# Patient Record
Sex: MALE | Race: WHITE | NOT HISPANIC OR LATINO | URBAN - METROPOLITAN AREA
[De-identification: names, ages, dates, MRNs, and addresses within clinical notes are randomized per-mention and may not be internally consistent; named-entity substitution may affect disease eponyms.]

---

## 2022-04-11 ENCOUNTER — INPATIENT (INPATIENT)
Facility: HOSPITAL | Age: 87
LOS: 38 days | Discharge: HOME HEALTH | DRG: 885 | End: 2022-05-20
Attending: PSYCHIATRY & NEUROLOGY | Admitting: PSYCHIATRY & NEUROLOGY
Payer: MEDICARE

## 2022-04-11 VITALS
SYSTOLIC BLOOD PRESSURE: 106 MMHG | TEMPERATURE: 98 F | OXYGEN SATURATION: 96 % | DIASTOLIC BLOOD PRESSURE: 55 MMHG | RESPIRATION RATE: 17 BRPM | HEIGHT: 67 IN | WEIGHT: 149.91 LBS | HEART RATE: 77 BPM

## 2022-04-11 DIAGNOSIS — F33.2 MAJOR DEPRESSIVE DISORDER, RECURRENT SEVERE WITHOUT PSYCHOTIC FEATURES: ICD-10-CM

## 2022-04-11 LAB
ALBUMIN SERPL ELPH-MCNC: 3.7 G/DL — SIGNIFICANT CHANGE UP (ref 3.3–5)
ALP SERPL-CCNC: 58 U/L — SIGNIFICANT CHANGE UP (ref 40–120)
ALT FLD-CCNC: 18 U/L — SIGNIFICANT CHANGE UP (ref 10–45)
AMPHET UR-MCNC: NEGATIVE — SIGNIFICANT CHANGE UP
ANION GAP SERPL CALC-SCNC: 10 MMOL/L — SIGNIFICANT CHANGE UP (ref 5–17)
APAP SERPL-MCNC: <5 UG/ML — LOW (ref 10–30)
APPEARANCE UR: CLEAR — SIGNIFICANT CHANGE UP
AST SERPL-CCNC: 18 U/L — SIGNIFICANT CHANGE UP (ref 10–40)
BARBITURATES UR SCN-MCNC: NEGATIVE — SIGNIFICANT CHANGE UP
BASOPHILS # BLD AUTO: 0.04 K/UL — SIGNIFICANT CHANGE UP (ref 0–0.2)
BASOPHILS NFR BLD AUTO: 0.5 % — SIGNIFICANT CHANGE UP (ref 0–2)
BENZODIAZ UR-MCNC: NEGATIVE — SIGNIFICANT CHANGE UP
BILIRUB SERPL-MCNC: 0.3 MG/DL — SIGNIFICANT CHANGE UP (ref 0.2–1.2)
BILIRUB UR-MCNC: NEGATIVE — SIGNIFICANT CHANGE UP
BUN SERPL-MCNC: 29 MG/DL — HIGH (ref 7–23)
CALCIUM SERPL-MCNC: 9.1 MG/DL — SIGNIFICANT CHANGE UP (ref 8.4–10.5)
CHLORIDE SERPL-SCNC: 102 MMOL/L — SIGNIFICANT CHANGE UP (ref 96–108)
CO2 SERPL-SCNC: 25 MMOL/L — SIGNIFICANT CHANGE UP (ref 22–31)
COCAINE METAB.OTHER UR-MCNC: NEGATIVE — SIGNIFICANT CHANGE UP
COLOR SPEC: YELLOW — SIGNIFICANT CHANGE UP
CREAT SERPL-MCNC: 1.43 MG/DL — HIGH (ref 0.5–1.3)
DIFF PNL FLD: NEGATIVE — SIGNIFICANT CHANGE UP
EGFR: 45 ML/MIN/1.73M2 — LOW
EOSINOPHIL # BLD AUTO: 0 K/UL — SIGNIFICANT CHANGE UP (ref 0–0.5)
EOSINOPHIL NFR BLD AUTO: 0 % — SIGNIFICANT CHANGE UP (ref 0–6)
ETHANOL SERPL-MCNC: <10 MG/DL — SIGNIFICANT CHANGE UP (ref 0–10)
GLUCOSE SERPL-MCNC: 109 MG/DL — HIGH (ref 70–99)
GLUCOSE UR QL: NEGATIVE — SIGNIFICANT CHANGE UP
HCT VFR BLD CALC: 30.2 % — LOW (ref 39–50)
HGB BLD-MCNC: 9.9 G/DL — LOW (ref 13–17)
IMM GRANULOCYTES NFR BLD AUTO: 0.2 % — SIGNIFICANT CHANGE UP (ref 0–1.5)
KETONES UR-MCNC: NEGATIVE — SIGNIFICANT CHANGE UP
LEUKOCYTE ESTERASE UR-ACNC: NEGATIVE — SIGNIFICANT CHANGE UP
LYMPHOCYTES # BLD AUTO: 1.01 K/UL — SIGNIFICANT CHANGE UP (ref 1–3.3)
LYMPHOCYTES # BLD AUTO: 12.4 % — LOW (ref 13–44)
MCHC RBC-ENTMCNC: 32.8 GM/DL — SIGNIFICANT CHANGE UP (ref 32–36)
MCHC RBC-ENTMCNC: 33.7 PG — SIGNIFICANT CHANGE UP (ref 27–34)
MCV RBC AUTO: 102.7 FL — HIGH (ref 80–100)
METHADONE UR-MCNC: NEGATIVE — SIGNIFICANT CHANGE UP
MONOCYTES # BLD AUTO: 0.68 K/UL — SIGNIFICANT CHANGE UP (ref 0–0.9)
MONOCYTES NFR BLD AUTO: 8.3 % — SIGNIFICANT CHANGE UP (ref 2–14)
NEUTROPHILS # BLD AUTO: 6.4 K/UL — SIGNIFICANT CHANGE UP (ref 1.8–7.4)
NEUTROPHILS NFR BLD AUTO: 78.6 % — HIGH (ref 43–77)
NITRITE UR-MCNC: NEGATIVE — SIGNIFICANT CHANGE UP
NRBC # BLD: 0 /100 WBCS — SIGNIFICANT CHANGE UP (ref 0–0)
OPIATES UR-MCNC: NEGATIVE — SIGNIFICANT CHANGE UP
PCP SPEC-MCNC: SIGNIFICANT CHANGE UP
PCP UR-MCNC: NEGATIVE — SIGNIFICANT CHANGE UP
PH UR: 6 — SIGNIFICANT CHANGE UP (ref 5–8)
PLATELET # BLD AUTO: 213 K/UL — SIGNIFICANT CHANGE UP (ref 150–400)
POTASSIUM SERPL-MCNC: 3.9 MMOL/L — SIGNIFICANT CHANGE UP (ref 3.5–5.3)
POTASSIUM SERPL-SCNC: 3.9 MMOL/L — SIGNIFICANT CHANGE UP (ref 3.5–5.3)
PROT SERPL-MCNC: 6.7 G/DL — SIGNIFICANT CHANGE UP (ref 6–8.3)
PROT UR-MCNC: NEGATIVE MG/DL — SIGNIFICANT CHANGE UP
RBC # BLD: 2.94 M/UL — LOW (ref 4.2–5.8)
RBC # FLD: 14.9 % — HIGH (ref 10.3–14.5)
SALICYLATES SERPL-MCNC: <0.3 MG/DL — LOW (ref 2.8–20)
SARS-COV-2 RNA SPEC QL NAA+PROBE: NEGATIVE — SIGNIFICANT CHANGE UP
SODIUM SERPL-SCNC: 137 MMOL/L — SIGNIFICANT CHANGE UP (ref 135–145)
SP GR SPEC: 1.02 — SIGNIFICANT CHANGE UP (ref 1–1.03)
THC UR QL: NEGATIVE — SIGNIFICANT CHANGE UP
UROBILINOGEN FLD QL: 0.2 E.U./DL — SIGNIFICANT CHANGE UP
WBC # BLD: 8.15 K/UL — SIGNIFICANT CHANGE UP (ref 3.8–10.5)
WBC # FLD AUTO: 8.15 K/UL — SIGNIFICANT CHANGE UP (ref 3.8–10.5)

## 2022-04-11 PROCEDURE — 99285 EMERGENCY DEPT VISIT HI MDM: CPT

## 2022-04-11 NOTE — ED ADULT NURSE NOTE - OBJECTIVE STATEMENT
Patient presents to ED c/o being depressed. Patient states that his daughters urged him to come in but states "I don't think I need to be here." Patient denies SI/HI or audio/visual hallucinations. Patient resting in bed in no acute distress at this time. PMH Depressed and Hard of hearing with hearing aid in right ear. NKA status confirmed.

## 2022-04-11 NOTE — ED BEHAVIORAL HEALTH ASSESSMENT NOTE - DETAILS
spoke to Dr Grant regarding the need to complete 9.27 form handoff to PRICILA Campa according to daughter, patient has never had any suicidal ideation/intent/plan

## 2022-04-11 NOTE — ED BEHAVIORAL HEALTH ASSESSMENT NOTE - OTHER
refused to answer retired, domiciled with daughter continues to think that he is in a bad financial predicament

## 2022-04-11 NOTE — ED BEHAVIORAL HEALTH ASSESSMENT NOTE - SUMMARY
Patient is a 89yo retired  M, domiciled with daughter Brianda (), w/ PMH of hearing loss using hearing aid, colitis, HLD, HTN, on Xarelto 15mg daily (PCP Dr Barrett  #1 #5), PPH of depression, multiple psych hospitalization requiring ECT, last 4 years ago at [facility], currently on sertraline 50mg bid, olanzapine 5mg qd, sees Dr Josh Simmons () for outpatient, presents today accompanied by daughters due to worsening depression and obsessive thought. Patient is sent on recommendation by psychiatrist as patient's depression is worsening, not responding to medications.    Patient seen in quiet room with daughter Valery/Brianda present. Upon evaluation, patient meets criteria for MDD (depressed mood, anhedonia, impaired concentration, anergia, weight loss, hypersomnia, hopelessness) with no acute SI. Outpatient attempts to stabilize patient has not been successful. Daughter had tried to work with outpatient psychiatrist Dr Simmons to manage patient's symptoms as outpatient but he has not responded to medications, only to ECT like he was 3-4 years ago (according to daughter, 4-5 sessions of ECT would show improvements). Daughter had tried to seek outpatient ECT however the waitlist is 4-5 weeks. Daughters and psychiatrist are concerned of patient's current state of depression. Patient is completely independent with ADLs and daughter assist him with some iADLs.    Due to current active depressive symptoms, nonresponsive to outpatient treatment, and collateral from psychiatrist and family, patient meets involuntary admission criteria. 2PC will be signed by ED attending, patient to be admitted to 8U for stabilization and possible ECT as inpatient.    Plan:  - Admit to 8U  - Does not need 1:1 in 8U. Continue 1:1 in ED pending transfer  - Continue medication as follows:  Myrbetriq 50mg daily  Xarelto 15mg daily with meals  Sertraline 50mg twice daily  Olanzapine 5mg daily  Losartan 50mg daily  Furosemide 20mg twice daily  Atorvastatin 20mg daily  Mesalamine 1.2mg 2 tablets twice daily  Vit D3 2000 units  Magnesium 500mg  -Add on TSH and rule out medical causes of depression

## 2022-04-11 NOTE — ED BEHAVIORAL HEALTH ASSESSMENT NOTE - RISK ASSESSMENT
High Acute Suicide Risk High risk for self harm due to acute dysphoria, hopelessness, not responding to outpatient treatment    Protective factor is supportive family, has established psychiatric care, has no self harm history, no access to guns.

## 2022-04-11 NOTE — ED ADULT NURSE NOTE - CHIEF COMPLAINT QUOTE
Patient arrives ambulatory with daughter for eval of "deep depression."  Patient reports, "a whole raft of problems, personal and financial."  Denies SI/HI/AH/VH.  Hx ECT treatment at [employer] ~4 years ago, patient's doctor spoke with Dr. Pittman "they are expecting him."  Patient is hard of hearing, hearing aid right ear.

## 2022-04-11 NOTE — ED BEHAVIORAL HEALTH ASSESSMENT NOTE - DESCRIPTION
Stable. Patient did bloodwork, did not require meds. Patient is ambulatory able to walk without assistance to toilet/quiet room. see above patient refused to elaborate. Currently lives with daughter Brianda

## 2022-04-11 NOTE — ED BEHAVIORAL HEALTH ASSESSMENT NOTE - OTHER PAST PSYCHIATRIC HISTORY (INCLUDE DETAILS REGARDING ONSET, COURSE OF ILLNESS, INPATIENT/OUTPATIENT TREATMENT)
Patient has history of depression, multiple hospitalizations, last ECT in 4 years ago at [facility]. Refused to provide further information regarding his psychiatric illness

## 2022-04-11 NOTE — ED BEHAVIORAL HEALTH ASSESSMENT NOTE - HPI (INCLUDE ILLNESS QUALITY, SEVERITY, DURATION, TIMING, CONTEXT, MODIFYING FACTORS, ASSOCIATED SIGNS AND SYMPTOMS)
Patient is a 89yo retired  M, domiciled with daughter Brianda (), w/ PMH of hearing loss using hearing aid, colitis, HLD, HTN, on Xarelto 15mg daily (PCP Dr Barrett  #1 #5), PPH of depression, multiple psych hospitalization requiring ECT, last 4 years ago at [facility], currently on sertraline 50mg bid, olanzapine 5mg qd, sees Dr Josh Simmons () for outpatient, presents today accompanied by daughters due to worsening depression and obsessive thought. Patient is sent on recommendation by psychiatrist as patient's depression is worsening, not responding to medications.    Patient seen in quiet room with daughter Valery/Brianda present. Patient is somewhat partially uncooperative during interview, appears miserable, defeated, and annoyed. He kept repeating that people are not going to care for him as he could not hear them. Patient says that he has been feeling more depressed, has no olivia in doing his activities, sleeping more than usual (16hr/day), no energy, poor concentration, and 15 lbs weight loss. He has no active suicidal ideation but feels miserable that he is in the hospital and said to his daughter 'you might as well shoot me', denies having access to firearm or have weapons at home. He denies manic symptoms, denies nightmares. Thinks his memory is good. He could not identify triggers to his depressive symptom although noted improvements with ECT. He does not want to be admitted, and feel very concerned about his financial situation.    Daughters Valery and Brianda provided collateral. Brianda noted that after patient had colitis around a few weeks ago, patient's depression has worsened. When patient becomes more depressed, he became fixated with the family's finances and had concerns that there would be no food or that they would be kicked out from the apartment. Daughter had to put lock in her bedroom as patient would come to her bed to say that they are running out of money. Patient has become less active, refusing to exercise like he used to regularly. Daughter had tried to work with outpatient psychiatrist Dr Simmons to manage patient's symptoms as outpatient but he has not responded to medications, only to ECT like he was 3-4 years ago (according to daughter, 4-5 sessions of ECT would show improvements). Daughter had tried to seek outpatient ECT however the waitlist is 4-5 weeks. Daughters and psychiatrist are concerned of patient's current state of depression. Patient is completely independent with ADLs and daughter assist him with some iADLs.

## 2022-04-11 NOTE — ED ADULT TRIAGE NOTE - CHIEF COMPLAINT QUOTE
Patient arrives ambulatory with daughter for eval of "deep depression."  Patient reports, "a whole raft of problems, personal and financial."  Denies SI/HI/AH/VH.  Hx ECT treatment at [facility] ~4 years ago, patient's doctor spoke with Dr. Pittman "they are expecting him."  Patient is hard of hearing, hearing aid right ear.

## 2022-04-11 NOTE — ED PROVIDER NOTE - CLINICAL SUMMARY MEDICAL DECISION MAKING FREE TEXT BOX
here w/ worsening depression despite outpatient tx  wll plan for admission to 8 uris for further treatment

## 2022-04-11 NOTE — ED PROVIDER NOTE - OBJECTIVE STATEMENT
89M retired  M, domiciled with daughter Brianda (), w/ PMH of hearing loss using hearing aid, colitis, HLD, HTN, on Xarelto 15mg daily (PCP Dr Barrett  #1 #5), PPH of depression, multiple psych hospitalization requiring ECT, last 4 years ago at [facility], currently on sertraline 50mg bid, olanzapine 5mg qd, sees Dr Josh Simmons () for outpatient, presents today accompanied by daughters due to worsening depression and obsessive thought. Patient is sent on recommendation by psychiatrist as patient's depression is worsening, not responding to medications.

## 2022-04-11 NOTE — ED BEHAVIORAL HEALTH ASSESSMENT NOTE - CURRENT MEDICATION
Myrbetriq 50mg daily  Xarelto 15mg daily with meals  Sertraline 50mg twice daily  Olanzapine 5mg daily  Losartan 50mg daily  Furosemide 20mg twice daily  Atorvastatin 20mg daily  Mesalamine 1.2mg 2 tablets twice daily  Vit D3 2000 units  Magnesium 500mg

## 2022-04-12 PROCEDURE — 99233 SBSQ HOSP IP/OBS HIGH 50: CPT

## 2022-04-12 RX ORDER — FUROSEMIDE 40 MG
20 TABLET ORAL
Refills: 0 | Status: DISCONTINUED | OUTPATIENT
Start: 2022-04-12 | End: 2022-04-18

## 2022-04-12 RX ORDER — LOSARTAN POTASSIUM 100 MG/1
50 TABLET, FILM COATED ORAL DAILY
Refills: 0 | Status: DISCONTINUED | OUTPATIENT
Start: 2022-04-12 | End: 2022-04-18

## 2022-04-12 RX ORDER — MAGNESIUM OXIDE 400 MG ORAL TABLET 241.3 MG
400 TABLET ORAL DAILY
Refills: 0 | Status: DISCONTINUED | OUTPATIENT
Start: 2022-04-12 | End: 2022-05-12

## 2022-04-12 RX ORDER — ATORVASTATIN CALCIUM 80 MG/1
20 TABLET, FILM COATED ORAL DAILY
Refills: 0 | Status: DISCONTINUED | OUTPATIENT
Start: 2022-04-12 | End: 2022-05-12

## 2022-04-12 RX ORDER — RIVAROXABAN 15 MG-20MG
15 KIT ORAL DAILY
Refills: 0 | Status: DISCONTINUED | OUTPATIENT
Start: 2022-04-12 | End: 2022-04-19

## 2022-04-12 RX ORDER — CHOLECALCIFEROL (VITAMIN D3) 125 MCG
2000 CAPSULE ORAL DAILY
Refills: 0 | Status: DISCONTINUED | OUTPATIENT
Start: 2022-04-12 | End: 2022-05-12

## 2022-04-12 RX ORDER — SERTRALINE 25 MG/1
50 TABLET, FILM COATED ORAL
Refills: 0 | Status: DISCONTINUED | OUTPATIENT
Start: 2022-04-12 | End: 2022-04-14

## 2022-04-12 RX ORDER — METHYLPREDNISOLONE 4 MG
500 TABLET ORAL DAILY
Refills: 0 | Status: DISCONTINUED | OUTPATIENT
Start: 2022-04-12 | End: 2022-04-12

## 2022-04-12 RX ORDER — OLANZAPINE 15 MG/1
5 TABLET, FILM COATED ORAL DAILY
Refills: 0 | Status: DISCONTINUED | OUTPATIENT
Start: 2022-04-12 | End: 2022-04-14

## 2022-04-12 RX ADMIN — OLANZAPINE 5 MILLIGRAM(S): 15 TABLET, FILM COATED ORAL at 11:12

## 2022-04-12 RX ADMIN — Medication 20 MILLIGRAM(S): at 19:16

## 2022-04-12 RX ADMIN — SERTRALINE 50 MILLIGRAM(S): 25 TABLET, FILM COATED ORAL at 11:12

## 2022-04-12 RX ADMIN — LOSARTAN POTASSIUM 50 MILLIGRAM(S): 100 TABLET, FILM COATED ORAL at 11:12

## 2022-04-12 RX ADMIN — Medication 20 MILLIGRAM(S): at 11:19

## 2022-04-12 RX ADMIN — MAGNESIUM OXIDE 400 MG ORAL TABLET 400 MILLIGRAM(S): 241.3 TABLET ORAL at 11:11

## 2022-04-12 NOTE — BH PATIENT PROFILE - FALL HARM RISK - HARM RISK INTERVENTIONS
Communicate Risk of Fall with Harm to all staff/Monitor for mental status changes/Monitor gait and stability/Move patient closer to nurses' station/Reinforce activity limits and safety measures with patient and family/Tailored Fall Risk Interventions/Visual Cue: Yellow wristband and red socks/Bed in lowest position, wheels locked, appropriate side rails in place/Non-slip footwear when patient is out of bed/Physically safe environment - no spills, clutter or unnecessary equipment/Purposeful Proactive Rounding

## 2022-04-12 NOTE — BH INPATIENT PSYCHIATRY ASSESSMENT NOTE - NSBHASSESSSUMMFT_PSY_ALL_CORE
Mr. Arriaga is a 89-year-old retired man, domiciled with daughter Brainda (), w/ PMH of hearing loss using hearing aid, colitis, HLD, HTN, on Xarelto 15mg daily (PCP Dr Barrett  #1 #5), PPH of depression, multiple psych hospitalization requiring ECT, last 4 years ago at [facility], currently on sertraline 50mg bid, olanzapine 5mg qd, sees Dr Josh Simmons () for outpatient, presenting due to worsening depression and obsessive thought.    Due to current active depressive symptoms, nonresponsive to outpatient treatment, and collateral from psychiatrist and family, patient meets involuntary admission criteria. He has been admitted to 8U for stabilization and possible ECT as inpatient. Despite not endorsing suicidality, the patient has experienced weight loss 2/2 severe depression, so ECT may be a good choice for him. Unfortunately, he is currently undecided regarding this treatment and has not yet given permission. Will continue to reassess and will consult medicine for clearance in the meantime.  MoCA score 20/30. Unclear how much of this is pseudodementia vs neurocognitive impairment vs hearing loss.      Plan:  - Does not need 1:1 in 8U  - Continue medication as follows:     Xarelto 15mg daily with meals     Sertraline 50mg twice daily     Olanzapine 5mg daily     Losartan 50mg daily     Furosemide 20mg twice daily     Atorvastatin 20mg daily     Vit D3 2000 units     Magnesium 500mg  - Need clarification on "Mesalamine 1.2mg 2 tablets twice daily"; this is not a normal dose, as most are in the 500-1000mg range  - Need daughter to bring in Myrbetriq 50mg daily, as it is not on formulary at [facility]  - Consider starting Wellbutrin or other activating antidepressant  - F/u labs:     TSH     RPR     B12     Folate     Lipid profile    4/12: Pt reluctant to engage today; does not want to be on [facility]. Equivocating regarding ECT. Clearly depressed. MoCA 20/30. Will continue to monitor and to discuss ECT.

## 2022-04-12 NOTE — BH INPATIENT PSYCHIATRY ASSESSMENT NOTE - MODIFICATIONS
Observe ; medical evaluation; consider for ECT but patient seems to reject it at this time.  Consider alternatives such as augmentation with low dose Welbutrin or low dose Abilify  etc.

## 2022-04-12 NOTE — CHART NOTE - NSCHARTNOTEFT_GEN_A_CORE
[facility]– [place]  PHYSICAL EXAM: Agree/Declined    VITALS: T(C): 36.6 (04-12-22 @ 01:50), Max: 36.7 (04-11-22 @ 20:46)  HR: 68 (04-12-22 @ 01:50) (67 - 77)  BP: 142/52 (04-12-22 @ 01:50) (106/55 - 157/68)  RR: 18 (04-12-22 @ 01:50) (17 - 18)  SpO2: 98% (04-12-22 @ 01:50) (96% - 99%)      GENERAL: NAD, comfortable, ambulating  HEAD:  Atraumatic, Normocephalic  EYES: EOMI, PERRLA, conjunctiva and sclera clear  ENT: Moist mucous membranes  NECK: Supple, No JVD  CHEST/LUNG: Clear to auscultation bilaterally; No rales, rhonchi, wheezing, or rubs. Unlabored respirations  HEART: Regular rate and rhythm; No murmurs, rubs, or gallops  ABDOMEN: BSx4; Soft, nontender, nondistended  EXTREMITIES:  No clubbing, cyanosis, or edema  NERVOUS SYSTEM:  A&Ox3, no focal deficits   SKIN: No rashes or lesions [facility]  PHYSICAL EXAM: Agree    VITALS: T(C): 36.6 (04-12-22 @ 01:50), Max: 36.7 (04-11-22 @ 20:46)  HR: 68 (04-12-22 @ 01:50) (67 - 77)  BP: 142/52 (04-12-22 @ 01:50) (106/55 - 157/68)  RR: 18 (04-12-22 @ 01:50) (17 - 18)  SpO2: 98% (04-12-22 @ 01:50) (96% - 99%)      GENERAL: NAD, comfortable, ambulating; quite thin  HEAD:  Atraumatic, Normocephalic  EYES: EOMI, PERRLA, conjunctiva and sclera clear  ENT: Moist mucous membranes  NECK: Supple, No JVD  CHEST/LUNG: Clear to auscultation bilaterally; No rales, rhonchi, wheezing, or rubs. Unlabored respirations  HEART: Regular rate and rhythm; No murmurs, rubs, or gallops  ABDOMEN: BSx4; Soft, nontender, nondistended  EXTREMITIES:  No clubbing, cyanosis, or edema  NERVOUS SYSTEM:  A&Ox3, no focal deficits; severe bilateral hearing loss  SKIN: No rashes or lesions

## 2022-04-12 NOTE — BH SOCIAL WORK INITIAL PSYCHOSOCIAL EVALUATION - OTHER PAST PSYCHIATRIC HISTORY (INCLUDE DETAILS REGARDING ONSET, COURSE OF ILLNESS, INPATIENT/OUTPATIENT TREATMENT)
Patient has history of depression, multiple hospitalizations, last ECT in 4 years ago at [facility]. Refused to provide further information regarding his psychiatric illness

## 2022-04-12 NOTE — BH INPATIENT PSYCHIATRY ASSESSMENT NOTE - HPI (INCLUDE ILLNESS QUALITY, SEVERITY, DURATION, TIMING, CONTEXT, MODIFYING FACTORS, ASSOCIATED SIGNS AND SYMPTOMS)
Mr. Arriaga is a 89-year-old retired man, domiciled with daughter Brianda (), w/ PMH of hearing loss using hearing aid, colitis, HLD, HTN, on Xarelto 15mg daily (PCP Dr Barrett  #1 #5), PPH of depression, multiple psych hospitalization requiring ECT, last 4 years ago at [facility], currently on sertraline 50mg bid, olanzapine 5mg qd, sees Dr Josh Simmons () for outpatient, presenting due to worsening depression and obsessive thought.    Per CL service (Dr. Knox):  "Patient is sent on recommendation by psychiatrist as patient's depression is worsening, not responding to medications.  Patient is somewhat partially uncooperative... appears miserable, defeated, and annoyed. He kept repeating that people are not going to care for him as he could not hear them. Patient says that he has been feeling more depressed, has no olivia in doing his activities, sleeping more than usual (16hr/day), no energy, poor concentration, and 15 lbs weight loss. He has no active suicidal ideation but feels miserable that he is in the hospital and said to his daughter 'you might as well shoot me', denies having access to firearm or have weapons at home. He denies manic symptoms, denies nightmares. Thinks his memory is good. He could not identify triggers to his depressive symptom although noted improvements with ECT. He does not want to be admitted, and feel very concerned about his financial situation.    Daughters Valery and Brianda provided collateral. Brianda noted that after patient had colitis around a few weeks ago, patient's depression has worsened. When patient becomes more depressed, he became fixated with the family's finances and had concerns that there would be no food or that they would be kicked out from the apartment. Daughter had to put lock in her bedroom as patient would come to her bed to say that they are running out of money. Patient has become less active, refusing to exercise like he used to regularly. Daughter had tried to work with outpatient psychiatrist Dr Simmons to manage patient's symptoms as outpatient but he has not responded to medications, only to ECT like he was 3-4 years ago (according to daughter, 4-5 sessions of ECT would show improvements). Daughter had tried to seek outpatient ECT however the waitlist is 4-5 weeks. Daughters and psychiatrist are concerned of patient's current state of depression. Patient is completely independent with ADLs and daughter assist him with some iADLs."    Pt was admitted to [facility] on 2PC; transferred safely and without incident.    On initial interview on the unit, Mr. Arriaga presented much as described by Dr. Knox: slightly irritated, annoyed at being here, low affect, very hard of hearing. Writer had to repeat questions several times at a very loud volume, but Mr. Arriaga corroborated the story above; he admitted to having felt depressed for "a while" and that his outpatient medications were not particularly helpful. He was not able to fully elaborate on the details of his illness but did admit to "lots" of sleep, poor energy, low mood, poor concentration and lack of interest in activities. He asked repeatedly for discharge and equivocated regarding ECT, neither granting nor rescinding permission. He acknowledged that it had been helpful 4-5 years ago (calling it "ECP") but could not describe other successful (or even partially so) past treatments.  Mr. 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 He lives with his daughter (Brianda) but hates feeling like he relies on her (even though he is independent with ADLs per reports). He denied drug or alcohol use, denied SI, and denied symptoms of psychosis. He was unable to explain worries about running out of money (see above).      Note: Mr. Arriaga scored a 20/30 on today's MoCA exam. However, he is extremely hard of hearing. Also worth noting that he used "larger" F-words during exam, such as "forethought" or "flourish."    Call placed to kb Lamar ([phone]) at approximately 3 pm; left message.

## 2022-04-12 NOTE — BH INPATIENT PSYCHIATRY ASSESSMENT NOTE - NSBHMETABOLIC_PSY_ALL_CORE_FT
BMI: BMI (kg/m2): 22.7 (04-12-22 @ 09:00)  HbA1c:   Glucose:   BP: 161/100 (04-12-22 @ 09:00) (106/55 - 161/100)  Lipid Panel:  BMI: BMI (kg/m2): 22.7 (04-12-22 @ 09:00)  HbA1c:   Glucose:   BP: 92/50 (04-12-22 @ 16:40) (92/50 - 161/100)  Lipid Panel:

## 2022-04-12 NOTE — BH INPATIENT PSYCHIATRY ASSESSMENT NOTE - OTHER PAST PSYCHIATRIC HISTORY (INCLUDE DETAILS REGARDING ONSET, COURSE OF ILLNESS, INPATIENT/OUTPATIENT TREATMENT)
Patient has history of depression, multiple hospitalizations, last ECT in 4 years ago at [facility]. Did not provide further information regarding his psychiatric illness.

## 2022-04-12 NOTE — BH INPATIENT PSYCHIATRY ASSESSMENT NOTE - CASE SUMMARY
89-year-old retired man, domiciled with daughter Brianda (), w/ PMH of hearing loss using hearing aid, colitis, HLD, HTN, on Xarelto 15mg daily (PCP Dr Barrett  #1 #5), PPH of depression, multiple psych hospitalization requiring ECT, last 4 years ago at [facility], currently on sertraline 50mg bid, olanzapine 5mg qd, sees Dr Josh Simmons () for outpatient, presenting due to worsening depression and obsessive thought.

## 2022-04-12 NOTE — ED BEHAVIORAL HEALTH NOTE - BEHAVIORAL HEALTH NOTE
Received handoff from day team Dr. Knox regarding patient. Pending C legals to be completed and orders to be placed. Handoff was already provided to [facility].    Patient was interviewed and part B completed at [facility]. Orders have been entered with the exception of "Myrbetriq 50mg daily" and "Mesalamine 1.2mg 2 tablets twice daily." Not on formulary. Discussed with [facility] and they recommended that patient bring bottles to determine brand and/or replacement medication can be determined in am.

## 2022-04-12 NOTE — BH INPATIENT PSYCHIATRY ASSESSMENT NOTE - NSBHCHARTREVIEWVS_PSY_A_CORE FT
Vital Signs Last 24 Hrs  T(C): 36.4 (04-12-22 @ 09:00), Max: 36.7 (04-11-22 @ 20:46)  T(F): 97.6 (04-12-22 @ 09:00), Max: 98 (04-11-22 @ 20:46)  HR: 79 (04-12-22 @ 09:00) (67 - 79)  BP: 161/100 (04-12-22 @ 09:00) (106/55 - 161/100)  BP(mean): --  RR: 16 (04-12-22 @ 09:00) (16 - 18)  SpO2: 97% (04-12-22 @ 09:00) (96% - 99%)     Vital Signs Last 24 Hrs  T(C): 36.9 (04-12-22 @ 16:40), Max: 36.9 (04-12-22 @ 16:40)  T(F): 98.4 (04-12-22 @ 16:40), Max: 98.4 (04-12-22 @ 16:40)  HR: 80 (04-12-22 @ 16:40) (67 - 80)  BP: 92/50 (04-12-22 @ 16:40) (92/50 - 161/100)  BP(mean): --  RR: 18 (04-12-22 @ 16:40) (16 - 18)  SpO2: 97% (04-12-22 @ 16:40) (97% - 99%)

## 2022-04-12 NOTE — BH INPATIENT PSYCHIATRY ASSESSMENT NOTE - CURRENT MEDICATION
MEDICATIONS  (STANDING):  atorvastatin 20 milliGRAM(s) Oral daily  cholecalciferol 2000 Unit(s) Oral daily  furosemide    Tablet 20 milliGRAM(s) Oral two times a day  losartan 50 milliGRAM(s) Oral daily  magnesium oxide 400 milliGRAM(s) Oral daily  OLANZapine 5 milliGRAM(s) Oral daily  rivaroxaban 15 milliGRAM(s) Oral daily  sertraline 50 milliGRAM(s) Oral two times a day    MEDICATIONS  (PRN):

## 2022-04-12 NOTE — DIETITIAN NUTRITION RISK NOTIFICATION - TREATMENT: THE FOLLOWING DIET HAS BEEN RECOMMENDED
1. Continue regular diet  >>Add low fiber restriction d/t colitis-pending MD verification  2. Monitor PO intake, honor food preferences as available  3. Recommend MVI to promote overall health  4. Align nutrition w/GOC at all times  >>Family reports Mexalaamine 1123mg (2caps/BID) & Butexonide 3mg (3 caps/ QD)  >>Family requested ECT treatment w/ Dr. Garcia  5. RD continue to follow per moderate nutrition risk

## 2022-04-13 LAB
A1C WITH ESTIMATED AVERAGE GLUCOSE RESULT: 5.7 % — HIGH (ref 4–5.6)
CHOLEST SERPL-MCNC: 120 MG/DL — SIGNIFICANT CHANGE UP
ESTIMATED AVERAGE GLUCOSE: 117 MG/DL — HIGH (ref 68–114)
HDLC SERPL-MCNC: 60 MG/DL — SIGNIFICANT CHANGE UP
LIPID PNL WITH DIRECT LDL SERPL: 50 MG/DL — SIGNIFICANT CHANGE UP
NON HDL CHOLESTEROL: 60 MG/DL — SIGNIFICANT CHANGE UP
TRIGL SERPL-MCNC: 50 MG/DL — SIGNIFICANT CHANGE UP

## 2022-04-13 PROCEDURE — 99233 SBSQ HOSP IP/OBS HIGH 50: CPT

## 2022-04-13 RX ORDER — MESALAMINE 400 MG
2.4 TABLET, DELAYED RELEASE (ENTERIC COATED) ORAL
Refills: 0 | Status: DISCONTINUED | OUTPATIENT
Start: 2022-04-13 | End: 2022-05-12

## 2022-04-13 RX ORDER — MIRABEGRON 50 MG/1
50 TABLET, EXTENDED RELEASE ORAL DAILY
Refills: 0 | Status: DISCONTINUED | OUTPATIENT
Start: 2022-04-13 | End: 2022-05-09

## 2022-04-13 RX ADMIN — MAGNESIUM OXIDE 400 MG ORAL TABLET 400 MILLIGRAM(S): 241.3 TABLET ORAL at 10:03

## 2022-04-13 RX ADMIN — SERTRALINE 50 MILLIGRAM(S): 25 TABLET, FILM COATED ORAL at 09:54

## 2022-04-13 RX ADMIN — LOSARTAN POTASSIUM 50 MILLIGRAM(S): 100 TABLET, FILM COATED ORAL at 09:54

## 2022-04-13 RX ADMIN — Medication 2000 UNIT(S): at 10:03

## 2022-04-13 RX ADMIN — Medication 20 MILLIGRAM(S): at 09:55

## 2022-04-13 RX ADMIN — RIVAROXABAN 15 MILLIGRAM(S): KIT at 10:03

## 2022-04-13 RX ADMIN — OLANZAPINE 5 MILLIGRAM(S): 15 TABLET, FILM COATED ORAL at 10:04

## 2022-04-13 NOTE — BH INPATIENT PSYCHIATRY PROGRESS NOTE - CASE SUMMARY
89-year-old retired man, domiciled with daughter Brianda (), w/ PMH of hearing loss using hearing aid, colitis, HLD, HTN, on Xarelto 15mg daily (PCP Dr Barrett  #1 #5), PPH of depression, multiple psych hospitalization requiring ECT, last 4 years ago at [facility], currently on sertraline 50mg bid, olanzapine 5mg qd, sees Dr Josh Simmons () for outpatient, presenting due to worsening depression and obsessive thought.      Due to current active depressive symptoms, nonresponsive to outpatient treatment, and collateral from psychiatrist and family, patient meets involuntary admission criteria. He has been admitted to 8U for stabilization and possible ECT as inpatient. Despite not endorsing suicidality, the patient has experienced weight loss 2/2 severe depression, so ECT may be a good choice for him. Unfortunately, he is currently undecided regarding this treatment and has not yet given permission. Will continue to reassess and will consult medicine for clearance in the meantime.  MoCA score 20/30. Unclear how much of this is pseudodementia vs neurocognitive impairment vs hearing loss.    ;;04/13: Sleep  appetite ok; no pain issues. sad depressed but ambivalent about ECT and refusing some labs; no SI or HI.  no AVH.  speech loud clear; difficulty hearing.  Some concern about evidence for macrocytic anemia;  need B12 and folate and med consult.

## 2022-04-13 NOTE — BH INPATIENT PSYCHIATRY PROGRESS NOTE - NSBHCHARTREVIEWVS_PSY_A_CORE FT
Vital Signs Last 24 Hrs  T(C): 36.9 (04-12-22 @ 16:40), Max: 36.9 (04-12-22 @ 16:40)  T(F): 98.4 (04-12-22 @ 16:40), Max: 98.4 (04-12-22 @ 16:40)  HR: 80 (04-12-22 @ 16:40) (80 - 80)  BP: 92/50 (04-12-22 @ 16:40) (92/50 - 92/50)  BP(mean): --  RR: 18 (04-12-22 @ 16:40) (18 - 18)  SpO2: 97% (04-12-22 @ 16:40) (97% - 97%)     Vital Signs Last 24 Hrs  T(C): 36.4 (04-13-22 @ 09:00), Max: 36.4 (04-13-22 @ 09:00)  T(F): 97.5 (04-13-22 @ 09:00), Max: 97.5 (04-13-22 @ 09:00)  HR: 78 (04-13-22 @ 09:00) (78 - 78)  BP: 169/77 (04-13-22 @ 09:00) (169/77 - 169/77)  BP(mean): --  RR: 18 (04-13-22 @ 09:00) (18 - 18)  SpO2: 97% (04-13-22 @ 09:00) (97% - 97%)

## 2022-04-13 NOTE — BH INPATIENT PSYCHIATRY PROGRESS NOTE - NSBHASSESSSUMMFT_PSY_ALL_CORE
Mr. Arriaga is a 89-year-old retired man, domiciled with daughter Brianda (), w/ PMH of hearing loss using hearing aid, colitis, HLD, HTN, on Xarelto 15mg daily (PCP Dr Barrett  #1 #5), PPH of depression, multiple psych hospitalization requiring ECT, last 4 years ago at [facility], currently on sertraline 50mg bid, olanzapine 5mg qd, sees Dr Josh Simmons () for outpatient, presenting due to worsening depression and obsessive thought.    Due to current active depressive symptoms, nonresponsive to outpatient treatment, and collateral from psychiatrist and family, patient meets involuntary admission criteria. He has been admitted to 8U for stabilization and possible ECT as inpatient. Despite not endorsing suicidality, the patient has experienced weight loss 2/2 severe depression, so ECT may be a good choice for him. Unfortunately, he is currently undecided regarding this treatment and has not yet given permission. Will continue to reassess and will consult medicine for clearance in the meantime.    MoCA score 20/30. Unclear how much of this is pseudodementia vs neurocognitive impairment vs hearing loss.    Labs resulted today; however patient refused many labs.     Plan:  -communicate with o/p psych  -discuss patient's decision (consents or does not consent) for ECT vs conservative management and outpatient referral to geriatric psychiatrist  -get medicine to clear for ECT, pending decision on treatment modality    - Does not need 1:1 in 8U  - Continue medication as follows:     Xarelto 15mg daily with meals     Sertraline 50mg twice daily; consider increase to 75 mg or 100 mg twice daily     Olanzapine 5mg daily     Losartan 50mg daily     Furosemide 20mg twice daily     Atorvastatin 20mg daily     Vit D3 2000 units     Magnesium 500mg  - Follow up on medications that daughters said they would bring in: clarification on "Mesalamine 1.2mg 2 tablets twice daily"; this is not a normal dose, as most are in the 500-1000mg range; and Myrbetriq 50mg daily, as it is not on formulary at [facility]  - Consider starting Wellbutrin or other activating antidepressant  - Labs:  -CBC shows mild macrocytic anemia () However, MCV >110 is generally what is considered the cutoff for [id number] anemia  -UDS - negative     TSH; RPR; B9/B12 - patient refused, for next collection     UA- within normal limits     Lipid profile - within normal limits  HgbA1c - 5.7%, c/w prediabetes  EKG - pending    4/12: Pt reluctant to engage today; does not want to be on 8Uris. Equivocating regarding ECT. Clearly depressed. MoCA 20/30. Will continue to monitor and to discuss ECT.    4/13:       Mr. Arriaga is a 89-year-old retired man, domiciled with daughter Brianda (), w/ PMH of hearing loss using hearing aid, colitis, HLD, HTN, on Xarelto 15mg daily (PCP Dr Barrett  #1 #5), PPH of depression, multiple psych hospitalization requiring ECT, last 4 years ago at [facility], currently on sertraline 50mg bid, olanzapine 5mg qd, sees Dr Josh Simmons () for outpatient, presenting due to worsening depression and obsessive thought.    Due to current active depressive symptoms, nonresponsive to outpatient treatment, and collateral from psychiatrist and family, patient meets involuntary admission criteria. He has been admitted to 8U for stabilization and possible ECT as inpatient. Despite not endorsing suicidality, the patient has experienced weight loss 2/2 severe depression, so ECT may be a good choice for him. Unfortunately, he is currently undecided regarding this treatment and has not yet given permission. The patient himself states that he "does not remember" whether the treatment was effective in the past and he is "not optimistic" about the potential efficacy of the treatment in the future. Will continue to reassess and will consult medicine for clearance in the meantime.  MoCA score 20/30. Unclear how much of this is pseudodementia vs neurocognitive impairment vs hearing loss.    Some labs resulted today; however patient refused many labs.    Plan:  - F/u with o/p psych  - Discuss patient's decision (consents or does not consent) for ECT vs conservative management and outpatient referral to geriatric psychiatrist  - Medicine consult    - Does not need 1:1 in 8U  - Continue medication as follows:     Xarelto 15mg daily with meals     Sertraline 50mg twice daily; consider increase to 75 mg or 100 mg twice daily     Olanzapine 5mg daily     Losartan 50mg daily     Furosemide 20mg twice daily     Atorvastatin 20mg daily     Vit D3 2000 units     Magnesium 500mg  - Follow up on medications that daughters said they would bring in: clarification on "Mesalamine 1.2mg 2 tablets twice daily"; this is not a normal dose, as most are in the 500-1000mg range; and Myrbetriq 50mg daily, as it is not on formulary at [facility]  - Consider starting Wellbutrin or other activating antidepressant  - Labs:  - CBC shows mild macrocytic anemia ()  - UDS - negative     TSH; RPR; B9/B12 - patient refused, for next collection     UA - within normal limits     Lipid profile - within normal limits  - HgbA1c - 5.7%  - EKG - pending    4/12: Pt reluctant to engage today; does not want to be on 8Uris. Equivocating regarding ECT. Clearly depressed. MoCA 20/30. Will continue to monitor and to discuss ECT.    4/13: Pt remains ambivalent about treatment. Not asking for discharge today. Refused some labs, will try to get these again. Alert and oriented. No SI. Will follow up anemia.

## 2022-04-13 NOTE — BH INPATIENT PSYCHIATRY PROGRESS NOTE - NSBHMETABOLIC_PSY_ALL_CORE_FT
BMI: BMI (kg/m2): 22.7 (04-12-22 @ 09:00)  HbA1c: A1C with Estimated Average Glucose Result: 5.7 % (04-13-22 @ 07:45)    Glucose:   BP: 92/50 (04-12-22 @ 16:40) (92/50 - 161/100)  Lipid Panel: Date/Time: 04-13-22 @ 07:45  Cholesterol, Serum: 120  Direct LDL: --  HDL Cholesterol, Serum: 60  Total Cholesterol/HDL Ration Measurement: --  Triglycerides, Serum: 50   BMI: BMI (kg/m2): 22.7 (04-12-22 @ 09:00)  HbA1c: A1C with Estimated Average Glucose Result: 5.7 % (04-13-22 @ 07:45)    Glucose:   BP: 169/77 (04-13-22 @ 09:00) (92/50 - 169/77)  Lipid Panel: Date/Time: 04-13-22 @ 07:45  Cholesterol, Serum: 120  Direct LDL: --  HDL Cholesterol, Serum: 60  Total Cholesterol/HDL Ration Measurement: --  Triglycerides, Serum: 50

## 2022-04-13 NOTE — BH INPATIENT PSYCHIATRY PROGRESS NOTE - CASE SUMMARY
89-year-old retired man, domiciled with daughter Brianda (), w/ PMH of hearing loss using hearing aid, colitis, HLD, HTN, on Xarelto 15mg daily (PCP Dr Barrett  #1 #5), PPH of depression, multiple psych hospitalization requiring ECT, last 4 years ago at [facility], currently on sertraline 50mg bid, olanzapine 5mg qd, sees Dr Josh Simmons () for outpatient, presenting due to worsening depression and obsessive thought.      Due to current active depressive symptoms, nonresponsive to outpatient treatment, and collateral from psychiatrist and family, patient meets involuntary admission criteria. He has been admitted to 8U for stabilization and possible ECT as inpatient. Despite not endorsing suicidality, the patient has experienced weight loss 2/2 severe depression, so ECT may be a good choice for him. Unfortunately, he is currently undecided regarding this treatment and has not yet given permission. Will continue to reassess and will consult medicine for clearance in the meantime.  MoCA score 20/30. Unclear how much of this is pseudodementia vs neurocognitive impairment vs hearing loss.    ;;04/13: Sleep  appetite ok; no pain issues. sad depressed but ambivalent about ECT and refusing some labs; no SI or HI.  no AVH.  speech loud clear; difficulty hearing.  Some concern about evidence for macrocytic anemia;  need B12 and folate and med consult.

## 2022-04-13 NOTE — BH INPATIENT PSYCHIATRY PROGRESS NOTE - NSBHASSESSSUMMFT_PSY_ALL_CORE
Mr. Arriaga is a 89-year-old retired man, domiciled with daughter Brianda (), w/ PMH of hearing loss using hearing aid, colitis, HLD, HTN, on Xarelto 15mg daily (PCP Dr Barrett  #1 #5), PPH of depression, multiple psych hospitalization requiring ECT, last 4 years ago at [facility], currently on sertraline 50mg bid, olanzapine 5mg qd, sees Dr Josh Simmons () for outpatient, presenting due to worsening depression and obsessive thought.    Due to current active depressive symptoms, nonresponsive to outpatient treatment, and collateral from psychiatrist and family, patient meets involuntary admission criteria. He has been admitted to 8U for stabilization and possible ECT as inpatient. Despite not endorsing suicidality, the patient has experienced weight loss 2/2 severe depression, so ECT may be a good choice for him. Unfortunately, he is currently undecided regarding this treatment and has not yet given permission. Will continue to reassess and will consult medicine for clearance in the meantime.  MoCA score 20/30. Unclear how much of this is pseudodementia vs neurocognitive impairment vs hearing loss.      Plan:  - Does not need 1:1 in 8U  - Continue medication as follows:     Xarelto 15mg daily with meals     Sertraline 50mg twice daily     Olanzapine 5mg daily     Losartan 50mg daily     Furosemide 20mg twice daily     Atorvastatin 20mg daily     Vit D3 2000 units     Magnesium 500mg  - Need clarification on "Mesalamine 1.2mg 2 tablets twice daily"; this is not a normal dose, as most are in the 500-1000mg range  - Need daughter to bring in Myrbetriq 50mg daily, as it is not on formulary at [facility]  - Consider starting Wellbutrin or other activating antidepressant  - F/u labs:     TSH     RPR     B12     Folate     Lipid profile    4/12: Pt reluctant to engage today; does not want to be on 8Uris. Equivocating regarding ECT. Clearly depressed. MoCA 20/30. Will continue to monitor and to discuss ECT.

## 2022-04-13 NOTE — BH INPATIENT PSYCHIATRY PROGRESS NOTE - PRN MEDS
84 yo male with generalized weakness, decreased PO and lethargy for one day.    Acute krishna:    IV abx  Hold per cutaneous krishna     Occipital hemorrhage:  R/o hemorrhagic mets  Neuro Sx eval noted  MRI brain pending      Rt Iliopsoas hematoma:    Sx f/up noted.  Hb stable        Paroxysmal A Fib:  Dced  heparin  Cardio f/up    IVETH/Hyponatremia:    BMP  Nephro f/up appreciated.    Left a message to pt's wife.   MEDICATIONS  (PRN):

## 2022-04-13 NOTE — BH INPATIENT PSYCHIATRY PROGRESS NOTE - NSBHMETABOLIC_PSY_ALL_CORE_FT
BMI: BMI (kg/m2): 22.7 (04-12-22 @ 09:00)  HbA1c: A1C with Estimated Average Glucose Result: 5.7 % (04-13-22 @ 07:45)    Glucose:   BP: 92/50 (04-12-22 @ 16:40) (92/50 - 161/100)  Lipid Panel: Date/Time: 04-13-22 @ 07:45  Cholesterol, Serum: 120  Direct LDL: --  HDL Cholesterol, Serum: 60  Total Cholesterol/HDL Ration Measurement: --  Triglycerides, Serum: 50

## 2022-04-13 NOTE — BH INPATIENT PSYCHIATRY PROGRESS NOTE - CURRENT MEDICATION
MEDICATIONS  (STANDING):  atorvastatin 20 milliGRAM(s) Oral daily  cholecalciferol 2000 Unit(s) Oral daily  furosemide    Tablet 20 milliGRAM(s) Oral two times a day  losartan 50 milliGRAM(s) Oral daily  magnesium oxide 400 milliGRAM(s) Oral daily  OLANZapine 5 milliGRAM(s) Oral daily  rivaroxaban 15 milliGRAM(s) Oral daily  sertraline 50 milliGRAM(s) Oral two times a day    MEDICATIONS  (PRN):   MEDICATIONS  (STANDING):  atorvastatin 20 milliGRAM(s) Oral daily  cholecalciferol 2000 Unit(s) Oral daily  furosemide    Tablet 20 milliGRAM(s) Oral two times a day  losartan 50 milliGRAM(s) Oral daily  magnesium oxide 400 milliGRAM(s) Oral daily  mesalamine DR (24-Hour) Tablet 2.4 Gram(s) Oral <User Schedule>  mirabegron ER 50 milliGRAM(s) Oral daily  OLANZapine 5 milliGRAM(s) Oral daily  rivaroxaban 15 milliGRAM(s) Oral daily  sertraline 50 milliGRAM(s) Oral two times a day    MEDICATIONS  (PRN):

## 2022-04-13 NOTE — BH INPATIENT PSYCHIATRY PROGRESS NOTE - OTHER
Reluctantly cooperative; guarded. Unwilling to undergo blood draws. Ambivalent about undergoing ECT. Reluctantly cooperative; guarded. Ambivalent about treatments.

## 2022-04-13 NOTE — BH INPATIENT PSYCHIATRY PROGRESS NOTE - NSBHFUPINTERVALHXFT_PSY_A_CORE
No acute events overnight. No SI/HI, auditory or visual hallucinations. The vital signs are within acceptable limits. The patient is left-handed per himself. 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     The following labs have resulted or been refused:  CBC shows mild macrocytic anemia.  UDS - negative     TSH; RPR; B9/B12 - patient refused, for next collection     UA- within normal limits     Lipid profile - within normal limits  HgbA1c - 5.7%, c/w prediabetes  EKG - pending    The patient's cognition demonstrated deficits in memory, but otherwise perceiving, recognizing, conceptualizing, learning, reasoning, problem solving, and language demonstrated no deficits. The patient's speech was clear; and there was no other evidence of movement impairment.     Today, the patient is still complaining of depression. 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 The patient expressed understanding, but requested more time to think about undergoing the procedure. Of note, his outpatient psychiatrist recommended admission with ECT as the indication for his admission.     Of note, today, we also spoke with his daughters (Lyndon) at 9:10 AM today about medication clarification and about this past treatment regimen. The patient's daughters said they would bring in the Mesalamine and Mybetriq and then give them to the Pharmacy team. Both his daughters and the patient stated that the patient has had ECT in the past. According to his daughters, his past ECT resulted in marked improvement in his mood. However, the patient himself states that he "does not remember" whether the treatment was effective in the past and he is "not optimistic" about the potential efficacy of the treatment in the future.     The patient is also refusing blood draws. When prompted to speak more about his refusal, the patient declined to elaborate and simply repeated that he "did not want" to give blood.     The patient is interested in returning home. Per his daughters' suggestions, we explained "Electroconvulsive therapy (ECT) in conjunction with pharmacotherapy is superior to pharmacotherapy alone for management of treatment-resistant depression" (Lita AP, David OM, Mary H, Terri WM. Electroconvulsive Therapy in Depression: Current Practice and Future Direction. [facility] [place] 2018 Sep;41(3):341-353. doi: 10.1016/j.psc.2018.04.001. Epub 2018 Fred 15. PMID: 35460681). Nevertheless, the patient reiterated that he "wasn't convinced" about whether ECT would be "worth it."    Otherwise, the patient presents with no behavioral issues. He is independent in his ADLs. He is neither labile, nor irritable.  No acute events overnight. No SI/HI, auditory or visual hallucinations. The vital signs are within acceptable limits. The patient is left-handed per himself. 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    The following labs have resulted or been refused:  CBC shows mild macrocytic anemia.  UDS - negative     TSH; RPR; B9/B12 - patient refused, for next collection     UA- within normal limits     Lipid profile - within normal limits  HgbA1c - 5.7%, c/w prediabetes  EKG - pending    Today, the patient is still complaining of depression. 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 The patient expressed understanding, but requested more time to think about undergoing the procedure.  The patient is also refusing blood draws. When prompted to speak more about his refusal, the patient declined to elaborate and simply repeated that he "did not want" to give blood.   The patient is interested in returning home. Per his daughters' suggestions, we explained "Electroconvulsive therapy (ECT) in conjunction with pharmacotherapy is superior to pharmacotherapy alone for management of treatment-resistant depression" (Lita AP, David OM, Mary H, Terri WM. Electroconvulsive Therapy in Depression: Current Practice and Future Direction. [facility] [place] 2018 Sep;41(3):341-353. doi: 10.1016/j.psc.2018.04.001. Epub 2018 Fred 15. PMID: 78110177). Nevertheless, the patient reiterated that he "wasn't convinced" about whether ECT would be "worth it."    Otherwise, the patient presents with no behavioral issues. He is independent in his ADLs. He is neither labile, nor irritable.  The patient demonstrated deficits in memory (did not recall details/efficacy of previous ECT), but otherwise perceiving, recognizing, conceptualizing, learning, reasoning, problem solving, and language demonstrated no deficits. The patient's speech was clear; and there was no other evidence of movement impairment.      Of note, today, we also spoke with his daughters (Lyndon; [phone]) at 9:10 AM today about medication clarification and about this past treatment regimen. The patient's daughters said they would bring in the Mesalamine and Mybetriq and then give them to nursing. Both his daughters and the patient stated that the patient has had ECT in the past. According to his daughters, his past ECT resulted in marked improvement in his mood.    Attempted to call outpatient psychiatrist, Dr. Chino ([phone]). Left message. No acute events overnight. No SI/HI, auditory or visual hallucinations. The vital signs are within acceptable limits. The patient is left-handed per himself. 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    The following labs have resulted or been refused:  CBC shows mild macrocytic anemia.  UDS - negative     TSH; RPR; B9/B12 - patient refused, for next collection     UA- within normal limits     Lipid profile - within normal limits  HgbA1c - 5.7%, c/w prediabetes  EKG - pending    Today, the patient is still complaining of depression. 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 The patient expressed understanding, but requested more time to think about undergoing the procedure.  The patient is also refusing blood draws. When prompted to speak more about his refusal, the patient declined to elaborate and simply repeated that he "did not want" to give blood.   The patient is interested in returning home. Per his daughters' suggestions, we explained "Electroconvulsive therapy (ECT) in conjunction with pharmacotherapy is superior to pharmacotherapy alone for management of treatment-resistant depression" (Lita AP, David OM, Mary H, Terri WM. Electroconvulsive Therapy in Depression: Current Practice and Future Direction. [facility] [place] 2018 Sep;41(3):341-353. doi: 10.1016/j.psc.2018.04.001. Epub 2018 Fred 15. PMID: [id number]). Nevertheless, the patient reiterated that he "wasn't convinced" about whether ECT would be "worth it."    Otherwise, the patient presents with no behavioral issues. He is independent in his ADLs. He is neither labile, nor irritable.  The patient demonstrated deficits in memory (did not recall details/efficacy of previous ECT), but otherwise perceiving, recognizing, conceptualizing, learning, reasoning, problem solving, and language demonstrated no deficits. The patient's speech was clear; and there was no other evidence of movement impairment.      Of note, today, we also spoke with his daughters (Lyndon; [phone]) at 9:10 AM today about medication clarification and about this past treatment regimen. The patient's daughters said they would bring in the Mesalamine and Mybetriq and then give them to nursing. Both his daughters and the patient stated that the patient has had ECT in the past. According to his daughters, his past ECT resulted in marked improvement in his mood.    Attempted to call outpatient psychiatrist, Dr. Chino ([phone]). Left message.

## 2022-04-14 PROCEDURE — 99232 SBSQ HOSP IP/OBS MODERATE 35: CPT

## 2022-04-14 RX ORDER — SERTRALINE 25 MG/1
100 TABLET, FILM COATED ORAL DAILY
Refills: 0 | Status: DISCONTINUED | OUTPATIENT
Start: 2022-04-15 | End: 2022-05-15

## 2022-04-14 RX ORDER — OLANZAPINE 15 MG/1
5 TABLET, FILM COATED ORAL AT BEDTIME
Refills: 0 | Status: DISCONTINUED | OUTPATIENT
Start: 2022-04-14 | End: 2022-04-20

## 2022-04-14 RX ADMIN — Medication 20 MILLIGRAM(S): at 11:08

## 2022-04-14 RX ADMIN — Medication 2000 UNIT(S): at 11:08

## 2022-04-14 RX ADMIN — LOSARTAN POTASSIUM 50 MILLIGRAM(S): 100 TABLET, FILM COATED ORAL at 11:09

## 2022-04-14 RX ADMIN — SERTRALINE 100 MILLIGRAM(S): 25 TABLET, FILM COATED ORAL at 11:12

## 2022-04-14 RX ADMIN — RIVAROXABAN 15 MILLIGRAM(S): KIT at 11:07

## 2022-04-14 RX ADMIN — ATORVASTATIN CALCIUM 20 MILLIGRAM(S): 80 TABLET, FILM COATED ORAL at 21:48

## 2022-04-14 RX ADMIN — MAGNESIUM OXIDE 400 MG ORAL TABLET 400 MILLIGRAM(S): 241.3 TABLET ORAL at 11:08

## 2022-04-14 RX ADMIN — OLANZAPINE 5 MILLIGRAM(S): 15 TABLET, FILM COATED ORAL at 21:48

## 2022-04-14 NOTE — BH INPATIENT PSYCHIATRY PROGRESS NOTE - CURRENT MEDICATION
MEDICATIONS  (STANDING):  atorvastatin 20 milliGRAM(s) Oral daily  cholecalciferol 2000 Unit(s) Oral daily  furosemide    Tablet 20 milliGRAM(s) Oral two times a day  losartan 50 milliGRAM(s) Oral daily  magnesium oxide 400 milliGRAM(s) Oral daily  mesalamine DR (24-Hour) Tablet 2.4 Gram(s) Oral <User Schedule>  mirabegron ER 50 milliGRAM(s) Oral daily  OLANZapine 5 milliGRAM(s) Oral at bedtime  rivaroxaban 15 milliGRAM(s) Oral daily    MEDICATIONS  (PRN):

## 2022-04-14 NOTE — BH INPATIENT PSYCHIATRY PROGRESS NOTE - CURRENT MEDICATION
MEDICATIONS  (STANDING):  atorvastatin 20 milliGRAM(s) Oral daily  cholecalciferol 2000 Unit(s) Oral daily  furosemide    Tablet 20 milliGRAM(s) Oral two times a day  losartan 50 milliGRAM(s) Oral daily  magnesium oxide 400 milliGRAM(s) Oral daily  mesalamine DR (24-Hour) Tablet 2.4 Gram(s) Oral <User Schedule>  mirabegron ER 50 milliGRAM(s) Oral daily  OLANZapine 5 milliGRAM(s) Oral daily  rivaroxaban 15 milliGRAM(s) Oral daily  sertraline 50 milliGRAM(s) Oral two times a day    MEDICATIONS  (PRN):

## 2022-04-14 NOTE — BH INPATIENT PSYCHIATRY PROGRESS NOTE - NSBHASSESSSUMMFT_PSY_ALL_CORE
Mr. Arriaga is a 89-year-old retired man, domiciled with daughter Brianda (), w/ PMH of hearing loss using hearing aid, colitis, HLD, HTN, on Xarelto 15mg daily (PCP Dr Barrett  #1 #5), PPH of depression, multiple psych hospitalization requiring ECT, last 4 years ago at [facility], currently on sertraline 50mg bid, olanzapine 5mg qd, sees Dr Josh Simmons () for outpatient, presenting due to worsening depression and obsessive thought.    Due to current active depressive symptoms, nonresponsive to outpatient treatment, and collateral from psychiatrist and family, patient meets involuntary admission criteria. He has been admitted to 8U for stabilization and possible ECT as inpatient. Despite not endorsing suicidality, the patient has experienced weight loss 2/2 severe depression, so ECT may be a good choice for him. Unfortunately, he is currently undecided regarding this treatment and has not yet given permission. The patient himself states that he "does not remember" whether the treatment was effective in the past and he is "not optimistic" about the potential efficacy of the treatment in the future. Will continue to reassess and will consult medicine for clearance in the meantime.  MoCA score 20/30. Unclear how much of this is pseudodementia vs neurocognitive impairment vs hearing loss.    Some labs resulted today; however patient refused many labs.    Plan:  - F/u with o/p psych  - Discuss patient's decision (consents or does not consent) for ECT vs conservative management and outpatient referral to geriatric psychiatrist  - Medicine consult    - Does not need 1:1 in 8U  - Continue medication as follows:     Xarelto 15mg daily with meals     Sertraline 50mg twice daily; consider increase to 75 mg or 100 mg twice daily     Olanzapine 5mg daily     Losartan 50mg daily     Furosemide 20mg twice daily     Atorvastatin 20mg daily     Vit D3 2000 units     Magnesium 500mg  - Follow up on medications that daughters said they would bring in: clarification on "Mesalamine 1.2mg 2 tablets twice daily"; this is not a normal dose, as most are in the 500-1000mg range; and Myrbetriq 50mg daily, as it is not on formulary at [facility]  - Consider starting Wellbutrin or other activating antidepressant  - Labs:  - CBC shows mild macrocytic anemia ()  - UDS - negative     TSH; RPR; B9/B12 - patient refused, for next collection     UA - within normal limits     Lipid profile - within normal limits  - HgbA1c - 5.7%  - EKG - pending    4/12: Pt reluctant to engage today; does not want to be on [facility]. Equivocating regarding ECT. Clearly depressed. MoCA 20/30. Will continue to monitor and to discuss ECT.    4/13: Pt remains ambivalent about treatment. Not asking for discharge today. Refused some labs, will try to get these again. Alert and oriented. No SI. Will follow up anemia.    ;;04/14: at this point patient is irritable and negativistic refusing ECT as well as some medications and lab work.  Will move Zyprexa to nighttime (sleep an issue?); and Zoloft 100mg to am; continue to attempt to discuss need for labs and possible ECT if medical evaluation can be completed.  Need to assess/address anemia.  Refusal may require consideration of TOO.

## 2022-04-14 NOTE — BH INPATIENT PSYCHIATRY PROGRESS NOTE - NSBHMETABOLIC_PSY_ALL_CORE_FT
BMI: BMI (kg/m2): 22.7 (04-12-22 @ 09:00)  HbA1c: A1C with Estimated Average Glucose Result: 5.7 % (04-13-22 @ 07:45)    Glucose:   BP: 169/77 (04-13-22 @ 09:00) (92/50 - 169/77)  Lipid Panel: Date/Time: 04-13-22 @ 07:45  Cholesterol, Serum: 120  Direct LDL: --  HDL Cholesterol, Serum: 60  Total Cholesterol/HDL Ration Measurement: --  Triglycerides, Serum: 50

## 2022-04-14 NOTE — BH INPATIENT PSYCHIATRY PROGRESS NOTE - NSTXDEPRESINTERMD_PSY_ALL_CORE
Zoloft and Zyprexa and consider ECT;  consider need to treat medical comorbidies that may be worsening mood issues.

## 2022-04-14 NOTE — BH INPATIENT PSYCHIATRY PROGRESS NOTE - NSBHCHARTREVIEWVS_PSY_A_CORE FT
Vital Signs Last 24 Hrs  T(C): 36.4 (04-13-22 @ 09:00), Max: 36.4 (04-13-22 @ 09:00)  T(F): 97.5 (04-13-22 @ 09:00), Max: 97.5 (04-13-22 @ 09:00)  HR: 78 (04-13-22 @ 09:00) (78 - 78)  BP: 169/77 (04-13-22 @ 09:00) (169/77 - 169/77)  BP(mean): --  RR: 18 (04-13-22 @ 09:00) (18 - 18)  SpO2: 97% (04-13-22 @ 09:00) (97% - 97%)

## 2022-04-14 NOTE — BH INPATIENT PSYCHIATRY PROGRESS NOTE - NSBHFUPINTERVALHXFT_PSY_A_CORE
patient accepting Zyprexa but has refused many or most of his medications and takes Zoloft sporadically; similarity has refused some of his blood work; especially important in view of his macrocytic anemia is folate and B12;  patient mostly should angry remarks when attempting to discuss labs, ECT, supplements, "My family!"   in bed angry and irritable.  When asked directly if he would consent to ECT he shouts "No!"

## 2022-04-15 PROCEDURE — 99223 1ST HOSP IP/OBS HIGH 75: CPT | Mod: GC

## 2022-04-15 PROCEDURE — 93010 ELECTROCARDIOGRAM REPORT: CPT

## 2022-04-15 RX ADMIN — MAGNESIUM OXIDE 400 MG ORAL TABLET 400 MILLIGRAM(S): 241.3 TABLET ORAL at 10:12

## 2022-04-15 RX ADMIN — SERTRALINE 100 MILLIGRAM(S): 25 TABLET, FILM COATED ORAL at 10:11

## 2022-04-15 RX ADMIN — MIRABEGRON 50 MILLIGRAM(S): 50 TABLET, EXTENDED RELEASE ORAL at 10:46

## 2022-04-15 RX ADMIN — Medication 20 MILLIGRAM(S): at 17:57

## 2022-04-15 RX ADMIN — Medication 2.4 GRAM(S): at 06:27

## 2022-04-15 RX ADMIN — Medication 20 MILLIGRAM(S): at 10:12

## 2022-04-15 RX ADMIN — Medication 2000 UNIT(S): at 10:46

## 2022-04-15 RX ADMIN — RIVAROXABAN 15 MILLIGRAM(S): KIT at 10:08

## 2022-04-15 RX ADMIN — Medication 2.4 GRAM(S): at 17:57

## 2022-04-15 RX ADMIN — LOSARTAN POTASSIUM 50 MILLIGRAM(S): 100 TABLET, FILM COATED ORAL at 10:11

## 2022-04-15 RX ADMIN — ATORVASTATIN CALCIUM 20 MILLIGRAM(S): 80 TABLET, FILM COATED ORAL at 22:04

## 2022-04-15 RX ADMIN — OLANZAPINE 5 MILLIGRAM(S): 15 TABLET, FILM COATED ORAL at 22:04

## 2022-04-15 NOTE — BH INPATIENT PSYCHIATRY PROGRESS NOTE - NSBHMETABOLIC_PSY_ALL_CORE_FT
BMI: BMI (kg/m2): 22.7 (04-12-22 @ 09:00)  HbA1c: A1C with Estimated Average Glucose Result: 5.7 % (04-13-22 @ 07:45)    Glucose:   BP: 168/65 (04-14-22 @ 09:15) (92/50 - 169/77)  Lipid Panel: Date/Time: 04-13-22 @ 07:45  Cholesterol, Serum: 120  Direct LDL: --  HDL Cholesterol, Serum: 60  Total Cholesterol/HDL Ration Measurement: --  Triglycerides, Serum: 50   BMI: BMI (kg/m2): 22.7 (04-12-22 @ 09:00)  HbA1c: A1C with Estimated Average Glucose Result: 5.7 % (04-13-22 @ 07:45)    Glucose:   BP: 194/71 (04-15-22 @ 09:10) (92/50 - 194/71)  Lipid Panel: Date/Time: 04-13-22 @ 07:45  Cholesterol, Serum: 120  Direct LDL: --  HDL Cholesterol, Serum: 60  Total Cholesterol/HDL Ration Measurement: --  Triglycerides, Serum: 50

## 2022-04-15 NOTE — BH INPATIENT PSYCHIATRY PROGRESS NOTE - CURRENT MEDICATION
MEDICATIONS  (STANDING):  atorvastatin 20 milliGRAM(s) Oral daily  cholecalciferol 2000 Unit(s) Oral daily  furosemide    Tablet 20 milliGRAM(s) Oral two times a day  losartan 50 milliGRAM(s) Oral daily  magnesium oxide 400 milliGRAM(s) Oral daily  mesalamine DR (24-Hour) Tablet 2.4 Gram(s) Oral <User Schedule>  mirabegron ER 50 milliGRAM(s) Oral daily  OLANZapine 5 milliGRAM(s) Oral at bedtime  rivaroxaban 15 milliGRAM(s) Oral daily  sertraline 100 milliGRAM(s) Oral daily    MEDICATIONS  (PRN):

## 2022-04-15 NOTE — BH INPATIENT PSYCHIATRY PROGRESS NOTE - OTHER
Reluctantly cooperative; guarded. Ambivalent about treatments. Swaying from side to side while standing up

## 2022-04-15 NOTE — CONSULT NOTE ADULT - ASSESSMENT
89M w PMH of hearing loss, colitis, HLD, HTN, Afib, depression, multiple psych hospitalization requiring ECT, last 4 years ago at [facility] per chart review. Admitted brought in by daughters due to worsening depression and obsessive thought. Medicine consulted for ECT clearance    #Pre Op Risk   - EKG with rate controlled Afib, LAFB  - Re HTN: maintain /90 or less prior to ECT  - Re: Afib: maintain anticoagulation with home xarelto   - Most likely low-intermediate risk for low risk procedure. Consider cards clearance     Pending d/w attending.  89M w PMH of hearing loss, colitis, HLD, HTN, Afib, depression, multiple psych hospitalization requiring ECT, last 4 years ago at [facility] per chart review. Admitted brought in by daughters due to worsening depression and obsessive thought. Medicine consulted for ECT clearance.     #Pre Op Risk   - EKG with rate controlled Afib, LAFB  - Re HTN: maintain /90   - Re: Afib: maintain anticoagulation with home xarelto   - Most likely low-intermediate risk for low risk procedure. Consider cards clearance     Pending d/w attending  89M w PMH of hearing loss, colitis, HLD, HTN, Afib, depression, multiple psych hospitalization requiring ECT, last 4 years ago at [facility] per chart review. Admitted brought in by daughters due to worsening depression and obsessive thought. Medicine consulted for ECT clearance.     #Pre Op Risk   - EKG with rate controlled Afib, LAFB  - Re HTN: maintain /90   - Re: Afib: maintain anticoagulation with home xarelto   - Low-intermediate risk for low risk procedure 89M w PMH of hearing loss, colitis, HLD, HTN, Afib, depression, multiple psych hospitalization requiring ECT, last 4 years ago at [facility] per chart review. Admitted brought in by daughters due to worsening depression and obsessive thought. Medicine consulted for ECT clearance.     #Pre Op Risk   - EKG with rate controlled Afib, LAFB  - Re HTN: maintain /90   - Re: Afib: maintain anticoagulation with home xarelto   - Low-intermediate risk for low risk procedure    Medicine will s/o, please reconsult with new questions

## 2022-04-15 NOTE — CONSULT NOTE ADULT - SUBJECTIVE AND OBJECTIVE BOX
Patient is a 89y old  Male who presents with a chief complaint of     SUBJECTIVE / INTERVAL HPI: 89M w PMH of hearing loss, colitis, HLD, HTN, Afib, depression, multiple psych hospitalization requiring ECT, last 4 years ago at [facility] per chart review. Admitted brought in by daughters due to worsening depression and obsessive thought. Medicine consulted for ECT clearance    Upon interview pt refusing ECT, reports having it done in past.     REVIEW OF SYSTEMS: see HPI    PAST MEDICAL HISTORY: Afib, HTN, HLD    PAST SURGICAL HISTORY: unable to provide     FAMILY HISTORY: unable to provide    SOCIAL HISTORY:  Tobacco use: denies  EtOH use: denies  Illicit drug use: denies     MEDICATIONS:  MEDICATIONS  (STANDING):  atorvastatin 20 milliGRAM(s) Oral daily  cholecalciferol 2000 Unit(s) Oral daily  furosemide    Tablet 20 milliGRAM(s) Oral two times a day  losartan 50 milliGRAM(s) Oral daily  magnesium oxide 400 milliGRAM(s) Oral daily  mesalamine DR (24-Hour) Tablet 2.4 Gram(s) Oral <User Schedule>  mirabegron ER 50 milliGRAM(s) Oral daily  OLANZapine 5 milliGRAM(s) Oral at bedtime  rivaroxaban 15 milliGRAM(s) Oral daily  sertraline 100 milliGRAM(s) Oral daily    MEDICATIONS  (PRN):      ALLERGIES:  Allergies    No Known Allergies    Intolerances        VITAL SIGNS:  Vital Signs Last 24 Hrs  T(C): 36.6 (15 Apr 2022 09:10), Max: 36.6 (15 Apr 2022 09:10)  T(F): 97.8 (15 Apr 2022 09:10), Max: 97.8 (15 Apr 2022 09:10)  HR: 80 (15 Apr 2022 09:10) (80 - 80)  BP: 194/71 (15 Apr 2022 09:10) (194/71 - 194/71)  BP(mean): --  RR: 18 (15 Apr 2022 09:10) (18 - 18)  SpO2: 97% (15 Apr 2022 09:10) (97% - 97%)      PHYSICAL EXAM:  Constitutional: WDWN resting comfortably in bed; NAD  Head: NC/AT  Eyes: PERRL, EOMI, anicteric sclera  ENT: no nasal discharge; uvula midline, no oropharyngeal erythema or exudates; MMM  Neck: supple; no JVD or thyromegaly  Respiratory: CTA B/L; no W/R/R, no retractions  Cardiac: +S1/S2; RRR; no M/R/G; PMI non-displaced  Gastrointestinal: abdomen soft, NT/ND; no rebound or guarding; +BSx4  Genitourinary: normal external genitalia  Back: spine midline, no bony tenderness or step-offs; no CVAT B/L  Extremities: WWP, no clubbing or cyanosis; no peripheral edema  Musculoskeletal: NROM x4; no joint swelling, tenderness or erythema  Vascular: 2+ radial, femoral, DP/PT pulses B/L  Dermatologic: skin warm, dry and intact; no rashes, wounds, or scars  Lymphatic: no submandibular or cervical LAD  Neurologic: AAOx3; CNII-XII grossly intact; no focal deficits  Psychiatric: affect and characteristics of appearance, verbalizations, behaviors are appropriate    LABS: noted from 4/11/22                CAPILLARY BLOOD GLUCOSE              RADIOLOGY & ADDITIONAL TESTS: Reviewed. Patient is a 89y old  Male who presents with a chief complaint of     SUBJECTIVE / INTERVAL HPI: 89M w PMH of hearing loss, colitis, HLD, HTN, Afib, depression, multiple psych hospitalization requiring ECT, last 4 years ago at [facility] per chart review. Admitted brought in by daughters due to worsening depression and obsessive thought. Medicine consulted for ECT clearance    Upon interview pt refusing ECT, reports having it done in past with his history of afib. Reports he feels depressed. Denies CP palpitations dizziness sob.     REVIEW OF SYSTEMS: see HPI    PAST MEDICAL HISTORY: Afib, HTN, HLD    PAST SURGICAL HISTORY: unable to provide     FAMILY HISTORY: unable to provide    SOCIAL HISTORY:  Tobacco use: denies  EtOH use: denies  Illicit drug use: denies     MEDICATIONS:  MEDICATIONS  (STANDING):  atorvastatin 20 milliGRAM(s) Oral daily  cholecalciferol 2000 Unit(s) Oral daily  furosemide    Tablet 20 milliGRAM(s) Oral two times a day  losartan 50 milliGRAM(s) Oral daily  magnesium oxide 400 milliGRAM(s) Oral daily  mesalamine DR (24-Hour) Tablet 2.4 Gram(s) Oral <User Schedule>  mirabegron ER 50 milliGRAM(s) Oral daily  OLANZapine 5 milliGRAM(s) Oral at bedtime  rivaroxaban 15 milliGRAM(s) Oral daily  sertraline 100 milliGRAM(s) Oral daily    MEDICATIONS  (PRN):      ALLERGIES:  Allergies    No Known Allergies    Intolerances        VITAL SIGNS:  Vital Signs Last 24 Hrs  T(C): 36.6 (15 Apr 2022 09:10), Max: 36.6 (15 Apr 2022 09:10)  T(F): 97.8 (15 Apr 2022 09:10), Max: 97.8 (15 Apr 2022 09:10)  HR: 80 (15 Apr 2022 09:10) (80 - 80)  BP: 194/71 (15 Apr 2022 09:10) (194/71 - 194/71)  BP(mean): --  RR: 18 (15 Apr 2022 09:10) (18 - 18)  SpO2: 97% (15 Apr 2022 09:10) (97% - 97%)      PHYSICAL EXAM:  Constitutional: NAD  Head: NC/AT  Eyes: PERRL, EOMI, anicteric sclera  ENT: no nasal discharge; uvula midline, no oropharyngeal erythema or exudates; MMM  Neck: supple; no JVD  Respiratory: CTA B/L; no W/R/R, no retractions  Cardiac: +S1/S2; irregular ; no M/R/G; PMI non-displaced  Gastrointestinal: abdomen soft, NT/ND; no rebound or guarding; +BSx4  Back: spine midline, no bony tenderness or step-offs; no CVAT B/L  Extremities: WWP, no clubbing or cyanosis; no peripheral edema   Musculoskeletal: NROM x4; no joint swelling, tenderness or erythema  Vascular: 2+ radial pulses B/L  Dermatologic: skin warm, dry and intact; no rashes, wounds, or scars  Lymphatic: no submandibular or cervical LAD  Neurologic: AAOx3; CNII-XII grossly intact; no focal deficits  Psychiatric: affect and characteristics of appearance, verbalizations, behaviors are appropriate    LABS: noted from 4/11/22    CAPILLARY BLOOD GLUCOSE    RADIOLOGY & ADDITIONAL TESTS: Reviewed.

## 2022-04-15 NOTE — BH INPATIENT PSYCHIATRY PROGRESS NOTE - NSBHCHARTREVIEWVS_PSY_A_CORE FT
Vital Signs Last 24 Hrs  T(C): --  T(F): --  HR: --  BP: --  BP(mean): --  RR: --  SpO2: --     Vital Signs Last 24 Hrs  T(C): 36.6 (04-15-22 @ 09:10), Max: 36.6 (04-15-22 @ 09:10)  T(F): 97.8 (04-15-22 @ 09:10), Max: 97.8 (04-15-22 @ 09:10)  HR: 80 (04-15-22 @ 09:10) (80 - 80)  BP: 194/71 (04-15-22 @ 09:10) (194/71 - 194/71)  BP(mean): --  RR: 18 (04-15-22 @ 09:10) (18 - 18)  SpO2: 97% (04-15-22 @ 09:10) (97% - 97%)

## 2022-04-15 NOTE — BH INPATIENT PSYCHIATRY PROGRESS NOTE - NSBHASSESSSUMMFT_PSY_ALL_CORE
Mr. Arriaga is a 89-year-old retired man, domiciled with daughter Brianda (), w/ PMH of hearing loss using hearing aid, colitis, HLD, HTN, on Xarelto 15mg daily (PCP Dr Barrett  #1 #5), PPH of depression, multiple psych hospitalization requiring ECT, last 4 years ago at [facility], currently on sertraline 50mg bid, olanzapine 5mg qd, sees Dr Josh Simmons () for outpatient, presenting due to worsening depression and obsessive thought.    Due to current active depressive symptoms, nonresponsive to outpatient treatment, and collateral from psychiatrist and family, patient meets involuntary admission criteria. He has been admitted to 8U for stabilization and possible ECT as inpatient. Despite not endorsing suicidality, the patient has experienced weight loss 2/2 severe depression, so ECT may be a good choice for him. Unfortunately, he is currently undecided regarding this treatment and has not yet given permission. The patient himself states that he "does not remember" whether the treatment was effective in the past and he is "not optimistic" about the potential efficacy of the treatment in the future. Will continue to reassess and will consult medicine for clearance in the meantime.  MoCA score 20/30. Unclear how much of this is pseudodementia vs neurocognitive impairment vs hearing loss.    Some labs resulted today; however patient refused many labs.    Plan:  - F/u with o/p psych  - Discuss patient's decision (consents or does not consent) for ECT vs conservative management and outpatient referral to geriatric psychiatrist  - Medicine consult    - Does not need 1:1 in 8U  - Continue medication as follows:     Xarelto 15mg daily with meals     Sertraline 50mg twice daily; consider increase to 75 mg or 100 mg twice daily     Olanzapine 5mg daily     Losartan 50mg daily     Furosemide 20mg twice daily     Atorvastatin 20mg daily     Vit D3 2000 units     Magnesium 500mg  - Follow up on medications that daughters said they would bring in: clarification on "Mesalamine 1.2mg 2 tablets twice daily"; this is not a normal dose, as most are in the 500-1000mg range; and Myrbetriq 50mg daily, as it is not on formulary at [facility]  - Consider starting Wellbutrin or other activating antidepressant  - Labs:  - CBC shows mild macrocytic anemia ()  - UDS - negative     TSH; RPR; B9/B12 - patient refused, for next collection     UA - within normal limits     Lipid profile - within normal limits  - HgbA1c - 5.7%  - EKG - pending    4/12: Pt reluctant to engage today; does not want to be on 8Uris. Equivocating regarding ECT. Clearly depressed. MoCA 20/30. Will continue to monitor and to discuss ECT.    4/13: Pt remains ambivalent about treatment. Not asking for discharge today. Refused some labs, will try to get these again. Alert and oriented. No SI. Will follow up anemia.    ;;04/14: at this point patient is irritable and negativistic refusing ECT as well as some medications and lab work.  Will move Zyprexa to nighttime (sleep an issue?); and Zoloft 100mg to am; continue to attempt to discuss need for labs and possible ECT if medical evaluation can be completed.  Need to assess/address anemia.  Refusal may require consideration of TOO.             Mr. Arriaga is a 89-year-old retired man, domiciled with daughter Brianda (), w/ PMH of hearing loss using hearing aid, colitis, HLD, HTN, on Xarelto 15mg daily (PCP Dr Barrett  #1 #5), PPH of depression, multiple psych hospitalization requiring ECT, last 4 years ago at [facility], currently on sertraline 50mg bid, olanzapine 5mg qd, sees Dr Josh Simmons () for outpatient, presenting due to worsening depression and obsessive thought.    Due to current active depressive symptoms, nonresponsive to outpatient treatment, and collateral from psychiatrist and family, patient meets involuntary admission criteria. He has been admitted to 8U for stabilization and possible ECT as inpatient. Despite not endorsing suicidality, the patient has experienced weight loss 2/2 severe depression, so ECT may be a good choice for him. Unfortunately, he is currently undecided regarding this treatment and has not yet given permission. The patient himself states that he "does not remember" whether the treatment was effective in the past and he is "not optimistic" about the potential efficacy of the treatment in the future. Will continue to reassess and will consult medicine for clearance in the meantime.    MoCA score 20/30. Unclear how much of this is pseudodementia vs neurocognitive impairment vs hearing loss.    Patient continues to refuse blood draws and ECT.    Plan:  - F/u with o/p psych  - Discuss patient's decision (consents or does not consent) for ECT vs conservative management and outpatient referral to geriatric psychiatrist  - Seen by medicine for ECT clearance today, 4/15: They determine pt is Low-intermediate risk for low risk procedure    - Does not need 1:1 in 8U  - Continue medication as follows:     Xarelto 15mg daily with meals     Sertraline 50mg twice daily; consider increase to 75 mg or 100 mg twice daily     Olanzapine 5mg daily     Losartan 50mg daily     Furosemide 20mg twice daily     Atorvastatin 20mg daily     Vit D3 2000 units     Magnesium 500mg    - Follow up on medications that daughters said they would bring in: clarification on "Mesalamine 1.2mg 2 tablets twice daily"; this is not a normal dose, as most are in the 500-1000mg range; and Myrbetriq 50mg daily, as it is not on formulary at [facility]    - Consider starting Wellbutrin or other activating antidepressant  - Labs:  - CBC shows mild macrocytic anemia ()  - UDS - negative     TSH; RPR; B9/B12 - patient refused, for next collection     UA - within normal limits     Lipid profile - within normal limits  - HgbA1c - 5.7%  - EKG - pending    4/12: Pt reluctant to engage today; does not want to be on 8Uris. Equivocating regarding ECT. Clearly depressed. MoCA 20/30. Will continue to monitor and to discuss ECT.    4/13: Pt remains ambivalent about treatment. Not asking for discharge today. Refused some labs, will try to get these again. Alert and oriented. No SI. Will follow up anemia.    04/14: at this point patient is irritable and negativistic refusing ECT as well as some medications and lab work.  Will move Zyprexa to nighttime (sleep an issue?); and Zoloft 100mg to am; continue to attempt to discuss need for labs and possible ECT if medical evaluation can be completed.  Need to assess/address anemia.  Refusal may require consideration of TOO.      04/15: Pt continues to refuse ECT and routine blood work. Pt was seen by medicine team for clearance for ECT, after which he was determined to be "Low-intermediate risk for low risk procedure. Pt's daughter, Valery, states she has medical proxy and will bring in form next week, pending subsequent assessment of capacity.          Mr. Arriaga is a 89-year-old retired man, domiciled with daughter Brianda (), w/ PMH of hearing loss using hearing aid, colitis, HLD, HTN, on Xarelto 15mg daily (PCP Dr Barrett  #1 #5), PPH of depression, multiple psych hospitalization requiring ECT, last 4 years ago at [facility], currently on sertraline 50mg bid, olanzapine 5mg qd, sees Dr Josh Simmons () for outpatient, presenting due to worsening depression and obsessive thought.    Due to current active depressive symptoms, nonresponsive to outpatient treatment, and collateral from psychiatrist and family, patient meets involuntary admission criteria. He has been admitted to 8U for stabilization and possible ECT as inpatient. Despite not endorsing suicidality, the patient has experienced weight loss 2/2 severe depression, so ECT may be a good choice for him. Unfortunately, he is currently undecided regarding this treatment and has not yet given permission. The patient himself states that he "does not remember" whether the treatment was effective in the past and he is "not optimistic" about the potential efficacy of the treatment in the future. Will continue to reassess and will consult medicine for clearance in the meantime.    MoCA score 20/30. Unclear how much of this is pseudodementia vs neurocognitive impairment vs hearing loss.    Patient continues to refuse blood draws and ECT.    Plan:  - F/u with o/p psych  - Discuss patient's decision (consents or does not consent) for ECT vs conservative management and outpatient referral to geriatric psychiatrist  - Seen by medicine for ECT clearance today, 4/15: They determine pt is Low-intermediate risk for low risk procedure    - Does not need 1:1 in 8U  - Continue medication as follows:     Xarelto 15mg daily with meals     Sertraline 50mg twice daily; consider increase to 75 mg or 100 mg twice daily     Olanzapine 5mg daily     Losartan 50mg daily     Furosemide 20mg twice daily     Atorvastatin 20mg daily     Vit D3 2000 units     Magnesium 500mg    - Follow up on medications that daughters said they would bring in: clarification on "Mesalamine 1.2mg 2 tablets twice daily"; this is not a normal dose, as most are in the 500-1000mg range; and Myrbetriq 50mg daily, as it is not on formulary at [facility]    - Consider starting Wellbutrin or other activating antidepressant  - Labs:  - CBC shows mild macrocytic anemia ()  - UDS - negative     TSH; RPR; B9/B12 - patient refused, for next collection     UA - within normal limits     Lipid profile - within normal limits  - HgbA1c - 5.7%    Medicine consult, 4/15:  #Pre Op Risk   - EKG with rate controlled Afib, LAFB  - Re HTN: maintain /90   - Re: Afib: maintain anticoagulation with home xarelto   - Low-intermediate risk for low risk procedure      4/12: Pt reluctant to engage today; does not want to be on 8Uris. Equivocating regarding ECT. Clearly depressed. MoCA 20/30. Will continue to monitor and to discuss ECT.    4/13: Pt remains ambivalent about treatment. Not asking for discharge today. Refused some labs, will try to get these again. Alert and oriented. No SI. Will follow up anemia.    4/14: at this point patient is irritable and negativistic refusing ECT as well as some medications and lab work.  Will move Zyprexa to nighttime (sleep an issue?); and Zoloft 100mg to am; continue to attempt to discuss need for labs and possible ECT if medical evaluation can be completed. Need to assess/address anemia.  Refusal may require consideration of TOO.    4/15: Pt continues to refuse ECT and routine blood work. Pt was seen by medicine team for clearance for ECT, after which he was determined to be "Low-intermediate risk for low risk procedure. Pt's daughter, Valery, states she is his medical proxy and will bring in form next week.

## 2022-04-15 NOTE — BH INPATIENT PSYCHIATRY PROGRESS NOTE - NSBHFUPINTERVALHXFT_PSY_A_CORE
patient accepting Zyprexa but has refused many or most of his medications and takes Zoloft sporadically; similarity has refused some of his blood work; especially important in view of his macrocytic anemia is folate and B12;  patient mostly should angry remarks when attempting to discuss labs, ECT, supplements, "My family!"   in bed angry and irritable.  When asked directly if he would consent to ECT he shouts "No!"   Patient was seen in his room today. Unprompted, as soon as he saw interviewer, stated, "I'm not going for that procedure," referrin to ECT for which he was admitted. He otherwise was cooperative with interview, but depressed, stating "he doesn't see the point" and in his ideal scenario he'd "be a goner." Did not respond when asked if he had any suicidal ideation.     He continues to refuse blood work as "there is no point." He was counseled on the possible etiology of his depressed mood and feelings of "there being no hope." We spoke with him extensively about his newfound macrocytic anemia and the possible neuropsychiatric effects of B12 deficiency, which is on the differential. Nevertheless, the patient did not respond when asked if he'd be willing to allow a blood draw for B12 levels and CBC.     Pt was not irritable today.    Capacity assessment was done. Pt expressed he understood what ECT was and understood the risk, benefits, alternative and steps, and was still declining the treatment as "It's too much to go through."      Pt stated he is otherwise eating and sleeping fine. When asked about his hobbies, pt stated he "used to enjoy sailing and owned many boats" a long time ago.     Pt was seen by medicine team for clearance for ECT. Their reccs were seen and appreciated.    - Low-intermediate risk for low risk procedure    Medicine will s/o, please reconsult with new questions    Pt's daughter states she has medical proxy potentially Patient was seen in his room today. Unprompted, as soon as he saw interviewer, stated, "I'm not going for that procedure," referring to ECT for which he was admitted. He otherwise was cooperative with interview, but depressed, stating "he doesn't see the point" and in his ideal scenario he'd "be a goner." Did not respond when asked if he had any suicidal ideation.    He continues to refuse blood work as "there is no point." He was counseled on the possible etiology of his depressed mood and feelings of "there being no hope." Also reminded that he has some anemia (though this is not major). Nevertheless, the patient did not respond when asked if he'd be willing to allow a blood draw for B12 levels and CBC.    Pt was not irritable today.    Capacity assessment was done. Pt expressed he understood what ECT was and understood the risk, benefits, alternative and steps, and was still declining the treatment as "It's too much to go through."      Pt stated he is otherwise eating and sleeping fine. When asked about his hobbies, pt stated he "used to enjoy sailing and owned many boats" a long time ago.     Pt was seen by medicine team for clearance for ECT. Their recs were seen and appreciated.

## 2022-04-15 NOTE — BH INPATIENT PSYCHIATRY PROGRESS NOTE - PRN MEDS
Jennifer Junior is a 74 year old female presenting for a B/P check.      Home cuff bp readin/87  Our bp reading today:  152/74       MEDICATIONS  (PRN):

## 2022-04-16 RX ADMIN — ATORVASTATIN CALCIUM 20 MILLIGRAM(S): 80 TABLET, FILM COATED ORAL at 21:45

## 2022-04-16 RX ADMIN — Medication 20 MILLIGRAM(S): at 17:20

## 2022-04-16 RX ADMIN — Medication 2.4 GRAM(S): at 07:28

## 2022-04-16 RX ADMIN — LOSARTAN POTASSIUM 50 MILLIGRAM(S): 100 TABLET, FILM COATED ORAL at 07:29

## 2022-04-16 RX ADMIN — Medication 20 MILLIGRAM(S): at 07:29

## 2022-04-16 RX ADMIN — OLANZAPINE 5 MILLIGRAM(S): 15 TABLET, FILM COATED ORAL at 21:45

## 2022-04-16 RX ADMIN — Medication 2.4 GRAM(S): at 17:20

## 2022-04-17 LAB — GLUCOSE BLDC GLUCOMTR-MCNC: 184 MG/DL — HIGH (ref 70–99)

## 2022-04-17 RX ADMIN — LOSARTAN POTASSIUM 50 MILLIGRAM(S): 100 TABLET, FILM COATED ORAL at 06:44

## 2022-04-17 RX ADMIN — Medication 20 MILLIGRAM(S): at 06:44

## 2022-04-17 RX ADMIN — RIVAROXABAN 15 MILLIGRAM(S): KIT at 10:16

## 2022-04-17 RX ADMIN — SERTRALINE 100 MILLIGRAM(S): 25 TABLET, FILM COATED ORAL at 10:17

## 2022-04-17 RX ADMIN — Medication 2.4 GRAM(S): at 06:44

## 2022-04-17 RX ADMIN — MAGNESIUM OXIDE 400 MG ORAL TABLET 400 MILLIGRAM(S): 241.3 TABLET ORAL at 10:17

## 2022-04-17 RX ADMIN — MIRABEGRON 50 MILLIGRAM(S): 50 TABLET, EXTENDED RELEASE ORAL at 10:16

## 2022-04-17 RX ADMIN — Medication 2000 UNIT(S): at 10:17

## 2022-04-18 PROCEDURE — 99231 SBSQ HOSP IP/OBS SF/LOW 25: CPT

## 2022-04-18 RX ORDER — FUROSEMIDE 40 MG
20 TABLET ORAL
Refills: 0 | Status: DISCONTINUED | OUTPATIENT
Start: 2022-04-18 | End: 2022-04-18

## 2022-04-18 RX ORDER — LOSARTAN POTASSIUM 100 MG/1
25 TABLET, FILM COATED ORAL DAILY
Refills: 0 | Status: DISCONTINUED | OUTPATIENT
Start: 2022-04-18 | End: 2022-04-27

## 2022-04-18 RX ADMIN — MAGNESIUM OXIDE 400 MG ORAL TABLET 400 MILLIGRAM(S): 241.3 TABLET ORAL at 11:20

## 2022-04-18 RX ADMIN — OLANZAPINE 5 MILLIGRAM(S): 15 TABLET, FILM COATED ORAL at 22:09

## 2022-04-18 RX ADMIN — Medication 20 MILLIGRAM(S): at 06:43

## 2022-04-18 RX ADMIN — Medication 2000 UNIT(S): at 11:20

## 2022-04-18 RX ADMIN — LOSARTAN POTASSIUM 50 MILLIGRAM(S): 100 TABLET, FILM COATED ORAL at 06:43

## 2022-04-18 RX ADMIN — RIVAROXABAN 15 MILLIGRAM(S): KIT at 11:21

## 2022-04-18 RX ADMIN — MIRABEGRON 50 MILLIGRAM(S): 50 TABLET, EXTENDED RELEASE ORAL at 11:46

## 2022-04-18 RX ADMIN — Medication 2.4 GRAM(S): at 06:43

## 2022-04-18 RX ADMIN — SERTRALINE 100 MILLIGRAM(S): 25 TABLET, FILM COATED ORAL at 11:20

## 2022-04-18 RX ADMIN — ATORVASTATIN CALCIUM 20 MILLIGRAM(S): 80 TABLET, FILM COATED ORAL at 22:09

## 2022-04-18 NOTE — BH INPATIENT PSYCHIATRY PROGRESS NOTE - NSBHCHARTREVIEWVS_PSY_A_CORE FT
Vital Signs Last 24 Hrs  T(C): 36.1 (04-18-22 @ 09:12), Max: 37 (04-17-22 @ 09:38)  T(F): 97 (04-18-22 @ 09:12), Max: 98.6 (04-17-22 @ 09:38)  HR: 148 (04-18-22 @ 09:12) (76 - 148)  BP: 100/62 (04-18-22 @ 09:12) (100/62 - 142/54)  BP(mean): --  RR: 18 (04-18-22 @ 09:12) (16 - 18)  SpO2: 95% (04-18-22 @ 09:12) (95% - 100%)     Vital Signs Last 24 Hrs  T(C): 36.1 (04-18-22 @ 09:12), Max: 36.4 (04-17-22 @ 16:11)  T(F): 97 (04-18-22 @ 09:12), Max: 97.5 (04-17-22 @ 16:11)  HR: 148 (04-18-22 @ 09:12) (78 - 148)  BP: 100/62 (04-18-22 @ 09:12) (100/62 - 124/43)  BP(mean): --  RR: 18 (04-18-22 @ 10:22) (18 - 18)  SpO2: 98% (04-18-22 @ 10:22) (95% - 99%)    Orthostatic VS  04-18-22 @ 10:22  Lying BP: 107/65 HR: 73  Sitting BP: 92/38 HR: 83  Standing BP: 116/66 HR: 86  Site: --  Mode: --

## 2022-04-18 NOTE — BH INPATIENT PSYCHIATRY PROGRESS NOTE - CASE SUMMARY
Pt calm, with PMR, speech latency, denies SI today after much encouragement to discuss the topic.  He has been refusing ECT which had been recommended by outpt team.  Intermittently he was not taking medications, no side effects reported, QTc 419 on 4/15.  Continue to encourage Rx for now, primary team considering ECT.

## 2022-04-18 NOTE — BH TREATMENT PLAN - NSTXDEPRESINTERMD_PSY_ALL_CORE
Zoloft and Zyprexa and consider ECT;  consider need to treat medical comorbidities that may be worsening mood issues (pt will still not allow blood draws).

## 2022-04-18 NOTE — BH INPATIENT PSYCHIATRY PROGRESS NOTE - NSBHFUPINTERVALHXFT_PSY_A_CORE
Patient was seen in his room today. Unprompted, as soon as he saw interviewer, stated, "I'm not going for that procedure," referring to ECT for which he was admitted. He otherwise was cooperative with interview, but depressed, stating "he doesn't see the point" and in his ideal scenario he'd "be a goner." Did not respond when asked if he had any suicidal ideation.    He continues to refuse blood work as "there is no point." He was counseled on the possible etiology of his depressed mood and feelings of "there being no hope." Also reminded that he has some anemia (though this is not major). Nevertheless, the patient did not respond when asked if he'd be willing to allow a blood draw for B12 levels and CBC.    Pt was not irritable today.    Capacity assessment was done. Pt expressed he understood what ECT was and understood the risk, benefits, alternative and steps, and was still declining the treatment as "It's too much to go through."      Pt stated he is otherwise eating and sleeping fine. When asked about his hobbies, pt stated he "used to enjoy sailing and owned many boats" a long time ago.     Pt was seen by medicine team for clearance for ECT. Their recs were seen and appreciated.   Of note, pt had /43 yesterday and evening furosemide was held. Today pt complained to nursing of some dizziness and was positive for orthostasis. Given salty crackers and encouraged to drink more water. Furosemide suspended and losartan cut in half for now.    Patient was seen in his room today. Unprompted, as soon as he saw interviewer, stated, "I'm not going for that procedure," referring to ECT for which he was admitted. He otherwise was cooperative with interview, but depressed, stating "he doesn't see the point" and in his ideal scenario he'd "be a goner." Did not respond when asked if he had any suicidal ideation.    He continues to refuse blood work as "there is no point." He was counseled on the possible etiology of his depressed mood and feelings of "there being no hope." Also reminded that he has some anemia (though this is not major). Nevertheless, the patient did not respond when asked if he'd be willing to allow a blood draw for B12 levels and CBC.  Pt was not irritable today.   Of note, pt had /43 yesterday and evening furosemide was held. Today pt complained to nursing of some dizziness and was positive for orthostasis. Given salty crackers and encouraged to drink more water. Furosemide suspended and losartan cut in half for now. Pt states he is eating 3 meals a day, "If you can call them that."    Patient was seen in his room today. Continues to endorse low mood and states "I feel hopeless about being here and about my prognosis." When asked whether he would consent to blood draws or ECT, pt will not respond—instead he will look away, stop talking and engage in repetitive rubbing of his quadriceps.    Pt repeatedly said, "Don't go" and "Oh jeez" when interviewer attempted to leave. When asked why he wanted me to say, pt responded, "I don't know what you can do for me" and "I don't know what to say." When again counseled on ECT, pt stopped talking again and looked away.   Of note, pt had /43 yesterday and evening furosemide was held. Today pt complained to nursing of some dizziness and was positive for orthostasis. Given salty crackers and encouraged to drink more water. Furosemide suspended and losartan cut in half for now. Pt states he is eating 3 meals a day, "If you can call them that."  (Later seen in hallway, denied feeling dizzy.)    Patient was seen in his room today. Still with long periods of staring and considerable PMR & latency. Continues to endorse low mood and states "I feel hopeless about being here and about my prognosis." When asked whether he would consent to blood draws or ECT, pt will not respond—instead he will look away, stop talking and engage in repetitive rubbing of his quadriceps.    Pt repeatedly said, "Don't go" and "Oh jeez" when interviewer attempted to leave. When asked why he wanted me to say, pt responded, "I don't know what you can do for me" and "I don't know what to say." When again counseled on ECT, pt stopped talking again and looked away.  Pt did not answer when directly asked about SI.

## 2022-04-18 NOTE — BH INPATIENT PSYCHIATRY PROGRESS NOTE - OTHER
Swaying from side to side while standing up Reluctantly cooperative; guarded. Ambivalent about treatments. Swaying from side to side while standing up; right thumb tremor; left hand intention tremor when drinking water Continues to think that he is in a bad financial predicament and/or that his future is hopeless no matter what treatments are attempted

## 2022-04-18 NOTE — BH INPATIENT PSYCHIATRY PROGRESS NOTE - CURRENT MEDICATION
MEDICATIONS  (STANDING):  atorvastatin 20 milliGRAM(s) Oral daily  cholecalciferol 2000 Unit(s) Oral daily  furosemide    Tablet 20 milliGRAM(s) Oral two times a day  losartan 50 milliGRAM(s) Oral daily  magnesium oxide 400 milliGRAM(s) Oral daily  mesalamine DR (24-Hour) Tablet 2.4 Gram(s) Oral <User Schedule>  mirabegron ER 50 milliGRAM(s) Oral daily  OLANZapine 5 milliGRAM(s) Oral at bedtime  rivaroxaban 15 milliGRAM(s) Oral daily  sertraline 100 milliGRAM(s) Oral daily    MEDICATIONS  (PRN):   MEDICATIONS  (STANDING):  atorvastatin 20 milliGRAM(s) Oral daily  cholecalciferol 2000 Unit(s) Oral daily  losartan 50 milliGRAM(s) Oral daily  magnesium oxide 400 milliGRAM(s) Oral daily  mesalamine DR (24-Hour) Tablet 2.4 Gram(s) Oral <User Schedule>  mirabegron ER 50 milliGRAM(s) Oral daily  OLANZapine 5 milliGRAM(s) Oral at bedtime  rivaroxaban 15 milliGRAM(s) Oral daily  sertraline 100 milliGRAM(s) Oral daily    MEDICATIONS  (PRN):   MEDICATIONS  (STANDING):  atorvastatin 20 milliGRAM(s) Oral daily  cholecalciferol 2000 Unit(s) Oral daily  losartan 25 milliGRAM(s) Oral daily  magnesium oxide 400 milliGRAM(s) Oral daily  mesalamine DR (24-Hour) Tablet 2.4 Gram(s) Oral <User Schedule>  mirabegron ER 50 milliGRAM(s) Oral daily  OLANZapine 5 milliGRAM(s) Oral at bedtime  rivaroxaban 15 milliGRAM(s) Oral daily  sertraline 100 milliGRAM(s) Oral daily    MEDICATIONS  (PRN):

## 2022-04-18 NOTE — BH INPATIENT PSYCHIATRY PROGRESS NOTE - NSBHMETABOLIC_PSY_ALL_CORE_FT
BMI: BMI (kg/m2): 22.7 (04-12-22 @ 09:00)  HbA1c: A1C with Estimated Average Glucose Result: 5.7 % (04-13-22 @ 07:45)    Glucose: POCT Blood Glucose.: 184 mg/dL (04-17-22 @ 07:03)    BP: 100/62 (04-18-22 @ 09:12) (100/62 - 162/60)  Lipid Panel: Date/Time: 04-13-22 @ 07:45  Cholesterol, Serum: 120  Direct LDL: --  HDL Cholesterol, Serum: 60  Total Cholesterol/HDL Ration Measurement: --  Triglycerides, Serum: 50

## 2022-04-18 NOTE — BH INPATIENT PSYCHIATRY PROGRESS NOTE - NSBHASSESSSUMMFT_PSY_ALL_CORE
Mr. Arriaga is a 89-year-old retired man, domiciled with daughter Brianda (), w/ PMH of hearing loss using hearing aid, colitis, HLD, HTN, on Xarelto 15mg daily (PCP Dr Barrett  #1 #5), PPH of depression, multiple psych hospitalization requiring ECT, last 4 years ago at [facility], currently on sertraline 50mg bid, olanzapine 5mg qd, sees Dr Josh Simmons () for outpatient, presenting due to worsening depression and obsessive thought.    Due to current active depressive symptoms, nonresponsive to outpatient treatment, and collateral from psychiatrist and family, patient meets involuntary admission criteria. He has been admitted to 8U for stabilization and possible ECT as inpatient. Despite not endorsing suicidality, the patient has experienced weight loss 2/2 severe depression, so ECT may be a good choice for him. Unfortunately, he is currently undecided regarding this treatment and has not yet given permission. The patient himself states that he "does not remember" whether the treatment was effective in the past and he is "not optimistic" about the potential efficacy of the treatment in the future. Will continue to reassess and will consult medicine for clearance in the meantime.    MoCA score 20/30. Unclear how much of this is pseudodementia vs neurocognitive impairment vs hearing loss.    Patient continues to refuse blood draws and ECT.    Plan:  - Dr. Pittman spoke with the patient's outpatient psychiatrist, who reiterated his belief that ECT was the best next step  - Discuss patient's decision (consents or does not consent) for ECT vs conservative management and outpatient referral to geriatric psychiatrist  - Seen by medicine for ECT clearance 4/15: They determine pt is Low-intermediate risk for low risk procedure    - Does not need 1:1 in 8U  - Continue medication as follows:     Xarelto 15mg daily with meals     Sertraline 50mg twice daily; consider increase to 75 mg or 100 mg twice daily     Olanzapine 5mg daily     Losartan 50mg daily     Furosemide 20mg twice daily     Atorvastatin 20mg daily     Vit D3 2000 units     Magnesium 500mg    - Follow up on medications that daughters said they would bring in: clarification on "Mesalamine 1.2mg 2 tablets twice daily"; this is not a normal dose, as most are in the 500-1000mg range; and Myrbetriq 50mg daily, as it is not on formulary at [facility]    - Consider starting Wellbutrin or other activating antidepressant  - Labs:  - CBC shows mild macrocytic anemia ()  - UDS - negative     TSH; RPR; B9/B12 - patient refused, for next collection     UA - within normal limits     Lipid profile - within normal limits  - HgbA1c - 5.7%    Medicine consult, 4/15:  #Pre Op Risk   - EKG with rate controlled Afib, LAFB  - Re HTN: maintain /90   - Re: Afib: maintain anticoagulation with home xarelto   - Low-intermediate risk for low risk procedure      4/12: Pt reluctant to engage today; does not want to be on 8Uris. Equivocating regarding ECT. Clearly depressed. MoCA 20/30. Will continue to monitor and to discuss ECT.    4/13: Pt remains ambivalent about treatment. Not asking for discharge today. Refused some labs, will try to get these again. Alert and oriented. No SI. Will follow up anemia.    4/14: at this point patient is irritable and negativistic refusing ECT as well as some medications and lab work.  Will move Zyprexa to nighttime (sleep an issue?); and Zoloft 100mg to am; continue to attempt to discuss need for labs and possible ECT if medical evaluation can be completed. Need to assess/address anemia.  Refusal may require consideration of TOO.    4/15: Pt continues to refuse ECT and routine blood work. Pt was seen by medicine team for clearance for ECT, after which he was determined to be "Low-intermediate risk for low risk procedure. Pt's daughter, Valery, states she is his medical proxy and will bring in form next week.         Mr. Arriaga is a 89-year-old retired man, domiciled with daughter Brianda (), w/ PMH of hearing loss using hearing aid, colitis, HLD, HTN, on Xarelto 15mg daily (PCP Dr Barrett  #1 #5), PPH of depression, multiple psych hospitalization requiring ECT, last 4 years ago at [facility], currently on sertraline 50mg bid, olanzapine 5mg qd, sees Dr Josh Simmons () for outpatient, presenting due to worsening depression and obsessive thought.    Due to current active depressive symptoms, nonresponsive to outpatient treatment, and collateral from psychiatrist and family, patient meets involuntary admission criteria. He has been admitted to 8U for stabilization and possible ECT as inpatient. Despite not endorsing suicidality, the patient has experienced weight loss 2/2 severe depression, so ECT may be a good choice for him. Unfortunately, he is currently undecided regarding this treatment and has not yet given permission. The patient himself states that he "does not remember" whether the treatment was effective in the past and he is "not optimistic" about the potential efficacy of the treatment in the future. Will continue to reassess and will consult medicine for clearance in the meantime.    MoCA score 20/30. Unclear how much of this is pseudodementia vs neurocognitive impairment vs hearing loss.    Patient continues to refuse blood draws and ECT.    Plan:  - Dr. Pittman spoke with the patient's outpatient psychiatrist, who reiterated his belief that ECT was the best next step  - C/w Discuss patient's decision (consents or does not consent) for ECT vs conservative management and outpatient referral to geriatric psychiatrist  - Seen by medicine for ECT clearance 4/15: They determine pt is Low-intermediate risk for low risk procedure  - Decrease     - Does not need 1:1 in 8U  - Continue medication as follows:     Xarelto 15mg daily with meals     Sertraline 50mg twice daily; consider increase to 75 mg or 100 mg twice daily     Olanzapine 5mg daily     - Losartan to 25 mg QD; 4/18/22, decrease Losartan to 25 mg (previously 50mg) daily as BPs have been low 4/18/22 and pt was orthostatics positive     - Discontinue Furosemide 4/18/ 22; preiously 20mg twice daily     Atorvastatin 20mg daily     Vit D3 2000 units     Magnesium 500mg    - Meds are Mesalamine 2.4 mg and Mirabegron 50mg po QD     - Consider starting Wellbutrin or other activating antidepressant  - Labs:  - CBC shows mild macrocytic anemia ()  - UDS - negative     TSH; RPR; B9/B12 - patient refused, for next collection     UA - within normal limits     Lipid profile - within normal limits  - HgbA1c - 5.7%    Medicine consult, 4/15:  #Pre Op Risk   - EKG with rate controlled Afib, LAFB  - Re HTN: maintain /90   - Re: Afib: maintain anticoagulation with home xarelto   - Low-intermediate risk for low risk procedure    -Repeat EKG 4/18/22 per medicine reccs was unremarkable except for LBBB      4/12: Pt reluctant to engage today; does not want to be on 8Uris. Equivocating regarding ECT. Clearly depressed. MoCA 20/30. Will continue to monitor and to discuss ECT.    4/13: Pt remains ambivalent about treatment. Not asking for discharge today. Refused some labs, will try to get these again. Alert and oriented. No SI. Will follow up anemia.    4/14: at this point patient is irritable and negativistic refusing ECT as well as some medications and lab work.  Will move Zyprexa to nighttime (sleep an issue?); and Zoloft 100mg to am; continue to attempt to discuss need for labs and possible ECT if medical evaluation can be completed. Need to assess/address anemia.  Refusal may require consideration of TOO.    4/15: Pt continues to refuse ECT and routine blood work. Pt was seen by medicine team for clearance for ECT, after which he was determined to be "Low-intermediate risk for low risk procedure. Pt's daughter, Valery, states she is his medical proxy and will bring in form next week.    4/18: Pt continues to be ambivalent about ECT.  Lasix was held as BPs were low. BPs continued to be low and pt was newly orthostatic positive, tf decrease Losartan to 25 mg (previously 50mg) daily as BPs have been low 4/18/22 and pt was orthostatics positive.       Mr. Arriaga is a 89-year-old retired man, domiciled with daughter Brianda (), w/ PMH of hearing loss using hearing aid, colitis, HLD, HTN, on Xarelto 15mg daily (PCP Dr Barrett  #1 #5), PPH of depression, multiple psych hospitalization requiring ECT, last 4 years ago at [facility], currently on sertraline 50mg bid, olanzapine 5mg qd, sees Dr Josh Simmons () for outpatient, presenting due to worsening depression and obsessive thought.    Due to current active depressive symptoms, nonresponsive to outpatient treatment, and collateral from psychiatrist and family, patient meets involuntary admission criteria. He has been admitted to 8U for stabilization and possible ECT as inpatient. Despite not endorsing suicidality, the patient has experienced weight loss 2/2 severe depression, so ECT may be a good choice for him. Unfortunately, he is currently undecided regarding this treatment and has not yet given permission. The patient himself states that he "does not remember" whether the treatment was effective in the past and he is "not optimistic" about the potential efficacy of the treatment in the future. Will continue to reassess and will consult medicine for clearance in the meantime.    MoCA score 20/30. Unclear how much of this is pseudodementia vs neurocognitive impairment vs hearing loss.    Patient continues to refuse blood draws and remains ambivalent about ECT.    Plan:  - Dr. Pittman spoke with the patient's outpatient psychiatrist, who reiterated his belief that ECT was the best next step  - C/w Discuss patient's decision (consents or does not consent) for ECT vs conservative management and outpatient referral to geriatric psychiatrist  - Seen by medicine for ECT clearance 4/15: They determine pt is Low-intermediate risk for low risk procedure  - Decrease     - Does not need 1:1 in 8U  - Continue medication as follows:     Xarelto 15mg daily with meals     Sertraline 50mg twice daily; consider increase to 75 mg or 100 mg twice daily     Olanzapine 5mg daily     - Losartan to 25 mg QD; 4/18/22, decrease Losartan to 25 mg (previously 50mg) daily as BPs have been low 4/18/22 and pt was orthostatics positive     - Discontinue Furosemide 4/18/ 22; preiously 20mg twice daily     Atorvastatin 20mg daily     Vit D3 2000 units     Magnesium 500mg    - Meds are Mesalamine 2.4 mg and Mirabegron 50mg po QD     - Consider starting Wellbutrin or other activating antidepressant  - Labs:  - CBC shows mild macrocytic anemia ()  - UDS - negative     TSH; RPR; B9/B12 - patient refused, for next collection     UA - within normal limits     Lipid profile - within normal limits  - HgbA1c - 5.7%    Medicine consult, 4/15:  #Pre Op Risk   - EKG with rate controlled Afib, LAFB  - Re HTN: maintain /90   - Re: Afib: maintain anticoagulation with home xarelto   - Low-intermediate risk for low risk procedure    -Repeat EKG 4/18/22 per medicine reccs was unremarkable except for LBBB      4/12: Pt reluctant to engage today; does not want to be on 8Uris. Equivocating regarding ECT. Clearly depressed. MoCA 20/30. Will continue to monitor and to discuss ECT.    4/13: Pt remains ambivalent about treatment. Not asking for discharge today. Refused some labs, will try to get these again. Alert and oriented. No SI. Will follow up anemia.    4/14: at this point patient is irritable and negativistic refusing ECT as well as some medications and lab work.  Will move Zyprexa to nighttime (sleep an issue?); and Zoloft 100mg to am; continue to attempt to discuss need for labs and possible ECT if medical evaluation can be completed. Need to assess/address anemia.  Refusal may require consideration of TOO.    4/15: Pt continues to refuse ECT and routine blood work. Pt was seen by medicine team for clearance for ECT, after which he was determined to be "Low-intermediate risk for low risk procedure. Pt's daughter, Valery, states she is his medical proxy and will bring in form next week.    4/18: Lasix was held as BPs were low. BPs continued to be low and pt was newly orthostatic positive, tf decrease Losartan to 25 mg (previously 50mg) daily CTM. Pt still severely depressed; outpatient psychiatrist and daughters endorse ECT as having been the only effective treatment in the past. Pt continues to be ambivalent about it. He will not answer regarding SI today but endorsed passive SI on previous interview. Still with significant PMR and latency.

## 2022-04-19 LAB
GLUCOSE BLDC GLUCOMTR-MCNC: 190 MG/DL — HIGH (ref 70–99)
SARS-COV-2 RNA SPEC QL NAA+PROBE: SIGNIFICANT CHANGE UP

## 2022-04-19 PROCEDURE — 99232 SBSQ HOSP IP/OBS MODERATE 35: CPT

## 2022-04-19 RX ORDER — RIVAROXABAN 15 MG-20MG
15 KIT ORAL DAILY
Refills: 0 | Status: DISCONTINUED | OUTPATIENT
Start: 2022-04-19 | End: 2022-04-26

## 2022-04-19 RX ADMIN — Medication 2.4 GRAM(S): at 06:52

## 2022-04-19 RX ADMIN — ATORVASTATIN CALCIUM 20 MILLIGRAM(S): 80 TABLET, FILM COATED ORAL at 21:59

## 2022-04-19 RX ADMIN — SERTRALINE 100 MILLIGRAM(S): 25 TABLET, FILM COATED ORAL at 10:49

## 2022-04-19 RX ADMIN — RIVAROXABAN 15 MILLIGRAM(S): KIT at 10:49

## 2022-04-19 RX ADMIN — OLANZAPINE 5 MILLIGRAM(S): 15 TABLET, FILM COATED ORAL at 21:59

## 2022-04-19 RX ADMIN — MIRABEGRON 50 MILLIGRAM(S): 50 TABLET, EXTENDED RELEASE ORAL at 10:52

## 2022-04-19 RX ADMIN — MAGNESIUM OXIDE 400 MG ORAL TABLET 400 MILLIGRAM(S): 241.3 TABLET ORAL at 10:52

## 2022-04-19 RX ADMIN — Medication 2000 UNIT(S): at 10:49

## 2022-04-19 RX ADMIN — LOSARTAN POTASSIUM 25 MILLIGRAM(S): 100 TABLET, FILM COATED ORAL at 10:49

## 2022-04-19 NOTE — BH INPATIENT PSYCHIATRY PROGRESS NOTE - NSBHFUPINTERVALHXFT_PSY_A_CORE
irritable and not wanting to speak to the writer; sitting at the desk in his room eating breakfast; stares angrily at writer; No behavioral issues.

## 2022-04-19 NOTE — BH INPATIENT PSYCHIATRY PROGRESS NOTE - CURRENT MEDICATION
MEDICATIONS  (STANDING):  atorvastatin 20 milliGRAM(s) Oral daily  cholecalciferol 2000 Unit(s) Oral daily  losartan 25 milliGRAM(s) Oral daily  magnesium oxide 400 milliGRAM(s) Oral daily  mesalamine DR (24-Hour) Tablet 2.4 Gram(s) Oral <User Schedule>  mirabegron ER 50 milliGRAM(s) Oral daily  OLANZapine 5 milliGRAM(s) Oral at bedtime  rivaroxaban 15 milliGRAM(s) Oral daily  sertraline 100 milliGRAM(s) Oral daily    MEDICATIONS  (PRN):

## 2022-04-19 NOTE — BH INPATIENT PSYCHIATRY PROGRESS NOTE - NSBHMSETHTCONTENT_PSY_A_CORE
Preoccupations/Ruminations/Hopelessness/Other Preoccupations/Ruminations/Hopelessness/Unable to assess

## 2022-04-19 NOTE — BH INPATIENT PSYCHIATRY PROGRESS NOTE - OTHER
Swaying from side to side while standing up; right thumb tremor; left hand intention tremor when drinking water Continues to think that he is in a bad financial predicament and/or that his future is hopeless no matter what treatments are attempted   Reluctantly cooperative; guarded. Ambivalent about treatments. vasilee to writer; however spoke to other staff

## 2022-04-19 NOTE — BH INPATIENT PSYCHIATRY PROGRESS NOTE - NSBHASSESSSUMMFT_PSY_ALL_CORE
Mr. Arriaga is a 89-year-old retired man, domiciled with daughter Brianda (), w/ PMH of hearing loss using hearing aid, colitis, HLD, HTN, on Xarelto 15mg daily (PCP Dr Barrett  #1 #5), PPH of depression, multiple psych hospitalization requiring ECT, last 4 years ago at [facility], currently on sertraline 50mg bid, olanzapine 5mg qd, sees Dr Josh Simmnos () for outpatient, presenting due to worsening depression and obsessive thought.    Due to current active depressive symptoms, nonresponsive to outpatient treatment, and collateral from psychiatrist and family, patient meets involuntary admission criteria. He has been admitted to 8U for stabilization and possible ECT as inpatient. Despite not endorsing suicidality, the patient has experienced weight loss 2/2 severe depression, so ECT may be a good choice for him. Unfortunately, he is currently undecided regarding this treatment and has not yet given permission. The patient himself states that he "does not remember" whether the treatment was effective in the past and he is "not optimistic" about the potential efficacy of the treatment in the future. Will continue to reassess and will consult medicine for clearance in the meantime.    MoCA score 20/30. Unclear how much of this is pseudodementia vs neurocognitive impairment vs hearing loss.    Patient continues to refuse blood draws and remains ambivalent about ECT.    Plan:  - Dr. Pittman spoke with the patient's outpatient psychiatrist, who reiterated his belief that ECT was the best next step  - C/w Discuss patient's decision (consents or does not consent) for ECT vs conservative management and outpatient referral to geriatric psychiatrist  - Seen by medicine for ECT clearance 4/15: They determine pt is Low-intermediate risk for low risk procedure  - Decrease     - Does not need 1:1 in 8U  - Continue medication as follows:     Xarelto 15mg daily with meals     Sertraline 50mg twice daily; consider increase to 75 mg or 100 mg twice daily     Olanzapine 5mg daily     - Losartan to 25 mg QD; 4/18/22, decrease Losartan to 25 mg (previously 50mg) daily as BPs have been low 4/18/22 and pt was orthostatics positive     - Discontinue Furosemide 4/18/ 22; preiously 20mg twice daily     Atorvastatin 20mg daily     Vit D3 2000 units     Magnesium 500mg    - Meds are Mesalamine 2.4 mg and Mirabegron 50mg po QD     - Consider starting Wellbutrin or other activating antidepressant  - Labs:  - CBC shows mild macrocytic anemia ()  - UDS - negative     TSH; RPR; B9/B12 - patient refused, for next collection     UA - within normal limits     Lipid profile - within normal limits  - HgbA1c - 5.7%    Medicine consult, 4/15:  #Pre Op Risk   - EKG with rate controlled Afib, LAFB  - Re HTN: maintain /90   - Re: Afib: maintain anticoagulation with home xarelto   - Low-intermediate risk for low risk procedure    -Repeat EKG 4/18/22 per medicine reccs was unremarkable except for LBBB      4/12: Pt reluctant to engage today; does not want to be on 8Uris. Equivocating regarding ECT. Clearly depressed. MoCA 20/30. Will continue to monitor and to discuss ECT.    4/13: Pt remains ambivalent about treatment. Not asking for discharge today. Refused some labs, will try to get these again. Alert and oriented. No SI. Will follow up anemia.    4/14: at this point patient is irritable and negativistic refusing ECT as well as some medications and lab work.  Will move Zyprexa to nighttime (sleep an issue?); and Zoloft 100mg to am; continue to attempt to discuss need for labs and possible ECT if medical evaluation can be completed. Need to assess/address anemia.  Refusal may require consideration of TOO.    4/15: Pt continues to refuse ECT and routine blood work. Pt was seen by medicine team for clearance for ECT, after which he was determined to be "Low-intermediate risk for low risk procedure. Pt's daughter, Valery, states she is his medical proxy and will bring in form next week.    4/18: Lasix was held as BPs were low. BPs continued to be low and pt was newly orthostatic positive, tf decrease Losartan to 25 mg (previously 50mg) daily CTM. Pt still severely depressed; outpatient psychiatrist and daughters endorse ECT as having been the only effective treatment in the past. Pt continues to be ambivalent about it. He will not answer regarding SI today but endorsed passive SI on previous interview. Still with significant PMR and latency.    ;;04/19: mute to writer but stares; observed interacting with staff; irritable speech clear and coherent; by observation appears generally Alert; oriented; cognition intact; speech clear; no tremor or evidence of movement impairment. No behavioral issues.

## 2022-04-19 NOTE — BH INPATIENT PSYCHIATRY PROGRESS NOTE - NSTXDEPRESINTERMD_PSY_ALL_CORE
Zoloft and Zyprexa and consider ECT;  consider need to treat medical comorbidities that may be worsening mood issues (pt will still not allow blood draws). refuses ECT as a consideration but appears to be accepting Zyprexa and Zoloft at this time

## 2022-04-19 NOTE — BH INPATIENT PSYCHIATRY PROGRESS NOTE - NSBHCHARTREVIEWVS_PSY_A_CORE FT
Vital Signs Last 24 Hrs  T(C): 37.2 (04-19-22 @ 06:00), Max: 37.2 (04-19-22 @ 06:00)  T(F): 99 (04-19-22 @ 06:00), Max: 99 (04-19-22 @ 06:00)  HR: 68 (04-19-22 @ 06:00) (68 - 148)  BP: 168/60 (04-19-22 @ 06:00) (97/50 - 168/60)  BP(mean): --  RR: 18 (04-19-22 @ 06:00) (18 - 18)  SpO2: 95% (04-19-22 @ 06:00) (95% - 98%)    Orthostatic VS  04-18-22 @ 10:22  Lying BP: 107/65 HR: 73  Sitting BP: 92/38 HR: 83  Standing BP: 116/66 HR: 86  Site: --  Mode: --   Vital Signs Last 24 Hrs  T(C): 36.9 (04-19-22 @ 10:46), Max: 37.2 (04-19-22 @ 06:00)  T(F): 98.5 (04-19-22 @ 10:46), Max: 99 (04-19-22 @ 06:00)  HR: 73 (04-19-22 @ 10:46) (68 - 82)  BP: 149/71 (04-19-22 @ 10:46) (97/50 - 168/60)  BP(mean): --  RR: 16 (04-19-22 @ 10:46) (16 - 18)  SpO2: 97% (04-19-22 @ 10:46) (95% - 97%)    Orthostatic VS  04-18-22 @ 10:22  Lying BP: 107/65 HR: 73  Sitting BP: 92/38 HR: 83  Standing BP: 116/66 HR: 86  Site: --  Mode: --

## 2022-04-19 NOTE — BH INPATIENT PSYCHIATRY PROGRESS NOTE - NSBHMETABOLIC_PSY_ALL_CORE_FT
BMI: BMI (kg/m2): 22.7 (04-12-22 @ 09:00)  HbA1c: A1C with Estimated Average Glucose Result: 5.7 % (04-13-22 @ 07:45)    Glucose: POCT Blood Glucose.: 190 mg/dL (04-19-22 @ 06:43)    BP: 168/60 (04-19-22 @ 06:00) (97/50 - 168/60)  Lipid Panel: Date/Time: 04-13-22 @ 07:45  Cholesterol, Serum: 120  Direct LDL: --  HDL Cholesterol, Serum: 60  Total Cholesterol/HDL Ration Measurement: --  Triglycerides, Serum: 50   BMI: BMI (kg/m2): 22.7 (04-12-22 @ 09:00)  HbA1c: A1C with Estimated Average Glucose Result: 5.7 % (04-13-22 @ 07:45)    Glucose: POCT Blood Glucose.: 190 mg/dL (04-19-22 @ 06:43)    BP: 149/71 (04-19-22 @ 10:46) (97/50 - 168/60)  Lipid Panel: Date/Time: 04-13-22 @ 07:45  Cholesterol, Serum: 120  Direct LDL: --  HDL Cholesterol, Serum: 60  Total Cholesterol/HDL Ration Measurement: --  Triglycerides, Serum: 50

## 2022-04-19 NOTE — BH INPATIENT PSYCHIATRY PROGRESS NOTE - NSBHMSESPABN_PSY_A_CORE
Soft volume/Decreased productivity/Increased latency Soft volume/Decreased productivity/Increased latency/Other

## 2022-04-20 PROCEDURE — 99233 SBSQ HOSP IP/OBS HIGH 50: CPT

## 2022-04-20 RX ORDER — OLANZAPINE 15 MG/1
5 TABLET, FILM COATED ORAL AT BEDTIME
Refills: 0 | Status: COMPLETED | OUTPATIENT
Start: 2022-04-20 | End: 2022-04-20

## 2022-04-20 RX ORDER — OLANZAPINE 15 MG/1
2.5 TABLET, FILM COATED ORAL ONCE
Refills: 0 | Status: COMPLETED | OUTPATIENT
Start: 2022-04-20 | End: 2022-04-20

## 2022-04-20 RX ORDER — OLANZAPINE 15 MG/1
7.5 TABLET, FILM COATED ORAL AT BEDTIME
Refills: 0 | Status: DISCONTINUED | OUTPATIENT
Start: 2022-04-21 | End: 2022-04-22

## 2022-04-20 RX ADMIN — MIRABEGRON 50 MILLIGRAM(S): 50 TABLET, EXTENDED RELEASE ORAL at 10:40

## 2022-04-20 RX ADMIN — Medication 2000 UNIT(S): at 10:40

## 2022-04-20 RX ADMIN — RIVAROXABAN 15 MILLIGRAM(S): KIT at 10:40

## 2022-04-20 RX ADMIN — ATORVASTATIN CALCIUM 20 MILLIGRAM(S): 80 TABLET, FILM COATED ORAL at 22:01

## 2022-04-20 RX ADMIN — OLANZAPINE 2.5 MILLIGRAM(S): 15 TABLET, FILM COATED ORAL at 13:22

## 2022-04-20 RX ADMIN — LOSARTAN POTASSIUM 25 MILLIGRAM(S): 100 TABLET, FILM COATED ORAL at 10:40

## 2022-04-20 RX ADMIN — MAGNESIUM OXIDE 400 MG ORAL TABLET 400 MILLIGRAM(S): 241.3 TABLET ORAL at 10:40

## 2022-04-20 RX ADMIN — OLANZAPINE 5 MILLIGRAM(S): 15 TABLET, FILM COATED ORAL at 22:01

## 2022-04-20 RX ADMIN — SERTRALINE 100 MILLIGRAM(S): 25 TABLET, FILM COATED ORAL at 10:40

## 2022-04-20 NOTE — BH INPATIENT PSYCHIATRY PROGRESS NOTE - MODIFICATIONS
monitor compliance with Zyprexa (switch to Zydis) may need to raise dose.  If continues to worsen will advocate for ECT and TOO if needed.  Monitor for vascular events.  Currently bp stable.  May consider repeating EKG.

## 2022-04-20 NOTE — BH INPATIENT PSYCHIATRY PROGRESS NOTE - CASE SUMMARY
Mr. Arriaga is a 89-year-old retired man, domiciled with daughter Brianda (), w/ PMH of hearing loss using hearing aid, colitis, HLD, HTN, on Xarelto 15mg daily (PCP Dr Barrett  #1 #5), PPH of depression, multiple psych hospitalization requiring ECT, last 4 years ago at [facility], currently on sertraline 50mg bid, olanzapine 5mg qd, sees Dr Josh Simmons () for outpatient, presenting due to worsening depression and obsessive thought.    Due to current active depressive symptoms, nonresponsive to outpatient treatment, and collateral from psychiatrist and family, patient meets involuntary admission criteria. He has been admitted to 8U for stabilization and possible ECT as inpatient. Despite not endorsing suicidality, the patient has experienced weight loss 2/2 severe depression, so ECT may be a good choice for him. Unfortunately, he is currently undecided regarding this treatment and has not yet given permission. The patient himself states that he "does not remember" whether the treatment was effective in the past and he is "not optimistic" about the potential efficacy of the treatment in the future. Will continue to reassess and will consult medicine for clearance in the meantime.    MoCA score 20/30. Unclear how much of this is pseudodementia vs neurocognitive impairment vs hearing loss.    Patient continues to refuse blood draws and remains ambivalent about ECT.    Plan:  - Dr. Pittman spoke with the patient's outpatient psychiatrist, who reiterated his belief that ECT was the best next step  - C/w Discuss patient's decision (consents or does not consent) for ECT vs conservative management and outpatient referral to geriatric psychiatrist  - Seen by medicine for ECT clearance 4/15: They determine pt is Low-intermediate risk for low risk procedure  - Decrease     - Does not need 1:1 in 8U  - Continue medication as follows:     Xarelto 15mg daily with meals     Sertraline 50mg twice daily; consider increase to 75 mg or 100 mg twice daily     Olanzapine 5mg daily     - Losartan to 25 mg QD; 4/18/22, decrease Losartan to 25 mg (previously 50mg) daily as BPs have been low 4/18/22 and pt was orthostatics positive     - Discontinue Furosemide 4/18/ 22; preiously 20mg twice daily     Atorvastatin 20mg daily     Vit D3 2000 units     Magnesium 500mg    - Meds are Mesalamine 2.4 mg and Mirabegron 50mg po QD     - Consider starting Wellbutrin or other activating antidepressant  - Labs:  - CBC shows mild macrocytic anemia ()  - UDS - negative     TSH; RPR; B9/B12 - patient refused, for next collection     UA - within normal limits     Lipid profile - within normal limits  - HgbA1c - 5.7%    Medicine consult, 4/15:  #Pre Op Risk   - EKG with rate controlled Afib, LAFB  - Re HTN: maintain /90   - Re: Afib: maintain anticoagulation with home xarelto   - Low-intermediate risk for low risk procedure    -Repeat EKG 4/18/22 per medicine reccs was unremarkable except for LBBB      4/12: Pt reluctant to engage today; does not want to be on [facility]. Equivocating regarding ECT. Clearly depressed. MoCA 20/30. Will continue to monitor and to discuss ECT.    4/13: Pt remains ambivalent about treatment. Not asking for discharge today. Refused some labs, will try to get these again. Alert and oriented. No SI. Will follow up anemia.    4/14: at this point patient is irritable and negativistic refusing ECT as well as some medications and lab work.  Will move Zyprexa to nighttime (sleep an issue?); and Zoloft 100mg to am; continue to attempt to discuss need for labs and possible ECT if medical evaluation can be completed. Need to assess/address anemia.  Refusal may require consideration of TOO.    4/15: Pt continues to refuse ECT and routine blood work. Pt was seen by medicine team for clearance for ECT, after which he was determined to be "Low-intermediate risk for low risk procedure. Pt's daughter, Valery, states she is his medical proxy and will bring in form next week.    4/18: Lasix was held as BPs were low. BPs continued to be low and pt was newly orthostatic positive, tf decrease Losartan to 25 mg (previously 50mg) daily CTM. Pt still severely depressed; outpatient psychiatrist and daughters endorse ECT as having been the only effective treatment in the past. Pt continues to be ambivalent about it. He will not answer regarding SI today but endorsed passive SI on previous interview. Still with significant PMR and latency.    ;;04/19: mute to writer but stares; observed interacting with staff; irritable speech clear and coherent; by observation appears generally Alert; oriented; cognition intact; speech clear; no tremor or evidence of movement impairment. No behavioral issues.      ;;04/20: worsening of depression and anxiety ; fearful of the writer leaving his room; sits with direction near the nursing station; internally preoccupied; unable to give details; however appears alert and aware of environment; given Zydis (Zyprexa) 2.5mg po stat for distress and severe anxiety.  Possibility of psychotic sxs; in view of worsening of sxs may need to consider ECT and if needed TOO.     Mr. Arriaga is a 89-year-old retired man, domiciled with daughter Brianda (), w/ PMH of hearing loss using hearing aid, colitis, HLD, HTN, on Xarelto 15mg daily (PCP Dr Barrett  #1 #5), PPH of depression, multiple psych hospitalization requiring ECT, last 4 years ago at [facility], currently on sertraline 50mg bid, olanzapine 5mg qd, sees Dr Josh Simmons () for outpatient, presenting due to worsening depression and obsessive thought.    Due to current active depressive symptoms, nonresponsive to outpatient treatment, and collateral from psychiatrist and family, patient meets involuntary admission criteria. He has been admitted to 8U for stabilization and possible ECT as inpatient. Despite not endorsing suicidality, the patient has experienced weight loss 2/2 severe depression, so ECT may be a good choice for him. Unfortunately, he is currently undecided regarding this treatment and has not yet given permission. The patient himself states that he "does not remember" whether the treatment was effective in the past and he is "not optimistic" about the potential efficacy of the treatment in the future. Will continue to reassess and will consult medicine for clearance in the meantime.    MoCA score 20/30. Unclear how much of this is pseudodementia vs neurocognitive impairment vs hearing loss.    Patient continues to refuse blood draws and remains ambivalent about ECT.    Plan:  - Dr. Pittman spoke with the patient's outpatient psychiatrist, who reiterated his belief that ECT was the best next step  - C/w Discuss patient's decision (consents or does not consent) for ECT vs conservative management and outpatient referral to geriatric psychiatrist  - Seen by medicine for ECT clearance 4/15: They determine pt is Low-intermediate risk for low risk procedure  - Decrease     - Does not need 1:1 in 8U  - Continue medication as follows:     Xarelto 15mg daily with meals     Sertraline 50mg twice daily; consider increase to 75 mg or 100 mg twice daily     Olanzapine 5mg daily     - Losartan to 25 mg QD; 4/18/22, decrease Losartan to 25 mg (previously 50mg) daily as BPs have been low 4/18/22 and pt was orthostatics positive     - Discontinue Furosemide 4/18/ 22; preiously 20mg twice daily     Atorvastatin 20mg daily     Vit D3 2000 units     Magnesium 500mg    - Meds are Mesalamine 2.4 mg and Mirabegron 50mg po QD     - Consider starting Wellbutrin or other activating antidepressant  - Labs:  - CBC shows mild macrocytic anemia ()  - UDS - negative     TSH; RPR; B9/B12 - patient refused, for next collection     UA - within normal limits     Lipid profile - within normal limits  - HgbA1c - 5.7%    Medicine consult, 4/15:  #Pre Op Risk   - EKG with rate controlled Afib, LAFB  - Re HTN: maintain /90   - Re: Afib: maintain anticoagulation with home xarelto   - Low-intermediate risk for low risk procedure    -Repeat EKG 4/18/22 per medicine reccs was unremarkable except for LBBB      4/12: Pt reluctant to engage today; does not want to be on [facility]. Equivocating regarding ECT. Clearly depressed. MoCA 20/30. Will continue to monitor and to discuss ECT.    4/13: Pt remains ambivalent about treatment. Not asking for discharge today. Refused some labs, will try to get these again. Alert and oriented. No SI. Will follow up anemia.    4/14: at this point patient is irritable and negativistic refusing ECT as well as some medications and lab work.  Will move Zyprexa to nighttime (sleep an issue?); and Zoloft 100mg to am; continue to attempt to discuss need for labs and possible ECT if medical evaluation can be completed. Need to assess/address anemia.  Refusal may require consideration of TOO.    4/15: Pt continues to refuse ECT and routine blood work. Pt was seen by medicine team for clearance for ECT, after which he was determined to be "Low-intermediate risk for low risk procedure. Pt's daughter, Valery, states she is his medical proxy and will bring in form next week.    4/18: Lasix was held as BPs were low. BPs continued to be low and pt was newly orthostatic positive, tf decrease Losartan to 25 mg (previously 50mg) daily CTM. Pt still severely depressed; outpatient psychiatrist and daughters endorse ECT as having been the only effective treatment in the past. Pt continues to be ambivalent about it. He will not answer regarding SI today but endorsed passive SI on previous interview. Still with significant PMR and latency.    ;;04/19: mute to writer but stares; observed interacting with staff; irritable speech clear and coherent; by observation appears generally Alert; oriented; cognition intact; speech clear; no tremor or evidence of movement impairment. No behavioral issues.      ;;04/20: worsening of depression and anxiety ; fearful of the writer leaving his room; sits with direction near the nursing station; internally preoccupied; unable to give details; however appears alert and aware of environment; given Zydis (Zyprexa) 2.5mg po stat for distress and severe anxiety.  Possibility of psychotic sxs; in view of worsening of sxs may need to consider ECT and if needed TOO.  Later in the day verbally consented to ECT in presence of RN and visiting daughter; will approach patient tomorrow for written consent; has been medically cleared for ECT.

## 2022-04-20 NOTE — BH INPATIENT PSYCHIATRY PROGRESS NOTE - NSBHMETABOLIC_PSY_ALL_CORE_FT
BMI: BMI (kg/m2): 22.7 (04-12-22 @ 09:00)  HbA1c: A1C with Estimated Average Glucose Result: 5.7 % (04-13-22 @ 07:45)    Glucose: POCT Blood Glucose.: 190 mg/dL (04-19-22 @ 06:43)    BP: 125/60 (04-19-22 @ 16:13) (97/50 - 168/60)  Lipid Panel: Date/Time: 04-13-22 @ 07:45  Cholesterol, Serum: 120  Direct LDL: --  HDL Cholesterol, Serum: 60  Total Cholesterol/HDL Ration Measurement: --  Triglycerides, Serum: 50   BMI: BMI (kg/m2): 22.7 (04-12-22 @ 09:00)  HbA1c: A1C with Estimated Average Glucose Result: 5.7 % (04-13-22 @ 07:45)    Glucose: POCT Blood Glucose.: 190 mg/dL (04-19-22 @ 06:43)    BP: 137/62 (04-20-22 @ 08:30) (97/50 - 168/60)  Lipid Panel: Date/Time: 04-13-22 @ 07:45  Cholesterol, Serum: 120  Direct LDL: --  HDL Cholesterol, Serum: 60  Total Cholesterol/HDL Ration Measurement: --  Triglycerides, Serum: 50   BMI: BMI (kg/m2): 22.7 (04-12-22 @ 09:00)  HbA1c: A1C with Estimated Average Glucose Result: 5.7 % (04-13-22 @ 07:45)    Glucose: POCT Blood Glucose.: 190 mg/dL (04-19-22 @ 06:43)    BP: 93/60 (04-20-22 @ 13:23) (93/60 - 168/60)  Lipid Panel: Date/Time: 04-13-22 @ 07:45  Cholesterol, Serum: 120  Direct LDL: --  HDL Cholesterol, Serum: 60  Total Cholesterol/HDL Ration Measurement: --  Triglycerides, Serum: 50   BMI: BMI (kg/m2): 22.7 (04-12-22 @ 09:00)  HbA1c: A1C with Estimated Average Glucose Result: 5.7 % (04-13-22 @ 07:45)    Glucose: POCT Blood Glucose.: 190 mg/dL (04-19-22 @ 06:43)    BP: 164/56 (04-20-22 @ 16:44) (93/60 - 168/60)  Lipid Panel: Date/Time: 04-13-22 @ 07:45  Cholesterol, Serum: 120  Direct LDL: --  HDL Cholesterol, Serum: 60  Total Cholesterol/HDL Ration Measurement: --  Triglycerides, Serum: 50

## 2022-04-20 NOTE — BH INPATIENT PSYCHIATRY PROGRESS NOTE - CURRENT MEDICATION
MEDICATIONS  (STANDING):  atorvastatin 20 milliGRAM(s) Oral daily  cholecalciferol 2000 Unit(s) Oral daily  losartan 25 milliGRAM(s) Oral daily  magnesium oxide 400 milliGRAM(s) Oral daily  mesalamine DR (24-Hour) Tablet 2.4 Gram(s) Oral <User Schedule>  mirabegron ER 50 milliGRAM(s) Oral daily  OLANZapine 5 milliGRAM(s) Oral at bedtime  rivaroxaban 15 milliGRAM(s) Oral daily  sertraline 100 milliGRAM(s) Oral daily    MEDICATIONS  (PRN):   MEDICATIONS  (STANDING):  atorvastatin 20 milliGRAM(s) Oral daily  cholecalciferol 2000 Unit(s) Oral daily  losartan 25 milliGRAM(s) Oral daily  magnesium oxide 400 milliGRAM(s) Oral daily  mesalamine DR (24-Hour) Tablet 2.4 Gram(s) Oral <User Schedule>  mirabegron ER 50 milliGRAM(s) Oral daily  OLANZapine 5 milliGRAM(s) Oral at bedtime  OLANZapine Disintegrating Tablet 2.5 milliGRAM(s) Oral once  rivaroxaban 15 milliGRAM(s) Oral daily  sertraline 100 milliGRAM(s) Oral daily    MEDICATIONS  (PRN):

## 2022-04-20 NOTE — BH INPATIENT PSYCHIATRY PROGRESS NOTE - OTHER
Swaying from side to side while standing up; right thumb tremor; left hand intention tremor when drinking water Reluctantly cooperative; guarded. Ambivalent about treatments. vasilee to writer; however spoke to other staff

## 2022-04-20 NOTE — BH INPATIENT PSYCHIATRY PROGRESS NOTE - NSBHASSESSSUMMFT_PSY_ALL_CORE
Mr. Arriaga is a 89-year-old retired man, domiciled with daughter Brianda (), w/ PMH of hearing loss using hearing aid, colitis, HLD, HTN, on Xarelto 15mg daily (PCP Dr Barrett  #1 #5), PPH of depression, multiple psych hospitalization requiring ECT, last 4 years ago at [facility], currently on sertraline 50mg bid, olanzapine 5mg qd, sees Dr Josh Simmons () for outpatient, presenting due to worsening depression and obsessive thought.    Due to current active depressive symptoms, nonresponsive to outpatient treatment, and collateral from psychiatrist and family, patient meets involuntary admission criteria. He has been admitted to 8U for stabilization and possible ECT as inpatient. Despite not endorsing suicidality, the patient has experienced weight loss 2/2 severe depression, so ECT may be a good choice for him. Unfortunately, he is currently undecided regarding this treatment and has not yet given permission. The patient himself states that he "does not remember" whether the treatment was effective in the past and he is "not optimistic" about the potential efficacy of the treatment in the future. Will continue to reassess and will consult medicine for clearance in the meantime.    MoCA score 20/30. Unclear how much of this is pseudodementia vs neurocognitive impairment vs hearing loss.    Patient continues to refuse blood draws and remains ambivalent about ECT.    Plan:  - Dr. Pittman spoke with the patient's outpatient psychiatrist, who reiterated his belief that ECT was the best next step  - C/w Discuss patient's decision (consents or does not consent) for ECT vs conservative management and outpatient referral to geriatric psychiatrist  - Seen by medicine for ECT clearance 4/15: They determine pt is Low-intermediate risk for low risk procedure  - Decrease     - Does not need 1:1 in 8U  - Continue medication as follows:     Xarelto 15mg daily with meals     Sertraline 50mg twice daily; consider increase to 75 mg or 100 mg twice daily     Olanzapine 5mg daily     - Losartan to 25 mg QD; 4/18/22, decrease Losartan to 25 mg (previously 50mg) daily as BPs have been low 4/18/22 and pt was orthostatics positive     - Discontinue Furosemide 4/18/ 22; preiously 20mg twice daily     Atorvastatin 20mg daily     Vit D3 2000 units     Magnesium 500mg    - Meds are Mesalamine 2.4 mg and Mirabegron 50mg po QD     - Consider starting Wellbutrin or other activating antidepressant  - Labs:  - CBC shows mild macrocytic anemia ()  - UDS - negative     TSH; RPR; B9/B12 - patient refused, for next collection     UA - within normal limits     Lipid profile - within normal limits  - HgbA1c - 5.7%    Medicine consult, 4/15:  #Pre Op Risk   - EKG with rate controlled Afib, LAFB  - Re HTN: maintain /90   - Re: Afib: maintain anticoagulation with home xarelto   - Low-intermediate risk for low risk procedure    -Repeat EKG 4/18/22 per medicine reccs was unremarkable except for LBBB      4/12: Pt reluctant to engage today; does not want to be on 8[facility]ris. Equivocating regarding ECT. Clearly depressed. MoCA 20/30. Will continue to monitor and to discuss ECT.    4/13: Pt remains ambivalent about treatment. Not asking for discharge today. Refused some labs, will try to get these again. Alert and oriented. No SI. Will follow up anemia.    4/14: at this point patient is irritable and negativistic refusing ECT as well as some medications and lab work.  Will move Zyprexa to nighttime (sleep an issue?); and Zoloft 100mg to am; continue to attempt to discuss need for labs and possible ECT if medical evaluation can be completed. Need to assess/address anemia.  Refusal may require consideration of TOO.    4/15: Pt continues to refuse ECT and routine blood work. Pt was seen by medicine team for clearance for ECT, after which he was determined to be "Low-intermediate risk for low risk procedure. Pt's daughter, Valery, states she is his medical proxy and will bring in form next week.    4/18: Lasix was held as BPs were low. BPs continued to be low and pt was newly orthostatic positive, tf decrease Losartan to 25 mg (previously 50mg) daily CTM. Pt still severely depressed; outpatient psychiatrist and daughters endorse ECT as having been the only effective treatment in the past. Pt continues to be ambivalent about it. He will not answer regarding SI today but endorsed passive SI on previous interview. Still with significant PMR and latency.    ;;04/19: mute to writer but stares; observed interacting with staff; irritable speech clear and coherent; by observation appears generally Alert; oriented; cognition intact; speech clear; no tremor or evidence of movement impairment. No behavioral issues.       Mr. Arriaga is a 89-year-old retired man, domiciled with daughter Brianda (), w/ PMH of hearing loss using hearing aid, colitis, HLD, HTN, on Xarelto 15mg daily (PCP Dr Barrett  #1 #5), PPH of depression, multiple psych hospitalization requiring ECT, last 4 years ago at [facility], currently on sertraline 50mg bid, olanzapine 5mg qd, sees Dr Josh Simmons () for outpatient, presenting due to worsening depression and obsessive thought.    Due to current active depressive symptoms, nonresponsive to outpatient treatment, and collateral from psychiatrist and family, patient meets involuntary admission criteria. He has been admitted to 8U for stabilization and possible ECT as inpatient. Despite not endorsing suicidality, the patient has experienced weight loss 2/2 severe depression, so ECT may be a good choice for him. Unfortunately, he is currently undecided regarding this treatment and has not yet given permission. The patient himself states that he "does not remember" whether the treatment was effective in the past and he is "not optimistic" about the potential efficacy of the treatment in the future. Cleared by medicine for ECT 4/15/22.    MoCA score 20/30. Unclear how much of this is pseudodementia vs neurocognitive impairment vs hearing loss.    Patient continues to refuse blood draws and remains ambivalent about ECT.    Problems    1) MDD r/o psychotic features  2) Auditory Hallucinations    #MDD  - Today, 4/20, pt endorses auditory hallucinations. Stated he was "hearing music coming from the room and following him around with words that he couldn't quite make out." Similar episode "last time I was suffering from this condition." AH resolved after 10 minutes.   Plan:  - Dr. 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 It is unclear whether he has tinnitus. His female sex is not necessarily a pro nor a con as female predomination "may be due to an overrepresentation of females in the literature." (MADHU Escoto et al (2015))    Per, VIRGEN Blair et al. (2009), "Psychotic Major Depression patients were more likely to be members of a racial/ethnic minority and to have lower educational attainment compared to those with nonpsychotic major depression. In addition, PMD patients were found to have greater current depression severity, suicidal ideation, and social and work impairment. These patients also were more likely to have histories of suicide attempts and psychiatric hospitalizations, to report an earlier age of illness onset, and to meet criteria for chronic depression. 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 Arriaga has stated that he is of [place] descent and his father was a member of an [place] minority group in [place] [facility]. Further supporting this diagnosis is his transient suicidal ideation evidenced by the statement, "It'd be better if I were a goner." The patient's chronic depression is another plus in this column. "Results indicated that PMD was present in a relatively small percentage of treatment-seeking outpatients but was associated with disproportionately high levels of severity and impairment" (ibid)  Plan: Consider AchE inhibitors as "[T]hey seem to be safer and to yield fewer side-effects than antipsychotics, antidepressants, and anticonvulsants . Second, they may even yield beneficial side-effects when administered to patients suffering from comorbid cognitive decline. Furthermore, a beneficial effect may be obtained relatively quickly…at a low dose (rivastigmine 1.5?mg is much lower than the 9?mg dose used for dementia), and with substantial effect (HOMERO Escoto et al 2015)     -MADHU Escoto, MADHU Crowder, JO ANN Napier, & MARYSOL Bonner (2015). Musical hallucinations treated with acetylcholinesterase inhibitors. Frontiers in psychiatry, 6, 46. https://doi.org/10.3389/fpsyt.2015.97985  -VIRGEN Blair., LAURA Hernandez, & ROSA Maher (2009). Prevalence and clinical characteristics of psychotic versus nonpsychotic major depression in a general psychiatric outpatient clinic. Depression and anxiety, 26(1), 54–64. https://doi.org/10.1002/da.47228    - Does not need 1:1 in 8U  - Continue medication as follows:     Xarelto 15mg daily with meals     Sertraline 50mg twice daily; consider increase to 75 mg or 100 mg twice daily     Olanzapine 5mg daily     - Losartan to 25 mg QD; 4/18/22, decrease Losartan to 25 mg (previously 50mg) daily as BPs have been low 4/18/22 and pt was orthostatics positive     - Discontinue Furosemide 4/18/ 22; preiously 20mg twice daily     Atorvastatin 20mg daily     Vit D3 2000 units     Magnesium 500mg    - Meds are Mesalamine 2.4 mg and Mirabegron 50mg po QD; pt refused Mesalamine    - Consider starting Wellbutrin or other activating antidepressant  - Labs:  - CBC shows mild macrocytic anemia ()  - UDS - negative     TSH; RPR; B9/B12 - patient refused, for next collection     UA - within normal limits     Lipid profile - within normal limits  - HgbA1c - 5.7%    Medicine consult, 4/15:  #Pre Op Risk   - EKG with rate controlled Afib, LAFB  - Re HTN: maintain /90   - Re: Afib: maintain anticoagulation with home xarelto   - Low-intermediate risk for low risk procedure    -Repeat EKG 4/18/22 per medicine reccs was unremarkable except for LBBB      4/12: Pt reluctant to engage today; does not want to be on 8[facility]. Equivocating regarding ECT. Clearly depressed. MoCA 20/30. Will continue to monitor and to discuss ECT.    4/13: Pt remains ambivalent about treatment. Not asking for discharge today. Refused some labs, will try to get these again. Alert and oriented. No SI. Will follow up anemia.    4/14: at this point patient is irritable and negativistic refusing ECT as well as some medications and lab work.  Will move Zyprexa to nighttime (sleep an issue?); and Zoloft 100mg to am; continue to attempt to discuss need for labs and possible ECT if medical evaluation can be completed. Need to assess/address anemia.  Refusal may require consideration of TOO.    4/15: Pt continues to refuse ECT and routine blood work. Pt was seen by medicine team for clearance for ECT, after which he was determined to be "Low-intermediate risk for low risk procedure. Pt's daughter, Valery, states she is his medical proxy and will bring in form next week.    4/18: Lasix was held as BPs were low. BPs continued to be low and pt was newly orthostatic positive, tf decrease Losartan to 25 mg (previously 50mg) daily CTM. Pt still severely depressed; outpatient psychiatrist and daughters endorse ECT as having been the only effective treatment in the past. Pt continues to be ambivalent about it. He will not answer regarding SI today but endorsed passive SI on previous interview. Still with significant PMR and latency.    ;;04/19: mute to writer but stares; observed interacting with staff; irritable speech clear and coherent; by observation appears generally Alert; oriented; cognition intact; speech clear; no tremor or evidence of movement impairment. No behavioral issues.      4/20: Continues to be despondent and ambivalent about treatments. Endorses auditory hallucinations. Stated he was "hearing music coming from the room and following him around with words that he couldn't quite make out." Similar episode "last time I was suffering from this condition." Pt states the music "does not bother him." AH resolved after 10 minutes. Otw no SI/HI/VH//delusions.     Mr. Arriaga is a 89-year-old retired man, domiciled with daughter Brianda (), w/ PMH of hearing loss using hearing aid, colitis, HLD, HTN, on Xarelto 15mg daily (PCP Dr Barrett  #1 #5), PPH of depression, multiple psych hospitalization requiring ECT, last 4 years ago at [facility], currently on sertraline 50mg bid, olanzapine 5mg qd, sees Dr Josh Simmons () for outpatient, presenting due to worsening depression and obsessive thought.    Due to current active depressive symptoms, nonresponsive to outpatient treatment, and collateral from psychiatrist and family, patient meets involuntary admission criteria. He has been admitted to 8U for stabilization and possible ECT as inpatient. Despite not endorsing suicidality, the patient has experienced weight loss 2/2 severe depression, so ECT may be a good choice for him. Unfortunately, he is currently undecided regarding this treatment and has not yet given permission. The patient himself states that he "does not remember" whether the treatment was effective in the past and he is "not optimistic" about the potential efficacy of the treatment in the future. Cleared by medicine for ECT 4/15/22.    MoCA score 20/30. Unclear how much of this is pseudodementia vs neurocognitive impairment vs hearing loss.    Patient continues to refuse blood draws and remains ambivalent about ECT.    Problems  1) MDD r/o psychotic features  2) Auditory Hallucinations    #MDD  - Today, 4/20, pt endorses auditory hallucinations. Stated he was "hearing music coming from the room and following him around with words that he couldn't quite make out." Similar episode "last time I was suffering from this condition." AH resolved after 10 minutes.   Plan:  - Dr. 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 It is unclear whether he has tinnitus. His female sex is not necessarily a pro nor a con as female predomination "may be due to an overrepresentation of females in the literature." (MADHU Escoto et al (2015))    Per, VIRGEN Blair et al. (2009), "Psychotic Major Depression patients were more likely to be members of a racial/ethnic minority and to have lower educational attainment compared to those with nonpsychotic major depression. In addition, PMD patients were found to have greater current depression severity, suicidal ideation, and social and work impairment. These patients also were more likely to have histories of suicide attempts and psychiatric hospitalizations, to report an earlier age of illness onset, and to meet criteria for chronic depression. 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 Arriaga has stated that he is of [place] descent and his father was a member of an [place] minority group in [place]. Further supporting this diagnosis is his transient suicidal ideation evidenced by the statement, "It'd be better if I were a goner." The patient's chronic depression is another plus in this column. "Results indicated that PMD was present in a relatively small percentage of treatment-seeking outpatients but was associated with disproportionately high levels of severity and impairment" (ibid)  Plan: Consider AchE inhibitors as "[T]hey seem to be safer and to yield fewer side-effects than antipsychotics, antidepressants, and anticonvulsants . Second, they may even yield beneficial side-effects when administered to patients suffering from comorbid cognitive decline. Furthermore, a beneficial effect may be obtained relatively quickly…at a low dose (rivastigmine 1.5?mg is much lower than the 9?mg dose used for dementia), and with substantial effect (HOMERO Escoto et al 2015)     -MADHU Escoto, MADHU Crowder, JO ANN Napier, & MAYRSOL Bonner (2015). Musical hallucinations treated with acetylcholinesterase inhibitors. Frontiers in psychiatry, 6, 46. https://doi.org/10.3389/fpsyt.2015.45861  -VIRGEN Blair., LAURA Hernandez, & ROSA Maher (2009). Prevalence and clinical characteristics of psychotic versus nonpsychotic major depression in a general psychiatric outpatient clinic. Depression and anxiety, 26(1), 54–64. https://doi.org/10.1002/da.77401    - Does not need 1:1 in 8U  - Continue medication as follows:     Xarelto 15mg daily with meals     Sertraline 50mg twice daily; consider increase to 75 mg or 100 mg twice daily     Olanzapine 5mg daily     - Losartan to 25 mg QD; 4/18/22, decrease Losartan to 25 mg (previously 50mg) daily as BPs have been low 4/18/22 and pt was orthostatics positive     - Discontinue Furosemide 4/18/ 22; preiously 20mg twice daily     Atorvastatin 20mg daily     Vit D3 2000 units     Magnesium 500mg    - Meds are Mesalamine 2.4 mg and Mirabegron 50mg po QD; pt refused Mesalamine    - Consider starting Wellbutrin or other activating antidepressant  - Labs:  - CBC shows mild macrocytic anemia ()  - UDS - negative     TSH; RPR; B9/B12 - patient refused, for next collection     UA - within normal limits     Lipid profile - within normal limits  - HgbA1c - 5.7%    Medicine consult, 4/15:  #Pre Op Risk   - EKG with rate controlled Afib, LAFB  - Re HTN: maintain /90   - Re: Afib: maintain anticoagulation with home xarelto   - Low-intermediate risk for low risk procedure    -Repeat EKG 4/18/22 per medicine reccs was unremarkable except for LBBB      4/12: Pt reluctant to engage today; does not want to be on 8[facility]. Equivocating regarding ECT. Clearly depressed. MoCA 20/30. Will continue to monitor and to discuss ECT.    4/13: Pt remains ambivalent about treatment. Not asking for discharge today. Refused some labs, will try to get these again. Alert and oriented. No SI. Will follow up anemia.    4/14: at this point patient is irritable and negativistic refusing ECT as well as some medications and lab work.  Will move Zyprexa to nighttime (sleep an issue?); and Zoloft 100mg to am; continue to attempt to discuss need for labs and possible ECT if medical evaluation can be completed. Need to assess/address anemia.  Refusal may require consideration of TOO.    4/15: Pt continues to refuse ECT and routine blood work. Pt was seen by medicine team for clearance for ECT, after which he was determined to be "Low-intermediate risk for low risk procedure. Pt's daughter, Valery, states she is his medical proxy and will bring in form next week.    4/18: Lasix was held as BPs were low. BPs continued to be low and pt was newly orthostatic positive, tf decrease Losartan to 25 mg (previously 50mg) daily CTM. Pt still severely depressed; outpatient psychiatrist and daughters endorse ECT as having been the only effective treatment in the past. Pt continues to be ambivalent about it. He will not answer regarding SI today but endorsed passive SI on previous interview. Still with significant PMR and latency.    ;;04/19: mute to writer but stares; observed interacting with staff; irritable speech clear and coherent; by observation appears generally Alert; oriented; cognition intact; speech clear; no tremor or evidence of movement impairment. No behavioral issues.      4/20: Continues to be despondent and ambivalent about treatments. Endorses auditory hallucinations. Stated he was "hearing music coming from the room and following him around with words that he couldn't quite make out." Similar episode "last time I was suffering from this condition." Pt states the music "does not bother him." AH resolved after 10 minutes. Otw no SI/HI/VH//delusions.     Mr. Arriaga is a 89-year-old retired man, domiciled with daughter Brianad (), w/ PMH of hearing loss using hearing aid, colitis, HLD, HTN, on Xarelto 15mg daily (PCP Dr Barrett  #1 #5), PPH of depression, multiple psych hospitalization requiring ECT, last 4 years ago at [facility], currently on sertraline 50mg bid, olanzapine 5mg qd, sees Dr Josh Simmons () for outpatient, presenting due to worsening depression and obsessive thought.    Due to current active depressive symptoms, nonresponsive to outpatient treatment, and collateral from psychiatrist and family, patient meets involuntary admission criteria. He has been admitted to 8U for stabilization and possible ECT as inpatient. Despite not endorsing suicidality, the patient has experienced weight loss 2/2 severe depression, so ECT may be a good choice for him. Unfortunately, he is currently undecided regarding this treatment and has not yet given permission. The patient himself states that he "does not remember" whether the treatment was effective in the past and he is "not optimistic" about the potential efficacy of the treatment in the future. Cleared by medicine for ECT 4/15/22.    MoCA score 20/30. Unclear how much of this is pseudodementia vs neurocognitive impairment vs hearing loss.    Patient continues to refuse blood draws and remains ambivalent about ECT.    Problems  1) MDD r/o psychotic features  2) Auditory Hallucinations    #MDD  - Today, 4/20, pt endorses auditory hallucinations. Stated he was "hearing music coming from the room and following him around with words that he couldn't quite make out." Similar episode "last time I was suffering from this condition." AH resolved after 10 minutes.   Plan:  - Dr. 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 It is unclear whether he has tinnitus. His female sex is not necessarily a pro nor a con as female predomination "may be due to an overrepresentation of females in the literature." (MADHU Escoto et al (2015))    Per, VIRGEN Blair et al. (2009), "Psychotic Major Depression patients were more likely to be members of a racial/ethnic minority and to have lower educational attainment compared to those with nonpsychotic major depression. In addition, PMD patients were found to have greater current depression severity, suicidal ideation, and social and work impairment. These patients also were more likely to have histories of suicide attempts and psychiatric hospitalizations, to report an earlier age of illness onset, and to meet criteria for chronic depression. 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 Arriaga has stated that he is of [place] descent and his father was a member of an [place]inese minority group in a [facility] of the [facility]. Further supporting this diagnosis is his transient suicidal ideation evidenced by the statement, "It'd be better if I were a goner." The patient's chronic depression is another plus in this column. "Results indicated that PMD was present in a relatively small percentage of treatment-seeking outpatients but was associated with disproportionately high levels of severity and impairment" (ibid)  Plan: Consider AchE inhibitors as "[T]hey seem to be safer and to yield fewer side-effects than antipsychotics, antidepressants, and anticonvulsants . Second, they may even yield beneficial side-effects when administered to patients suffering from comorbid cognitive decline. Furthermore, a beneficial effect may be obtained relatively quickly…at a low dose (rivastigmine 1.5?mg is much lower than the 9?mg dose used for dementia), and with substantial effect (HOMERO Escoto et al 2015)     -MADHU Escoto, MADHU Crowder, JO ANN Napier, & MARYSOL Bonner (2015). Musical hallucinations treated with acetylcholinesterase inhibitors. Frontiers in psychiatry, 6, 46. https://doi.org/10.3389/fpsyt.2015.77906  -VIRGEN Blair., LAURA Hernandez, & ROSA Maher (2009). Prevalence and clinical characteristics of psychotic versus nonpsychotic major depression in a general psychiatric outpatient clinic. Depression and anxiety, 26(1), 54–64. https://doi.org/10.1002/da.89004    #ADL  Barthel Index for Activities of Daily Living (ADL) from [employer]  on 4/20/2022    RESULT SUMMARY:  65 points  Minimally dependent    INPUTS:  Feeding —> 5 = Needs help  Bathing —> 0 = Unable  Grooming —> 5 = Independent  Dressing —> 10 = Independent  Bowel control —> 5 = Occasional accident  Bladder control —> 5 = Occasional accident  Toilet use —> 5 = Needs help  Transfers (bed to chair and back) —> 15 = Independent  Mobility on level surfaces —> 15 = Independent (but may use any aid, e.g. stick) >50 yards  Stairs —> 0 = Unable  -YOLANDA DAILEY, BARTHEL DW. FUNCTIONAL EVALUATION: THE BARTHEL INDEX. [place] State Med J. 1965 Feb;14:61-5. PMID: [id number].    - Does not need 1:1 in 8U  - Continue medication as follows:     Xarelto 15mg daily with meals     Sertraline 50mg twice daily; consider increase to 75 mg or 100 mg twice daily     Olanzapine 5mg daily     - Losartan to 25 mg QD; 4/18/22, decrease Losartan to 25 mg (previously 50mg) daily as BPs have been low 4/18/22 and pt was orthostatics positive     - Discontinue Furosemide 4/18/ 22; preiously 20mg twice daily     Atorvastatin 20mg daily     Vit D3 2000 units     Magnesium 500mg    - Meds are Mesalamine 2.4 mg and Mirabegron 50mg po QD; pt refused Mesalamine    - Consider starting Wellbutrin or other activating antidepressant  - Labs:  - CBC shows mild macrocytic anemia ()  - UDS - negative     TSH; RPR; B9/B12 - patient refused, for next collection     UA - within normal limits     Lipid profile - within normal limits  - HgbA1c - 5.7%    Medicine consult, 4/15:  #Pre Op Risk   - EKG with rate controlled Afib, LAFB  - Re HTN: maintain /90   - Re: Afib: maintain anticoagulation with home xarelto   - Low-intermediate risk for low risk procedure    -Repeat EKG 4/18/22 per medicine reccs was unremarkable except for LBBB      4/12: Pt reluctant to engage today; does not want to be on 8Uris. Equivocating regarding ECT. Clearly depressed. MoCA 20/30. Will continue to monitor and to discuss ECT.    4/13: Pt remains ambivalent about treatment. Not asking for discharge today. Refused some labs, will try to get these again. Alert and oriented. No SI. Will follow up anemia.    4/14: at this point patient is irritable and negativistic refusing ECT as well as some medications and lab work.  Will move Zyprexa to nighttime (sleep an issue?); and Zoloft 100mg to am; continue to attempt to discuss need for labs and possible ECT if medical evaluation can be completed. Need to assess/address anemia.  Refusal may require consideration of TOO.    4/15: Pt continues to refuse ECT and routine blood work. Pt was seen by medicine team for clearance for ECT, after which he was determined to be "Low-intermediate risk for low risk procedure. Pt's daughter, Valery, states she is his medical proxy and will bring in form next week.    4/18: Lasix was held as BPs were low. BPs continued to be low and pt was newly orthostatic positive, tf decrease Losartan to 25 mg (previously 50mg) daily CTM. Pt still severely depressed; outpatient psychiatrist and daughters endorse ECT as having been the only effective treatment in the past. Pt continues to be ambivalent about it. He will not answer regarding SI today but endorsed passive SI on previous interview. Still with significant PMR and latency.    ;;04/19: mute to writer but stares; observed interacting with staff; irritable speech clear and coherent; by observation appears generally Alert; oriented; cognition intact; speech clear; no tremor or evidence of movement impairment. No behavioral issues.      4/20: Continues to be despondent and ambivalent about treatments. Endorses auditory hallucinations. Stated he was "hearing music coming from the room and following him around with words that he couldn't quite make out." Similar episode "last time I was suffering from this condition." Pt states the music "does not bother him." AH resolved after 10 minutes. Otw no SI/HI/VH//delusions.     Mr. Arriaga is a 89-year-old retired man, domiciled with daughter Brianda (), w/ PMH of hearing loss using hearing aid, colitis, HLD, HTN, on Xarelto 15mg daily (PCP Dr Barrett  #1 #5), PPH of depression, multiple psych hospitalization requiring ECT, last 4 years ago at [facility], currently on sertraline 50mg bid, olanzapine 5mg qd, sees Dr Josh Simmons () for outpatient, presenting due to worsening depression and obsessive thought.    Due to current active depressive symptoms, nonresponsive to outpatient treatment, and collateral from psychiatrist and family, patient meets involuntary admission criteria. He has been admitted to 8U for stabilization and possible ECT as inpatient. Despite not endorsing suicidality, the patient has experienced weight loss 2/2 severe depression, so ECT may be a good choice for him. Unfortunately, he is currently undecided regarding this treatment and has not yet given permission. The patient himself states that he "does not remember" whether the treatment was effective in the past and he is "not optimistic" about the potential efficacy of the treatment in the future. Cleared by medicine for ECT 4/15/22.    MoCA score 20/30. Unclear how much of this is pseudodementia vs neurocognitive impairment vs hearing loss.    Patient continues to refuse blood draws and remains ambivalent about ECT. May pursue TOO due to poor ADLs. Pt may be showing signs of psychosis; endorses hearing music which may be consistent with auditory hallucinations, and has been following clinicians around without speaking.    MDD w/psychotic features  Plan:  - Continue sertraline 100mg daily  - Increase olanzapine to 7.5mg nightly to target ?psychosis  - Considering ECT but pt is ambivalent - Seen by medicine for ECT clearance 4/15: They determine pt is Low-intermediate risk for low risk procedure  - Continue medication as follows:     Xarelto 15mg daily with meals     Losartan to 25 mg QD; 4/18/22, decrease Losartan to 25 mg (previously 50mg) daily as BPs have been low 4/18/22 and pt was orthostatics positive     Discontinued Furosemide 4/18/ 22; preiously 20mg twice daily     Atorvastatin 20mg daily     Vit D3 2000 units     Magnesium 500mg     Mesalamine 2.4 mg     Mirabegron 50mg po QD (pt occasionally refusing)    Medicine consult, 4/15:  #Pre Op Risk   - EKG with rate controlled Afib, LAFB  - Re HTN: maintain /90   - Re: Afib: maintain anticoagulation with home xarelto   - Low-intermediate risk for low risk procedure      4/12: Pt reluctant to engage today; does not want to be on 8Uris. Equivocating regarding ECT. Clearly depressed. MoCA 20/30. Will continue to monitor and to discuss ECT.    4/13: Pt remains ambivalent about treatment. Not asking for discharge today. Refused some labs, will try to get these again. Alert and oriented. No SI. Will follow up anemia.    4/14: at this point patient is irritable and negativistic refusing ECT as well as some medications and lab work.  Will move Zyprexa to nighttime (sleep an issue?); and Zoloft 100mg to am; continue to attempt to discuss need for labs and possible ECT if medical evaluation can be completed. Need to assess/address anemia.  Refusal may require consideration of TOO.    4/15: Pt continues to refuse ECT and routine blood work. Pt was seen by medicine team for clearance for ECT, after which he was determined to be "Low-intermediate risk for low risk procedure. Pt's daughter, Valery, states she is his medical proxy and will bring in form next week.    4/18: Lasix was held as BPs were low. BPs continued to be low and pt was newly orthostatic positive, tf decrease Losartan to 25 mg (previously 50mg) daily CTM. Pt still severely depressed; outpatient psychiatrist and daughters endorse ECT as having been the only effective treatment in the past. Pt continues to be ambivalent about it. He will not answer regarding SI today but endorsed passive SI on previous interview. Still with significant PMR and latency.    4/19: mute to writer but stares; observed interacting with staff; irritable speech clear and coherent; by observation appears generally Alert; oriented; cognition intact; speech clear; no tremor or evidence of movement impairment. No behavioral issues.      4/20: Continues to be despondent and ambivalent about treatments. Endorses hearing music which may be consistent with auditory hallucinations. Stated he was "hearing music coming from the room and following him around with words that he couldn't quite make out." Similar episode "last time I was suffering from this condition." Pt states the music "does not bother him." This resolved after 10 minutes. Otw no SI/HI/VH//delusions. Walking halls as if in fugue.  ADLs:  Barthel Index for Activities of Daily Living (ADL) from [employer]  on 4/20/2022  RESULT SUMMARY:  65 points  Minimally dependent  INPUTS:  Feeding —> 5 = Needs help  Bathing —> 0 = Unable  Grooming —> 5 = Independent  Dressing —> 10 = Independent  Bowel control —> 5 = Occasional accident  Bladder control —> 5 = Occasional accident  Toilet use —> 5 = Needs help  Transfers (bed to chair and back) —> 15 = Independent  Mobility on level surfaces —> 15 = Independent (but may use any aid, e.g. stick) >50 yards  Stairs —> 0 = Unable            Per VIRGEN Blair et al. (2009), "Psychotic Major Depression patients were more likely to be members of a racial/ethnic minority and to have lower educational attainment compared to those with nonpsychotic major depression. In addition, PMD patients were found to have greater current depression severity, suicidal ideation, and social and work impairment. These patients also were more likely to have histories of suicide attempts and psychiatric hospitalizations, to report an earlier age of illness onset, and to meet criteria for chronic depression. 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 Arriaga has stated that he is of [place] descent and his father was a member of an [place] minority group in a [facility] region of the [facility]. Further supporting this diagnosis is his transient suicidal ideation evidenced by the statement, "It'd be better if I were a goner." The patient's chronic depression is another plus in this column. "Results indicated that PMD was present in a relatively small percentage of treatment-seeking outpatients but was associated with disproportionately high levels of severity and impairment" (ibid)  Plan: Consider AchE inhibitors as "[T]hey seem to be safer and to yield fewer side-effects than antipsychotics, antidepressants, and anticonvulsants . Second, they may even yield beneficial side-effects when administered to patients suffering from comorbid cognitive decline. Furthermore, a beneficial effect may be obtained relatively quickly…at a low dose (rivastigmine 1.5?mg is much lower than the 9?mg dose used for dementia), and with substantial effect (HOMERO Escoto et al 2015)     -MADHU Escoto., MADHU Crowder, JO ANN Napier, & MARYSOL Bonner (2015). Musical hallucinations treated with acetylcholinesterase inhibitors. Frontiers in psychiatry, 6, 46. https://doi.org/10.3389/fpsyt.2015.13608  -VIRGEN Blair., LAURA Hernandez, & ROSA Maher (2009). Prevalence and clinical characteristics of psychotic versus nonpsychotic major depression in a general psychiatric outpatient clinic. Depression and anxiety, 26(1), 54–64. https://doi.org/10.1002/da.42824

## 2022-04-20 NOTE — BH INPATIENT PSYCHIATRY PROGRESS NOTE - NSBHFUPINTERVALHXFT_PSY_A_CORE
irritable and not wanting to speak to the writer; sitting at the desk in his room eating breakfast; stares angrily at writer; No behavioral issues.   Pt was seen today in his room. VSS. No acute events overnight. He stood for the entirety of the interview. He was despondent but cooperative with interview.  During interview, pt endorsed "Hearing music with words that he can't quite make out." Pt stated he is the only one who can hear this music and that it is coming from inside the room and "following him around." He stated that he had a similar episode of "hearing music only he could hear a few years ago, the last time I suffering from this condition." Pt states the hallucinations "do not bother him ," and thus are not causing him any distress.     Otw no SI/HI/VH/delusions.    Pt will frequently look down and not respond when asked questions. When interviewer attempted to leave, pt responded "Don't go." When asked "Why not? What would you like me to do or hear if I stay?" After which, pt again looked down and remained silent for approximately 5 minutes as interview waited.   When asked "What is the ideal outcome for you?" Pt responds, "To get well because my mind is in such sad shape. I'm all over the place and I can't concentrate. Pt repeatedly pleaded with interviewer "Don't go." When asked why not? He again responded, "Oh Fermín" and then stopped talking.     We again discussed the possibility of ECT. Pt responds, "ECT seems like such a rigamarole with all the blood draws." When asked if he'd consent, pt did not respond. We discussed RAZ Haskins, & MADHU Sebastian's 2014 paper in laymen's terms with the patient. Specifically, we shared "The efficacy of ECT in major depression is well established. Data from comparative trials showed that the antidepressant effects of ECT are greater than any pharmacologic agent …. A large, multisite collaborative study showed that, among 217 patients, 86% completed an acute treatment course with three-times a week BL ECT, 79% showed sustained improvement and that ECT has a rapid effect [despite] the high remission rates compared with 25–35% remission rates with pharmacotherapy. For pharmacotherapy treatment-resistant major depression, 50% or more can respond to ECT ." He expressed understanding.  RAZ Haskins, & MADHU Sebastian (2014). Current electroconvulsive therapy practice and research in the geriatric population. Neuropsychiatry, 4(1), 33–54. https://doi.[email or web address]/10.2217/npy.14.3. Pt threw up his hands and responded, "Jenaro Kovacs."    Orthostatic hypotension has improved s/p d/c of anti-HTN medications. Pt was seen today in his room. VSS. No acute events overnight. He stood for the entirety of the interview. He was despondent but cooperative with interview.  During interview, pt endorsed "Hearing music with words that he can't quite make out." Pt stated he is the only one who can hear this music and that it is coming from inside the room and "following him around." He stated that he had a similar episode of "hearing music only he could hear a few years ago, the last time I suffering from this condition." Pt states the hallucinations "do not bother him ," and thus are not causing him any distress.     Otw no SI/HI/VH/delusions.    Pt will frequently look down and not respond when asked questions. When interviewer attempted to leave, pt responded "Don't go." When asked "Why not? What would you like me to do or hear if I stay?" After which, pt again looked down and remained silent for approximately 5 minutes as interview waited.   When asked "What is the ideal outcome for you?" Pt responds, "To get well because my mind is in such sad shape. I'm all over the place and I can't concentrate. Pt repeatedly pleaded with interviewer "Don't go." When asked why not? He again responded, "Oh Fermín" and then stopped talking.     We again discussed the possibility of ECT. Pt responds, "ECT seems like such a rigamarole with all the blood draws." When asked if he'd consent, pt did not respond. We discussed RAZ Haskins, & MADHU Sebastian's 2014 paper in laymen's terms with the patient. Specifically, we shared "The efficacy of ECT in major depression is well established. Data from comparative trials showed that the antidepressant effects of ECT are greater than any pharmacologic agent …. A large, multisite collaborative study showed that, among 217 patients, 86% completed an acute treatment course with three-times a week BL ECT, 79% showed sustained improvement and that ECT has a rapid effect [despite] the high remission rates compared with 25–35% remission rates with pharmacotherapy. For pharmacotherapy treatment-resistant major depression, 50% or more can respond to ECT ." He expressed understanding.  RAZ Haskins, & MADHU Sebastian (2014). Current electroconvulsive therapy practice and research in the geriatric population. Neuropsychiatry, 4(1), 33–54. https://doi.[email or web address]/10.2217/npy.14.3. Pt threw up his hands and responded, "Jenaro Kovacs."    Orthostatic hypotension has improved s/p d/c of anti-HTN medications.    Lastly, Barthel Index for Activities of Daily Living (ADL) from [employer]  on 4/20/2022    RESULT SUMMARY:  65 points  Minimally dependent      INPUTS:  Feeding —> 5 = Needs help  Bathing —> 0 = Unable  Grooming —> 5 = Independent  Dressing —> 10 = Independent  Bowel control —> 5 = Occasional accident  Bladder control —> 5 = Occasional accident  Toilet use —> 5 = Needs help  Transfers (bed to chair and back) —> 15 = Independent  Mobility on level surfaces —> 15 = Independent (but may use any aid, e.g. stick) >50 yards  Stairs —> 0 = Unable    Abdi Index of [place] in Activities of Daily Living was administered, with patient scoring 3/6.  Pt was seen today in his room. VSS. No acute events overnight. He stood for the entirety of the interview. He appeared despondent but was cooperative with interview.  During interview, pt endorsed "Hearing music with words that he can't quite make out." Pt stated he is the only one who can hear this music and that it is coming from inside the room and "following him around." He stated that he had a similar episode of "hearing music only he could hear a few years ago, the last time I suffering from this condition." Pt states that the music "does not bother him" and thus are not causing him any distress. He hears this music as if it is in the room with him and it follows him around.    Otw no SI/HI/VH/delusions.    Pt will frequently look down and not respond when asked questions. When interviewer attempted to leave, pt responded "Don't go." When asked "Why not? What would you like me to do or hear if I stay?" After which, pt again looked down and remained silent for approximately 5 minutes as interview waited.   When asked "What is the ideal outcome for you?" Pt responds, "To get well because my mind is in such sad shape. I'm all over the place and I can't concentrate." Pt repeatedly pleaded with interviewer "Don't go." When asked why not? He again responded, "Oh Fermín" and then stopped talking.    We again discussed the possibility of ECT. Pt responds, "ECT seems like such a rigamarole with all the blood draws." When asked if he'd consent, pt did not respond. We discussed Jozef & Jaz's 2014 paper in laypeople's terms with the patient. Specifically, we shared "The efficacy of ECT in major depression is well established. Data from comparative trials showed that the antidepressant effects of ECT are greater than any pharmacologic agent …. A large, multisite collaborative study showed that, among 217 patients, 86% completed an acute treatment course with three-times a week BL ECT, 79% showed sustained improvement and that ECT has a rapid effect [despite] the high remission rates compared with 25–35% remission rates with pharmacotherapy. For pharmacotherapy treatment-resistant major depression, 50% or more can respond to ECT ." He expressed understanding.    Pt later seen in halls, wandering slowly, barely speaking, asking for clinicians but then not engaging. Asked to sit, he could not decide whether or not to do so.      Barthel Index for Activities of Daily Living (ADL) from [employer] on 4/20/2022.  RESULT SUMMARY:  65 points  Minimally dependent      INPUTS:  Feeding —> 5 = Needs help  Bathing —> 0 = Unable  Grooming —> 5 = Independent  Dressing —> 10 = Independent  Bowel control —> 5 = Occasional accident  Bladder control —> 5 = Occasional accident  Toilet use —> 5 = Needs help  Transfers (bed to chair and back) —> 15 = Independent  Mobility on level surfaces —> 15 = Independent (but may use any aid, e.g. stick) >50 yards  Stairs —> 0 = Unable    Abdi Index of [place] in Activities of Daily Living was administered, with patient scoring 3/6.

## 2022-04-20 NOTE — BH INPATIENT PSYCHIATRY PROGRESS NOTE - NSCGIIMPROVESX_PSY_ALL_CORE
3 = Minimally improved - slightly better with little or no clinically meaningful reduction of symptoms.  Represents very little change in basic clinical status, level of care, or functional capacity. 5 = Minimally worse - slightly worse but may not be clinically meaningful; may represent very little change in basic clinical status or functional capacity

## 2022-04-20 NOTE — BH INPATIENT PSYCHIATRY PROGRESS NOTE - NSBHCHARTREVIEWVS_PSY_A_CORE FT
Vital Signs Last 24 Hrs  T(C): 37 (04-19-22 @ 16:13), Max: 37 (04-19-22 @ 16:13)  T(F): 98.6 (04-19-22 @ 16:13), Max: 98.6 (04-19-22 @ 16:13)  HR: 76 (04-19-22 @ 16:13) (73 - 76)  BP: 125/60 (04-19-22 @ 16:13) (125/60 - 149/71)  BP(mean): --  RR: 16 (04-19-22 @ 16:13) (16 - 16)  SpO2: 97% (04-19-22 @ 16:13) (97% - 97%)    Orthostatic VS  04-18-22 @ 10:22  Lying BP: 107/65 HR: 73  Sitting BP: 92/38 HR: 83  Standing BP: 116/66 HR: 86  Site: --  Mode: --   Vital Signs Last 24 Hrs  T(C): 37 (04-19-22 @ 16:13), Max: 37 (04-19-22 @ 16:13)  T(F): 98.6 (04-19-22 @ 16:13), Max: 98.6 (04-19-22 @ 16:13)  HR: 80 (04-20-22 @ 08:30) (76 - 80)  BP: 137/62 (04-20-22 @ 08:30) (125/60 - 137/62)  BP(mean): --  RR: 16 (04-20-22 @ 08:30) (16 - 16)  SpO2: 97% (04-19-22 @ 16:13) (97% - 97%)     Vital Signs Last 24 Hrs  T(C): 37 (04-19-22 @ 16:13), Max: 37 (04-19-22 @ 16:13)  T(F): 98.6 (04-19-22 @ 16:13), Max: 98.6 (04-19-22 @ 16:13)  HR: 74 (04-20-22 @ 13:23) (74 - 80)  BP: 93/60 (04-20-22 @ 13:23) (93/60 - 137/62)  BP(mean): --  RR: 18 (04-20-22 @ 13:23) (16 - 18)  SpO2: 98% (04-20-22 @ 13:23) (97% - 98%)     Vital Signs Last 24 Hrs  T(C): 36.6 (04-20-22 @ 16:44), Max: 36.6 (04-20-22 @ 16:44)  T(F): 97.8 (04-20-22 @ 16:44), Max: 97.8 (04-20-22 @ 16:44)  HR: 90 (04-20-22 @ 16:44) (74 - 90)  BP: 164/56 (04-20-22 @ 16:44) (93/60 - 164/56)  BP(mean): --  RR: 18 (04-20-22 @ 16:44) (16 - 18)  SpO2: 100% (04-20-22 @ 16:44) (98% - 100%)

## 2022-04-21 PROCEDURE — 99232 SBSQ HOSP IP/OBS MODERATE 35: CPT

## 2022-04-21 RX ORDER — OLANZAPINE 15 MG/1
2.5 TABLET, FILM COATED ORAL ONCE
Refills: 0 | Status: COMPLETED | OUTPATIENT
Start: 2022-04-21 | End: 2022-04-21

## 2022-04-21 RX ADMIN — Medication 2000 UNIT(S): at 11:12

## 2022-04-21 RX ADMIN — ATORVASTATIN CALCIUM 20 MILLIGRAM(S): 80 TABLET, FILM COATED ORAL at 22:02

## 2022-04-21 RX ADMIN — MAGNESIUM OXIDE 400 MG ORAL TABLET 400 MILLIGRAM(S): 241.3 TABLET ORAL at 11:11

## 2022-04-21 RX ADMIN — LOSARTAN POTASSIUM 25 MILLIGRAM(S): 100 TABLET, FILM COATED ORAL at 11:12

## 2022-04-21 RX ADMIN — MIRABEGRON 50 MILLIGRAM(S): 50 TABLET, EXTENDED RELEASE ORAL at 12:06

## 2022-04-21 RX ADMIN — OLANZAPINE 2.5 MILLIGRAM(S): 15 TABLET, FILM COATED ORAL at 12:01

## 2022-04-21 RX ADMIN — Medication 2.4 GRAM(S): at 06:57

## 2022-04-21 RX ADMIN — OLANZAPINE 7.5 MILLIGRAM(S): 15 TABLET, FILM COATED ORAL at 22:02

## 2022-04-21 RX ADMIN — Medication 2.4 GRAM(S): at 18:34

## 2022-04-21 RX ADMIN — RIVAROXABAN 15 MILLIGRAM(S): KIT at 11:11

## 2022-04-21 RX ADMIN — SERTRALINE 100 MILLIGRAM(S): 25 TABLET, FILM COATED ORAL at 11:11

## 2022-04-21 NOTE — BH INPATIENT PSYCHIATRY PROGRESS NOTE - NSBHMSESPABN_PSY_A_CORE
Soft volume/Decreased productivity/Increased latency/Other Loud volume/Decreased productivity/Increased latency/Other

## 2022-04-21 NOTE — BH INPATIENT PSYCHIATRY PROGRESS NOTE - NSBHASSESSSUMMFT_PSY_ALL_CORE
Mr. Arriaga is a 89-year-old retired man, domiciled with daughter Brianda (), w/ PMH of hearing loss using hearing aid, colitis, HLD, HTN, on Xarelto 15mg daily (PCP Dr Barrett  #1 #5), PPH of depression, multiple psych hospitalization requiring ECT, last 4 years ago at [facility], currently on sertraline 50mg bid, olanzapine 5mg qd, sees Dr Josh Simmons () for outpatient, presenting due to worsening depression and obsessive thought.    Due to current active depressive symptoms, nonresponsive to outpatient treatment, and collateral from psychiatrist and family, patient meets involuntary admission criteria. He has been admitted to 8U for stabilization and possible ECT as inpatient. Despite not endorsing suicidality, the patient has experienced weight loss 2/2 severe depression, so ECT may be a good choice for him. Unfortunately, he is currently undecided regarding this treatment and has not yet given permission. The patient himself states that he "does not remember" whether the treatment was effective in the past and he is "not optimistic" about the potential efficacy of the treatment in the future. Cleared by medicine for ECT 4/15/22.    MoCA score 20/30. Unclear how much of this is pseudodementia vs neurocognitive impairment vs hearing loss.    Patient continues to refuse blood draws and remains ambivalent about ECT. May pursue TOO due to poor ADLs. Pt may be showing signs of psychosis; endorses hearing music which may be consistent with auditory hallucinations, and has been following clinicians around without speaking.    MDD w/psychotic features  Plan:  - Continue sertraline 100mg daily  - Increase olanzapine to 7.5mg nightly to target ?psychosis  - Considering ECT but pt is ambivalent - Seen by medicine for ECT clearance 4/15: They determine pt is Low-intermediate risk for low risk procedure  - Continue medication as follows:     Xarelto 15mg daily with meals     Losartan to 25 mg QD; 4/18/22, decrease Losartan to 25 mg (previously 50mg) daily as BPs have been low 4/18/22 and pt was orthostatics positive     Discontinued Furosemide 4/18/ 22; preiously 20mg twice daily     Atorvastatin 20mg daily     Vit D3 2000 units     Magnesium 500mg     Mesalamine 2.4 mg     Mirabegron 50mg po QD (pt occasionally refusing)    Medicine consult, 4/15:  #Pre Op Risk   - EKG with rate controlled Afib, LAFB  - Re HTN: maintain /90   - Re: Afib: maintain anticoagulation with home xarelto   - Low-intermediate risk for low risk procedure      4/12: Pt reluctant to engage today; does not want to be on [facility]. Equivocating regarding ECT. Clearly depressed. MoCA 20/30. Will continue to monitor and to discuss ECT.    4/13: Pt remains ambivalent about treatment. Not asking for discharge today. Refused some labs, will try to get these again. Alert and oriented. No SI. Will follow up anemia.    4/14: at this point patient is irritable and negativistic refusing ECT as well as some medications and lab work.  Will move Zyprexa to nighttime (sleep an issue?); and Zoloft 100mg to am; continue to attempt to discuss need for labs and possible ECT if medical evaluation can be completed. Need to assess/address anemia.  Refusal may require consideration of TOO.    4/15: Pt continues to refuse ECT and routine blood work. Pt was seen by medicine team for clearance for ECT, after which he was determined to be "Low-intermediate risk for low risk procedure. Pt's daughter, Valery, states she is his medical proxy and will bring in form next week.    4/18: Lasix was held as BPs were low. BPs continued to be low and pt was newly orthostatic positive, tf decrease Losartan to 25 mg (previously 50mg) daily CTM. Pt still severely depressed; outpatient psychiatrist and daughters endorse ECT as having been the only effective treatment in the past. Pt continues to be ambivalent about it. He will not answer regarding SI today but endorsed passive SI on previous interview. Still with significant PMR and latency.    4/19: mute to writer but stares; observed interacting with staff; irritable speech clear and coherent; by observation appears generally Alert; oriented; cognition intact; speech clear; no tremor or evidence of movement impairment. No behavioral issues.      4/20: Continues to be despondent and ambivalent about treatments. Endorses hearing music which may be consistent with auditory hallucinations. Stated he was "hearing music coming from the room and following him around with words that he couldn't quite make out." Similar episode "last time I was suffering from this condition." Pt states the music "does not bother him." This resolved after 10 minutes. Otw no SI/HI/VH//delusions. Walking halls as if in fugue.  ADLs:  Barthel Index for Activities of Daily Living (ADL) from [employer]  on 4/20/2022  RESULT SUMMARY:  65 points  Minimally dependent  INPUTS:  Feeding —> 5 = Needs help  Bathing —> 0 = Unable  Grooming —> 5 = Independent  Dressing —> 10 = Independent  Bowel control —> 5 = Occasional accident  Bladder control —> 5 = Occasional accident  Toilet use —> 5 = Needs help  Transfers (bed to chair and back) —> 15 = Independent  Mobility on level surfaces —> 15 = Independent (but may use any aid, e.g. stick) >50 yards  Stairs —> 0 = Unable    4/21/22: Yesterday, pt consented to ECT in presence of RN and daughter. Today, again seems ambivalent. Pt appears more irritable today, shouting at interviewer. Did not ask interviewer to stay in room. Pt had "panic" episode yesterday and was given Zyprexa, which seemed to improve panic. Pt is compliant with all meds today.

## 2022-04-21 NOTE — BH INPATIENT PSYCHIATRY PROGRESS NOTE - NSBHCHARTREVIEWVS_PSY_A_CORE FT
Vital Signs Last 24 Hrs  T(C): 36.6 (04-20-22 @ 16:44), Max: 36.6 (04-20-22 @ 16:44)  T(F): 97.8 (04-20-22 @ 16:44), Max: 97.8 (04-20-22 @ 16:44)  HR: 90 (04-20-22 @ 16:44) (74 - 90)  BP: 164/56 (04-20-22 @ 16:44) (93/60 - 164/56)  BP(mean): --  RR: 18 (04-20-22 @ 16:44) (18 - 18)  SpO2: 100% (04-20-22 @ 16:44) (98% - 100%)     Vital Signs Last 24 Hrs  T(C): 36.6 (04-21-22 @ 09:40), Max: 36.6 (04-20-22 @ 16:44)  T(F): 97.8 (04-21-22 @ 09:40), Max: 97.8 (04-20-22 @ 16:44)  HR: 70 (04-21-22 @ 09:40) (70 - 90)  BP: 152/51 (04-21-22 @ 09:40) (93/60 - 164/56)  BP(mean): --  RR: 16 (04-21-22 @ 09:40) (16 - 18)  SpO2: 98% (04-21-22 @ 09:40) (98% - 100%)     Vital Signs Last 24 Hrs  T(C): 36.8 (04-21-22 @ 17:00), Max: 36.8 (04-21-22 @ 17:00)  T(F): 98.3 (04-21-22 @ 17:00), Max: 98.3 (04-21-22 @ 17:00)  HR: 79 (04-21-22 @ 17:00) (70 - 79)  BP: 178/63 (04-21-22 @ 17:00) (152/51 - 178/63)  BP(mean): --  RR: 18 (04-21-22 @ 17:00) (16 - 18)  SpO2: 98% (04-21-22 @ 17:00) (98% - 98%)

## 2022-04-21 NOTE — BH INPATIENT PSYCHIATRY PROGRESS NOTE - MODIFICATIONS
continue Zyprexa 2.5mg midday and 5mg at night and monitor anxiety and mood; patient does NOT endorse SI.

## 2022-04-21 NOTE — BH INPATIENT PSYCHIATRY PROGRESS NOTE - NSBHMETABOLIC_PSY_ALL_CORE_FT
BMI: BMI (kg/m2): 22.7 (04-12-22 @ 09:00)  HbA1c: A1C with Estimated Average Glucose Result: 5.7 % (04-13-22 @ 07:45)    Glucose: POCT Blood Glucose.: 190 mg/dL (04-19-22 @ 06:43)    BP: 164/56 (04-20-22 @ 16:44) (93/60 - 168/60)  Lipid Panel: Date/Time: 04-13-22 @ 07:45  Cholesterol, Serum: 120  Direct LDL: --  HDL Cholesterol, Serum: 60  Total Cholesterol/HDL Ration Measurement: --  Triglycerides, Serum: 50   BMI: BMI (kg/m2): 22.7 (04-12-22 @ 09:00)  HbA1c: A1C with Estimated Average Glucose Result: 5.7 % (04-13-22 @ 07:45)    Glucose: POCT Blood Glucose.: 190 mg/dL (04-19-22 @ 06:43)    BP: 152/51 (04-21-22 @ 09:40) (93/60 - 168/60)  Lipid Panel: Date/Time: 04-13-22 @ 07:45  Cholesterol, Serum: 120  Direct LDL: --  HDL Cholesterol, Serum: 60  Total Cholesterol/HDL Ration Measurement: --  Triglycerides, Serum: 50   BMI: BMI (kg/m2): 22.7 (04-12-22 @ 09:00)  HbA1c: A1C with Estimated Average Glucose Result: 5.7 % (04-13-22 @ 07:45)    Glucose: POCT Blood Glucose.: 190 mg/dL (04-19-22 @ 06:43)    BP: 178/63 (04-21-22 @ 17:00) (93/60 - 178/63)  Lipid Panel: Date/Time: 04-13-22 @ 07:45  Cholesterol, Serum: 120  Direct LDL: --  HDL Cholesterol, Serum: 60  Total Cholesterol/HDL Ration Measurement: --  Triglycerides, Serum: 50

## 2022-04-21 NOTE — BH INPATIENT PSYCHIATRY PROGRESS NOTE - CASE SUMMARY
89-year-old retired man, domiciled with daughter Brianda (), w/ PMH of hearing loss using hearing aid, colitis, HLD, HTN, on Xarelto 15mg daily (PCP Dr Barrett  #1 #5), PPH of depression, multiple psych hospitalization requiring ECT, last 4 years ago at [facility], currently on sertraline 50mg bid, olanzapine 5mg qd, sees Dr Josh Simmons () for outpatient, presenting due to worsening depression and obsessive thought.    Due to current active depressive symptoms, nonresponsive to outpatient treatment, and collateral from psychiatrist and family, patient meets involuntary admission criteria. He has been admitted to 8U for stabilization and possible ECT as inpatient. Despite not endorsing suicidality, the patient has experienced weight loss 2/2 severe depression, so ECT may be a good choice for him. Unfortunately, he is currently undecided regarding this treatment and has not yet given permission. The patient himself states that he "does not remember" whether the treatment was effective in the past and he is "not optimistic" about the potential efficacy of the treatment in the future. Will continue to reassess and will consult medicine for clearance in the meantime.    MoCA score 20/30. Unclear how much of this is pseudodementia vs neurocognitive impairment vs hearing loss.    Patient continues to refuse blood draws and remains ambivalent about ECT.    Plan:  - Dr. Pittman spoke with the patient's outpatient psychiatrist, who reiterated his belief that ECT was the best next step  - C/w Discuss patient's decision (consents or does not consent) for ECT vs conservative management and outpatient referral to geriatric psychiatrist  - Seen by medicine for ECT clearance 4/15: They determine pt is Low-intermediate risk for low risk procedure  - Decrease     - Does not need 1:1 in 8U  - Continue medication as follows:     Xarelto 15mg daily with meals     Sertraline 50mg twice daily; consider increase to 75 mg or 100 mg twice daily     Olanzapine 5mg daily     - Losartan to 25 mg QD; 4/18/22, decrease Losartan to 25 mg (previously 50mg) daily as BPs have been low 4/18/22 and pt was orthostatics positive     - Discontinue Furosemide 4/18/ 22; preiously 20mg twice daily     Atorvastatin 20mg daily     Vit D3 2000 units     Magnesium 500mg    - Meds are Mesalamine 2.4 mg and Mirabegron 50mg po QD     - Consider starting Wellbutrin or other activating antidepressant  - Labs:  - CBC shows mild macrocytic anemia ()  - UDS - negative     TSH; RPR; B9/B12 - patient refused, for next collection     UA - within normal limits     Lipid profile - within normal limits  - HgbA1c - 5.7%    Medicine consult, 4/15:  #Pre Op Risk   - EKG with rate controlled Afib, LAFB  - Re HTN: maintain /90   - Re: Afib: maintain anticoagulation with home xarelto   - Low-intermediate risk for low risk procedure    -Repeat EKG 4/18/22 per medicine reccs was unremarkable except for LBBB      4/12: Pt reluctant to engage today; does not want to be on [facility]. Equivocating regarding ECT. Clearly depressed. MoCA 20/30. Will continue to monitor and to discuss ECT.    4/13: Pt remains ambivalent about treatment. Not asking for discharge today. Refused some labs, will try to get these again. Alert and oriented. No SI. Will follow up anemia.    4/14: at this point patient is irritable and negativistic refusing ECT as well as some medications and lab work.  Will move Zyprexa to nighttime (sleep an issue?); and Zoloft 100mg to am; continue to attempt to discuss need for labs and possible ECT if medical evaluation can be completed. Need to assess/address anemia.  Refusal may require consideration of TOO.    4/15: Pt continues to refuse ECT and routine blood work. Pt was seen by medicine team for clearance for ECT, after which he was determined to be "Low-intermediate risk for low risk procedure. Pt's daughter, Valery, states she is his medical proxy and will bring in form next week.    4/18: Lasix was held as BPs were low. BPs continued to be low and pt was newly orthostatic positive, tf decrease Losartan to 25 mg (previously 50mg) daily CTM. Pt still severely depressed; outpatient psychiatrist and daughters endorse ECT as having been the only effective treatment in the past. Pt continues to be ambivalent about it. He will not answer regarding SI today but endorsed passive SI on previous interview. Still with significant PMR and latency.    ;;04/19: mute to writer but stares; observed interacting with staff; irritable speech clear and coherent; by observation appears generally Alert; oriented; cognition intact; speech clear; no tremor or evidence of movement impairment. No behavioral issues.      ;;04/20: worsening of depression and anxiety ; fearful of the writer leaving his room; sits with direction near the nursing station; internally preoccupied; unable to give details; however appears alert and aware of environment; given Zydis (Zyprexa) 2.5mg po stat for distress and severe anxiety.  Possibility of psychotic sxs; in view of worsening of sxs may need to consider ECT and if needed TOO.  Later in the day verbally consented to ECT in presence of RN and visiting daughter; will approach patient tomorrow for written consent; has been medically cleared for ECT.      ;;04/21: today when asked about ECT the patient was silent but not silent when other questions where asked such as "how are you feeling?";  anxious but not clinging to the writer or other staff; accepted 2.5mg at midday; will continue effort to consult re benefit/risk of ECT in this patient.

## 2022-04-21 NOTE — BH INPATIENT PSYCHIATRY PROGRESS NOTE - NSBHFUPINTERVALHXFT_PSY_A_CORE
Pt was seen today in his room. VSS. No acute events overnight. He stood for the entirety of the interview. His responses were terser than previous days. Pt appeared irritable. Shouted, "What do you think?" in response to "How are you feeling today?"     Yesterday, in the presence of an RN and his daughter, he consented to ECT. When asked whether he reaffirmed consent, he responded, "I don't know."  Today, pt is s/p zyprexa. He denies SI/HI/VH/ and AH that he had yesterday.     Pt did not ask interviewer to stay when interviewer stated, "Any other questions or concerns before I leave?" This is a change from the previous few days.      Yesterday, pt had "panic" episode yesterday and was given Zyprexa, which seemed to improve panic. Pt is compliant with all meds today.

## 2022-04-21 NOTE — BH INPATIENT PSYCHIATRY PROGRESS NOTE - CURRENT MEDICATION
MEDICATIONS  (STANDING):  atorvastatin 20 milliGRAM(s) Oral daily  cholecalciferol 2000 Unit(s) Oral daily  losartan 25 milliGRAM(s) Oral daily  magnesium oxide 400 milliGRAM(s) Oral daily  mesalamine DR (24-Hour) Tablet 2.4 Gram(s) Oral <User Schedule>  mirabegron ER 50 milliGRAM(s) Oral daily  OLANZapine 7.5 milliGRAM(s) Oral at bedtime  rivaroxaban 15 milliGRAM(s) Oral daily  sertraline 100 milliGRAM(s) Oral daily    MEDICATIONS  (PRN):

## 2022-04-21 NOTE — BH INPATIENT PSYCHIATRY PROGRESS NOTE - OTHER
Reluctantly cooperative; guarded. Ambivalent about treatments. Swaying from side to side while standing up; right thumb tremor; left hand intention tremor when drinking water

## 2022-04-22 RX ORDER — BENZTROPINE MESYLATE 1 MG
0.5 TABLET ORAL ONCE
Refills: 0 | Status: COMPLETED | OUTPATIENT
Start: 2022-04-22 | End: 2022-04-22

## 2022-04-22 RX ORDER — HYDROXYZINE HCL 10 MG
25 TABLET ORAL ONCE
Refills: 0 | Status: COMPLETED | OUTPATIENT
Start: 2022-04-22 | End: 2022-04-22

## 2022-04-22 RX ORDER — HYDROXYZINE HCL 10 MG
25 TABLET ORAL THREE TIMES A DAY
Refills: 0 | Status: DISCONTINUED | OUTPATIENT
Start: 2022-04-23 | End: 2022-04-25

## 2022-04-22 RX ORDER — OLANZAPINE 15 MG/1
2.5 TABLET, FILM COATED ORAL AT BEDTIME
Refills: 0 | Status: DISCONTINUED | OUTPATIENT
Start: 2022-04-22 | End: 2022-04-22

## 2022-04-22 RX ORDER — OLANZAPINE 15 MG/1
2.5 TABLET, FILM COATED ORAL DAILY
Refills: 0 | Status: DISCONTINUED | OUTPATIENT
Start: 2022-04-22 | End: 2022-04-22

## 2022-04-22 RX ORDER — QUETIAPINE FUMARATE 200 MG/1
25 TABLET, FILM COATED ORAL AT BEDTIME
Refills: 0 | Status: DISCONTINUED | OUTPATIENT
Start: 2022-04-22 | End: 2022-04-25

## 2022-04-22 RX ORDER — OLANZAPINE 15 MG/1
5 TABLET, FILM COATED ORAL AT BEDTIME
Refills: 0 | Status: DISCONTINUED | OUTPATIENT
Start: 2022-04-22 | End: 2022-04-22

## 2022-04-22 RX ADMIN — QUETIAPINE FUMARATE 25 MILLIGRAM(S): 200 TABLET, FILM COATED ORAL at 21:47

## 2022-04-22 RX ADMIN — Medication 25 MILLIGRAM(S): at 12:25

## 2022-04-22 RX ADMIN — Medication 2.4 GRAM(S): at 17:42

## 2022-04-22 RX ADMIN — LOSARTAN POTASSIUM 25 MILLIGRAM(S): 100 TABLET, FILM COATED ORAL at 12:38

## 2022-04-22 RX ADMIN — SERTRALINE 100 MILLIGRAM(S): 25 TABLET, FILM COATED ORAL at 12:25

## 2022-04-22 RX ADMIN — OLANZAPINE 2.5 MILLIGRAM(S): 15 TABLET, FILM COATED ORAL at 12:38

## 2022-04-22 RX ADMIN — RIVAROXABAN 15 MILLIGRAM(S): KIT at 12:24

## 2022-04-22 RX ADMIN — Medication 25 MILLIGRAM(S): at 16:57

## 2022-04-22 RX ADMIN — ATORVASTATIN CALCIUM 20 MILLIGRAM(S): 80 TABLET, FILM COATED ORAL at 21:47

## 2022-04-22 RX ADMIN — MAGNESIUM OXIDE 400 MG ORAL TABLET 400 MILLIGRAM(S): 241.3 TABLET ORAL at 12:25

## 2022-04-22 RX ADMIN — Medication 2000 UNIT(S): at 12:25

## 2022-04-22 NOTE — BH INPATIENT PSYCHIATRY PROGRESS NOTE - MODIFICATIONS
lower dose of Zyprexa to 2.5mg midday and at night in view of emergence of eps; give trial dose of Cogentin 0.5mg and monitor response; continue if needed.  Monitor mood and especially anxiety

## 2022-04-22 NOTE — BH INPATIENT PSYCHIATRY PROGRESS NOTE - CURRENT MEDICATION
MEDICATIONS  (STANDING):  atorvastatin 20 milliGRAM(s) Oral daily  cholecalciferol 2000 Unit(s) Oral daily  losartan 25 milliGRAM(s) Oral daily  magnesium oxide 400 milliGRAM(s) Oral daily  mesalamine DR (24-Hour) Tablet 2.4 Gram(s) Oral <User Schedule>  mirabegron ER 50 milliGRAM(s) Oral daily  OLANZapine 7.5 milliGRAM(s) Oral at bedtime  rivaroxaban 15 milliGRAM(s) Oral daily  sertraline 100 milliGRAM(s) Oral daily    MEDICATIONS  (PRN):   MEDICATIONS  (STANDING):  atorvastatin 20 milliGRAM(s) Oral daily  cholecalciferol 2000 Unit(s) Oral daily  losartan 25 milliGRAM(s) Oral daily  magnesium oxide 400 milliGRAM(s) Oral daily  mesalamine DR (24-Hour) Tablet 2.4 Gram(s) Oral <User Schedule>  mirabegron ER 50 milliGRAM(s) Oral daily  OLANZapine 2.5 milliGRAM(s) Oral daily  OLANZapine 5 milliGRAM(s) Oral at bedtime  rivaroxaban 15 milliGRAM(s) Oral daily  sertraline 100 milliGRAM(s) Oral daily    MEDICATIONS  (PRN):

## 2022-04-22 NOTE — BH INPATIENT PSYCHIATRY PROGRESS NOTE - NSBHMETABOLIC_PSY_ALL_CORE_FT
BMI: BMI (kg/m2): 22.7 (04-12-22 @ 09:00)  HbA1c: A1C with Estimated Average Glucose Result: 5.7 % (04-13-22 @ 07:45)    Glucose: POCT Blood Glucose.: 190 mg/dL (04-19-22 @ 06:43)    BP: 178/63 (04-21-22 @ 17:00) (93/60 - 178/63)  Lipid Panel: Date/Time: 04-13-22 @ 07:45  Cholesterol, Serum: 120  Direct LDL: --  HDL Cholesterol, Serum: 60  Total Cholesterol/HDL Ration Measurement: --  Triglycerides, Serum: 50

## 2022-04-22 NOTE — BH INPATIENT PSYCHIATRY PROGRESS NOTE - NSBHCHARTREVIEWVS_PSY_A_CORE FT
Vital Signs Last 24 Hrs  T(C): 36.8 (04-21-22 @ 17:00), Max: 36.8 (04-21-22 @ 17:00)  T(F): 98.3 (04-21-22 @ 17:00), Max: 98.3 (04-21-22 @ 17:00)  HR: 79 (04-21-22 @ 17:00) (70 - 79)  BP: 178/63 (04-21-22 @ 17:00) (152/51 - 178/63)  BP(mean): --  RR: 18 (04-21-22 @ 17:00) (16 - 18)  SpO2: 98% (04-21-22 @ 17:00) (98% - 98%)     Vital Signs Last 24 Hrs  T(C): 36.8 (04-21-22 @ 17:00), Max: 36.8 (04-21-22 @ 17:00)  T(F): 98.3 (04-21-22 @ 17:00), Max: 98.3 (04-21-22 @ 17:00)  HR: 79 (04-21-22 @ 17:00) (79 - 79)  BP: 178/63 (04-21-22 @ 17:00) (178/63 - 178/63)  BP(mean): --  RR: 18 (04-21-22 @ 17:00) (18 - 18)  SpO2: 98% (04-21-22 @ 17:00) (98% - 98%)

## 2022-04-22 NOTE — BH INPATIENT PSYCHIATRY PROGRESS NOTE - CASE SUMMARY
89-year-old retired man, domiciled with daughter Brianda (), w/ PMH of hearing loss using hearing aid, colitis, HLD, HTN, on Xarelto 15mg daily (PCP Dr Barrett  #1 #5), PPH of depression, multiple psych hospitalization requiring ECT, last 4 years ago at [facility], currently on sertraline 50mg bid, olanzapine 5mg qd, sees Dr Josh Simmons () for outpatient, presenting due to worsening depression and obsessive thought.    Due to current active depressive symptoms, nonresponsive to outpatient treatment, and collateral from psychiatrist and family, patient meets involuntary admission criteria. He has been admitted to 8U for stabilization and possible ECT as inpatient. Despite not endorsing suicidality, the patient has experienced weight loss 2/2 severe depression, so ECT may be a good choice for him. Unfortunately, he is currently undecided regarding this treatment and has not yet given permission. The patient himself states that he "does not remember" whether the treatment was effective in the past and he is "not optimistic" about the potential efficacy of the treatment in the future. Cleared by medicine for ECT 4/15/22.    MoCA score 20/30. Unclear how much of this is pseudodementia vs neurocognitive impairment vs hearing loss.    Patient continues to refuse blood draws and remains ambivalent about ECT. May pursue TOO due to poor ADLs. Pt may be showing signs of psychosis; endorses hearing music which may be consistent with auditory hallucinations, and has been following clinicians around without speaking.    MDD w/psychotic features  Plan:  - Continue sertraline 100mg daily  - Increase olanzapine to 7.5mg nightly to target ?psychosis  - Considering ECT but pt is ambivalent - Seen by medicine for ECT clearance 4/15: They determine pt is Low-intermediate risk for low risk procedure  - Continue medication as follows:     Xarelto 15mg daily with meals     Losartan to 25 mg QD; 4/18/22, decrease Losartan to 25 mg (previously 50mg) daily as BPs have been low 4/18/22 and pt was orthostatics positive     Discontinued Furosemide 4/18/ 22; preiously 20mg twice daily     Atorvastatin 20mg daily     Vit D3 2000 units     Magnesium 500mg     Mesalamine 2.4 mg     Mirabegron 50mg po QD (pt occasionally refusing)    Medicine consult, 4/15:  #Pre Op Risk   - EKG with rate controlled Afib, LAFB  - Re HTN: maintain /90   - Re: Afib: maintain anticoagulation with home xarelto   - Low-intermediate risk for low risk procedure      4/12: Pt reluctant to engage today; does not want to be on [facility]. Equivocating regarding ECT. Clearly depressed. MoCA 20/30. Will continue to monitor and to discuss ECT.    4/13: Pt remains ambivalent about treatment. Not asking for discharge today. Refused some labs, will try to get these again. Alert and oriented. No SI. Will follow up anemia.    4/14: at this point patient is irritable and negativistic refusing ECT as well as some medications and lab work.  Will move Zyprexa to nighttime (sleep an issue?); and Zoloft 100mg to am; continue to attempt to discuss need for labs and possible ECT if medical evaluation can be completed. Need to assess/address anemia.  Refusal may require consideration of TOO.    4/15: Pt continues to refuse ECT and routine blood work. Pt was seen by medicine team for clearance for ECT, after which he was determined to be "Low-intermediate risk for low risk procedure. Pt's daughter, Valery, states she is his medical proxy and will bring in form next week.    4/18: Lasix was held as BPs were low. BPs continued to be low and pt was newly orthostatic positive, tf decrease Losartan to 25 mg (previously 50mg) daily CTM. Pt still severely depressed; outpatient psychiatrist and daughters endorse ECT as having been the only effective treatment in the past. Pt continues to be ambivalent about it. He will not answer regarding SI today but endorsed passive SI on previous interview. Still with significant PMR and latency.    4/19: mute to writer but stares; observed interacting with staff; irritable speech clear and coherent; by observation appears generally Alert; oriented; cognition intact; speech clear; no tremor or evidence of movement impairment. No behavioral issues.      4/20: Continues to be despondent and ambivalent about treatments. Endorses hearing music which may be consistent with auditory hallucinations. Stated he was "hearing music coming from the room and following him around with words that he couldn't quite make out." Similar episode "last time I was suffering from this condition." Pt states the music "does not bother him." This resolved after 10 minutes. Otw no SI/HI/VH//delusions. Walking halls as if in fugue.  ADLs:  Barthel Index for Activities of Daily Living (ADL) from [employer]  on 4/20/2022  RESULT SUMMARY:  65 points  Minimally dependent  INPUTS:  Feeding —> 5 = Needs help  Bathing —> 0 = Unable  Grooming —> 5 = Independent  Dressing —> 10 = Independent  Bowel control —> 5 = Occasional accident  Bladder control —> 5 = Occasional accident  Toilet use —> 5 = Needs help  Transfers (bed to chair and back) —> 15 = Independent  Mobility on level surfaces —> 15 = Independent (but may use any aid, e.g. stick) >50 yards  Stairs —> 0 = Unable    4/21/22: Yesterday (4/20) , pt "consented  verbally " to ECT in presence of RN and daughter. Today, again seems ambivalent. Pt appears more irritable today, shouting at interviewer. Did not ask interviewer to stay in room. Pt had "panic" episode yesterday and was given Zyprexa, which seemed to improve panic. Pt is compliant with all meds today.  Patient did not wish to sign consent and had no response when asked about this issue; however less clingy allowing staff to leave the room without panic.   Reported that patient had panic after "consent" was allegedly given on 4/20 verbally.       ;;04/22: patient states he is anxious rather than sad; Not endorsing  suicidal or homicidal ideation intent or plans; no mention of auditory or visual hallucinations ; does not consent to ECT (at this time).  Appeared less distraught today than yesterday.  However still feels "terrible";  Sleep  appetite ok; no pain issues. Alert; oriented; cognition intact; speech clear; but has tremor at rest thought to be EPS possibly secondary to Zyprexa.

## 2022-04-22 NOTE — BH INPATIENT PSYCHIATRY PROGRESS NOTE - NSBHFUPINTERVALHXFT_PSY_A_CORE
Pt was seen today in his room. VS_____. No acute events overnight. He stood for the entirety of the interview. His responses were terser than previous days. Pt appeared irritable. Shouted, "What do you think?" in response to "How are you feeling today?"     Yesterday, in the presence of an RN and his daughter, he consented to ECT. When asked whether he reaffirmed consent, he responded, "I don't know."  Today, pt is s/p zyprexa. He denies SI/HI/VH/ and AH that he had yesterday.     Pt did not ask interviewer to stay when interviewer stated, "Any other questions or concerns before I leave?" This is a change from the previous few days.      Yesterday, pt had "panic" episode yesterday and was given Zyprexa, which seemed to improve panic. Pt is compliant with all meds today.     Pt was seen today in his room. VSS. Today, pt demonstrated new whole body resting tremor and lead pipe rigidity in the RUE s/p zyprexa over two days for "panic attacks." Pt states he did not sleep well and is now "frightened."     Pt is also complaining of "not moving his bowels well." He has been refusing his mesalamine, stating "It's supposed to be given with another medication. I can't remember what it is, but I won't take it without the other medication."     No SI/HI/AH/VH/ delusions.       When asked whether he reaffirmed consent, he responded, "I don't know."    Today, pt is s/p zyprexa. He denies SI/HI/VH/ and AH that he had yesterday.     Pt did not ask interviewer to stay when interviewer stated, "Any other questions or concerns before I leave?" This is a change from the previous few days.      Yesterday, pt had "panic" episode yesterday and was given Zyprexa, which seemed to improve panic. Pt is compliant with all meds today.     Pt was seen today in his room. VSS. Today, pt demonstrated new whole body resting tremor and lead pipe rigidity in the RUE s/p zyprexa over two days for "panic attacks." Pt states he did not sleep well and is now "frightened." Pt was offered Benztropine for extra-pyramidal symptoms, which he consented to after initially expressing apprehension, "What's that supposed to do?"    Pt is also complaining of "not moving his bowels well." He has been refusing his mesalamine, stating "It's supposed to be given with another medication. I can't remember what it is, but I won't take it without the other medication." When interviewer offered to speak with daughters about meds, pt responded, "They won't know." When interviewer subsequently offered to call outpatient PCP, pt looked away and remained silent.     No SI/HI/AH/VH/ delusions.       When asked whether pt would consent to ECT, he  did not respond. Pt did not ask interviewer to stay after interview ended.    Pt was seen today in his room. VSS. Today, pt demonstrated new whole body resting tremor and cogwheel rigidity in the RUE. Of note, pt is s/p Zyprexa over two days for "panic attacks." Pt at first refused Cogentin, but after some convincing agreed to take Atarax 25mg PO for both EPS and anxiety.  During interview, pt states he did not sleep well and is now "frightened."    Pt is also complaining of "not moving his bowels well." He has been refusing his mesalamine, stating "It's supposed to be given with another medication. I can't remember what it is, but I won't take it without the other medication." When interviewer offered to speak with daughters about meds, pt responded, "They won't know." When interviewer subsequently offered to call outpatient PCP, pt looked away and remained silent. Pt also did not respond to offer of Senna.    No SI/HI/AH/VH/ delusions.    When asked whether pt would consent to ECT, he  did not respond. Pt did not ask interviewer to stay after interview ended.

## 2022-04-22 NOTE — BH INPATIENT PSYCHIATRY PROGRESS NOTE - NSBHASSESSSUMMFT_PSY_ALL_CORE
WDL Mr. Arriaga is a 89-year-old retired man, domiciled with daughter Brianda (), w/ PMH of hearing loss using hearing aid, colitis, HLD, HTN, on Xarelto 15mg daily (PCP Dr Barrett  #1 #5), PPH of depression, multiple psych hospitalization requiring ECT, last 4 years ago at [facility], currently on sertraline 50mg bid, olanzapine 5mg qd, sees Dr Josh Simmons () for outpatient, presenting due to worsening depression and obsessive thought.    Due to current active depressive symptoms, nonresponsive to outpatient treatment, and collateral from psychiatrist and family, patient meets involuntary admission criteria. He has been admitted to 8U for stabilization and possible ECT as inpatient. Despite not endorsing suicidality, the patient has experienced weight loss 2/2 severe depression, so ECT may be a good choice for him. Unfortunately, he is currently undecided regarding this treatment and has not yet given permission. The patient himself states that he "does not remember" whether the treatment was effective in the past and he is "not optimistic" about the potential efficacy of the treatment in the future. Cleared by medicine for ECT 4/15/22.    MoCA score 20/30. Unclear how much of this is pseudodementia vs neurocognitive impairment vs hearing loss.    Patient continues to refuse blood draws and remains ambivalent about ECT. May pursue TOO due to poor ADLs. Pt may be showing signs of psychosis; endorses hearing music which may be consistent with auditory hallucinations, and has been following clinicians around without speaking.    MDD w/psychotic features  Plan:  - Continue sertraline 100mg daily  - Increase olanzapine to 7.5mg nightly to target ?psychosis  - Considering ECT but pt is ambivalent - Seen by medicine for ECT clearance 4/15: They determine pt is Low-intermediate risk for low risk procedure  - Continue medication as follows:     Xarelto 15mg daily with meals     Losartan to 25 mg QD; 4/18/22, decrease Losartan to 25 mg (previously 50mg) daily as BPs have been low 4/18/22 and pt was orthostatics positive     Discontinued Furosemide 4/18/ 22; preiously 20mg twice daily     Atorvastatin 20mg daily     Vit D3 2000 units     Magnesium 500mg     Mesalamine 2.4 mg     Mirabegron 50mg po QD (pt occasionally refusing)    Medicine consult, 4/15:  #Pre Op Risk   - EKG with rate controlled Afib, LAFB  - Re HTN: maintain /90   - Re: Afib: maintain anticoagulation with home xarelto   - Low-intermediate risk for low risk procedure      4/12: Pt reluctant to engage today; does not want to be on 8Uris. Equivocating regarding ECT. Clearly depressed. MoCA 20/30. Will continue to monitor and to discuss ECT.    4/13: Pt remains ambivalent about treatment. Not asking for discharge today. Refused some labs, will try to get these again. Alert and oriented. No SI. Will follow up anemia.    4/14: at this point patient is irritable and negativistic refusing ECT as well as some medications and lab work.  Will move Zyprexa to nighttime (sleep an issue?); and Zoloft 100mg to am; continue to attempt to discuss need for labs and possible ECT if medical evaluation can be completed. Need to assess/address anemia.  Refusal may require consideration of TOO.    4/15: Pt continues to refuse ECT and routine blood work. Pt was seen by medicine team for clearance for ECT, after which he was determined to be "Low-intermediate risk for low risk procedure. Pt's daughter, Valery, states she is his medical proxy and will bring in form next week.    4/18: Lasix was held as BPs were low. BPs continued to be low and pt was newly orthostatic positive, tf decrease Losartan to 25 mg (previously 50mg) daily CTM. Pt still severely depressed; outpatient psychiatrist and daughters endorse ECT as having been the only effective treatment in the past. Pt continues to be ambivalent about it. He will not answer regarding SI today but endorsed passive SI on previous interview. Still with significant PMR and latency.    4/19: mute to writer but stares; observed interacting with staff; irritable speech clear and coherent; by observation appears generally Alert; oriented; cognition intact; speech clear; no tremor or evidence of movement impairment. No behavioral issues.      4/20: Continues to be despondent and ambivalent about treatments. Endorses hearing music which may be consistent with auditory hallucinations. Stated he was "hearing music coming from the room and following him around with words that he couldn't quite make out." Similar episode "last time I was suffering from this condition." Pt states the music "does not bother him." This resolved after 10 minutes. Otw no SI/HI/VH//delusions. Walking halls as if in fugue.  ADLs:  Barthel Index for Activities of Daily Living (ADL) from [employer]  on 4/20/2022  RESULT SUMMARY:  65 points  Minimally dependent  INPUTS:  Feeding —> 5 = Needs help  Bathing —> 0 = Unable  Grooming —> 5 = Independent  Dressing —> 10 = Independent  Bowel control —> 5 = Occasional accident  Bladder control —> 5 = Occasional accident  Toilet use —> 5 = Needs help  Transfers (bed to chair and back) —> 15 = Independent  Mobility on level surfaces —> 15 = Independent (but may use any aid, e.g. stick) >50 yards  Stairs —> 0 = Unable    4/21/22: Yesterday, pt consented to ECT in presence of RN and daughter. Today, again seems ambivalent. Pt appears more irritable today, shouting at interviewer. Did not ask interviewer to stay in room. Pt had "panic" episode yesterday and was given Zyprexa, which seemed to improve panic. Pt is compliant with all meds today.    4/22/22:    Mr. Arriaga is a 89-year-old retired man, domiciled with daughter Brianda (), w/ PMH of hearing loss using hearing aid, colitis, HLD, HTN, on Xarelto 15mg daily (PCP Dr Barrett  #1 #5), PPH of depression, multiple psych hospitalization requiring ECT, last 4 years ago at [facility], currently on sertraline 50mg bid, olanzapine 5mg qd, sees Dr Josh Simmons () for outpatient, presenting due to worsening depression and obsessive thought.    Due to current active depressive symptoms, nonresponsive to outpatient treatment, and collateral from psychiatrist and family, patient meets involuntary admission criteria. He has been admitted to 8U for stabilization and possible ECT as inpatient. Despite not endorsing suicidality, the patient has experienced weight loss 2/2 severe depression, so ECT may be a good choice for him. Unfortunately, he is currently undecided regarding this treatment and has not yet given permission. The patient himself states that he "does not remember" whether the treatment was effective in the past and he is "not optimistic" about the potential efficacy of the treatment in the future. Cleared by medicine for ECT 4/15/22.    MoCA score 20/30. Unclear how much of this is pseudodementia vs neurocognitive impairment vs hearing loss.    Patient continues to refuse blood draws and remains ambivalent about ECT. May pursue TOO due to poor ADLs. Pt may be showing signs of psychosis; endorses hearing music which may be consistent with auditory hallucinations, and has been following clinicians around without speaking.    MDD w/psychotic features  -AH resolved yesterday and have not re-occurred today s/p Zyprexa 7.5 mg.     Plan:  - Continue sertraline 100mg daily  - Decrease zyprexa 2/2 EPS    - Considering ECT but pt is ambivalent - Seen by medicine for ECT clearance 4/15: They determine pt is Low-intermediate risk for low risk procedure  - Continue medication as follows:     Xarelto 15mg daily with meals     Losartan to 25 mg QD; 4/18/22, decrease Losartan to 25 mg (previously 50mg) daily as BPs have been low 4/18/22 and pt was orthostatics positive     Discontinued Furosemide 4/18/ 22; preiously 20mg twice daily     Atorvastatin 20mg daily     Vit D3 2000 units     Magnesium 500mg     Mesalamine 2.4 mg     Mirabegron 50mg po QD (pt occasionally refusing)    Medicine consult, 4/15:  #Pre Op Risk   - EKG with rate controlled Afib, LAFB  - Re HTN: maintain /90   - Re: Afib: maintain anticoagulation with home xarelto   - Low-intermediate risk for low risk procedure      4/12: Pt reluctant to engage today; does not want to be on 8[facility]ris. Equivocating regarding ECT. Clearly depressed. MoCA 20/30. Will continue to monitor and to discuss ECT.    4/13: Pt remains ambivalent about treatment. Not asking for discharge today. Refused some labs, will try to get these again. Alert and oriented. No SI. Will follow up anemia.    4/14: at this point patient is irritable and negativistic refusing ECT as well as some medications and lab work.  Will move Zyprexa to nighttime (sleep an issue?); and Zoloft 100mg to am; continue to attempt to discuss need for labs and possible ECT if medical evaluation can be completed. Need to assess/address anemia.  Refusal may require consideration of TOO.    4/15: Pt continues to refuse ECT and routine blood work. Pt was seen by medicine team for clearance for ECT, after which he was determined to be "Low-intermediate risk for low risk procedure. Pt's daughter, Valery, states she is his medical proxy and will bring in form next week.    4/18: Lasix was held as BPs were low. BPs continued to be low and pt was newly orthostatic positive, tf decrease Losartan to 25 mg (previously 50mg) daily CTM. Pt still severely depressed; outpatient psychiatrist and daughters endorse ECT as having been the only effective treatment in the past. Pt continues to be ambivalent about it. He will not answer regarding SI today but endorsed passive SI on previous interview. Still with significant PMR and latency.    4/19: mute to writer but stares; observed interacting with staff; irritable speech clear and coherent; by observation appears generally Alert; oriented; cognition intact; speech clear; no tremor or evidence of movement impairment. No behavioral issues.      4/20: Continues to be despondent and ambivalent about treatments. Endorses hearing music which may be consistent with auditory hallucinations. Stated he was "hearing music coming from the room and following him around with words that he couldn't quite make out." Similar episode "last time I was suffering from this condition." Pt states the music "does not bother him." This resolved after 10 minutes. Otw no SI/HI/VH//delusions. Walking halls as if in fugue.  ADLs:  Barthel Index for Activities of Daily Living (ADL) from [employer]  on 4/20/2022  RESULT SUMMARY:  65 points  Minimally dependent  INPUTS:  Feeding —> 5 = Needs help  Bathing —> 0 = Unable  Grooming —> 5 = Independent  Dressing —> 10 = Independent  Bowel control —> 5 = Occasional accident  Bladder control —> 5 = Occasional accident  Toilet use —> 5 = Needs help  Transfers (bed to chair and back) —> 15 = Independent  Mobility on level surfaces —> 15 = Independent (but may use any aid, e.g. stick) >50 yards  Stairs —> 0 = Unable    4/21/22: Yesterday, pt consented to ECT in presence of RN and daughter. Today, again seems ambivalent. Pt appears more irritable today, shouting at interviewer. Did not ask interviewer to stay in room. Pt had "panic" episode yesterday and was given Zyprexa, which seemed to improve panic. Pt is compliant with all meds today.    4/22/22: Pt was seen today in his room. VSS. Today, pt demonstrated was offered Benztropine for new onset extra-pyramidal symptoms s/p Zyprexa 7.5 mg. Zyprexa was also decreased. Pt is also complaining of constipation. Of note, he has been refusing his mesalamine, stating "It's supposed to be given with another medication. I can't remember what it is, but I won't take it without the other medication." Otw no SI/HI/AH/VH/ delusions. Still does not consent to ECT.  Mr. Arriaga is a 89-year-old retired man, domiciled with daughter Brianda (), w/ PMH of hearing loss using hearing aid, colitis, HLD, HTN, on Xarelto 15mg daily (PCP Dr Barrett  #1 #5), PPH of depression, multiple psych hospitalization requiring ECT, last 4 years ago at [facility], currently on sertraline 50mg bid, olanzapine 5mg qd, sees Dr Josh Simmons () for outpatient, presenting due to worsening depression and obsessive thought.    Due to current active depressive symptoms, nonresponsive to outpatient treatment, and collateral from psychiatrist and family, patient meets involuntary admission criteria. He has been admitted to 8U for stabilization and possible ECT as inpatient. Despite not endorsing suicidality, the patient has experienced weight loss 2/2 severe depression, so ECT may be a good choice for him. Unfortunately, he is currently undecided regarding this treatment and has not yet given permission. The patient himself states that he "does not remember" whether the treatment was effective in the past and he is "not optimistic" about the potential efficacy of the treatment in the future. Cleared by medicine for ECT 4/15/22.    MoCA score 20/30. Unclear how much of this is pseudodementia vs neurocognitive impairment vs hearing loss.    Patient continues to refuse blood draws and remains ambivalent about ECT. May pursue TOO due to poor ADLs. Pt may be showing signs of psychosis; endorses hearing music which may be consistent with auditory hallucinations, and has been following clinicians around without speaking.    MDD w/psychotic features  -AH resolved yesterday and have not re-occurred today s/p Zyprexa 7.5 mg.   - Pt developed new extra-pyramidal symptoms (resting tremor; lead pipe rigidity) today, s/p Zyprexa 7.5 mg    Plan:  - Continue sertraline 100mg daily    - Decrease zyprexa from 7.5 mg (to 5 mg and 2.5 mg) 2/2 EPS  -Benztropine 0.5 mg 1 time for EPS  -Atarax 25 mg 1 time for EPS    - Considering ECT but pt is ambivalent - Seen by medicine for ECT clearance 4/15: They determine pt is Low-intermediate risk for low risk procedure  - Continue medication as follows:     Xarelto 15mg daily with meals     Losartan to 25 mg QD; 4/18/22, decrease Losartan to 25 mg (previously 50mg) daily as BPs have been low 4/18/22 and pt was orthostatics positive     Discontinued Furosemide 4/18/ 22; preiously 20mg twice daily     Atorvastatin 20mg daily     Vit D3 2000 units     Magnesium 500mg     Mesalamine 2.4 mg     Mirabegron 50mg po QD (pt occasionally refusing)    Medicine consult, 4/15:  #Pre Op Risk   - EKG with rate controlled Afib, LAFB  - Re HTN: maintain /90   - Re: Afib: maintain anticoagulation with home xarelto   - Low-intermediate risk for low risk procedure      4/12: Pt reluctant to engage today; does not want to be on 8[facility]. Equivocating regarding ECT. Clearly depressed. MoCA 20/30. Will continue to monitor and to discuss ECT.    4/13: Pt remains ambivalent about treatment. Not asking for discharge today. Refused some labs, will try to get these again. Alert and oriented. No SI. Will follow up anemia.    4/14: at this point patient is irritable and negativistic refusing ECT as well as some medications and lab work.  Will move Zyprexa to nighttime (sleep an issue?); and Zoloft 100mg to am; continue to attempt to discuss need for labs and possible ECT if medical evaluation can be completed. Need to assess/address anemia.  Refusal may require consideration of TOO.    4/15: Pt continues to refuse ECT and routine blood work. Pt was seen by medicine team for clearance for ECT, after which he was determined to be "Low-intermediate risk for low risk procedure. Pt's daughter, Valery, states she is his medical proxy and will bring in form next week.    4/18: Lasix was held as BPs were low. BPs continued to be low and pt was newly orthostatic positive, tf decrease Losartan to 25 mg (previously 50mg) daily CTM. Pt still severely depressed; outpatient psychiatrist and daughters endorse ECT as having been the only effective treatment in the past. Pt continues to be ambivalent about it. He will not answer regarding SI today but endorsed passive SI on previous interview. Still with significant PMR and latency.    4/19: mute to writer but stares; observed interacting with staff; irritable speech clear and coherent; by observation appears generally Alert; oriented; cognition intact; speech clear; no tremor or evidence of movement impairment. No behavioral issues.      4/20: Continues to be despondent and ambivalent about treatments. Endorses hearing music which may be consistent with auditory hallucinations. Stated he was "hearing music coming from the room and following him around with words that he couldn't quite make out." Similar episode "last time I was suffering from this condition." Pt states the music "does not bother him." This resolved after 10 minutes. Otw no SI/HI/VH//delusions. Walking halls as if in fugue.  ADLs:  Barthel Index for Activities of Daily Living (ADL) from [email or web address]  on 4/20/2022  RESULT SUMMARY:  65 points  Minimally dependent  INPUTS:  Feeding —> 5 = Needs help  Bathing —> 0 = Unable  Grooming —> 5 = Independent  Dressing —> 10 = Independent  Bowel control —> 5 = Occasional accident  Bladder control —> 5 = Occasional accident  Toilet use —> 5 = Needs help  Transfers (bed to chair and back) —> 15 = Independent  Mobility on level surfaces —> 15 = Independent (but may use any aid, e.g. stick) >50 yards  Stairs —> 0 = Unable    4/21/22: Yesterday, pt consented to ECT in presence of RN and daughter. Today, again seems ambivalent. Pt appears more irritable today, shouting at interviewer. Did not ask interviewer to stay in room. Pt had "panic" episode yesterday and was given Zyprexa, which seemed to improve panic. Pt is compliant with all meds today.    4/22/22: Pt was seen today in his room. VSS. Today, pt demonstrated was offered Benztropine for new onset extra-pyramidal symptoms s/p Zyprexa 7.5 mg. Zyprexa was also decreased. Pt is also complaining of constipation. Of note, he has been refusing his mesalamine, stating "It's supposed to be given with another medication. I can't remember what it is, but I won't take it without the other medication." Otw no SI/HI/AH/VH/ delusions. Still does not consent to ECT.  Mr. Arriaga is a 89-year-old retired man, domiciled with daughter Brianda (), w/ PMH of hearing loss using hearing aid, colitis, HLD, HTN, on Xarelto 15mg daily (PCP Dr Barrett  #1 #5), PPH of depression, multiple psych hospitalization requiring ECT, last 4 years ago at [facility], currently on sertraline 50mg bid, olanzapine 5mg qd, sees Dr Josh Simmons () for outpatient, presenting due to worsening depression and obsessive thought.    Due to current active depressive symptoms, nonresponsive to outpatient treatment, and collateral from psychiatrist and family, patient meets involuntary admission criteria. He has been admitted to 8U for stabilization and possible ECT as inpatient. Despite not endorsing suicidality, the patient has experienced weight loss 2/2 severe depression, so ECT may be a good choice for him. Unfortunately, he is currently undecided regarding this treatment and has not yet given permission. The patient himself states that he "does not remember" whether the treatment was effective in the past and he is "not optimistic" about the potential efficacy of the treatment in the future. Cleared by medicine for ECT 4/15/22.    MoCA score 20/30. Unclear how much of this is pseudodementia vs neurocognitive impairment vs hearing loss.    Patient continues to refuse blood draws and remains ambivalent about ECT. May pursue TOO due to poor ADLs. Pt may be showing signs of psychosis; endorses hearing music which may be consistent with auditory hallucinations, and has been following clinicians around without speaking.    MDD w/psychotic features  -AH resolved yesterday and have not re-occurred today s/p Zyprexa 7.5 mg.   - Pt developed new extra-pyramidal symptoms (resting tremor; lead pipe rigidity) today, s/p Zyprexa 7.5 mg    Plan:  - Continue sertraline 100mg daily    - Decrease zyprexa from 7.5 mg (to 5 mg and 2.5 mg) 2/2 EPS  -Benztropine 0.5 mg 1 time for EPS  -Atarax 25 mg 1 time for EPS    - Considering ECT but pt is ambivalent - Seen by medicine for ECT clearance 4/15: They determine pt is Low-intermediate risk for low risk procedure  - Continue medication as follows:     Xarelto 15mg daily with meals     Losartan to 25 mg QD; 4/18/22, decrease Losartan to 25 mg (previously 50mg) daily as BPs have been low 4/18/22 and pt was orthostatics positive     Discontinued Furosemide 4/18/ 22; preiously 20mg twice daily     Atorvastatin 20mg daily     Vit D3 2000 units     Magnesium 500mg     Mesalamine 2.4 mg     Mirabegron 50mg po QD (pt occasionally refusing)    Medicine consult, 4/15:  #Pre Op Risk   - EKG with rate controlled Afib, LAFB  - Re HTN: maintain /90   - Re: Afib: maintain anticoagulation with home xarelto   - Low-intermediate risk for low risk procedure      4/12: Pt reluctant to engage today; does not want to be on 8[facility]ris. Equivocating regarding ECT. Clearly depressed. MoCA 20/30. Will continue to monitor and to discuss ECT.    4/13: Pt remains ambivalent about treatment. Not asking for discharge today. Refused some labs, will try to get these again. Alert and oriented. No SI. Will follow up anemia.    4/14: at this point patient is irritable and negativistic refusing ECT as well as some medications and lab work.  Will move Zyprexa to nighttime (sleep an issue?); and Zoloft 100mg to am; continue to attempt to discuss need for labs and possible ECT if medical evaluation can be completed. Need to assess/address anemia.  Refusal may require consideration of TOO.    4/15: Pt continues to refuse ECT and routine blood work. Pt was seen by medicine team for clearance for ECT, after which he was determined to be "Low-intermediate risk for low risk procedure. Pt's daughter, Valery, states she is his medical proxy and will bring in form next week.    4/18: Lasix was held as BPs were low. BPs continued to be low and pt was newly orthostatic positive, tf decrease Losartan to 25 mg (previously 50mg) daily CTM. Pt still severely depressed; outpatient psychiatrist and daughters endorse ECT as having been the only effective treatment in the past. Pt continues to be ambivalent about it. He will not answer regarding SI today but endorsed passive SI on previous interview. Still with significant PMR and latency.    4/19: mute to writer but stares; observed interacting with staff; irritable speech clear and coherent; by observation appears generally Alert; oriented; cognition intact; speech clear; no tremor or evidence of movement impairment. No behavioral issues.      4/20: Continues to be despondent and ambivalent about treatments. Endorses hearing music which may be consistent with auditory hallucinations. Stated he was "hearing music coming from the room and following him around with words that he couldn't quite make out." Similar episode "last time I was suffering from this condition." Pt states the music "does not bother him." This resolved after 10 minutes. Otw no SI/HI/VH//delusions. Walking halls as if in fugue.  ADLs:  Barthel Index for Activities of Daily Living (ADL) from [email or web address]  on 4/20/2022  RESULT SUMMARY:  65 points  Minimally dependent  INPUTS:  Feeding —> 5 = Needs help  Bathing —> 0 = Unable  Grooming —> 5 = Independent  Dressing —> 10 = Independent  Bowel control —> 5 = Occasional accident  Bladder control —> 5 = Occasional accident  Toilet use —> 5 = Needs help  Transfers (bed to chair and back) —> 15 = Independent  Mobility on level surfaces —> 15 = Independent (but may use any aid, e.g. stick) >50 yards  Stairs —> 0 = Unable    4/21: Yesterday, pt consented to ECT in presence of RN and daughter. Today, again seems ambivalent. Pt appears more irritable today, shouting at interviewer. Did not ask interviewer to stay in room. Pt had "panic" episode yesterday and was given Zyprexa, which seemed to improve panic. Pt is compliant with all meds today.    4/22: Pt was seen today in his room. VSS. Today, pt demonstrated was offered Benztropine for new onset extra-pyramidal symptoms s/p Zyprexa 7.5 mg. Zyprexa was also decreased. Pt is also complaining of constipation. Of note, he has been refusing his mesalamine, stating "It's supposed to be given with another medication. I can't remember what it is, but I won't take it without the other medication." Otw no SI/HI/AH/VH/ delusions. Still does not consent to ECT.

## 2022-04-23 RX ADMIN — QUETIAPINE FUMARATE 25 MILLIGRAM(S): 200 TABLET, FILM COATED ORAL at 22:49

## 2022-04-23 RX ADMIN — MAGNESIUM OXIDE 400 MG ORAL TABLET 400 MILLIGRAM(S): 241.3 TABLET ORAL at 10:51

## 2022-04-23 RX ADMIN — Medication 2.4 GRAM(S): at 17:21

## 2022-04-23 RX ADMIN — RIVAROXABAN 15 MILLIGRAM(S): KIT at 10:49

## 2022-04-23 RX ADMIN — ATORVASTATIN CALCIUM 20 MILLIGRAM(S): 80 TABLET, FILM COATED ORAL at 22:49

## 2022-04-23 RX ADMIN — MIRABEGRON 50 MILLIGRAM(S): 50 TABLET, EXTENDED RELEASE ORAL at 10:49

## 2022-04-23 RX ADMIN — SERTRALINE 100 MILLIGRAM(S): 25 TABLET, FILM COATED ORAL at 10:49

## 2022-04-23 RX ADMIN — LOSARTAN POTASSIUM 25 MILLIGRAM(S): 100 TABLET, FILM COATED ORAL at 10:49

## 2022-04-23 RX ADMIN — Medication 2000 UNIT(S): at 10:49

## 2022-04-23 NOTE — BH INPATIENT PSYCHIATRY PROGRESS NOTE - NSBHFUPINTERVALHXFT_PSY_A_CORE
No acute interval events or episodes of behavioral agitation. No PRN medication required in the interval. Pt without any evidence of EPS or dystonia today after transitioning of olanzapine to quetiapine. Pt is oriented to time and self, but not to location, struggling to remember the name of the hospital; endorses confusion. Pt still endorsing constipation when asked, but refusing to take medications to resolve this, stating that he takes other medication but is unable to specify which.      Pt was seen today in his room. VSS. Today, pt demonstrated new whole body resting tremor and cogwheel rigidity in the RUE. Of note, pt is s/p Zyprexa over two days for "panic attacks." Pt at first refused Cogentin, but after some convincing agreed to take Atarax 25mg PO for both EPS and anxiety.  During interview, pt states he did not sleep well and is now "frightened."    No SI/HI/AH/VH or delusions.

## 2022-04-23 NOTE — BH INPATIENT PSYCHIATRY PROGRESS NOTE - PRN MEDS
MEDICATIONS  (PRN):  hydrOXYzine hydrochloride 25 milliGRAM(s) Oral three times a day PRN EPS, tremors, cogwheel rigidity

## 2022-04-23 NOTE — BH INPATIENT PSYCHIATRY PROGRESS NOTE - NSBHASSESSSUMMFT_PSY_ALL_CORE
Mr. Arriaga is a 89-year-old retired man, domiciled with daughter Brianda (), w/ PMH of hearing loss using hearing aid, colitis, HLD, HTN, on Xarelto 15mg daily (PCP Dr Barrett  #1 #5), PPH of depression, multiple psych hospitalization requiring ECT, last 4 years ago at [facility], currently on sertraline 50mg bid, olanzapine 5mg qd, sees Dr Josh Simmons () for outpatient, presenting due to worsening depression and obsessive thought.    Due to current active depressive symptoms, nonresponsive to outpatient treatment, and collateral from psychiatrist and family, patient meets involuntary admission criteria. He has been admitted to 8U for stabilization and possible ECT as inpatient. Despite not endorsing suicidality, the patient has experienced weight loss 2/2 severe depression, so ECT may be a good choice for him. Unfortunately, he is currently undecided regarding this treatment and has not yet given permission. The patient himself states that he "does not remember" whether the treatment was effective in the past and he is "not optimistic" about the potential efficacy of the treatment in the future. Cleared by medicine for ECT 4/15/22.    MoCA score 20/30. Unclear how much of this is pseudodementia vs neurocognitive impairment vs hearing loss.    Patient continues to refuse blood draws and remains ambivalent about ECT. May pursue TOO due to poor ADLs. Pt endorsing AH of music, which has been documented to occur in those who have hearing loss; r/o musical hallucinosis vs auditory variant of Umer-Bonnet syndrome.     MDD w/psychotic features  - Continue quetiapine 25 mg PO qHS   - Avoid benzodiazepines, anticholinergics, antihistamines, out of concern for inducing delirium in this elderly gentleman.  - Continue sertraline 100mg daily  - Considering ECT but pt is ambivalent - Seen by medicine for ECT clearance 4/15: They determine pt is Low-intermediate risk for low risk procedure  - Continue medication as follows:     Xarelto 15mg daily with meals     Losartan to 25 mg QD; 4/18/22, decrease Losartan to 25 mg (previously 50mg) daily as BPs have been low 4/18/22 and pt was orthostatics positive     Discontinued Furosemide 4/18/ 22; preiously 20mg twice daily     Atorvastatin 20mg daily     Vit D3 2000 units     Magnesium 500mg     Mesalamine 2.4 mg     Mirabegron 50mg po QD (pt occasionally refusing)    Medicine consult, 4/15:  #Pre Op Risk   - EKG with rate controlled Afib, LAFB  - Re HTN: maintain /90   - Re: Afib: maintain anticoagulation with home xarelto   - Low-intermediate risk for low risk procedure      4/12: Pt reluctant to engage today; does not want to be on 8[facility]. Equivocating regarding ECT. Clearly depressed. MoCA 20/30. Will continue to monitor and to discuss ECT.    4/13: Pt remains ambivalent about treatment. Not asking for discharge today. Refused some labs, will try to get these again. Alert and oriented. No SI. Will follow up anemia.    4/14: at this point patient is irritable and negativistic refusing ECT as well as some medications and lab work.  Will move Zyprexa to nighttime (sleep an issue?); and Zoloft 100mg to am; continue to attempt to discuss need for labs and possible ECT if medical evaluation can be completed. Need to assess/address anemia.  Refusal may require consideration of TOO.    4/15: Pt continues to refuse ECT and routine blood work. Pt was seen by medicine team for clearance for ECT, after which he was determined to be "Low-intermediate risk for low risk procedure. Pt's daughter, Valery, states she is his medical proxy and will bring in form next week.    4/18: Lasix was held as BPs were low. BPs continued to be low and pt was newly orthostatic positive, tf decrease Losartan to 25 mg (previously 50mg) daily CTM. Pt still severely depressed; outpatient psychiatrist and daughters endorse ECT as having been the only effective treatment in the past. Pt continues to be ambivalent about it. He will not answer regarding SI today but endorsed passive SI on previous interview. Still with significant PMR and latency.    4/19: mute to writer but stares; observed interacting with staff; irritable speech clear and coherent; by observation appears generally Alert; oriented; cognition intact; speech clear; no tremor or evidence of movement impairment. No behavioral issues.      4/20: Continues to be despondent and ambivalent about treatments. Endorses hearing music which may be consistent with auditory hallucinations. Stated he was "hearing music coming from the room and following him around with words that he couldn't quite make out." Similar episode "last time I was suffering from this condition." Pt states the music "does not bother him." This resolved after 10 minutes. Otw no SI/HI/VH//delusions. Walking halls as if in fugue.  ADLs:  Barthel Index for Activities of Daily Living (ADL) from [email or web address]  on 4/20/2022  RESULT SUMMARY:  65 points  Minimally dependent  INPUTS:  Feeding —> 5 = Needs help  Bathing —> 0 = Unable  Grooming —> 5 = Independent  Dressing —> 10 = Independent  Bowel control —> 5 = Occasional accident  Bladder control —> 5 = Occasional accident  Toilet use —> 5 = Needs help  Transfers (bed to chair and back) —> 15 = Independent  Mobility on level surfaces —> 15 = Independent (but may use any aid, e.g. stick) >50 yards  Stairs —> 0 = Unable    4/21: Yesterday, pt consented to ECT in presence of RN and daughter. Today, again seems ambivalent. Pt appears more irritable today, shouting at interviewer. Did not ask interviewer to stay in room. Pt had "panic" episode yesterday and was given Zyprexa, which seemed to improve panic. Pt is compliant with all meds today.    4/22: Pt was seen today in his room. VSS. Today, pt demonstrated was offered Benztropine for new onset extra-pyramidal symptoms s/p Zyprexa 7.5 mg. Zyprexa was also decreased. Pt is also complaining of constipation. Of note, he has been refusing his mesalamine, stating "It's supposed to be given with another medication. I can't remember what it is, but I won't take it without the other medication." Otw no SI/HI/AH/VH/ delusions. Still does not consent to ECT.

## 2022-04-23 NOTE — BH INPATIENT PSYCHIATRY PROGRESS NOTE - NSBHMETABOLIC_PSY_ALL_CORE_FT
BMI: BMI (kg/m2): 22.7 (04-12-22 @ 09:00)  HbA1c: A1C with Estimated Average Glucose Result: 5.7 % (04-13-22 @ 07:45)    Glucose: POCT Blood Glucose.: 190 mg/dL (04-19-22 @ 06:43)    BP: 119/51 (04-23-22 @ 10:52) (119/51 - 188/67)  Lipid Panel: Date/Time: 04-13-22 @ 07:45  Cholesterol, Serum: 120  Direct LDL: --  HDL Cholesterol, Serum: 60  Total Cholesterol/HDL Ration Measurement: --  Triglycerides, Serum: 50

## 2022-04-23 NOTE — BH INPATIENT PSYCHIATRY PROGRESS NOTE - CURRENT MEDICATION
MEDICATIONS  (STANDING):  atorvastatin 20 milliGRAM(s) Oral daily  cholecalciferol 2000 Unit(s) Oral daily  losartan 25 milliGRAM(s) Oral daily  magnesium oxide 400 milliGRAM(s) Oral daily  mesalamine DR (24-Hour) Tablet 2.4 Gram(s) Oral <User Schedule>  mirabegron ER 50 milliGRAM(s) Oral daily  QUEtiapine 25 milliGRAM(s) Oral at bedtime  rivaroxaban 15 milliGRAM(s) Oral daily  sertraline 100 milliGRAM(s) Oral daily    MEDICATIONS  (PRN):  hydrOXYzine hydrochloride 25 milliGRAM(s) Oral three times a day PRN EPS, tremors, cogwheel rigidity

## 2022-04-23 NOTE — BH INPATIENT PSYCHIATRY PROGRESS NOTE - NSBHCHARTREVIEWVS_PSY_A_CORE FT
Vital Signs Last 24 Hrs  T(C): 36.7 (04-23-22 @ 10:52), Max: 36.7 (04-23-22 @ 10:52)  T(F): 98 (04-23-22 @ 10:52), Max: 98 (04-23-22 @ 10:52)  HR: 83 (04-23-22 @ 10:52) (72 - 83)  BP: 119/51 (04-23-22 @ 10:52) (119/51 - 188/67)  BP(mean): --  RR: 18 (04-23-22 @ 10:52) (18 - 18)  SpO2: 99% (04-23-22 @ 10:52) (99% - 99%)

## 2022-04-24 RX ORDER — BENZTROPINE MESYLATE 1 MG
0.5 TABLET ORAL AT BEDTIME
Refills: 0 | Status: DISCONTINUED | OUTPATIENT
Start: 2022-04-24 | End: 2022-04-25

## 2022-04-24 RX ADMIN — RIVAROXABAN 15 MILLIGRAM(S): KIT at 10:36

## 2022-04-24 RX ADMIN — QUETIAPINE FUMARATE 25 MILLIGRAM(S): 200 TABLET, FILM COATED ORAL at 22:18

## 2022-04-24 RX ADMIN — ATORVASTATIN CALCIUM 20 MILLIGRAM(S): 80 TABLET, FILM COATED ORAL at 22:17

## 2022-04-24 RX ADMIN — MIRABEGRON 50 MILLIGRAM(S): 50 TABLET, EXTENDED RELEASE ORAL at 10:35

## 2022-04-24 RX ADMIN — Medication 2000 UNIT(S): at 10:36

## 2022-04-24 RX ADMIN — LOSARTAN POTASSIUM 25 MILLIGRAM(S): 100 TABLET, FILM COATED ORAL at 10:36

## 2022-04-24 RX ADMIN — Medication 0.5 MILLIGRAM(S): at 22:18

## 2022-04-24 RX ADMIN — SERTRALINE 100 MILLIGRAM(S): 25 TABLET, FILM COATED ORAL at 11:06

## 2022-04-24 RX ADMIN — MAGNESIUM OXIDE 400 MG ORAL TABLET 400 MILLIGRAM(S): 241.3 TABLET ORAL at 10:35

## 2022-04-24 NOTE — BH INPATIENT PSYCHIATRY PROGRESS NOTE - NSBHMSEJUDGE_PSY_A_CORE
Is unwilling to entertain the thought that ECT might be effective--even in the abstract./Unable to assess

## 2022-04-24 NOTE — BH INPATIENT PSYCHIATRY PROGRESS NOTE - NSBHMETABOLIC_PSY_ALL_CORE_FT
BMI: BMI (kg/m2): 22.7 (04-12-22 @ 09:00)  HbA1c: A1C with Estimated Average Glucose Result: 5.7 % (04-13-22 @ 07:45)    Glucose: POCT Blood Glucose.: 190 mg/dL (04-19-22 @ 06:43)    BP: 142/66 (04-24-22 @ 09:02) (119/51 - 188/67)  Lipid Panel: Date/Time: 04-13-22 @ 07:45  Cholesterol, Serum: 120  Direct LDL: --  HDL Cholesterol, Serum: 60  Total Cholesterol/HDL Ration Measurement: --  Triglycerides, Serum: 50

## 2022-04-24 NOTE — BH INPATIENT PSYCHIATRY PROGRESS NOTE - NSBHASSESSSUMMFT_PSY_ALL_CORE
Mr. Arriaga is a 89-year-old retired man, domiciled with daughter Brianda (), w/ PMH of hearing loss using hearing aid, colitis, HLD, HTN, on Xarelto 15mg daily (PCP Dr Barrett  #1 #5), PPH of depression, multiple psych hospitalization requiring ECT, last 4 years ago at [facility], currently on sertraline 50mg bid, olanzapine 5mg qd, sees Dr Josh Simmons () for outpatient, presenting due to worsening depression and obsessive thought.    Due to current active depressive symptoms, nonresponsive to outpatient treatment, and collateral from psychiatrist and family, patient meets involuntary admission criteria. He has been admitted to 8U for stabilization and possible ECT as inpatient. Despite not endorsing suicidality, the patient has experienced weight loss 2/2 severe depression, so ECT may be a good choice for him. Unfortunately, he is currently undecided regarding this treatment and has not yet given permission. The patient himself states that he "does not remember" whether the treatment was effective in the past and he is "not optimistic" about the potential efficacy of the treatment in the future. Cleared by medicine for ECT 4/15/22.    MoCA score 20/30. Unclear how much of this is pseudodementia vs neurocognitive impairment vs hearing loss.    Patient continues to refuse blood draws and remains ambivalent about ECT. May pursue TOO due to poor ADLs. Pt endorsing AH of music, which has been documented to occur in those who have hearing loss; r/o musical hallucinosis vs auditory variant of Umer-Bonnet syndrome.     MDD w/psychotic features  - Continue quetiapine 25 mg PO qHS   - Avoid benzodiazepines, anticholinergics, antihistamines, out of concern for inducing delirium in this elderly gentleman.  - Continue sertraline 100mg daily  - Considering ECT but pt is ambivalent - Seen by medicine for ECT clearance 4/15: They determine pt is Low-intermediate risk for low risk procedure  - Continue medication as follows:     Xarelto 15mg daily with meals     Losartan to 25 mg QD; 4/18/22, decrease Losartan to 25 mg (previously 50mg) daily as BPs have been low 4/18/22 and pt was orthostatics positive     Discontinued Furosemide 4/18/ 22; preiously 20mg twice daily     Atorvastatin 20mg daily     Vit D3 2000 units     Magnesium 500mg     Mesalamine 2.4 mg     Mirabegron 50mg po QD (pt occasionally refusing)    Medicine consult, 4/15:  #Pre Op Risk   - EKG with rate controlled Afib, LAFB  - Re HTN: maintain /90   - Re: Afib: maintain anticoagulation with home xarelto   - Low-intermediate risk for low risk procedure      4/12: Pt reluctant to engage today; does not want to be on [facility]. Equivocating regarding ECT. Clearly depressed. MoCA 20/30. Will continue to monitor and to discuss ECT.    4/13: Pt remains ambivalent about treatment. Not asking for discharge today. Refused some labs, will try to get these again. Alert and oriented. No SI. Will follow up anemia.    4/14: at this point patient is irritable and negativistic refusing ECT as well as some medications and lab work.  Will move Zyprexa to nighttime (sleep an issue?); and Zoloft 100mg to am; continue to attempt to discuss need for labs and possible ECT if medical evaluation can be completed. Need to assess/address anemia.  Refusal may require consideration of TOO.    4/15: Pt continues to refuse ECT and routine blood work. Pt was seen by medicine team for clearance for ECT, after which he was determined to be "Low-intermediate risk for low risk procedure. Pt's daughter, Valery, states she is his medical proxy and will bring in form next week.    4/18: Lasix was held as BPs were low. BPs continued to be low and pt was newly orthostatic positive, tf decrease Losartan to 25 mg (previously 50mg) daily CTM. Pt still severely depressed; outpatient psychiatrist and daughters endorse ECT as having been the only effective treatment in the past. Pt continues to be ambivalent about it. He will not answer regarding SI today but endorsed passive SI on previous interview. Still with significant PMR and latency.    4/19: mute to writer but stares; observed interacting with staff; irritable speech clear and coherent; by observation appears generally Alert; oriented; cognition intact; speech clear; no tremor or evidence of movement impairment. No behavioral issues.      4/20: Continues to be despondent and ambivalent about treatments. Endorses hearing music which may be consistent with auditory hallucinations. Stated he was "hearing music coming from the room and following him around with words that he couldn't quite make out." Similar episode "last time I was suffering from this condition." Pt states the music "does not bother him." This resolved after 10 minutes. Otw no SI/HI/VH//delusions. Walking halls as if in fugue.  ADLs:  Barthel Index for Activities of Daily Living (ADL) from [employer]  on 4/20/2022  RESULT SUMMARY:  65 points  Minimally dependent  INPUTS:  Feeding —> 5 = Needs help  Bathing —> 0 = Unable  Grooming —> 5 = Independent  Dressing —> 10 = Independent  Bowel control —> 5 = Occasional accident  Bladder control —> 5 = Occasional accident  Toilet use —> 5 = Needs help  Transfers (bed to chair and back) —> 15 = Independent  Mobility on level surfaces —> 15 = Independent (but may use any aid, e.g. stick) >50 yards  Stairs —> 0 = Unable    4/21: Yesterday, pt consented to ECT in presence of RN and daughter. Today, again seems ambivalent. Pt appears more irritable today, shouting at interviewer. Did not ask interviewer to stay in room. Pt had "panic" episode yesterday and was given Zyprexa, which seemed to improve panic. Pt is compliant with all meds today.    4/22: Pt was seen today in his room. VSS. Today, pt demonstrated was offered Benztropine for new onset extra-pyramidal symptoms s/p Zyprexa 7.5 mg. Zyprexa was also decreased. Pt is also complaining of constipation. Of note, he has been refusing his mesalamine, stating "It's supposed to be given with another medication. I can't remember what it is, but I won't take it without the other medication." Otw no SI/HI/AH/VH/ delusions. Still does not consent to ECT.    4/24: Pt was seen today in his room. VSS. Patient not able to engage in a meaningful evaluation due to difficulty hearing without his hearing aid. Still with whole body tremor. Started on cognentin 0.5mg qhs for EPS

## 2022-04-24 NOTE — BH INPATIENT PSYCHIATRY PROGRESS NOTE - OTHER
Encounters:   10/31/18 152 lb 6.4 oz (69.1 kg)   09/24/18 150 lb 9.6 oz (68.3 kg)   06/19/18 148 lb 12.8 oz (67.5 kg)       Physical Exam:  Constitutional: no acute distress, well appearing, well nourished  Psychiatric: oriented to person, place and time, judgement, insight and normal, recent and remote memory and intact and mood, affect are normal  Skin: skin and subcutaneous tissue is normal without mass, normal turgor  Head and Face: examination of head and face revealed no abnormalities  Eyes: no lid or conjunctival swelling, no erythema or discharge, pupils are normal, equal, round, and reactive to light  Ears/Nose: external inspection of ears and nose revealed no abnormalities, hearing is grossly normal  Oropharynx/Mouth/Face: lips, tongue and gums are normal with no lesions, the voice quality was normal  Neck: neck is supple and symmetric, with midline trachea and no masses, thyroid is normal  Lymphatics: normal cervical lymph nodes, normal supraclavicular nodes  Pulmonary: no increased work of breathing or signs of respiratory distress, lungs are clear to auscultation  Cardiovascular: normal heart rate and rhythm, normal S1 and S2, no murmurs and pedal pulses and 2+ bilaterally, No edema  Abdomen: abdomen is soft, non-tender with no masses  Musculoskeletal: normal gait and station, exam of the digits and nails are normal  Neurological: normal coordination, normal general cortical function    Lab Review:  No results found for: TSH  No results found for: FREET4     ASSESSMENT/PLAN:  1. Acquired hypothyroidism  Worsening of TSH, unclear reason, TSH was 7, now 0.14. Decrease levothyroxine to 0.15 mg 3 days, 0.175 mg 4 days a week. - T4, Free; Future  - TSH without Reflex;  Future    Reviewed and/or ordered clinical lab results Yes  Reviewed and/or ordered radiology tests No   Reviewed and/or ordered other diagnostic tests No  Discussed test results with performing physician No  Independently reviewed image, not assessed today Reluctantly cooperative; guarded. Ambivalent about treatments. Unable to engage due to difficulty hearing.

## 2022-04-24 NOTE — BH INPATIENT PSYCHIATRY PROGRESS NOTE - NSBHCHARTREVIEWVS_PSY_A_CORE FT
Vital Signs Last 24 Hrs  T(C): 36.1 (04-24-22 @ 09:02), Max: 36.1 (04-24-22 @ 09:02)  T(F): 97 (04-24-22 @ 09:02), Max: 97 (04-24-22 @ 09:02)  HR: 79 (04-24-22 @ 09:02) (79 - 79)  BP: 142/66 (04-24-22 @ 09:02) (142/66 - 142/66)  BP(mean): --  RR: 16 (04-24-22 @ 09:02) (16 - 16)  SpO2: 99% (04-24-22 @ 09:02) (99% - 99%)

## 2022-04-24 NOTE — BH INPATIENT PSYCHIATRY PROGRESS NOTE - NSBHFUPINTERVALHXFT_PSY_A_CORE
No acute interval events or episodes of behavioral agitation. No PRN medication required in the interval. Patient is somewhat difficult to engage as he is not able to hear the writer well despite of the appropriate effort made to speak very loudly and slowly. The patient says that he cannot hear the writer despite of the abovementioned efforts as the batteries for his hearing aid have run down. He appears minimally shaky and unstable on his feet on evaluation, but denies dizziness and difficulty walking or standing at this time. He says he "doesn't feel well" but is not able to elaborate further on what this signifies. without overt signs of EPS or dystonia today after transitioning of olanzapine to quetiapine.   Pt was seen today in his room. VSS. Today, pt demonstrated new whole body resting tremor. Currently on cogentin, and Atarax 25mg PO for both EPS and anxiety.    No SI/HI/AH/VH or delusions.

## 2022-04-25 PROCEDURE — 99233 SBSQ HOSP IP/OBS HIGH 50: CPT

## 2022-04-25 RX ORDER — QUETIAPINE FUMARATE 200 MG/1
50 TABLET, FILM COATED ORAL AT BEDTIME
Refills: 0 | Status: DISCONTINUED | OUTPATIENT
Start: 2022-04-25 | End: 2022-04-29

## 2022-04-25 RX ADMIN — Medication 2000 UNIT(S): at 10:15

## 2022-04-25 RX ADMIN — MIRABEGRON 50 MILLIGRAM(S): 50 TABLET, EXTENDED RELEASE ORAL at 10:07

## 2022-04-25 RX ADMIN — QUETIAPINE FUMARATE 50 MILLIGRAM(S): 200 TABLET, FILM COATED ORAL at 21:29

## 2022-04-25 RX ADMIN — ATORVASTATIN CALCIUM 20 MILLIGRAM(S): 80 TABLET, FILM COATED ORAL at 21:28

## 2022-04-25 RX ADMIN — RIVAROXABAN 15 MILLIGRAM(S): KIT at 10:06

## 2022-04-25 RX ADMIN — SERTRALINE 100 MILLIGRAM(S): 25 TABLET, FILM COATED ORAL at 10:07

## 2022-04-25 NOTE — BH INPATIENT PSYCHIATRY PROGRESS NOTE - CASE SUMMARY
89-year-old retired man, domiciled with daughter Brianda (), w/ PMH of hearing loss using hearing aid, colitis, HLD, HTN, on Xarelto 15mg daily (PCP Dr Barrett  #1 #5), PPH of depression, multiple psych hospitalization requiring ECT, last 4 years ago at [facility], currently on sertraline 50mg bid, olanzapine 5mg qd, sees Dr Josh Simmons () for outpatient, presenting due to worsening depression and obsessive thought.    Due to current active depressive symptoms, nonresponsive to outpatient treatment, and collateral from psychiatrist and family, patient meets involuntary admission criteria. He has been admitted to 8U for stabilization and possible ECT as inpatient. Despite not endorsing suicidality, the patient has experienced weight loss 2/2 severe depression, so ECT may be a good choice for him. Unfortunately, he is currently undecided regarding this treatment and has not yet given permission. The patient himself states that he "does not remember" whether the treatment was effective in the past and he is "not optimistic" about the potential efficacy of the treatment in the future. Cleared by medicine for ECT 4/15/22.    MoCA score 20/30. Unclear how much of this is pseudodementia vs neurocognitive impairment vs hearing loss.    Patient continues to refuse blood draws and remains ambivalent about ECT. May pursue TOO due to poor ADLs. Pt may be showing signs of psychosis; endorses hearing music which may be consistent with auditory hallucinations, and has been following clinicians around without speaking.    MDD w/psychotic features  Plan:  - Continue sertraline 100mg daily  - Increase olanzapine to 7.5mg nightly to target ?psychosis  - Considering ECT but pt is ambivalent - Seen by medicine for ECT clearance 4/15: They determine pt is Low-intermediate risk for low risk procedure  - Continue medication as follows:     Xarelto 15mg daily with meals     Losartan to 25 mg QD; 4/18/22, decrease Losartan to 25 mg (previously 50mg) daily as BPs have been low 4/18/22 and pt was orthostatics positive     Discontinued Furosemide 4/18/ 22; preiously 20mg twice daily     Atorvastatin 20mg daily     Vit D3 2000 units     Magnesium 500mg     Mesalamine 2.4 mg     Mirabegron 50mg po QD (pt occasionally refusing)    Medicine consult, 4/15:  #Pre Op Risk   - EKG with rate controlled Afib, LAFB  - Re HTN: maintain /90   - Re: Afib: maintain anticoagulation with home xarelto   - Low-intermediate risk for low risk procedure      4/12: Pt reluctant to engage today; does not want to be on [facility]. Equivocating regarding ECT. Clearly depressed. MoCA 20/30. Will continue to monitor and to discuss ECT.    4/13: Pt remains ambivalent about treatment. Not asking for discharge today. Refused some labs, will try to get these again. Alert and oriented. No SI. Will follow up anemia.    4/14: at this point patient is irritable and negativistic refusing ECT as well as some medications and lab work.  Will move Zyprexa to nighttime (sleep an issue?); and Zoloft 100mg to am; continue to attempt to discuss need for labs and possible ECT if medical evaluation can be completed. Need to assess/address anemia.  Refusal may require consideration of TOO.    4/15: Pt continues to refuse ECT and routine blood work. Pt was seen by medicine team for clearance for ECT, after which he was determined to be "Low-intermediate risk for low risk procedure. Pt's daughter, Valery, states she is his medical proxy and will bring in form next week.    4/18: Lasix was held as BPs were low. BPs continued to be low and pt was newly orthostatic positive, tf decrease Losartan to 25 mg (previously 50mg) daily CTM. Pt still severely depressed; outpatient psychiatrist and daughters endorse ECT as having been the only effective treatment in the past. Pt continues to be ambivalent about it. He will not answer regarding SI today but endorsed passive SI on previous interview. Still with significant PMR and latency.    4/19: mute to writer but stares; observed interacting with staff; irritable speech clear and coherent; by observation appears generally Alert; oriented; cognition intact; speech clear; no tremor or evidence of movement impairment. No behavioral issues.      4/20: Continues to be despondent and ambivalent about treatments. Endorses hearing music which may be consistent with auditory hallucinations. Stated he was "hearing music coming from the room and following him around with words that he couldn't quite make out." Similar episode "last time I was suffering from this condition." Pt states the music "does not bother him." This resolved after 10 minutes. Otw no SI/HI/VH//delusions. Walking halls as if in fugue.  ADLs:  Barthel Index for Activities of Daily Living (ADL) from [employer]  on 4/20/2022  RESULT SUMMARY:  65 points  Minimally dependent  INPUTS:  Feeding —> 5 = Needs help  Bathing —> 0 = Unable  Grooming —> 5 = Independent  Dressing —> 10 = Independent  Bowel control —> 5 = Occasional accident  Bladder control —> 5 = Occasional accident  Toilet use —> 5 = Needs help  Transfers (bed to chair and back) —> 15 = Independent  Mobility on level surfaces —> 15 = Independent (but may use any aid, e.g. stick) >50 yards  Stairs —> 0 = Unable    4/21/22: Yesterday (4/20) , pt "consented  verbally " to ECT in presence of RN and daughter. Today, again seems ambivalent. Pt appears more irritable today, shouting at interviewer. Did not ask interviewer to stay in room. Pt had "panic" episode yesterday and was given Zyprexa, which seemed to improve panic. Pt is compliant with all meds today.  Patient did not wish to sign consent and had no response when asked about this issue; however less clingy allowing staff to leave the room without panic.   Reported that patient had panic after "consent" was allegedly given on 4/20 verbally.       ;;04/22: patient states he is anxious rather than sad; Not endorsing  suicidal or homicidal ideation intent or plans; no mention of auditory or visual hallucinations ; does not consent to ECT (at this time).  Appeared less distraught today than yesterday.  However still feels "terrible";  Sleep  appetite ok; no pain issues. Alert; oriented; cognition intact; speech clear; but has tremor at rest thought to be EPS possibly secondary to Zyprexa.    ;;04/25: patient focused on leaving; continues somewhat irritable and feels "terrible" but less so when approached this morning.  Sleep an issue.    tremor not prominent.  makes better eye contact; less thought blocking;  a little more spontaneous.

## 2022-04-25 NOTE — BH INPATIENT PSYCHIATRY PROGRESS NOTE - OTHER
Reluctantly cooperative; guarded. Ambivalent about treatments. Unable to engage due to difficulty hearing. not assessed today Claims interviewer told him "ECT is covered." However, interviewer never discussed this at any time with patient before interview today, and only at patient promptig. Reluctantly cooperative. Paranoid about financial incentive of staff. Concerned that ECT is "not covered by insurance." Reluctantly cooperative, oppositional at times. Claims interviewer told him "ECT is covered." However, interviewer never discussed this at any time with patient before interview today, and only at patient prompting. Paranoid about financial incentive of staff. Concerned that ECT is "not covered by insurance."

## 2022-04-25 NOTE — BH INPATIENT PSYCHIATRY PROGRESS NOTE - NSBHCHARTREVIEWVS_PSY_A_CORE FT
Vital Signs Last 24 Hrs  T(C): 36.1 (04-24-22 @ 09:02), Max: 36.1 (04-24-22 @ 09:02)  T(F): 97 (04-24-22 @ 09:02), Max: 97 (04-24-22 @ 09:02)  HR: 79 (04-24-22 @ 09:02) (79 - 79)  BP: 142/66 (04-24-22 @ 09:02) (142/66 - 142/66)  BP(mean): --  RR: 16 (04-24-22 @ 09:02) (16 - 16)  SpO2: 99% (04-24-22 @ 09:02) (99% - 99%)     Vital Signs Last 24 Hrs  T(C): --  T(F): --  HR: --  BP: --  BP(mean): --  RR: --  SpO2: --

## 2022-04-25 NOTE — BH INPATIENT PSYCHIATRY PROGRESS NOTE - NSBHMSETHTCONTENT_PSY_A_CORE
Preoccupations/Ruminations/Hopelessness/Unable to assess Delusions/Obsessions/Preoccupations/Ruminations/Hopelessness Delusions/Obsessions/Preoccupations/Ruminations/Hopelessness/Other

## 2022-04-25 NOTE — BH TREATMENT PLAN - NSTXDEPRESINTERMD_PSY_ALL_CORE
Ambivalent about ECT as a consideration but appears to be accepting Zyprexa and Zoloft at this time

## 2022-04-25 NOTE — BH INPATIENT PSYCHIATRY PROGRESS NOTE - MODIFICATIONS
patient tolerating and likely benefitting from switch to Seroquel 25mg po at night; would raise to 50mg at night but hold Cozaar until medicine consulted and bp monitored as Seroquel has potential to lower bp.

## 2022-04-25 NOTE — BH INPATIENT PSYCHIATRY PROGRESS NOTE - CURRENT MEDICATION
MEDICATIONS  (STANDING):  atorvastatin 20 milliGRAM(s) Oral daily  benztropine 0.5 milliGRAM(s) Oral at bedtime  cholecalciferol 2000 Unit(s) Oral daily  losartan 25 milliGRAM(s) Oral daily  magnesium oxide 400 milliGRAM(s) Oral daily  mesalamine DR (24-Hour) Tablet 2.4 Gram(s) Oral <User Schedule>  mirabegron ER 50 milliGRAM(s) Oral daily  QUEtiapine 25 milliGRAM(s) Oral at bedtime  rivaroxaban 15 milliGRAM(s) Oral daily  sertraline 100 milliGRAM(s) Oral daily    MEDICATIONS  (PRN):  hydrOXYzine hydrochloride 25 milliGRAM(s) Oral three times a day PRN EPS, tremors, cogwheel rigidity   MEDICATIONS  (STANDING):  atorvastatin 20 milliGRAM(s) Oral daily  benztropine 0.5 milliGRAM(s) Oral at bedtime  cholecalciferol 2000 Unit(s) Oral daily  losartan 25 milliGRAM(s) Oral daily  magnesium oxide 400 milliGRAM(s) Oral daily  mesalamine DR (24-Hour) Tablet 2.4 Gram(s) Oral <User Schedule>  mirabegron ER 50 milliGRAM(s) Oral daily  QUEtiapine 50 milliGRAM(s) Oral at bedtime  rivaroxaban 15 milliGRAM(s) Oral daily  sertraline 100 milliGRAM(s) Oral daily    MEDICATIONS  (PRN):  hydrOXYzine hydrochloride 25 milliGRAM(s) Oral three times a day PRN EPS, tremors, cogwheel rigidity   MEDICATIONS  (STANDING):  atorvastatin 20 milliGRAM(s) Oral daily  cholecalciferol 2000 Unit(s) Oral daily  losartan 25 milliGRAM(s) Oral daily  magnesium oxide 400 milliGRAM(s) Oral daily  mesalamine DR (24-Hour) Tablet 2.4 Gram(s) Oral <User Schedule>  mirabegron ER 50 milliGRAM(s) Oral daily  QUEtiapine 50 milliGRAM(s) Oral at bedtime  rivaroxaban 15 milliGRAM(s) Oral daily  sertraline 100 milliGRAM(s) Oral daily    MEDICATIONS  (PRN):

## 2022-04-25 NOTE — BH INPATIENT PSYCHIATRY PROGRESS NOTE - NSBHFUPINTERVALHXFT_PSY_A_CORE
No acute interval events or episodes of behavioral agitation. No PRN medication required in the interval. Patient is somewhat difficult to engage as he is not able to hear the writer well despite of the appropriate effort made to speak very loudly and slowly. The patient says that he cannot hear the writer despite of the abovementioned efforts as the batteries for his hearing aid have run down. He appears minimally shaky and unstable on his feet on evaluation, but denies dizziness and difficulty walking or standing at this time. He says he "doesn't feel well" but is not able to elaborate further on what this signifies. without overt signs of EPS or dystonia today after transitioning of olanzapine to quetiapine.   Pt was seen today in his room. VSS. Today, pt demonstrated new whole body resting tremor. Currently on cogentin, and Atarax 25mg PO for both EPS and anxiety.    No SI/HI/AH/VH or delusions.       Seen in [facility] today, [facility] NAD. A&Ox3. Describes mood as "Not bright." Had a bowel movement this morning but states frequency is decreased.    Upon seeing interviewer, patient, unprompted, greeted interviewer saying, "He told me the procedure would be covered." When asked, "Which procedure?" Patient replied, "The ECT." When asked, "So you've changed your mind? Now you do want to undergo the ECT?" The patient replied,"Yes, if it's covered." Pt then began accusing interviewer of misleading him about whether insurance would cover ECT, stating, "Well that's a flip answer. You don't give any one any credit for having any amount of intelligence." When interviewer explained that interviewer is a medical student with no financial relationship with the provider of the ECT and thus with no financial incentive to recommend ECT or any other therapy, patient remained silent and frowned.     Today, he had no overt signs of EPS, no observable resting tremor.     Of note, pt endorsed having heard the same, "Music from the last time I suffered from this condition." Pt states music resolved without any obvious intervention and denied hearing it at the time of interview. Pt also denied having slept. Nursing confirms that the patient slept in until 1 AM last night and then stayed awake from that time until time of interview.    Pt's BPs were within acceptable limits yesterday.    No SI/HI/AH/VH or delusions.    Pt was switched from Zyprexa to low dose seroquel. Plan today was to increase seroquel to 50 mg.     Currently on cogentin, and Atarax 25mg PO for both EPS and anxiety. Last received dose of Atarax was 4/22 at 16:00 and last dose of Benztropine was 0.5 mg at 4/24 at 2:12 PM. Seen in [facility] today, sitting. NAD. A&Ox3. Describes mood as "not bright." Had a bowel movement this morning but states frequency is decreased.  Pt was switched from Zyprexa to low dose Seroquel. Plan today was to increase Seroquel to 50 mg.  Pt's BPs were within acceptable limits yesterday. Currently on cogentin, and Atarax 25mg PO for both EPS and anxiety. Last received dose of Atarax was 4/22 at 16:00 and last dose of Benztropine was 0.5 mg at 4/24 at 2:12 PM.    Upon seeing interviewer, patient, unprompted, greeted interviewer saying, "He told me the procedure would be covered." When asked, "Which procedure?" Patient replied, "The ECT." When asked, "So you've changed your mind? Now you do want to undergo the ECT?" The patient replied, "Yes, if it's covered." Pt then began accusing interviewer of misleading him about whether insurance would cover ECT, stating, "Well that's a flip answer. You don't give any one any credit for having any amount of intelligence." When interviewer explained that interviewer has no financial relationship with the provider of the ECT and thus with no financial incentive to recommend ECT or any other therapy, patient remained silent and frowned.    Today, he had no overt signs of EPS, no observable resting tremor.    Of note, pt endorsed having heard the same "music from the last time I suffered from this condition." (Music is in his head.) Pt states music resolved without any obvious intervention and denied hearing it at the time of interview. Pt also denied having slept (nursing confirms that the patient slept in until 1 AM last night and then stayed awake from that time until time of interview).    No SI/HI/AH/VH or delusions.

## 2022-04-25 NOTE — BH INPATIENT PSYCHIATRY PROGRESS NOTE - NSBHASSESSSUMMFT_PSY_ALL_CORE
Mr. Arriaga is a 89-year-old retired man, domiciled with daughter Brianda (), w/ PMH of hearing loss using hearing aid, colitis, HLD, HTN, on Xarelto 15mg daily (PCP Dr Barrett  #1 #5), PPH of depression, multiple psych hospitalization requiring ECT, last 4 years ago at [facility], currently on sertraline 50mg bid, olanzapine 5mg qd, sees Dr Josh Simmons () for outpatient, presenting due to worsening depression and obsessive thought.    Due to current active depressive symptoms, nonresponsive to outpatient treatment, and collateral from psychiatrist and family, patient meets involuntary admission criteria. He has been admitted to 8U for stabilization and possible ECT as inpatient. Despite not endorsing suicidality, the patient has experienced weight loss 2/2 severe depression, so ECT may be a good choice for him. Unfortunately, he is currently undecided regarding this treatment and has not yet given permission. The patient himself states that he "does not remember" whether the treatment was effective in the past and he is "not optimistic" about the potential efficacy of the treatment in the future. Cleared by medicine for ECT 4/15/22.    MoCA score 20/30. Unclear how much of this is pseudodementia vs neurocognitive impairment vs hearing loss.    Patient continues to refuse blood draws and remains ambivalent about ECT. May pursue TOO due to poor ADLs. Pt endorsing AH of music, which has been documented to occur in those who have hearing loss; r/o musical hallucinosis vs auditory variant of Umer-Bonnet syndrome.     MDD w/psychotic features  - Continue quetiapine 25 mg PO qHS   - Avoid benzodiazepines, anticholinergics, antihistamines, out of concern for inducing delirium in this elderly gentleman.  - Continue sertraline 100mg daily  - Considering ECT but pt is ambivalent - Seen by medicine for ECT clearance 4/15: They determine pt is Low-intermediate risk for low risk procedure  - Continue medication as follows:     Xarelto 15mg daily with meals     Losartan to 25 mg QD; 4/18/22, decrease Losartan to 25 mg (previously 50mg) daily as BPs have been low 4/18/22 and pt was orthostatics positive     Discontinued Furosemide 4/18/ 22; preiously 20mg twice daily     Atorvastatin 20mg daily     Vit D3 2000 units     Magnesium 500mg     Mesalamine 2.4 mg     Mirabegron 50mg po QD (pt occasionally refusing)    Medicine consult, 4/15:  #Pre Op Risk   - EKG with rate controlled Afib, LAFB  - Re HTN: maintain /90   - Re: Afib: maintain anticoagulation with home xarelto   - Low-intermediate risk for low risk procedure      4/12: Pt reluctant to engage today; does not want to be on 8[facility]ris. Equivocating regarding ECT. Clearly depressed. MoCA 20/30. Will continue to monitor and to discuss ECT.    4/13: Pt remains ambivalent about treatment. Not asking for discharge today. Refused some labs, will try to get these again. Alert and oriented. No SI. Will follow up anemia.    4/14: at this point patient is irritable and negativistic refusing ECT as well as some medications and lab work.  Will move Zyprexa to nighttime (sleep an issue?); and Zoloft 100mg to am; continue to attempt to discuss need for labs and possible ECT if medical evaluation can be completed. Need to assess/address anemia.  Refusal may require consideration of TOO.    4/15: Pt continues to refuse ECT and routine blood work. Pt was seen by medicine team for clearance for ECT, after which he was determined to be "Low-intermediate risk for low risk procedure. Pt's daughter, Valery, states she is his medical proxy and will bring in form next week.    4/18: Lasix was held as BPs were low. BPs continued to be low and pt was newly orthostatic positive, tf decrease Losartan to 25 mg (previously 50mg) daily CTM. Pt still severely depressed; outpatient psychiatrist and daughters endorse ECT as having been the only effective treatment in the past. Pt continues to be ambivalent about it. He will not answer regarding SI today but endorsed passive SI on previous interview. Still with significant PMR and latency.    4/19: mute to writer but stares; observed interacting with staff; irritable speech clear and coherent; by observation appears generally Alert; oriented; cognition intact; speech clear; no tremor or evidence of movement impairment. No behavioral issues.      4/20: Continues to be despondent and ambivalent about treatments. Endorses hearing music which may be consistent with auditory hallucinations. Stated he was "hearing music coming from the room and following him around with words that he couldn't quite make out." Similar episode "last time I was suffering from this condition." Pt states the music "does not bother him." This resolved after 10 minutes. Otw no SI/HI/VH//delusions. Walking halls as if in fugue.  ADLs:  Barthel Index for Activities of Daily Living (ADL) from [employer]  on 4/20/2022  RESULT SUMMARY:  65 points  Minimally dependent  INPUTS:  Feeding —> 5 = Needs help  Bathing —> 0 = Unable  Grooming —> 5 = Independent  Dressing —> 10 = Independent  Bowel control —> 5 = Occasional accident  Bladder control —> 5 = Occasional accident  Toilet use —> 5 = Needs help  Transfers (bed to chair and back) —> 15 = Independent  Mobility on level surfaces —> 15 = Independent (but may use any aid, e.g. stick) >50 yards  Stairs —> 0 = Unable    4/21: Yesterday, pt consented to ECT in presence of RN and daughter. Today, again seems ambivalent. Pt appears more irritable today, shouting at interviewer. Did not ask interviewer to stay in room. Pt had "panic" episode yesterday and was given Zyprexa, which seemed to improve panic. Pt is compliant with all meds today.    4/22: Pt was seen today in his room. VSS. Today, pt demonstrated was offered Benztropine for new onset extra-pyramidal symptoms s/p Zyprexa 7.5 mg. Zyprexa was also decreased. Pt is also complaining of constipation. Of note, he has been refusing his mesalamine, stating "It's supposed to be given with another medication. I can't remember what it is, but I won't take it without the other medication." Otw no SI/HI/AH/VH/ delusions. Still does not consent to ECT.    4/24: Pt was seen today in his room. VSS. Patient not able to engage in a meaningful evaluation due to difficulty hearing without his hearing aid. Still with whole body tremor. Started on cognentin 0.5mg qhs for EPS       Mr. Arriaga is a 89-year-old retired man, domiciled with daughter Brianda (), w/ PMH of hearing loss using hearing aid, colitis, HLD, HTN, on Xarelto 15mg daily (PCP Dr Barrett  #1 #5), PPH of depression, multiple psych hospitalization requiring ECT, last 4 years ago at [facility], currently on sertraline 50mg bid, olanzapine 5mg qd, sees Dr Josh Simmons () for outpatient, presenting due to worsening depression and obsessive thought.    Due to current active depressive symptoms, nonresponsive to outpatient treatment, and collateral from psychiatrist and family, patient meets involuntary admission criteria. He has been admitted to 8U for stabilization and possible ECT as inpatient. Despite not endorsing suicidality, the patient has experienced weight loss 2/2 severe depression, so ECT may be a good choice for him. Unfortunately, he is currently undecided regarding this treatment and has not yet given permission. The patient himself states that he "does not remember" whether the treatment was effective in the past and he is "not optimistic" about the potential efficacy of the treatment in the future. Cleared by medicine for ECT 4/15/22.    MoCA score 20/30. Unclear how much of this is pseudodementia vs neurocognitive impairment vs hearing loss.    Patient today, unprompted consented to ECT. He is claiming he was misled about whether ECT was covered. Fixated on this topic. Does not respond nor express understanding when interviewer explains that interviewer is a medical student with no financial incentive to recommend any particular treatment.    Pt complained of constipation last week, but states he had a bm this morning. However, states that frequency is still decreased. Pt is still refusing mesalamine.    BPs have been labile 4/21 (178/63); 4/22 (188/62 @16:30 and 147/65 @18:28)-s/p emotionally charged discussion with daughter regarding ECT; 4/23 (119-123/46-51); 4/24 (142/66). His BP medications have been held as the BP lability is likely 2/2 emotional state and also autonomic modulating medications.    MDD w/psychotic features  - Increase quetiapine to 50 mg po qHS (from 25 mg PO qHS)   - Avoid benzodiazepines, anticholinergics, antihistamines, out of concern for inducing delirium in this elderly gentleman.  - Continue sertraline 100mg daily  - Considering ECT but pt is ambivalent - Seen by medicine for ECT clearance 4/15: They determine pt is Low-intermediate risk for low risk procedure  - Continue medication as follows:     Xarelto 15mg daily with meals     Losartan to 25 mg QD; 4/18/22, decrease Losartan to 25 mg (previously 50mg) daily as BPs have been low 4/18/22 and pt was orthostatics positive     Discontinued Furosemide 4/18/ 22; preiously 20mg twice daily     Atorvastatin 20mg daily     Vit D3 2000 units     Magnesium 500mg     Mesalamine 2.4 mg (pt occasionally refusing)     Mirabegron 50mg po QD (pt occasionally refusing)    Medicine consult, 4/15:  #Pre Op Risk   - EKG with rate controlled Afib, LAFB  - Re HTN: maintain /90   - Re: Afib: maintain anticoagulation with home xarelto   - Low-intermediate risk for low risk procedure    4/12: Pt reluctant to engage today; does not want to be on 8[facility]ris. Equivocating regarding ECT. Clearly depressed. MoCA 20/30. Will continue to monitor and to discuss ECT.    4/13: Pt remains ambivalent about treatment. Not asking for discharge today. Refused some labs, will try to get these again. Alert and oriented. No SI. Will follow up anemia.    4/14: at this point patient is irritable and negativistic refusing ECT as well as some medications and lab work.  Will move Zyprexa to nighttime (sleep an issue?); and Zoloft 100mg to am; continue to attempt to discuss need for labs and possible ECT if medical evaluation can be completed. Need to assess/address anemia.  Refusal may require consideration of TOO.    4/15: Pt continues to refuse ECT and routine blood work. Pt was seen by medicine team for clearance for ECT, after which he was determined to be "Low-intermediate risk for low risk procedure. Pt's daughter, Valery, states she is his medical proxy and will bring in form next week.    4/18: Lasix was held as BPs were low. BPs continued to be low and pt was newly orthostatic positive, tf decrease Losartan to 25 mg (previously 50mg) daily CTM. Pt still severely depressed; outpatient psychiatrist and daughters endorse ECT as having been the only effective treatment in the past. Pt continues to be ambivalent about it. He will not answer regarding SI today but endorsed passive SI on previous interview. Still with significant PMR and latency.    4/19: mute to writer but stares; observed interacting with staff; irritable speech clear and coherent; by observation appears generally Alert; oriented; cognition intact; speech clear; no tremor or evidence of movement impairment. No behavioral issues.      4/20: Continues to be despondent and ambivalent about treatments. Endorses hearing music which may be consistent with auditory hallucinations. Stated he was "hearing music coming from the room and following him around with words that he couldn't quite make out." Similar episode "last time I was suffering from this condition." Pt states the music "does not bother him." This resolved after 10 minutes. Otw no SI/HI/VH//delusions. Walking halls as if in fugue.  ADLs:  Barthel Index for Activities of Daily Living (ADL) from [employer]  on 4/20/2022  RESULT SUMMARY:  65 points  Minimally dependent  INPUTS:  Feeding —> 5 = Needs help  Bathing —> 0 = Unable  Grooming —> 5 = Independent  Dressing —> 10 = Independent  Bowel control —> 5 = Occasional accident  Bladder control —> 5 = Occasional accident  Toilet use —> 5 = Needs help  Transfers (bed to chair and back) —> 15 = Independent  Mobility on level surfaces —> 15 = Independent (but may use any aid, e.g. stick) >50 yards  Stairs —> 0 = Unable    4/21: Yesterday, pt consented to ECT in presence of RN and daughter. Today, again seems ambivalent. Pt appears more irritable today, shouting at interviewer. Did not ask interviewer to stay in room. Pt had "panic" episode yesterday and was given Zyprexa, which seemed to improve panic. Pt is compliant with all meds today.    4/22: Pt was seen today in his room. VSS. Today, pt demonstrated was offered Benztropine for new onset extra-pyramidal symptoms s/p Zyprexa 7.5 mg. Zyprexa was also decreased. Pt is also complaining of constipation. Of note, he has been refusing his mesalamine, stating "It's supposed to be given with another medication. I can't remember what it is, but I won't take it without the other medication." Otw no SI/HI/AH/VH/ delusions. Still does not consent to ECT.    4/24: Pt was seen today in his room. VSS. Patient not able to engage in a meaningful evaluation due to difficulty hearing without his hearing aid. Still with whole body tremor. Started on cognentin 0.5mg qhs for EPS    4/25: Pt seen in hallway today, seated. VSS. Pt EPS have improved. Plan is to increase seroquel to 50 mg today. Consider d/c Cogentin and Atarax if EPS improved due to possibility of delirium in th elderly. Had bm this morning but states frequency decreased. Otw no SI/HI/AH/VH/ delusions. Today consented to ECT twice, but expressed reservations about coverage.   Mr. Arriaga is a 89-year-old retired man, domiciled with daughter Brianda (), w/ PMH of hearing loss using hearing aid, colitis, HLD, HTN, on Xarelto 15mg daily (PCP Dr Barrett  #1 #5), PPH of depression, multiple psych hospitalization requiring ECT, last 4 years ago at [facility], currently on sertraline 50mg bid, olanzapine 5mg qd, sees Dr Josh Simmons () for outpatient, presenting due to worsening depression and obsessive thought.    Due to current active depressive symptoms, nonresponsive to outpatient treatment, and collateral from psychiatrist and family, patient meets involuntary admission criteria. He has been admitted to 8U for stabilization and possible ECT as inpatient. Despite not endorsing suicidality, the patient has experienced weight loss 2/2 severe depression, so ECT may be a good choice for him. Unfortunately, he is currently undecided regarding this treatment and has not yet given permission. The patient himself states that he "does not remember" whether the treatment was effective in the past and he is "not optimistic" about the potential efficacy of the treatment in the future. Cleared by medicine for ECT 4/15/22.    MoCA score 20/30. Unclear how much of this is pseudodementia vs neurocognitive impairment vs hearing loss.    Patient today, unprompted consented to ECT. He is claiming he was misled about whether ECT was covered. Fixated on this topic. Does not respond nor express understanding when interviewer explains that interviewer is a medical student with no financial incentive to recommend any particular treatment.    Pt complained of constipation last week, but states he had a bm this morning. However, states that frequency is still decreased. Pt is still refusing mesalamine.    BPs have been somewhat labile 4/21 (178/63); 4/22 (188/62 @16:30 and 147/65 @18:28)-s/p emotionally charged discussion with daughter regarding ECT; 4/23 (119-123/46-51); 4/24 (142/66). His BP medications have been held as the BP lability is likely 2/2 emotional state and ?also autonomic modulating medications.    MDD w/psychotic features  - Increase quetiapine to 50 mg po qHS (from 25 mg PO qHS)  - Avoid benzodiazepines, anticholinergics, antihistamines (except where needed for EPS) out of concern for inducing delirium in this elderly gentleman     -- DC Atarax, Cogentin  - Continue sertraline 100mg daily  - Considering ECT but pt remains ambivalent - Seen by medicine for ECT clearance 4/15: They determine pt is Low-intermediate risk for low risk procedure  - Continue medication as follows:     Xarelto 15mg daily with meals     Losartan to 25 mg QD; 4/18/22, decrease Losartan to 25 mg (previously 50mg) daily as BPs have been low 4/18/22 and pt was orthostatics positive     Discontinued Furosemide 4/18/ 22; previously 20mg twice daily     Atorvastatin 20mg daily     Vit D3 2000 units     Magnesium 500mg     Mesalamine 2.4 mg (pt occasionally refusing)     Mirabegron 50mg po QD (pt occasionally refusing)    Medicine consult, 4/15:  #Pre Op Risk  - EKG with rate controlled Afib, LAFB  - Re HTN: maintain /90  - Re: Afib: maintain anticoagulation with home Xarelto  - Low-intermediate risk for low risk procedure    4/12: Pt reluctant to engage today; does not want to be on 8[facility]. Equivocating regarding ECT. Clearly depressed. MoCA 20/30. Will continue to monitor and to discuss ECT.    4/13: Pt remains ambivalent about treatment. Not asking for discharge today. Refused some labs, will try to get these again. Alert and oriented. No SI. Will follow up anemia.    4/14: at this point patient is irritable and negativistic refusing ECT as well as some medications and lab work.  Will move Zyprexa to nighttime (sleep an issue?); and Zoloft 100mg to am; continue to attempt to discuss need for labs and possible ECT if medical evaluation can be completed. Need to assess/address anemia.  Refusal may require consideration of TOO.    4/15: Pt continues to refuse ECT and routine blood work. Pt was seen by medicine team for clearance for ECT, after which he was determined to be "Low-intermediate risk for low risk procedure. Pt's daughter, Valery, states she is his medical proxy and will bring in form next week.    4/18: Lasix was held as BPs were low. BPs continued to be low and pt was newly orthostatic positive, tf decrease Losartan to 25 mg (previously 50mg) daily CTM. Pt still severely depressed; outpatient psychiatrist and daughters endorse ECT as having been the only effective treatment in the past. Pt continues to be ambivalent about it. He will not answer regarding SI today but endorsed passive SI on previous interview. Still with significant PMR and latency.    4/19: mute to writer but stares; observed interacting with staff; irritable speech clear and coherent; by observation appears generally Alert; oriented; cognition intact; speech clear; no tremor or evidence of movement impairment. No behavioral issues.      4/20: Continues to be despondent and ambivalent about treatments. Endorses hearing music which may be consistent with auditory hallucinations. Stated he was "hearing music coming from the room and following him around with words that he couldn't quite make out." Similar episode "last time I was suffering from this condition." Pt states the music "does not bother him." This resolved after 10 minutes. Otw no SI/HI/VH//delusions. Walking halls as if in fugue.  ADLs:  Barthel Index for Activities of Daily Living (ADL) from [email or web address]  on 4/20/2022  RESULT SUMMARY:  65 points  Minimally dependent  INPUTS:  Feeding —> 5 = Needs help  Bathing —> 0 = Unable  Grooming —> 5 = Independent  Dressing —> 10 = Independent  Bowel control —> 5 = Occasional accident  Bladder control —> 5 = Occasional accident  Toilet use —> 5 = Needs help  Transfers (bed to chair and back) —> 15 = Independent  Mobility on level surfaces —> 15 = Independent (but may use any aid, e.g. stick) >50 yards  Stairs —> 0 = Unable    4/21: Yesterday, pt consented to ECT in presence of RN and daughter. Today, again seems ambivalent. Pt appears more irritable today, shouting at interviewer. Did not ask interviewer to stay in room. Pt had "panic" episode yesterday and was given Zyprexa, which seemed to improve panic. Pt is compliant with all meds today.    4/22: Pt was seen today in his room. Today, pt demonstrated was offered Benztropine for new onset extra-pyramidal symptoms s/p Zyprexa 7.5 mg. Zyprexa was also decreased. Pt is also complaining of constipation. Of note, he has been refusing his mesalamine, stating "It's supposed to be given with another medication. I can't remember what it is, but I won't take it without the other medication." Otw no SI/HI/AH/VH/ delusions. Still does not consent to ECT.    4/24: Pt was seen today in his room. VSS. Patient not able to engage in a meaningful evaluation due to difficulty hearing without his hearing aid. Still with whole body tremor. Started on cogentin 0.5mg qhs for EPS.    4/25: Pt seen in hallway today, seated. VSS. Pt EPS have improved. Plan is to increase Seroquel to 50 mg tonight. DC'd Cogentin and Atarax (as EPS has improved) due to possibility of delirium in the elderly. Had bm this morning but states frequency decreased. Otw no SI/HI/AH/VH/ delusions. Today seemed to consent to ECT, but expressed reservations about (insurance) coverage.

## 2022-04-25 NOTE — BH INPATIENT PSYCHIATRY PROGRESS NOTE - NSBHMSEJUDGE_PSY_A_CORE
Is unwilling to entertain the thought that ECT might be effective--even in the abstract./Unable to assess Is unwilling to entertain the thought that ECT might be effective--even in the abstract./Poor

## 2022-04-25 NOTE — BH INPATIENT PSYCHIATRY PROGRESS NOTE - NSBHMSETHTPROC_PSY_A_CORE
Linear/Perseverative/Unable to assess Linear/Perseverative/Illogical/Impaired reasoning Linear/Perseverative/Impaired reasoning

## 2022-04-25 NOTE — BH INPATIENT PSYCHIATRY PROGRESS NOTE - PRN MEDS
MEDICATIONS  (PRN):  hydrOXYzine hydrochloride 25 milliGRAM(s) Oral three times a day PRN EPS, tremors, cogwheel rigidity   MEDICATIONS  (PRN):

## 2022-04-26 PROCEDURE — 99233 SBSQ HOSP IP/OBS HIGH 50: CPT

## 2022-04-26 RX ORDER — POLYETHYLENE GLYCOL 3350 17 G/17G
17 POWDER, FOR SOLUTION ORAL DAILY
Refills: 0 | Status: DISCONTINUED | OUTPATIENT
Start: 2022-04-26 | End: 2022-05-20

## 2022-04-26 RX ORDER — SENNA PLUS 8.6 MG/1
2 TABLET ORAL AT BEDTIME
Refills: 0 | Status: DISCONTINUED | OUTPATIENT
Start: 2022-04-26 | End: 2022-05-20

## 2022-04-26 RX ORDER — RIVAROXABAN 15 MG-20MG
15 KIT ORAL DAILY
Refills: 0 | Status: DISCONTINUED | OUTPATIENT
Start: 2022-04-27 | End: 2022-05-04

## 2022-04-26 RX ORDER — MAGNESIUM HYDROXIDE 400 MG/1
30 TABLET, CHEWABLE ORAL DAILY
Refills: 0 | Status: DISCONTINUED | OUTPATIENT
Start: 2022-04-26 | End: 2022-05-20

## 2022-04-26 RX ADMIN — SERTRALINE 100 MILLIGRAM(S): 25 TABLET, FILM COATED ORAL at 11:06

## 2022-04-26 RX ADMIN — SENNA PLUS 2 TABLET(S): 8.6 TABLET ORAL at 21:43

## 2022-04-26 RX ADMIN — QUETIAPINE FUMARATE 50 MILLIGRAM(S): 200 TABLET, FILM COATED ORAL at 21:44

## 2022-04-26 RX ADMIN — RIVAROXABAN 15 MILLIGRAM(S): KIT at 11:06

## 2022-04-26 RX ADMIN — MIRABEGRON 50 MILLIGRAM(S): 50 TABLET, EXTENDED RELEASE ORAL at 11:03

## 2022-04-26 RX ADMIN — Medication 2000 UNIT(S): at 11:06

## 2022-04-26 RX ADMIN — LOSARTAN POTASSIUM 25 MILLIGRAM(S): 100 TABLET, FILM COATED ORAL at 11:05

## 2022-04-26 RX ADMIN — ATORVASTATIN CALCIUM 20 MILLIGRAM(S): 80 TABLET, FILM COATED ORAL at 21:44

## 2022-04-26 RX ADMIN — MAGNESIUM OXIDE 400 MG ORAL TABLET 400 MILLIGRAM(S): 241.3 TABLET ORAL at 11:06

## 2022-04-26 NOTE — BH INPATIENT PSYCHIATRY PROGRESS NOTE - NSBHASSESSSUMMFT_PSY_ALL_CORE
Mr. Arriaga is a 89-year-old retired man, domiciled with daughter Brianda (), w/ PMH of hearing loss using hearing aid, colitis, HLD, HTN, on Xarelto 15mg daily (PCP Dr Barrett  #1 #5), PPH of depression, multiple psych hospitalization requiring ECT, last 4 years ago at [facility], currently on sertraline 50mg bid, olanzapine 5mg qd, sees Dr Josh Simmons () for outpatient, presenting due to worsening depression and obsessive thought.    Due to current active depressive symptoms, nonresponsive to outpatient treatment, and collateral from psychiatrist and family, patient meets involuntary admission criteria. He has been admitted to 8U for stabilization and possible ECT as inpatient. Despite not endorsing suicidality, the patient has experienced weight loss 2/2 severe depression, so ECT may be a good choice for him. Unfortunately, he is currently undecided regarding this treatment and has not yet given permission. The patient himself states that he "does not remember" whether the treatment was effective in the past and he is "not optimistic" about the potential efficacy of the treatment in the future. Cleared by medicine for ECT 4/15/22.    MoCA score 20/30. Unclear how much of this is pseudodementia vs neurocognitive impairment vs hearing loss.    Patient today, unprompted consented to ECT. He is claiming he was misled about whether ECT was covered. Fixated on this topic. Does not respond nor express understanding when interviewer explains that interviewer is a medical student with no financial incentive to recommend any particular treatment.    Pt complained of constipation last week, but states he had a bm this morning. However, states that frequency is still decreased. Pt is still refusing mesalamine.    BPs have not been labile as of late. Range is 119/-66. Wide pulse pressure is likely age-related.      #MDD w/psychotic features  - Continue quetiapine 50 mg po qHS (from 25 mg PO qHS)  - Avoid benzodiazepines, anticholinergics, antihistamines (except where needed for EPS) out of concern for inducing delirium in this elderly gentleman     -- DC Atarax, Cogentin  - Continue sertraline 100mg daily  - Considering ECT but pt remains ambivalent - Seen by medicine for ECT clearance 4/15: They determine pt is Low-intermediate risk for low risk procedure  - Continue medication as follows:     Xarelto 15mg daily with meals     Losartan 25 mg QD     Atorvastatin 20mg daily     Vit D3 2000 units     Magnesium 500mg     Mesalamine 2.4 mg (pt occasionally refusing)     Mirabegron 50mg po QD (pt occasionally refusing)    Medicine consult, 4/15:  #Pre Op Risk  - EKG with rate controlled Afib, LAFB  - Re HTN: maintain /90  - Re: Afib: maintain anticoagulation with home Xarelto  - Low-intermediate risk for low risk procedure    4/12: Pt reluctant to engage today; does not want to be on 8[facility]. Equivocating regarding ECT. Clearly depressed. MoCA 20/30. Will continue to monitor and to discuss ECT.    4/13: Pt remains ambivalent about treatment. Not asking for discharge today. Refused some labs, will try to get these again. Alert and oriented. No SI. Will follow up anemia.    4/14: at this point patient is irritable and negativistic refusing ECT as well as some medications and lab work.  Will move Zyprexa to nighttime (sleep an issue?); and Zoloft 100mg to am; continue to attempt to discuss need for labs and possible ECT if medical evaluation can be completed. Need to assess/address anemia.  Refusal may require consideration of TOO.    4/15: Pt continues to refuse ECT and routine blood work. Pt was seen by medicine team for clearance for ECT, after which he was determined to be "Low-intermediate risk for low risk procedure. Pt's daughter, Valery, states she is his medical proxy and will bring in form next week.    4/18: Lasix was held as BPs were low. BPs continued to be low and pt was newly orthostatic positive, tf decrease Losartan to 25 mg (previously 50mg) daily CTM. Pt still severely depressed; outpatient psychiatrist and daughters endorse ECT as having been the only effective treatment in the past. Pt continues to be ambivalent about it. He will not answer regarding SI today but endorsed passive SI on previous interview. Still with significant PMR and latency.    4/19: Mute to writer but stares; observed interacting with staff; irritable speech clear and coherent; by observation appears generally Alert; oriented; cognition intact; speech clear; no tremor or evidence of movement impairment. No behavioral issues.      4/20: Continues to be despondent and ambivalent about treatments. Endorses hearing music which may be consistent with auditory hallucinations. Stated he was "hearing music coming from the room and following him around with words that he couldn't quite make out." Similar episode "last time I was suffering from this condition." Pt states the music "does not bother him." This resolved after 10 minutes. Otw no SI/HI/VH//delusions. Walking halls as if in fugue.  ADLs:  Barthel Index for Activities of Daily Living (ADL) from [email or web address]  on 4/20/2022  RESULT SUMMARY:  65 points  Minimally dependent  INPUTS:  Feeding —> 5 = Needs help  Bathing —> 0 = Unable  Grooming —> 5 = Independent  Dressing —> 10 = Independent  Bowel control —> 5 = Occasional accident  Bladder control —> 5 = Occasional accident  Toilet use —> 5 = Needs help  Transfers (bed to chair and back) —> 15 = Independent  Mobility on level surfaces —> 15 = Independent (but may use any aid, e.g. stick) >50 yards  Stairs —> 0 = Unable    4/21: Yesterday, pt consented to ECT in presence of RN and daughter. Today, again seems ambivalent. Pt appears more irritable today, shouting at interviewer. Did not ask interviewer to stay in room. Pt had "panic" episode yesterday and was given Zyprexa, which seemed to improve panic. Pt is compliant with all meds today.    4/22: Pt was seen today in his room. Today, pt demonstrated was offered Benztropine for new onset extra-pyramidal symptoms s/p Zyprexa 7.5 mg. Zyprexa was also decreased. Pt is also complaining of constipation. Of note, he has been refusing his mesalamine, stating "It's supposed to be given with another medication. I can't remember what it is, but I won't take it without the other medication." Otw no SI/HI/AH/VH/ delusions. Still does not consent to ECT.    4/24: Pt was seen today in his room. VSS. Patient not able to engage in a meaningful evaluation due to difficulty hearing without his hearing aid. Still with whole body tremor. Started on cogentin 0.5mg qhs for EPS.    4/25: Pt seen in hallway today, seated. VSS. Pt EPS have improved. Plan is to increase Seroquel to 50 mg tonight. DC'd Cogentin and Atarax (as EPS has improved) due to possibility of delirium in the elderly. Had bm this morning but states frequency decreased. Otw no SI/HI/AH/VH/ delusions. Today seemed to consent to ECT, but expressed reservations about (insurance) coverage.    4/26: Still with latency, appears to be searching for words or gathering thoughts; less depressed-appearing, more irritable today; observed interacting with staff; speech clear and coherent; alert and oriented x 3; cognition intact; speech clear; no tremor or evidence of movement impairment. No behavioral issues. Pt appears to be improving overall. BPs well controlled, no noticeable lability. Still fixated on insurance issues, real or imagined.

## 2022-04-26 NOTE — BH INPATIENT PSYCHIATRY PROGRESS NOTE - NSBHMETABOLIC_PSY_ALL_CORE_FT
BMI: BMI (kg/m2): 22.7 (04-12-22 @ 09:00)  HbA1c: A1C with Estimated Average Glucose Result: 5.7 % (04-13-22 @ 07:45)    Glucose: POCT Blood Glucose.: 190 mg/dL (04-19-22 @ 06:43)    BP: 145/98 (04-25-22 @ 17:07) (107/36 - 145/98)  Lipid Panel: Date/Time: 04-13-22 @ 07:45  Cholesterol, Serum: 120  Direct LDL: --  HDL Cholesterol, Serum: 60  Total Cholesterol/HDL Ration Measurement: --  Triglycerides, Serum: 50   BMI: BMI (kg/m2): 22.7 (04-12-22 @ 09:00)  HbA1c: A1C with Estimated Average Glucose Result: 5.7 % (04-13-22 @ 07:45)    Glucose: POCT Blood Glucose.: 190 mg/dL (04-19-22 @ 06:43)    BP: 140/62 (04-26-22 @ 08:45) (107/36 - 145/98)  Lipid Panel: Date/Time: 04-13-22 @ 07:45  Cholesterol, Serum: 120  Direct LDL: --  HDL Cholesterol, Serum: 60  Total Cholesterol/HDL Ration Measurement: --  Triglycerides, Serum: 50

## 2022-04-26 NOTE — BH INPATIENT PSYCHIATRY PROGRESS NOTE - CURRENT MEDICATION
MEDICATIONS  (STANDING):  atorvastatin 20 milliGRAM(s) Oral daily  cholecalciferol 2000 Unit(s) Oral daily  losartan 25 milliGRAM(s) Oral daily  magnesium oxide 400 milliGRAM(s) Oral daily  mesalamine DR (24-Hour) Tablet 2.4 Gram(s) Oral <User Schedule>  mirabegron ER 50 milliGRAM(s) Oral daily  QUEtiapine 50 milliGRAM(s) Oral at bedtime  rivaroxaban 15 milliGRAM(s) Oral daily  sertraline 100 milliGRAM(s) Oral daily    MEDICATIONS  (PRN):   MEDICATIONS  (STANDING):  atorvastatin 20 milliGRAM(s) Oral daily  cholecalciferol 2000 Unit(s) Oral daily  losartan 25 milliGRAM(s) Oral daily  magnesium oxide 400 milliGRAM(s) Oral daily  mesalamine DR (24-Hour) Tablet 2.4 Gram(s) Oral <User Schedule>  mirabegron ER 50 milliGRAM(s) Oral daily  QUEtiapine 50 milliGRAM(s) Oral at bedtime  rivaroxaban 15 milliGRAM(s) Oral daily  senna 2 Tablet(s) Oral at bedtime  sertraline 100 milliGRAM(s) Oral daily    MEDICATIONS  (PRN):  magnesium hydroxide Suspension 30 milliLiter(s) Oral daily PRN Constipation  polyethylene glycol 3350 17 Gram(s) Oral daily PRN Constipation

## 2022-04-26 NOTE — BH INPATIENT PSYCHIATRY PROGRESS NOTE - PRN MEDS
MEDICATIONS  (PRN):   MEDICATIONS  (PRN):  magnesium hydroxide Suspension 30 milliLiter(s) Oral daily PRN Constipation  polyethylene glycol 3350 17 Gram(s) Oral daily PRN Constipation

## 2022-04-26 NOTE — BH INPATIENT PSYCHIATRY PROGRESS NOTE - CASE SUMMARY
89-year-old retired man, domiciled with daughter Brianda (), w/ PMH of hearing loss using hearing aid, colitis, HLD, HTN, on Xarelto 15mg daily (PCP Dr Barrett  #1 #5), PPH of depression, multiple psych hospitalization requiring ECT, last 4 years ago at [facility], currently on sertraline 50mg bid, olanzapine 5mg qd, sees Dr Josh Simmons () for outpatient, presenting due to worsening depression and obsessive thought.    Due to current active depressive symptoms, nonresponsive to outpatient treatment, and collateral from psychiatrist and family, patient meets involuntary admission criteria. He has been admitted to 8U for stabilization and possible ECT as inpatient. Despite not endorsing suicidality, the patient has experienced weight loss 2/2 severe depression, so ECT may be a good choice for him. Unfortunately, he is currently undecided regarding this treatment and has not yet given permission. The patient himself states that he "does not remember" whether the treatment was effective in the past and he is "not optimistic" about the potential efficacy of the treatment in the future. Cleared by medicine for ECT 4/15/22.    MoCA score 20/30. Unclear how much of this is pseudodementia vs neurocognitive impairment vs hearing loss.    Patient continues to refuse blood draws and remains ambivalent about ECT. May pursue TOO due to poor ADLs. Pt may be showing signs of psychosis; endorses hearing music which may be consistent with auditory hallucinations, and has been following clinicians around without speaking.    MDD w/psychotic features  Plan:  - Continue sertraline 100mg daily  - Increase olanzapine to 7.5mg nightly to target ?psychosis  - Considering ECT but pt is ambivalent - Seen by medicine for ECT clearance 4/15: They determine pt is Low-intermediate risk for low risk procedure  - Continue medication as follows:     Xarelto 15mg daily with meals     Losartan to 25 mg QD; 4/18/22, decrease Losartan to 25 mg (previously 50mg) daily as BPs have been low 4/18/22 and pt was orthostatics positive     Discontinued Furosemide 4/18/ 22; preiously 20mg twice daily     Atorvastatin 20mg daily     Vit D3 2000 units     Magnesium 500mg     Mesalamine 2.4 mg     Mirabegron 50mg po QD (pt occasionally refusing)    Medicine consult, 4/15:  #Pre Op Risk   - EKG with rate controlled Afib, LAFB  - Re HTN: maintain /90   - Re: Afib: maintain anticoagulation with home xarelto   - Low-intermediate risk for low risk procedure      4/12: Pt reluctant to engage today; does not want to be on 8[facility]. Equivocating regarding ECT. Clearly depressed. MoCA 20/30. Will continue to monitor and to discuss ECT.    4/13: Pt remains ambivalent about treatment. Not asking for discharge today. Refused some labs, will try to get these again. Alert and oriented. No SI. Will follow up anemia.    4/14: at this point patient is irritable and negativistic refusing ECT as well as some medications and lab work.  Will move Zyprexa to nighttime (sleep an issue?); and Zoloft 100mg to am; continue to attempt to discuss need for labs and possible ECT if medical evaluation can be completed. Need to assess/address anemia.  Refusal may require consideration of TOO.    4/15: Pt continues to refuse ECT and routine blood work. Pt was seen by medicine team for clearance for ECT, after which he was determined to be "Low-intermediate risk for low risk procedure. Pt's daughter, Valery, states she is his medical proxy and will bring in form next week.    4/18: Lasix was held as BPs were low. BPs continued to be low and pt was newly orthostatic positive, tf decrease Losartan to 25 mg (previously 50mg) daily CTM. Pt still severely depressed; outpatient psychiatrist and daughters endorse ECT as having been the only effective treatment in the past. Pt continues to be ambivalent about it. He will not answer regarding SI today but endorsed passive SI on previous interview. Still with significant PMR and latency.    4/19: mute to writer but stares; observed interacting with staff; irritable speech clear and coherent; by observation appears generally Alert; oriented; cognition intact; speech clear; no tremor or evidence of movement impairment. No behavioral issues.      4/20: Continues to be despondent and ambivalent about treatments. Endorses hearing music which may be consistent with auditory hallucinations. Stated he was "hearing music coming from the room and following him around with words that he couldn't quite make out." Similar episode "last time I was suffering from this condition." Pt states the music "does not bother him." This resolved after 10 minutes. Otw no SI/HI/VH//delusions. Walking halls as if in fugue.  ADLs:  Barthel Index for Activities of Daily Living (ADL) from [employer]  on 4/20/2022  RESULT SUMMARY:  65 points  Minimally dependent  INPUTS:  Feeding —> 5 = Needs help  Bathing —> 0 = Unable  Grooming —> 5 = Independent  Dressing —> 10 = Independent  Bowel control —> 5 = Occasional accident  Bladder control —> 5 = Occasional accident  Toilet use —> 5 = Needs help  Transfers (bed to chair and back) —> 15 = Independent  Mobility on level surfaces —> 15 = Independent (but may use any aid, e.g. stick) >50 yards  Stairs —> 0 = Unable    4/21/22: Yesterday (4/20) , pt "consented  verbally " to ECT in presence of RN and daughter. Today, again seems ambivalent. Pt appears more irritable today, shouting at interviewer. Did not ask interviewer to stay in room. Pt had "panic" episode yesterday and was given Zyprexa, which seemed to improve panic. Pt is compliant with all meds today.  Patient did not wish to sign consent and had no response when asked about this issue; however less clingy allowing staff to leave the room without panic.   Reported that patient had panic after "consent" was allegedly given on 4/20 verbally.       ;;04/22: patient states he is anxious rather than sad; Not endorsing  suicidal or homicidal ideation intent or plans; no mention of auditory or visual hallucinations ; does not consent to ECT (at this time).  Appeared less distraught today than yesterday.  However still feels "terrible";  Sleep  appetite ok; no pain issues. Alert; oriented; cognition intact; speech clear; but has tremor at rest thought to be EPS possibly secondary to Zyprexa.    ;;04/25: patient focused on leaving; continues somewhat irritable and feels "terrible" but less so when approached this morning.  Sleep an issue.    tremor not prominent.  makes better eye contact; less thought blocking;  a little more spontaneous.    ;;04/26: "The same"; patient a little more spontaneous; wants to go home; no indication of interest in ECT; appears a little less anxious; a little mobile; suggests improvement; sleep is still poor but appears to be improving.  bp continues with swings in diastolic bp.      Vital Signs Last 24 Hrs ;T(C): 36.4 (26 Apr 2022 08:45), Max: 37 (25 Apr 2022 17:07) ;T(F): 97.6 (26 Apr 2022 08:45), Max: 98.6 (25 Apr 2022 17:07) ;HR: 84 (26 Apr 2022 08:45) (82 - 84) ;BP: 140/62 (26 Apr 2022 08:45) (140/62 - 145/98) ;BP(mean): -- ;ABP: -- ;ABP(mean): -- ;RR: 18 (26 Apr 2022 08:45) (17 - 18) ;SpO2: 96% (26 Apr 2022 08:45) (96% - 97%) ; ;

## 2022-04-26 NOTE — BH INPATIENT PSYCHIATRY PROGRESS NOTE - NSBHFUPINTERVALHXFT_PSY_A_CORE
Pt sleeping, eating well. Took all medications except for mesalamine. No anxiety PRNs in last 24 hours. He now has hearing aids and conversation is possible without yelling (for the most part). BP well controlled.    The patient was seen in his room this morning, lying in bed. His eyes were closed but he woke when his name was gently called. He appeared annoyed, asking writer, "Why are you here?" When reminded that writer saw him every day and was there to check in on him, pt paused and did not say anything. When asked directly how he was doing, pt answered, "About the same." He then added, "So what about coverage?" Writer informed him that his clinicians did not usually deal directly with insurance, except in special cases, but that his stay here was likely covered or we would have heard something about it. Pt responded, "Well, that's no good for me." He did not explain what was meant by this statement. Pt did agree to continue taking all medications, but (as on prior interviews) insisted that there is "another drug" supposed to go along with mesalamine (he could not name this drug).  No SI/HI/AH/VH elicted. No tremor or dystonias noted. NAD. A&O x 3.

## 2022-04-26 NOTE — BH INPATIENT PSYCHIATRY PROGRESS NOTE - NSBHCHARTREVIEWVS_PSY_A_CORE FT
Vital Signs Last 24 Hrs  T(C): 37 (04-25-22 @ 17:07), Max: 37 (04-25-22 @ 17:07)  T(F): 98.6 (04-25-22 @ 17:07), Max: 98.6 (04-25-22 @ 17:07)  HR: 82 (04-25-22 @ 17:07) (77 - 82)  BP: 145/98 (04-25-22 @ 17:07) (107/36 - 145/98)  BP(mean): --  RR: 17 (04-25-22 @ 17:07) (16 - 17)  SpO2: 97% (04-25-22 @ 17:07) (97% - 98%)     Vital Signs Last 24 Hrs  T(C): 36.4 (04-26-22 @ 08:45), Max: 37 (04-25-22 @ 17:07)  T(F): 97.6 (04-26-22 @ 08:45), Max: 98.6 (04-25-22 @ 17:07)  HR: 84 (04-26-22 @ 08:45) (82 - 84)  BP: 140/62 (04-26-22 @ 08:45) (140/62 - 145/98)  BP(mean): --  RR: 18 (04-26-22 @ 08:45) (17 - 18)  SpO2: 96% (04-26-22 @ 08:45) (96% - 97%)

## 2022-04-26 NOTE — BH INPATIENT PSYCHIATRY PROGRESS NOTE - OTHER
Reluctantly cooperative, oppositional at times. Claims interviewer told him "ECT is covered." However, interviewer never discussed this at any time with patient before interview today, and only at patient prompting. Paranoid about financial incentive of staff. Concerned that ECT is "not covered by insurance." Quite thin not assessed today

## 2022-04-27 PROCEDURE — 99221 1ST HOSP IP/OBS SF/LOW 40: CPT

## 2022-04-27 PROCEDURE — 99233 SBSQ HOSP IP/OBS HIGH 50: CPT

## 2022-04-27 RX ORDER — METOPROLOL TARTRATE 50 MG
25 TABLET ORAL DAILY
Refills: 0 | Status: DISCONTINUED | OUTPATIENT
Start: 2022-04-28 | End: 2022-05-20

## 2022-04-27 RX ADMIN — LOSARTAN POTASSIUM 25 MILLIGRAM(S): 100 TABLET, FILM COATED ORAL at 11:48

## 2022-04-27 RX ADMIN — QUETIAPINE FUMARATE 50 MILLIGRAM(S): 200 TABLET, FILM COATED ORAL at 21:46

## 2022-04-27 RX ADMIN — ATORVASTATIN CALCIUM 20 MILLIGRAM(S): 80 TABLET, FILM COATED ORAL at 21:52

## 2022-04-27 RX ADMIN — RIVAROXABAN 15 MILLIGRAM(S): KIT at 11:48

## 2022-04-27 RX ADMIN — Medication 2000 UNIT(S): at 11:48

## 2022-04-27 RX ADMIN — MIRABEGRON 50 MILLIGRAM(S): 50 TABLET, EXTENDED RELEASE ORAL at 11:47

## 2022-04-27 RX ADMIN — MAGNESIUM OXIDE 400 MG ORAL TABLET 400 MILLIGRAM(S): 241.3 TABLET ORAL at 11:48

## 2022-04-27 RX ADMIN — SENNA PLUS 2 TABLET(S): 8.6 TABLET ORAL at 21:46

## 2022-04-27 RX ADMIN — SERTRALINE 100 MILLIGRAM(S): 25 TABLET, FILM COATED ORAL at 11:49

## 2022-04-27 NOTE — BH INPATIENT PSYCHIATRY PROGRESS NOTE - CASE SUMMARY
89-year-old retired man, domiciled with daughter Brianda (), w/ PMH of hearing loss using hearing aid, colitis, HLD, HTN, on Xarelto 15mg daily (PCP Dr Barrett  #1 #5), PPH of depression, multiple psych hospitalization requiring ECT, last 4 years ago at [facility], currently on sertraline 50mg bid, olanzapine 5mg qd, sees Dr Josh Simmons () for outpatient, presenting due to worsening depression and obsessive thought.    Due to current active depressive symptoms, nonresponsive to outpatient treatment, and collateral from psychiatrist and family, patient meets involuntary admission criteria. He has been admitted to 8U for stabilization and possible ECT as inpatient. Despite not endorsing suicidality, the patient has experienced weight loss 2/2 severe depression, so ECT may be a good choice for him. Unfortunately, he is currently undecided regarding this treatment and has not yet given permission. The patient himself states that he "does not remember" whether the treatment was effective in the past and he is "not optimistic" about the potential efficacy of the treatment in the future. Cleared by medicine for ECT 4/15/22.    MoCA score 20/30. Unclear how much of this is pseudodementia vs neurocognitive impairment vs hearing loss.    Patient continues to refuse blood draws and remains ambivalent about ECT. May pursue TOO due to poor ADLs. Pt may be showing signs of psychosis; endorses hearing music which may be consistent with auditory hallucinations, and has been following clinicians around without speaking.    MDD w/psychotic features  Plan:  - Continue sertraline 100mg daily  - Increase olanzapine to 7.5mg nightly to target ?psychosis  - Considering ECT but pt is ambivalent - Seen by medicine for ECT clearance 4/15: They determine pt is Low-intermediate risk for low risk procedure  - Continue medication as follows:     Xarelto 15mg daily with meals     Losartan to 25 mg QD; 4/18/22, decrease Losartan to 25 mg (previously 50mg) daily as BPs have been low 4/18/22 and pt was orthostatics positive     Discontinued Furosemide 4/18/ 22; preiously 20mg twice daily     Atorvastatin 20mg daily     Vit D3 2000 units     Magnesium 500mg     Mesalamine 2.4 mg     Mirabegron 50mg po QD (pt occasionally refusing)    Medicine consult, 4/15:  #Pre Op Risk   - EKG with rate controlled Afib, LAFB  - Re HTN: maintain /90   - Re: Afib: maintain anticoagulation with home xarelto   - Low-intermediate risk for low risk procedure      4/12: Pt reluctant to engage today; does not want to be on [facility]. Equivocating regarding ECT. Clearly depressed. MoCA 20/30. Will continue to monitor and to discuss ECT.    4/13: Pt remains ambivalent about treatment. Not asking for discharge today. Refused some labs, will try to get these again. Alert and oriented. No SI. Will follow up anemia.    4/14: at this point patient is irritable and negativistic refusing ECT as well as some medications and lab work.  Will move Zyprexa to nighttime (sleep an issue?); and Zoloft 100mg to am; continue to attempt to discuss need for labs and possible ECT if medical evaluation can be completed. Need to assess/address anemia.  Refusal may require consideration of TOO.    4/15: Pt continues to refuse ECT and routine blood work. Pt was seen by medicine team for clearance for ECT, after which he was determined to be "Low-intermediate risk for low risk procedure. Pt's daughter, Valery, states she is his medical proxy and will bring in form next week.    4/18: Lasix was held as BPs were low. BPs continued to be low and pt was newly orthostatic positive, tf decrease Losartan to 25 mg (previously 50mg) daily CTM. Pt still severely depressed; outpatient psychiatrist and daughters endorse ECT as having been the only effective treatment in the past. Pt continues to be ambivalent about it. He will not answer regarding SI today but endorsed passive SI on previous interview. Still with significant PMR and latency.    4/19: mute to writer but stares; observed interacting with staff; irritable speech clear and coherent; by observation appears generally Alert; oriented; cognition intact; speech clear; no tremor or evidence of movement impairment. No behavioral issues.      4/20: Continues to be despondent and ambivalent about treatments. Endorses hearing music which may be consistent with auditory hallucinations. Stated he was "hearing music coming from the room and following him around with words that he couldn't quite make out." Similar episode "last time I was suffering from this condition." Pt states the music "does not bother him." This resolved after 10 minutes. Otw no SI/HI/VH//delusions. Walking halls as if in fugue.  ADLs:  Barthel Index for Activities of Daily Living (ADL) from [employer]  on 4/20/2022  RESULT SUMMARY:  65 points  Minimally dependent  INPUTS:  Feeding —> 5 = Needs help  Bathing —> 0 = Unable  Grooming —> 5 = Independent  Dressing —> 10 = Independent  Bowel control —> 5 = Occasional accident  Bladder control —> 5 = Occasional accident  Toilet use —> 5 = Needs help  Transfers (bed to chair and back) —> 15 = Independent  Mobility on level surfaces —> 15 = Independent (but may use any aid, e.g. stick) >50 yards  Stairs —> 0 = Unable    4/21/22: Yesterday (4/20) , pt "consented  verbally " to ECT in presence of RN and daughter. Today, again seems ambivalent. Pt appears more irritable today, shouting at interviewer. Did not ask interviewer to stay in room. Pt had "panic" episode yesterday and was given Zyprexa, which seemed to improve panic. Pt is compliant with all meds today.  Patient did not wish to sign consent and had no response when asked about this issue; however less clingy allowing staff to leave the room without panic.   Reported that patient had panic after "consent" was allegedly given on 4/20 verbally.       ;;04/22: patient states he is anxious rather than sad; Not endorsing  suicidal or homicidal ideation intent or plans; no mention of auditory or visual hallucinations ; does not consent to ECT (at this time).  Appeared less distraught today than yesterday.  However still feels "terrible";  Sleep  appetite ok; no pain issues. Alert; oriented; cognition intact; speech clear; but has tremor at rest thought to be EPS possibly secondary to Zyprexa.    ;;04/25: patient focused on leaving; continues somewhat irritable and feels "terrible" but less so when approached this morning.  Sleep an issue.    tremor not prominent.  makes better eye contact; less thought blocking;  a little more spontaneous.    ;;04/26: "The same"; patient a little more spontaneous; wants to go home; no indication of interest in ECT; appears a little less anxious; a little mobile; suggests improvement; sleep is still poor but appears to be improving.  bp continues with swings in diastolic bp.      Vital Signs Last 24 Hrs ;T(C): 36.4 (26 Apr 2022 08:45), Max: 37 (25 Apr 2022 17:07) ;T(F): 97.6 (26 Apr 2022 08:45), Max: 98.6 (25 Apr 2022 17:07) ;HR: 84 (26 Apr 2022 08:45) (82 - 84) ;BP: 140/62 (26 Apr 2022 08:45) (140/62 - 145/98) ;BP(mean): -- ;ABP: -- ;ABP(mean): -- ;RR: 18 (26 Apr 2022 08:45) (17 - 18) ;SpO2: 96% (26 Apr 2022 08:45) (96% - 97%) ; ;    ;;04/27: seen by cardiology scores 3 on stroke risk with 4 as cut off for too risky ; suggestion that patient be placed on warfarin; Dr. Simmons called and vm left for discussion; patient spoke to writer about breakfast and continues to be willing to consent to ECT ; a change from past discussions; however appears to have improved on Seroquel alone.

## 2022-04-27 NOTE — CONSULT NOTE ADULT - ATTENDING COMMENTS
No contraindication for low risk procedure   continue xarelto
Initial attending contact date  4/29/22    . See fellow note written above for details. I reviewed the fellow documentation. I have personally seen and examined this patient. I reviewed vitals, labs, medications, cardiac studies, and additional imaging. I agree with the above fellow's findings and plans as written above with the following additions/statements.    89 yo M, hx of HTN, HLD, AF on xarelto, depression, multiple psych hospitalization requiring ECT, who presents for ECT.  Per primary team, ECT will be under general anesthesia, with brief induced seizures. Cardiology consulted for pre-op in the setting of AF.      #Pre-op  Patient at low risk for an intermediate risk procedure. Not in ACS or decompensated HF. Unable to assess mets.   - no further cardiac testing needed prior to ECT     #AF  Rate controlled in the 80s, though with induction of ECT, may cause worsening of tachycardia.   - c/w rivaroxaban 15mg daily   - recommend switching losartan 25 to toprol 25mg po daily

## 2022-04-27 NOTE — BH INPATIENT PSYCHIATRY PROGRESS NOTE - NSBHFUPINTERVALHXFT_PSY_A_CORE
NAD. A&Ox3. Pt slept at intervals. Pt is less irritable. He agreed to ECT yesterday when his daughters visited. At night he was more irritable after learning he may have to stay  an additional week. BP within acceptable limits. Tentative plan is to d/c next week.    EPS _________________    The patient was seen in his room this morning. ___________________________________    No SI/HI/AH/VH elicted.     [id number] Score for Atrial Fibrillation Stroke Risk from [employer]  on 4/27/2022     RESULT SUMMARY: 3 points Stroke risk was 3.2% per year in >90,000 patients (the [place] Atrial Fibrillation Cohort Study) and 4.6% risk of stroke/TIA/systemic embolism.    Score 2 or greater is “moderate-high” risk and should otherwise be an anticoagulation candidate.   INPUTS: Age —> 2 = =75 Sex —> 0 = Male CHF history —> 0 = No Hypertension history —> 1 = Yes Stroke/TIA/thromboembolism history —> 0 = No Vascular disease history (prior MI, peripheral artery disease, or aortic plaque) —> 0 = No Diabetes history —> 0 = No CRITICAL ACTIONS     One recommendation suggests a 0 score is “low” risk and may not require anticoagulation; a 1 score is “low-moderate” risk and should consider antiplatelet or anticoagulation, and score 2 or greater is “moderate-high” risk and should otherwise be an anticoagulation candidate. Consider not starting anticoagulation in patients with non-valvular AF and a [id number] score of 0 as these patients had no TE events in the original study. For those patients in whom anticoagulation is considered, risk bleeding scores such as ATRIA can be used to determine the risk for warfarin-associated hemorrhage. Carefully consider all the risks and benefits prior to initiating anticoagulation in patients with non-valvular AF. Some guidelines suggest that aspirin monotherapy is not supported by evidence. — ATRIA Bleeding Risk Score from [email or web address]  on 4/27/2022 RESULT SUMMARY: 3 points Low Risk (<4 Points), 0.76% Annual Risk of Hemorrhage. Reasonable to start warfarin. INPUTS: Anemia —> 0 = No Severe Renal Disease —> 0 = No Age = 75 years —> 2 = Yes Any Prior Hemorrhage Diagnosis —> 0 = No Hypertension History —> 1 = Yes NAD. A&Ox3. Pt slept at intervals. Pt is still irritable. He agreed to ECT yesterday when his daughters visited. Seen by cardiology today for pre-ECT clearance in setting of Afibb. Cardiology reccs appreciated. At night he was more irritable after learning he may have to stay  an additional week. BP within acceptable limits. Tentative plan is to d/c next week.    Seen in room this morning. Pt was woken up at 11:35 AM. Seemed mildly tremulous. Immediately stated, "Is it breakfast? I've missed it the past two days." Refused to continue with interview until he was able to get medication and meals. Seen shouting, "Let's pick it up will ya" to nurse at window in response to nurse at medication window asking politely how his night was.     Unable to assess SI/HI/AH/VH.     [id number] Score for Atrial Fibrillation Stroke Risk from [email or web address]  on 4/27/2022     RESULT SUMMARY: 3 points Stroke risk was 3.2% per year and 4.6% risk of stroke/TIA/systemic embolism. Score 2 or greater is “moderate-high” risk and should otherwise be an anticoagulation candidate. ATRIA Bleeding Risk Score, RESULT SUMMARY: 3 points Low Risk (<4 Points), 0.76% Annual Risk of Hemorrhage. Reasonable to start warfarin. NAD. A&Ox3. Pt slept at intervals. Pt is still irritable. He agreed to ECT yesterday when his daughters visited. Seen by cardiology today for pre-ECT clearance in setting of Afibb. Cardiology reccs appreciated. At night he was more irritable after learning he may have to stay an additional week. BP within acceptable limits. Tentative plan is to d/c next week.    Seen in room this morning. Pt was woken up at 11:35 AM. Seemed mildly tremulous. Immediately stated, "Is it breakfast? I've missed it the past two days." Refused to continue with interview until he was able to get medication and meals. Seen shouting, "Let's pick it up will ya" to nurse at window in response to nurse at medication window asking politely how his night was.     Pt was seen 2 hours later, seated in room. Had difficulty placing his hearing aid in due to RUE tremor. When asked how he was doing, pt stated, "I've been here 4 weeks now and you haven't done anything. What are the chances of it starting this week?" When asked to clarify, the patient responded, "The procedure." When asked if he meant "ECT," the pt responded, "Yeah. You've turned me into an invalid." When asked how he would currently rate his state of health at the time of initial presentation, the patient responded, "I was good when I came in. I was better. Now look at me."     When asked "Why do you think we haven't given you the ECT?" Pt responded, "I'm sure you have some cockamamie reason." Interviewer then asked if pt remembered his persistent equivocation and refusal of the procedure over the past few weeks, pt responded, "Yeah I remember." Pt then shifted to discussing his lunch and stated "they screwed it up," bu would not clarify his meaning.     Otherwise, no SI/HI/AH/VH.     [id number] Score for Atrial Fibrillation Stroke Risk from [email or web address]  on 4/27/2022   RESULT SUMMARY: 3 points Stroke risk was 3.2% per year and 4.6% risk of stroke/TIA/systemic embolism. Score 2 or greater is “moderate-high” risk and should otherwise be an anticoagulation candidate. ATRIA Bleeding Risk Score, RESULT SUMMARY: 3 points Low Risk (<4 Points), 0.76% Annual Risk of Hemorrhage. Reasonable to start warfarin. NAD. A&Ox3. Pt slept at intervals. Pt is still irritable. He agreed to ECT yesterday when his daughters visited. Seen by cardiology today for pre-ECT clearance in setting of Afib. Cardiology recs appreciated. At night he was more irritable after learning he may have to stay an additional week. BP within acceptable limits. Tentative plan is to d/c next week.    Seen in room this morning. Pt was woken up at 11:35 AM. Seemed mildly tremulous. Immediately stated, "Is it breakfast? I've missed it the past two days." Refused to continue with interview until he was able to get medication and meals. Seen shouting, "Let's pick it up will ya" to nurse at window in response to nurse at medication window asking politely how his night was.     Pt was seen 2 hours later, seated in room. Had difficulty placing his hearing aid in due to RUE tremor. When asked how he was doing, pt stated, "I've been here 4 weeks now and you haven't done anything. What are the chances of it starting this week?" When asked to clarify, the patient responded, "The procedure." When asked if he meant "ECT," the pt responded, "Yeah. You've turned me into an invalid." When asked how he would currently rate his state of health at the time of initial presentation, the patient responded, "I was good when I came in. I was better. Now look at me."     When asked "Why do you think we haven't given you the ECT?" Pt responded, "I'm sure you have some cockamamie reason." Interviewer then asked if pt remembered his persistent equivocation and refusal of the procedure over the past few weeks, pt responded, "Yeah I remember." Pt then shifted to discussing his lunch and stated "they screwed it up," bu would not clarify his meaning.     Otherwise, no SI/HI/AH/VH.     [id number] Score for Atrial Fibrillation Stroke Risk from [email or web address]  on 4/27/2022   RESULT SUMMARY: 3 points Stroke risk was 3.2% per year and 4.6% risk of stroke/TIA/systemic embolism. Score 2 or greater is “moderate-high” risk and should otherwise be an anticoagulation candidate. ATRIA Bleeding Risk Score, RESULT SUMMARY: 3 points Low Risk (<4 Points), 0.76% Annual Risk of Hemorrhage.

## 2022-04-27 NOTE — BH INPATIENT PSYCHIATRY PROGRESS NOTE - PRN MEDS
MEDICATIONS  (PRN):  magnesium hydroxide Suspension 30 milliLiter(s) Oral daily PRN Constipation  polyethylene glycol 3350 17 Gram(s) Oral daily PRN Constipation

## 2022-04-27 NOTE — CONSULT NOTE ADULT - SUBJECTIVE AND OBJECTIVE BOX
HPI:   89 yo M, hx of HTN, HLD, AF on xarelto, depression, multiple psych hospitalization requiring ECT, who presents for ECT.  Per primary team, ECT will be under general anesthesia, with brief induced seizures. Cardiology consulted for pre-op in the setting of AF.        89 yo M, hx of HTN, HLD, AF on xarelto, depression, multiple psych hospitalization requiring ECT, who presents for ECT.  Per primary team, ECT will be under general anesthesia, with brief induced seizures. Cardiology consulted for pre-op in the setting of AF.      #Pre-op  Patient at low risk for an intermediate risk procedure.     #AF  Rate controlled in the 80s, though with induction of ECT, which is frequent.   - c/w rivaroxaban 15mg daily       PAST MEDICAL & SURGICAL HISTORY:  Atrial fibrillation    History of colitis    HTN (hypertension)    Hyperlipidemia    Overactive bladder    Stage 3 chronic kidney disease    Macrocytic anemia        PREVIOUS DIAGNOSTIC TESTING:      FAMILY HISTORY:      Social History:      ALLERGIES/INTOLERANCES:  No Known Allergies    HOME MEDICATIONS:    INPATIENT MEDICATIONS:  losartan 25 milliGRAM(s) Oral daily    rivaroxaban 15 milliGRAM(s) Oral daily    atorvastatin 20 milliGRAM(s) Oral daily  cholecalciferol 2000 Unit(s) Oral daily  magnesium hydroxide Suspension 30 milliLiter(s) Oral daily PRN  magnesium oxide 400 milliGRAM(s) Oral daily  mesalamine DR (24-Hour) Tablet 2.4 Gram(s) Oral <User Schedule>  mirabegron ER 50 milliGRAM(s) Oral daily  polyethylene glycol 3350 17 Gram(s) Oral daily PRN  QUEtiapine 50 milliGRAM(s) Oral at bedtime  senna 2 Tablet(s) Oral at bedtime  sertraline 100 milliGRAM(s) Oral daily    REVIEW OF SYSTEMS: See HPI     PHYSICAL EXAM:  Gen: NAD   HEENT: EOMI   Cor: irregularly irregular   Abd: soft, non-tender, non-distended  Ext: no edema  Neuro: alert and attentive    T(C): 36.6 (04-27-22 @ 08:45), Max: 37.1 (04-26-22 @ 18:02)  HR: 90 (04-27-22 @ 08:45) (70 - 90)  BP: 144/57 (04-27-22 @ 08:45) (124/74 - 144/57)  RR: 18 (04-27-22 @ 08:45) (18 - 18)  SpO2: 98% (04-27-22 @ 08:45) (97% - 98%)  Wt(kg): --    I&O's Summary      TELEMETRY: 	      ECG:  	AFib, LAFB  	         HPI:   89 yo M, hx of HTN, HLD, AF on xarelto, depression, multiple psych hospitalization requiring ECT, who presents for ECT.  Per primary team, ECT will be under general anesthesia, with brief induced seizures. Cardiology consulted for pre-op in the setting of AF.  Patient doing well, no chest pain, SOB or palpitations.     PAST MEDICAL & SURGICAL HISTORY:  Atrial fibrillation    History of colitis    HTN (hypertension)    Hyperlipidemia    Overactive bladder    Stage 3 chronic kidney disease    Macrocytic anemia      PREVIOUS DIAGNOSTIC TESTING:      FAMILY HISTORY:      Social History:      ALLERGIES/INTOLERANCES:  No Known Allergies    HOME MEDICATIONS:    INPATIENT MEDICATIONS:  losartan 25 milliGRAM(s) Oral daily    rivaroxaban 15 milliGRAM(s) Oral daily    atorvastatin 20 milliGRAM(s) Oral daily  cholecalciferol 2000 Unit(s) Oral daily  magnesium hydroxide Suspension 30 milliLiter(s) Oral daily PRN  magnesium oxide 400 milliGRAM(s) Oral daily  mesalamine DR (24-Hour) Tablet 2.4 Gram(s) Oral <User Schedule>  mirabegron ER 50 milliGRAM(s) Oral daily  polyethylene glycol 3350 17 Gram(s) Oral daily PRN  QUEtiapine 50 milliGRAM(s) Oral at bedtime  senna 2 Tablet(s) Oral at bedtime  sertraline 100 milliGRAM(s) Oral daily    REVIEW OF SYSTEMS: See HPI     PHYSICAL EXAM:  Gen: NAD   HEENT: EOMI   Cor: irregularly irregular   Abd: soft, non-tender, non-distended  Ext: no edema  Neuro: alert and attentive    T(C): 36.6 (04-27-22 @ 08:45), Max: 37.1 (04-26-22 @ 18:02)  HR: 90 (04-27-22 @ 08:45) (70 - 90)  BP: 144/57 (04-27-22 @ 08:45) (124/74 - 144/57)  RR: 18 (04-27-22 @ 08:45) (18 - 18)  SpO2: 98% (04-27-22 @ 08:45) (97% - 98%)  Wt(kg): --    I&O's Summary    ECG:  	AFib, LAFB

## 2022-04-27 NOTE — BH INPATIENT PSYCHIATRY PROGRESS NOTE - NSBHASSESSSUMMFT_PSY_ALL_CORE
Mr. Arriaga is a 89-year-old retired man, domiciled with daughter Brianda (), w/ PMH of hearing loss using hearing aid, colitis, HLD, HTN, on Xarelto 15mg daily (PCP Dr Barrett  #1 #5), PPH of depression, multiple psych hospitalization requiring ECT, last 4 years ago at [facility], currently on sertraline 50mg bid, olanzapine 5mg qd, sees Dr Josh Simmons () for outpatient, presenting due to worsening depression and obsessive thought.    Due to current active depressive symptoms, nonresponsive to outpatient treatment, and collateral from psychiatrist and family, patient meets involuntary admission criteria. He has been admitted to 8U for stabilization and possible ECT as inpatient. Despite not endorsing suicidality, the patient has experienced weight loss 2/2 severe depression, so ECT may be a good choice for him. Unfortunately, he is currently undecided regarding this treatment and has not yet given permission. The patient himself states that he "does not remember" whether the treatment was effective in the past and he is "not optimistic" about the potential efficacy of the treatment in the future. Cleared by medicine for ECT 4/15/22.    MoCA score 20/30. Unclear how much of this is pseudodementia vs neurocognitive impairment vs hearing loss.    Patient today, unprompted consented to ECT. He is claiming he was misled about whether ECT was covered. Fixated on this topic. Does not respond nor express understanding when interviewer explains that interviewer is a medical student with no financial incentive to recommend any particular treatment.    Pt complained of constipation last week, but states he had a bm this morning. However, states that frequency is still decreased. Pt is still refusing mesalamine.    BPs have not been labile as of late. Range is 119/-66. Wide pulse pressure is likely age-related.      #MDD w/psychotic features  - Continue quetiapine 50 mg po qHS (from 25 mg PO qHS)  - Avoid benzodiazepines, anticholinergics, antihistamines (except where needed for EPS) out of concern for inducing delirium in this elderly gentleman     -- DC Atarax, Cogentin  - Continue sertraline 100mg daily  - Considering ECT but pt remains ambivalent - Seen by medicine for ECT clearance 4/15: They determine pt is Low-intermediate risk for low risk procedure  - Continue medication as follows:     Xarelto 15mg daily with meals     Losartan 25 mg QD     Atorvastatin 20mg daily     Vit D3 2000 units     Magnesium 500mg     Mesalamine 2.4 mg (pt occasionally refusing)     Mirabegron 50mg po QD (pt occasionally refusing)    Medicine consult, 4/15:  #Pre Op Risk  - EKG with rate controlled Afib, Left Anterior Fascicular Block  - Re HTN: maintain /90  - Re: Afib: maintain anticoagulation with home Xarelto  - Low-intermediate risk for low risk procedure    4/12: Pt reluctant to engage today; does not want to be on 8Uris. Equivocating regarding ECT. Clearly depressed. MoCA 20/30. Will continue to monitor and to discuss ECT.    4/13: Pt remains ambivalent about treatment. Not asking for discharge today. Refused some labs, will try to get these again. Alert and oriented. No SI. Will follow up anemia.    4/14: at this point patient is irritable and negativistic refusing ECT as well as some medications and lab work.  Will move Zyprexa to nighttime (sleep an issue?); and Zoloft 100mg to am; continue to attempt to discuss need for labs and possible ECT if medical evaluation can be completed. Need to assess/address anemia.  Refusal may require consideration of TOO.    4/15: Pt continues to refuse ECT and routine blood work. Pt was seen by medicine team for clearance for ECT, after which he was determined to be "Low-intermediate risk for low risk procedure. Pt's daughter, Valery, states she is his medical proxy and will bring in form next week.    4/18: Lasix was held as BPs were low. BPs continued to be low and pt was newly orthostatic positive, tf decrease Losartan to 25 mg (previously 50mg) daily CTM. Pt still severely depressed; outpatient psychiatrist and daughters endorse ECT as having been the only effective treatment in the past. Pt continues to be ambivalent about it. He will not answer regarding SI today but endorsed passive SI on previous interview. Still with significant PMR and latency.    4/19: Mute to writer but stares; observed interacting with staff; irritable speech clear and coherent; by observation appears generally Alert; oriented; cognition intact; speech clear; no tremor or evidence of movement impairment. No behavioral issues.      4/20: Continues to be despondent and ambivalent about treatments. Endorses hearing music which may be consistent with auditory hallucinations. Stated he was "hearing music coming from the room and following him around with words that he couldn't quite make out." Similar episode "last time I was suffering from this condition." Pt states the music "does not bother him." This resolved after 10 minutes. Otw no SI/HI/VH//delusions. Walking halls as if in fugue.  ADLs:  Barthel Index for Activities of Daily Living (ADL) from [employer]  on 4/20/2022  RESULT SUMMARY:  65 points  Minimally dependent  INPUTS:  Feeding —> 5 = Needs help  Bathing —> 0 = Unable  Grooming —> 5 = Independent  Dressing —> 10 = Independent  Bowel control —> 5 = Occasional accident  Bladder control —> 5 = Occasional accident  Toilet use —> 5 = Needs help  Transfers (bed to chair and back) —> 15 = Independent  Mobility on level surfaces —> 15 = Independent (but may use any aid, e.g. stick) >50 yards  Stairs —> 0 = Unable    4/21: Yesterday, pt consented to ECT in presence of RN and daughter. Today, again seems ambivalent. Pt appears more irritable today, shouting at interviewer. Did not ask interviewer to stay in room. Pt had "panic" episode yesterday and was given Zyprexa, which seemed to improve panic. Pt is compliant with all meds today.    4/22: Pt was seen today in his room. Today, pt demonstrated was offered Benztropine for new onset extra-pyramidal symptoms s/p Zyprexa 7.5 mg. Zyprexa was also decreased. Pt is also complaining of constipation. Of note, he has been refusing his mesalamine, stating "It's supposed to be given with another medication. I can't remember what it is, but I won't take it without the other medication." Otw no SI/HI/AH/VH/ delusions. Still does not consent to ECT.    4/24: Pt was seen today in his room. VSS. Patient not able to engage in a meaningful evaluation due to difficulty hearing without his hearing aid. Still with whole body tremor. Started on cogentin 0.5mg qhs for EPS.    4/25: Pt seen in hallway today, seated. VSS. Pt EPS have improved. Plan is to increase Seroquel to 50 mg tonight. DC'd Cogentin and Atarax (as EPS has improved) due to possibility of delirium in the elderly. Had bm this morning but states frequency decreased. Otw no SI/HI/AH/VH/ delusions. Today seemed to consent to ECT, but expressed reservations about (insurance) coverage.    4/26: Still with latency, appears to be searching for words or gathering thoughts; less depressed-appearing, more irritable today; observed interacting with staff; speech clear and coherent; alert and oriented x 3; cognition intact; speech clear; no tremor or evidence of movement impairment. No behavioral issues. Pt appears to be improving overall. BPs well controlled, no noticeable lability. Still fixated on insurance issues, real or imagined.    4/27: [id number] 3 tf pt is an anticoagulation candidate. ATRIA risk bleeding score: 3 points Low Risk. C/w Xarelto as Afibb well controlled.     Mr. Arriaga is a 89-year-old retired man, domiciled with daughter Brianda (), w/ PMH of hearing loss using hearing aid, colitis, HLD, HTN, on Xarelto 15mg daily (PCP Dr Barrett  #1 #5), PPH of depression, multiple psych hospitalization requiring ECT, last 4 years ago at [facility], currently on sertraline 50mg bid, olanzapine 5mg qd, sees Dr Josh Simmons () for outpatient, presenting due to worsening depression and obsessive thought.    Due to current active depressive symptoms, nonresponsive to outpatient treatment, and collateral from psychiatrist and family, patient meets involuntary admission criteria. He has been admitted to 8U for stabilization and possible ECT as inpatient. Despite not endorsing suicidality, the patient has experienced weight loss 2/2 severe depression, so ECT may be a good choice for him. Unfortunately, he is currently undecided regarding this treatment and has not yet given permission. The patient himself states that he "does not remember" whether the treatment was effective in the past and he is "not optimistic" about the potential efficacy of the treatment in the future. Cleared by medicine for ECT 4/15/22.    MoCA score 20/30. Unclear how much of this is pseudodementia vs neurocognitive impairment vs hearing loss.    Patient today, unprompted consented to ECT. He is claiming he was misled about whether ECT was covered. Fixated on this topic. Does not respond nor express understanding when interviewer explains that interviewer is a medical student with no financial incentive to recommend any particular treatment.    Pt complained of constipation last week, but states he had a bm this morning. However, states that frequency is still decreased. Pt is still refusing mesalamine.    BPs have not been labile as of late. Range is 119/-66. Wide pulse pressure is likely age-related.      #MDD w/psychotic features  - Continue quetiapine 50 mg po qHS (from 25 mg PO qHS)  - Avoid benzodiazepines, anticholinergics, antihistamines (except where needed for EPS) out of concern for inducing delirium in this elderly gentleman     -- DC Atarax, Cogentin  - Continue sertraline 100mg daily  - Considering ECT but pt remains ambivalent - Seen by medicine for ECT clearance 4/15: They determine pt is Low-intermediate risk for low risk procedure  - Continue medication as follows:     Xarelto 15mg daily with meals     Losartan 25 mg QD     Atorvastatin 20mg daily     Vit D3 2000 units     Magnesium 500mg     Mesalamine 2.4 mg (pt occasionally refusing)     Mirabegron 50mg po QD (pt occasionally refusing)    Medicine consult, 4/15:  #Pre Op Risk  - EKG with rate controlled Afib, Left Anterior Fascicular Block  - Re HTN: maintain /90  - Re: Afib: maintain anticoagulation with home Xarelto  - Low-intermediate risk for low risk procedure    Cardiology Consult for ECT clearance in setting of Afibb 4/27/2022  -#Pre-op  Patient at low risk for an intermediate risk procedure.   #AF  Rate controlled in the 80s, though with induction of ECT, which is frequent.   - c/w rivaroxaban 15mg daily     4/12: Pt reluctant to engage today; does not want to be on 8Uris. Equivocating regarding ECT. Clearly depressed. MoCA 20/30. Will continue to monitor and to discuss ECT.    4/13: Pt remains ambivalent about treatment. Not asking for discharge today. Refused some labs, will try to get these again. Alert and oriented. No SI. Will follow up anemia.    4/14: at this point patient is irritable and negativistic refusing ECT as well as some medications and lab work.  Will move Zyprexa to nighttime (sleep an issue?); and Zoloft 100mg to am; continue to attempt to discuss need for labs and possible ECT if medical evaluation can be completed. Need to assess/address anemia.  Refusal may require consideration of TOO.    4/15: Pt continues to refuse ECT and routine blood work. Pt was seen by medicine team for clearance for ECT, after which he was determined to be "Low-intermediate risk for low risk procedure. Pt's daughter, Valery, states she is his medical proxy and will bring in form next week.    4/18: Lasix was held as BPs were low. BPs continued to be low and pt was newly orthostatic positive, tf decrease Losartan to 25 mg (previously 50mg) daily CTM. Pt still severely depressed; outpatient psychiatrist and daughters endorse ECT as having been the only effective treatment in the past. Pt continues to be ambivalent about it. He will not answer regarding SI today but endorsed passive SI on previous interview. Still with significant PMR and latency.    4/19: Mute to writer but stares; observed interacting with staff; irritable speech clear and coherent; by observation appears generally Alert; oriented; cognition intact; speech clear; no tremor or evidence of movement impairment. No behavioral issues.      4/20: Continues to be despondent and ambivalent about treatments. Endorses hearing music which may be consistent with auditory hallucinations. Stated he was "hearing music coming from the room and following him around with words that he couldn't quite make out." Similar episode "last time I was suffering from this condition." Pt states the music "does not bother him." This resolved after 10 minutes. Otw no SI/HI/VH//delusions. Walking halls as if in fugue.  ADLs:  Barthel Index for Activities of Daily Living (ADL) from [employer]  on 4/20/2022  RESULT SUMMARY:  65 points  Minimally dependent  INPUTS:  Feeding —> 5 = Needs help  Bathing —> 0 = Unable  Grooming —> 5 = Independent  Dressing —> 10 = Independent  Bowel control —> 5 = Occasional accident  Bladder control —> 5 = Occasional accident  Toilet use —> 5 = Needs help  Transfers (bed to chair and back) —> 15 = Independent  Mobility on level surfaces —> 15 = Independent (but may use any aid, e.g. stick) >50 yards  Stairs —> 0 = Unable    4/21: Yesterday, pt consented to ECT in presence of RN and daughter. Today, again seems ambivalent. Pt appears more irritable today, shouting at interviewer. Did not ask interviewer to stay in room. Pt had "panic" episode yesterday and was given Zyprexa, which seemed to improve panic. Pt is compliant with all meds today.    4/22: Pt was seen today in his room. Today, pt demonstrated was offered Benztropine for new onset extra-pyramidal symptoms s/p Zyprexa 7.5 mg. Zyprexa was also decreased. Pt is also complaining of constipation. Of note, he has been refusing his mesalamine, stating "It's supposed to be given with another medication. I can't remember what it is, but I won't take it without the other medication." Otw no SI/HI/AH/VH/ delusions. Still does not consent to ECT.    4/24: Pt was seen today in his room. VSS. Patient not able to engage in a meaningful evaluation due to difficulty hearing without his hearing aid. Still with whole body tremor. Started on cogentin 0.5mg qhs for EPS.    4/25: Pt seen in hallway today, seated. VSS. Pt EPS have improved. Plan is to increase Seroquel to 50 mg tonight. DC'd Cogentin and Atarax (as EPS has improved) due to possibility of delirium in the elderly. Had bm this morning but states frequency decreased. Otw no SI/HI/AH/VH/ delusions. Today seemed to consent to ECT, but expressed reservations about (insurance) coverage.    4/26: Still with latency, appears to be searching for words or gathering thoughts; less depressed-appearing, more irritable today; observed interacting with staff; speech clear and coherent; alert and oriented x 3; cognition intact; speech clear; no tremor or evidence of movement impairment. No behavioral issues. Pt appears to be improving overall. BPs well controlled, no noticeable lability. Still fixated on insurance issues, real or imagined.    4/27: Pt seen in room today. Upset about having missed breakfast as he was woken up at 11:35 AM.  Would not cooperate with interview until food taken. Seen b Cardiology for pre-ECT clearance. Cardiology reecs say pt is low risk fo ran intermediate procedure. [id number] 3 tf pt is an anticoagulation candidate. ATRIA risk bleeding score: 3 points Low Risk. C/w Xarelto as Afibb well controlled.         Mr. Arriaga is a 89-year-old retired man, domiciled with daughter Brianda (), w/ PMH of hearing loss using hearing aid, colitis, HLD, HTN, on Xarelto 15mg daily (PCP Dr Barrett  #1 #5), PPH of depression, multiple psych hospitalization requiring ECT, last 4 years ago at [facility], currently on sertraline 50mg bid, olanzapine 5mg qd, sees Dr Josh Simmons () for outpatient, presenting due to worsening depression and obsessive thought.    Cleared by medicine for ECT 4/15/22.    Today, NAD. A&Ox3. Pt slept at intervals. Pt is still irritable. He agreed to ECT yesterday when his daughters visited. Seen by cardiology today for pre-ECT clearance in setting of Afibb. Cardiology reccs appreciated.     Seen in room this morning. Pt was woken up at 11:35 AM. Seemed mildly tremulous with movement. Pt was seen 2 hours later, seated in room. Had difficulty placing his hearing aid in due to RUE tremor. Pt seemed confused about events of past week. He had to be prompted to recall his repeated refusals of ECT over the past few weeks.    MoCA score 20/30. Unclear how much of this is pseudodementia vs neurocognitive impairment vs hearing loss.    Patient today, unprompted consented to ECT. He is claiming he was misled about whether ECT was covered. Fixated on this topic. Does not respond nor express understanding when interviewer explains that interviewer is a medical student with no financial incentive to recommend any particular treatment.    BPs have been within acceptable limits.     #MDD w/psychotic features  - Continue quetiapine 50 mg po qHS (from 25 mg PO qHS)  - Avoid benzodiazepines, anticholinergics, antihistamines (except where needed for EPS) out of concern for inducing delirium in this elderly gentleman     -- DC Atarax, Cogentin  - Continue sertraline 100mg daily  - Considering ECT but pt remains ambivalent - Seen by medicine for ECT clearance 4/15: They determine pt is Low-intermediate risk for low risk procedure  - Continue medication as follows:     Xarelto 15mg daily with meals     Losartan 25 mg QD     Atorvastatin 20mg daily     Vit D3 2000 units     Magnesium 500mg     Mesalamine 2.4 mg (pt occasionally refusing)     Mirabegron 50mg po QD (pt occasionally refusing)    Medicine consult, 4/15:  #Pre Op Risk  - EKG with rate controlled Afib, Left Anterior Fascicular Block  - Re HTN: maintain /90  - Re: Afib: maintain anticoagulation with home Xarelto  - Low-intermediate risk for low risk procedure    Cardiology Consult for ECT clearance in setting of Afibb 4/27/2022  -#Pre-op  Patient at low risk for an intermediate risk procedure.   #AF  Rate controlled in the 80s, though with induction of ECT, which is frequent.   - c/w rivaroxaban 15mg daily     4/12: Pt reluctant to engage today; does not want to be on 8[facility]. Equivocating regarding ECT. Clearly depressed. MoCA 20/30. Will continue to monitor and to discuss ECT.    4/13: Pt remains ambivalent about treatment. Not asking for discharge today. Refused some labs, will try to get these again. Alert and oriented. No SI. Will follow up anemia.    4/14: at this point patient is irritable and negativistic refusing ECT as well as some medications and lab work.  Will move Zyprexa to nighttime (sleep an issue?); and Zoloft 100mg to am; continue to attempt to discuss need for labs and possible ECT if medical evaluation can be completed. Need to assess/address anemia.  Refusal may require consideration of TOO.    4/15: Pt continues to refuse ECT and routine blood work. Pt was seen by medicine team for clearance for ECT, after which he was determined to be "Low-intermediate risk for low risk procedure. Pt's daughter, Valery, states she is his medical proxy and will bring in form next week.    4/18: Lasix was held as BPs were low. BPs continued to be low and pt was newly orthostatic positive, tf decrease Losartan to 25 mg (previously 50mg) daily CTM. Pt still severely depressed; outpatient psychiatrist and daughters endorse ECT as having been the only effective treatment in the past. Pt continues to be ambivalent about it. He will not answer regarding SI today but endorsed passive SI on previous interview. Still with significant PMR and latency.    4/19: Mute to writer but stares; observed interacting with staff; irritable speech clear and coherent; by observation appears generally Alert; oriented; cognition intact; speech clear; no tremor or evidence of movement impairment. No behavioral issues.      4/20: Continues to be despondent and ambivalent about treatments. Endorses hearing music which may be consistent with auditory hallucinations. Stated he was "hearing music coming from the room and following him around with words that he couldn't quite make out." Similar episode "last time I was suffering from this condition." Pt states the music "does not bother him." This resolved after 10 minutes. Otw no SI/HI/VH//delusions. Walking halls as if in fugue.  ADLs:  Barthel Index for Activities of Daily Living (ADL) from [email or web address]  on 4/20/2022  RESULT SUMMARY:  65 points  Minimally dependent  INPUTS:  Feeding —> 5 = Needs help  Bathing —> 0 = Unable  Grooming —> 5 = Independent  Dressing —> 10 = Independent  Bowel control —> 5 = Occasional accident  Bladder control —> 5 = Occasional accident  Toilet use —> 5 = Needs help  Transfers (bed to chair and back) —> 15 = Independent  Mobility on level surfaces —> 15 = Independent (but may use any aid, e.g. stick) >50 yards  Stairs —> 0 = Unable    4/21: Yesterday, pt consented to ECT in presence of RN and daughter. Today, again seems ambivalent. Pt appears more irritable today, shouting at interviewer. Did not ask interviewer to stay in room. Pt had "panic" episode yesterday and was given Zyprexa, which seemed to improve panic. Pt is compliant with all meds today.    4/22: Pt was seen today in his room. Today, pt demonstrated was offered Benztropine for new onset extra-pyramidal symptoms s/p Zyprexa 7.5 mg. Zyprexa was also decreased. Pt is also complaining of constipation. Of note, he has been refusing his mesalamine, stating "It's supposed to be given with another medication. I can't remember what it is, but I won't take it without the other medication." Otw no SI/HI/AH/VH/ delusions. Still does not consent to ECT.    4/24: Pt was seen today in his room. VSS. Patient not able to engage in a meaningful evaluation due to difficulty hearing without his hearing aid. Still with whole body tremor. Started on cogentin 0.5mg qhs for EPS.    4/25: Pt seen in hallway today, seated. VSS. Pt EPS have improved. Plan is to increase Seroquel to 50 mg tonight. DC'd Cogentin and Atarax (as EPS has improved) due to possibility of delirium in the elderly. Had bm this morning but states frequency decreased. Otw no SI/HI/AH/VH/ delusions. Today seemed to consent to ECT, but expressed reservations about (insurance) coverage.    4/26: Still with latency, appears to be searching for words or gathering thoughts; less depressed-appearing, more irritable today; observed interacting with staff; speech clear and coherent; alert and oriented x 3; cognition intact; speech clear; no tremor or evidence of movement impairment. No behavioral issues. Pt appears to be improving overall. BPs well controlled, no noticeable lability. Still fixated on insurance issues, real or imagined.    4/27: Pt seen in room today. Tremulous when inserting hearing aids. Upset about having missed breakfast as he was woken up at 11:35 AM. After eating, pt seemed to believe that the lack of ECT initiation was due to interviewer rather than due to pt's own repeated refusal and equivocation. Seen by Cardiology for pre-ECT clearance. Cardiology recs say pt is low risk fo ran intermediate procedure. [id number] 3 tf pt is an anticoagulation candidate. ATRIA risk bleeding score: 3 points Low Risk. C/w Xarelto as Afibb well controlled.         Mr. Arriaga is a 89-year-old retired man, domiciled with daughter Brianda (), w/ PMH of hearing loss using hearing aid, colitis, HLD, HTN, on Xarelto 15mg daily (PCP Dr Barrett  #1 #5), PPH of depression, multiple psych hospitalization requiring ECT, last 4 years ago at [facility], currently on sertraline 50mg bid, olanzapine 5mg qd, sees Dr Josh Simmons () for outpatient, presenting due to worsening depression and obsessive thought.    Cleared by medicine for ECT 4/15/22.    Today, NAD. A&Ox3. Pt slept at intervals. Pt is still irritable. He agreed to ECT yesterday when his daughters visited. Seen by cardiology today for pre-ECT clearance in setting of Afibb. Cardiology reccs appreciated.     Seen in room this morning. Pt was woken up at 11:35 AM. Seemed mildly tremulous with movement. Pt was seen 2 hours later, seated in room. Had difficulty placing his hearing aid in due to RUE tremor. Pt seemed confused about events of past week. He had to be prompted to recall his repeated refusals of ECT over the past few weeks.    MoCA score 20/30. Unclear how much of this is pseudodementia vs neurocognitive impairment vs hearing loss.    Patient today, unprompted consented to ECT. He is claiming he was misled about whether ECT was covered. Fixated on this topic. Does not respond nor express understanding when interviewer explains that interviewer is a medical student with no financial incentive to recommend any particular treatment.    BPs have been within acceptable limits.     #MDD w/psychotic features  - Continue quetiapine 50 mg po qHS (from 25 mg PO qHS)  - Avoid benzodiazepines, anticholinergics, antihistamines (except where needed for EPS) out of concern for inducing delirium in this elderly gentleman     -- DC Atarax, Cogentin  - Continue sertraline 100mg daily  - Considering ECT but pt remains ambivalent - Seen by medicine for ECT clearance 4/15: They determine pt is Low-intermediate risk for low risk procedure  - Continue medication as follows:     Xarelto 15mg daily with meals     Losartan 25 mg QD     Atorvastatin 20mg daily     Vit D3 2000 units     Magnesium 500mg     Mesalamine 2.4 mg (pt occasionally refusing)     Mirabegron 50mg po QD (pt occasionally refusing)    Medicine consult, 4/15:  #Pre Op Risk  - EKG with rate controlled Afib, Left Anterior Fascicular Block  - Re HTN: maintain /90  - Re: Afib: maintain anticoagulation with home Xarelto  - Low-intermediate risk for low risk procedure    Cardiology Consult for ECT clearance in setting of Afibb 4/27/2022  -#Pre-op  Patient at low risk for an intermediate risk procedure.   #AF  Rate controlled in the 80s, though with induction of ECT, which is frequent.   - c/w rivaroxaban 15mg daily     4/12: Pt reluctant to engage today; does not want to be on [facility]. Equivocating regarding ECT. Clearly depressed. MoCA 20/30. Will continue to monitor and to discuss ECT.    4/13: Pt remains ambivalent about treatment. Not asking for discharge today. Refused some labs, will try to get these again. Alert and oriented. No SI. Will follow up anemia.    4/14: at this point patient is irritable and negativistic refusing ECT as well as some medications and lab work.  Will move Zyprexa to nighttime (sleep an issue?); and Zoloft 100mg to am; continue to attempt to discuss need for labs and possible ECT if medical evaluation can be completed. Need to assess/address anemia.  Refusal may require consideration of TOO.    4/15: Pt continues to refuse ECT and routine blood work. Pt was seen by medicine team for clearance for ECT, after which he was determined to be "Low-intermediate risk for low risk procedure. Pt's daughter, Valery, states she is his medical proxy and will bring in form next week.    4/18: Lasix was held as BPs were low. BPs continued to be low and pt was newly orthostatic positive, tf decrease Losartan to 25 mg (previously 50mg) daily CTM. Pt still severely depressed; outpatient psychiatrist and daughters endorse ECT as having been the only effective treatment in the past. Pt continues to be ambivalent about it. He will not answer regarding SI today but endorsed passive SI on previous interview. Still with significant PMR and latency.    4/19: Mute to writer but stares; observed interacting with staff; irritable speech clear and coherent; by observation appears generally Alert; oriented; cognition intact; speech clear; no tremor or evidence of movement impairment. No behavioral issues.      4/20: Continues to be despondent and ambivalent about treatments. Endorses hearing music which may be consistent with auditory hallucinations. Stated he was "hearing music coming from the room and following him around with words that he couldn't quite make out." Similar episode "last time I was suffering from this condition." Pt states the music "does not bother him." This resolved after 10 minutes. Otw no SI/HI/VH//delusions. Walking halls as if in fugue.  ADLs:  Barthel Index for Activities of Daily Living (ADL) from [employer]  on 4/20/2022  RESULT SUMMARY:  65 points  Minimally dependent  INPUTS:  Feeding —> 5 = Needs help  Bathing —> 0 = Unable  Grooming —> 5 = Independent  Dressing —> 10 = Independent  Bowel control —> 5 = Occasional accident  Bladder control —> 5 = Occasional accident  Toilet use —> 5 = Needs help  Transfers (bed to chair and back) —> 15 = Independent  Mobility on level surfaces —> 15 = Independent (but may use any aid, e.g. stick) >50 yards  Stairs —> 0 = Unable    4/21: Yesterday, pt consented to ECT in presence of RN and daughter. Today, again seems ambivalent. Pt appears more irritable today, shouting at interviewer. Did not ask interviewer to stay in room. Pt had "panic" episode yesterday and was given Zyprexa, which seemed to improve panic. Pt is compliant with all meds today.    4/22: Pt was seen today in his room. Today, pt demonstrated was offered Benztropine for new onset extra-pyramidal symptoms s/p Zyprexa 7.5 mg. Zyprexa was also decreased. Pt is also complaining of constipation. Of note, he has been refusing his mesalamine, stating "It's supposed to be given with another medication. I can't remember what it is, but I won't take it without the other medication." Otw no SI/HI/AH/VH/ delusions. Still does not consent to ECT.    4/24: Pt was seen today in his room. VSS. Patient not able to engage in a meaningful evaluation due to difficulty hearing without his hearing aid. Still with whole body tremor. Started on cogentin 0.5mg qhs for EPS.    4/25: Pt seen in hallway today, seated. VSS. Pt EPS have improved. Plan is to increase Seroquel to 50 mg tonight. DC'd Cogentin and Atarax (as EPS has improved) due to possibility of delirium in the elderly. Had bm this morning but states frequency decreased. Otw no SI/HI/AH/VH/ delusions. Today seemed to consent to ECT, but expressed reservations about (insurance) coverage.    4/26: Still with latency, appears to be searching for words or gathering thoughts; less depressed-appearing, more irritable today; observed interacting with staff; speech clear and coherent; alert and oriented x 3; cognition intact; speech clear; no tremor or evidence of movement impairment. No behavioral issues. Pt appears to be improving overall. BPs well controlled, no noticeable lability. Still fixated on insurance issues, real or imagined.    4/27: Pt seen in room today. Tremulous when inserting hearing aids. Upset about having missed breakfast as he was woken up at 11:35 AM. After eating, pt seemed to believe that the lack of ECT initiation was due to interviewer rather than due to pt's own repeated refusal and equivocation. Seen by Cardiology for pre-ECT clearance. Cardiology recs say pt is low risk fo ran intermediate procedure. [id number] 3 tf pt is an anticoagulation candidate. ATRIA risk bleeding score: 3 points Low Risk. C/w Xarelto as Afib well controlled.

## 2022-04-27 NOTE — BH INPATIENT PSYCHIATRY PROGRESS NOTE - NSBHCHARTREVIEWVS_PSY_A_CORE FT
Vital Signs Last 24 Hrs  T(C): 37.1 (04-26-22 @ 18:02), Max: 37.1 (04-26-22 @ 18:02)  T(F): 98.8 (04-26-22 @ 18:02), Max: 98.8 (04-26-22 @ 18:02)  HR: 70 (04-26-22 @ 18:02) (70 - 84)  BP: 124/74 (04-26-22 @ 18:02) (124/74 - 140/62)  BP(mean): --  RR: 18 (04-26-22 @ 18:02) (18 - 18)  SpO2: 97% (04-26-22 @ 18:02) (96% - 97%)     Vital Signs Last 24 Hrs  T(C): 36.6 (04-27-22 @ 08:45), Max: 37.1 (04-26-22 @ 18:02)  T(F): 97.9 (04-27-22 @ 08:45), Max: 98.8 (04-26-22 @ 18:02)  HR: 90 (04-27-22 @ 08:45) (70 - 90)  BP: 144/57 (04-27-22 @ 08:45) (124/74 - 144/57)  BP(mean): --  RR: 18 (04-27-22 @ 08:45) (18 - 18)  SpO2: 98% (04-27-22 @ 08:45) (97% - 98%)

## 2022-04-27 NOTE — BH INPATIENT PSYCHIATRY PROGRESS NOTE - OTHER
Quite thin Reluctantly cooperative, oppositional at times. not assessed today Needs reminding of his repeated refusal

## 2022-04-27 NOTE — BH INPATIENT PSYCHIATRY PROGRESS NOTE - NSBHMETABOLIC_PSY_ALL_CORE_FT
BMI: BMI (kg/m2): 22.7 (04-12-22 @ 09:00)  HbA1c: A1C with Estimated Average Glucose Result: 5.7 % (04-13-22 @ 07:45)    Glucose: POCT Blood Glucose.: 190 mg/dL (04-19-22 @ 06:43)    BP: 124/74 (04-26-22 @ 18:02) (107/36 - 145/98)  Lipid Panel: Date/Time: 04-13-22 @ 07:45  Cholesterol, Serum: 120  Direct LDL: --  HDL Cholesterol, Serum: 60  Total Cholesterol/HDL Ration Measurement: --  Triglycerides, Serum: 50   BMI: BMI (kg/m2): 22.7 (04-12-22 @ 09:00)  HbA1c: A1C with Estimated Average Glucose Result: 5.7 % (04-13-22 @ 07:45)    Glucose: POCT Blood Glucose.: 190 mg/dL (04-19-22 @ 06:43)    BP: 144/57 (04-27-22 @ 08:45) (107/36 - 145/98)  Lipid Panel: Date/Time: 04-13-22 @ 07:45  Cholesterol, Serum: 120  Direct LDL: --  HDL Cholesterol, Serum: 60  Total Cholesterol/HDL Ration Measurement: --  Triglycerides, Serum: 50

## 2022-04-27 NOTE — BH INPATIENT PSYCHIATRY PROGRESS NOTE - CURRENT MEDICATION
MEDICATIONS  (STANDING):  atorvastatin 20 milliGRAM(s) Oral daily  cholecalciferol 2000 Unit(s) Oral daily  losartan 25 milliGRAM(s) Oral daily  magnesium oxide 400 milliGRAM(s) Oral daily  mesalamine DR (24-Hour) Tablet 2.4 Gram(s) Oral <User Schedule>  mirabegron ER 50 milliGRAM(s) Oral daily  QUEtiapine 50 milliGRAM(s) Oral at bedtime  rivaroxaban 15 milliGRAM(s) Oral daily  senna 2 Tablet(s) Oral at bedtime  sertraline 100 milliGRAM(s) Oral daily    MEDICATIONS  (PRN):  magnesium hydroxide Suspension 30 milliLiter(s) Oral daily PRN Constipation  polyethylene glycol 3350 17 Gram(s) Oral daily PRN Constipation

## 2022-04-28 RX ADMIN — SENNA PLUS 2 TABLET(S): 8.6 TABLET ORAL at 21:33

## 2022-04-28 RX ADMIN — MIRABEGRON 50 MILLIGRAM(S): 50 TABLET, EXTENDED RELEASE ORAL at 10:35

## 2022-04-28 RX ADMIN — QUETIAPINE FUMARATE 50 MILLIGRAM(S): 200 TABLET, FILM COATED ORAL at 21:33

## 2022-04-28 RX ADMIN — ATORVASTATIN CALCIUM 20 MILLIGRAM(S): 80 TABLET, FILM COATED ORAL at 21:33

## 2022-04-28 RX ADMIN — MAGNESIUM OXIDE 400 MG ORAL TABLET 400 MILLIGRAM(S): 241.3 TABLET ORAL at 10:35

## 2022-04-28 RX ADMIN — SERTRALINE 100 MILLIGRAM(S): 25 TABLET, FILM COATED ORAL at 10:36

## 2022-04-28 RX ADMIN — Medication 25 MILLIGRAM(S): at 10:35

## 2022-04-28 RX ADMIN — Medication 2000 UNIT(S): at 10:35

## 2022-04-28 RX ADMIN — RIVAROXABAN 15 MILLIGRAM(S): KIT at 10:36

## 2022-04-28 NOTE — CHART NOTE - NSCHARTNOTEFT_GEN_A_CORE
Chart reviewed, case discussed with staff on 8U offering care.   89 yo male h/o MDE and obsessive thoughts admitted to psych for worsening depression and rec for trial of ECT in hospital setting.   Pt has history of ECT with reported good response for depression in past (last course reportedly 4 yrs ago, details not available).   Pt continues to be depressed and irritable, which is  not baseline for patient per collaterals.      Seen by medicine and cardiology and deemed optimized for low risk procedure.       PMH: HTN, HLD, Afib (on anticoagulation), CKD III, macrocytic anemai, colitis, hard of hearing.   Recent complaints of R knee pain and reportedly may have stumbled on unit, uses walked for stabilization.    Meds reviewed, patient now agreeing to ECT course.   MOCA 20/30.   Pt deemed to have capacity to consent.     Will assess in person prior to 1st treatment tomorrow to verify capacity and to discuss any acute issues with anesthesia team prior to treatment.    IMP: MDE    REC: RUL Acute ECT to treat depression not adequately responding to medications.   Will do titration at first treatment and then treat at 6xST while monitoring for side effects such as cognition.  Medicine and cardiology will continue to be available to assess medical status while in house.   Continue current medication regimen.

## 2022-04-28 NOTE — BH INPATIENT PSYCHIATRY PROGRESS NOTE - OTHER
Quite thin Needs reminding of his repeated refusal Reluctantly cooperative, oppositional at times. not assessed today Needs reminding of his repeated refusal of ECT. Claims interviewer is "trying to run up the bill" on him

## 2022-04-28 NOTE — BH INPATIENT PSYCHIATRY PROGRESS NOTE - NSBHASSESSSUMMFT_PSY_ALL_CORE
89-year-old retired man, domiciled with daughter Brianda (), w/ PMH of hearing loss using hearing aid, colitis, HLD, HTN, on Xarelto 15mg daily (PCP Dr Barrett  #1 #5), PPH of depression, multiple psych hospitalization requiring ECT, last 4 years ago at [facility], currently on sertraline 50mg bid, olanzapine 5mg qd, sees Dr Josh Simmons () for outpatient, presenting due to worsening depression and obsessive thought.    Due to current active depressive symptoms, nonresponsive to outpatient treatment, and collateral from psychiatrist and family, patient meets involuntary admission criteria. He has been admitted to 8U for stabilization and possible ECT as inpatient. Despite not endorsing suicidality, the patient has experienced weight loss 2/2 severe depression, so ECT may be a good choice for him. Unfortunately, he is currently undecided regarding this treatment and has not yet given permission. The patient himself states that he "does not remember" whether the treatment was effective in the past and he is "not optimistic" about the potential efficacy of the treatment in the future. Cleared by medicine for ECT 4/15/22.    MoCA score 20/30. Unclear how much of this is pseudodementia vs neurocognitive impairment vs hearing loss.    Patient continues to refuse blood draws and remains ambivalent about ECT. May pursue TOO due to poor ADLs. Pt may be showing signs of psychosis; endorses hearing music which may be consistent with auditory hallucinations, and has been following clinicians around without speaking.    MDD w/psychotic features  Plan:  - Continue sertraline 100mg daily  - Increase olanzapine to 7.5mg nightly to target ?psychosis  - Considering ECT but pt is ambivalent - Seen by medicine for ECT clearance 4/15: They determine pt is Low-intermediate risk for low risk procedure  - Continue medication as follows:     Xarelto 15mg daily with meals     Losartan to 25 mg QD; 4/18/22, decrease Losartan to 25 mg (previously 50mg) daily as BPs have been low 4/18/22 and pt was orthostatics positive     Discontinued Furosemide 4/18/ 22; preiously 20mg twice daily     Atorvastatin 20mg daily     Vit D3 2000 units     Magnesium 500mg     Mesalamine 2.4 mg     Mirabegron 50mg po QD (pt occasionally refusing)    Medicine consult, 4/15:  #Pre Op Risk   - EKG with rate controlled Afib, LAFB  - Re HTN: maintain /90   - Re: Afib: maintain anticoagulation with home xarelto   - Low-intermediate risk for low risk procedure      4/12: Pt reluctant to engage today; does not want to be on 8[facility]. Equivocating regarding ECT. Clearly depressed. MoCA 20/30. Will continue to monitor and to discuss ECT.    4/13: Pt remains ambivalent about treatment. Not asking for discharge today. Refused some labs, will try to get these again. Alert and oriented. No SI. Will follow up anemia.    4/14: at this point patient is irritable and negativistic refusing ECT as well as some medications and lab work.  Will move Zyprexa to nighttime (sleep an issue?); and Zoloft 100mg to am; continue to attempt to discuss need for labs and possible ECT if medical evaluation can be completed. Need to assess/address anemia.  Refusal may require consideration of TOO.    4/15: Pt continues to refuse ECT and routine blood work. Pt was seen by medicine team for clearance for ECT, after which he was determined to be "Low-intermediate risk for low risk procedure. Pt's daughter, Valery, states she is his medical proxy and will bring in form next week.    4/18: Lasix was held as BPs were low. BPs continued to be low and pt was newly orthostatic positive, tf decrease Losartan to 25 mg (previously 50mg) daily CTM. Pt still severely depressed; outpatient psychiatrist and daughters endorse ECT as having been the only effective treatment in the past. Pt continues to be ambivalent about it. He will not answer regarding SI today but endorsed passive SI on previous interview. Still with significant PMR and latency.    4/19: mute to writer but stares; observed interacting with staff; irritable speech clear and coherent; by observation appears generally Alert; oriented; cognition intact; speech clear; no tremor or evidence of movement impairment. No behavioral issues.      4/20: Continues to be despondent and ambivalent about treatments. Endorses hearing music which may be consistent with auditory hallucinations. Stated he was "hearing music coming from the room and following him around with words that he couldn't quite make out." Similar episode "last time I was suffering from this condition." Pt states the music "does not bother him." This resolved after 10 minutes. Otw no SI/HI/VH//delusions. Walking halls as if in fugue.  ADLs:  Barthel Index for Activities of Daily Living (ADL) from [employer]  on 4/20/2022  RESULT SUMMARY:  65 points  Minimally dependent  INPUTS:  Feeding —> 5 = Needs help  Bathing —> 0 = Unable  Grooming —> 5 = Independent  Dressing —> 10 = Independent  Bowel control —> 5 = Occasional accident  Bladder control —> 5 = Occasional accident  Toilet use —> 5 = Needs help  Transfers (bed to chair and back) —> 15 = Independent  Mobility on level surfaces —> 15 = Independent (but may use any aid, e.g. stick) >50 yards  Stairs —> 0 = Unable    4/21/22: Yesterday (4/20) , pt "consented  verbally " to ECT in presence of RN and daughter. Today, again seems ambivalent. Pt appears more irritable today, shouting at interviewer. Did not ask interviewer to stay in room. Pt had "panic" episode yesterday and was given Zyprexa, which seemed to improve panic. Pt is compliant with all meds today.  Patient did not wish to sign consent and had no response when asked about this issue; however less clingy allowing staff to leave the room without panic.   Reported that patient had panic after "consent" was allegedly given on 4/20 verbally.       ;;04/22: patient states he is anxious rather than sad; Not endorsing  suicidal or homicidal ideation intent or plans; no mention of auditory or visual hallucinations ; does not consent to ECT (at this time).  Appeared less distraught today than yesterday.  However still feels "terrible";  Sleep  appetite ok; no pain issues. Alert; oriented; cognition intact; speech clear; but has tremor at rest thought to be EPS possibly secondary to Zyprexa.    ;;04/25: patient focused on leaving; continues somewhat irritable and feels "terrible" but less so when approached this morning.  Sleep an issue.    tremor not prominent.  makes better eye contact; less thought blocking;  a little more spontaneous.    ;;04/26: "The same"; patient a little more spontaneous; wants to go home; no indication of interest in ECT; appears a little less anxious; a little mobile; suggests improvement; sleep is still poor but appears to be improving.  bp continues with swings in diastolic bp.      Vital Signs Last 24 Hrs ;T(C): 36.4 (26 Apr 2022 08:45), Max: 37 (25 Apr 2022 17:07) ;T(F): 97.6 (26 Apr 2022 08:45), Max: 98.6 (25 Apr 2022 17:07) ;HR: 84 (26 Apr 2022 08:45) (82 - 84) ;BP: 140/62 (26 Apr 2022 08:45) (140/62 - 145/98) ;BP(mean): -- ;ABP: -- ;ABP(mean): -- ;RR: 18 (26 Apr 2022 08:45) (17 - 18) ;SpO2: 96% (26 Apr 2022 08:45) (96% - 97%) ; ;    ;;04/27: seen by cardiology scores 3 on stroke risk with 4 as cut off for too risky ; suggestion that patient be placed on warfarin; Dr. Simmons called and vm left for discussion; patient spoke to writer about breakfast and continues to be willing to consent to ECT ; a change from past discussions; however appears to have improved on Seroquel alone.    ;;04/28: above risk score related to Warfarin use and may not apply to this patient; however cardiology did score patient as low risk; Dr. Simmons contacted and treatment discussed with plan to monitor progress on Seroquel and Zoloft as patient had been stable on Zyprexa and Zoloft for many years; however this morning patient returns to being sad; irritable; "terrible"; and silent when some questions asked despite responding to others; consented for ECT yesterday; first treatment RUL in am tomorrow.   89-year-old, left-handed retired man, domiciled with daughter Brianda (), w/ PMH of hearing loss using hearing aid, colitis, HLD, HTN, on Xarelto 15mg daily (PCP Dr Barrett  #1 #5), PPH of depression, multiple psych hospitalization requiring ECT, last 4 years ago at [facility], currently on sertraline 50mg bid, olanzapine 5mg qd, sees Dr Josh Simmons () for outpatient, presenting due to worsening depression and obsessive thought.   MoCA score 20/30. Due to current active depressive symptoms, nonresponsive to outpatient treatment, and collateral from psychiatrist and family, patient meets involuntary admission criteria. He has been admitted to 8U for stabilization and possible ECT as inpatient.     Pt cleared for ECT by medicine on 4/15/22 and cleared by Cardiology on 4/27/2022.     First ECT session scheduled for tomorrow, 4/29/2022.     Seen in room today. NAD. A&Ox3. Still tremulous. Exhibiting new difficulty with getting up from a seated position and with ambulation. His first ECT session is set for tomorrow, 4/29/2022. Very irritable. When told he would be undergoing ECT tomorrow, pt responded "Quit running up the bill. 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 Pt ended interview abruptly when lunch was announced.     Continues on Seroquel and Zoloft for anxiety and mood.      MDD w/psychotic features  Plan:  - Continue sertraline 100mg daily  - Seen by medicine for ECT clearance 4/15: They determine pt is Low-intermediate risk for low risk procedure  - Continue medication as follows:     Xarelto 15mg daily with meals     Losartan to 25 mg QD; 4/18/22, decrease Losartan to 25 mg (previously 50mg) daily as BPs have been low 4/18/22 and pt was orthostatics positive     Discontinued Furosemide 4/18/ 22; previously 20mg twice daily     Atorvastatin 20mg daily     Vit D3 2000 units     Magnesium 500mg     Mesalamine 2.4 mg     Mirabegron 50mg po QD (pt occasionally refusing)    Medicine consult, 4/15:  #Pre Op Risk   - EKG with rate controlled Afib, LAFB  - Re HTN: maintain /90   - Re: Afib: maintain anticoagulation with home xarelto   - Low-intermediate risk for low risk procedure    4/12: Pt reluctant to engage today; does not want to be on [facility]. Equivocating regarding ECT. Clearly depressed. MoCA 20/30. Will continue to monitor and to discuss ECT.    4/13: Pt remains ambivalent about treatment. Not asking for discharge today. Refused some labs, will try to get these again. Alert and oriented. No SI. Will follow up anemia.    4/14: at this point patient is irritable and negativistic refusing ECT as well as some medications and lab work.  Will move Zyprexa to nighttime (sleep an issue?); and Zoloft 100mg to am; continue to attempt to discuss need for labs and possible ECT if medical evaluation can be completed. Need to assess/address anemia.  Refusal may require consideration of TOO.    4/15: Pt continues to refuse ECT and routine blood work. Pt was seen by medicine team for clearance for ECT, after which he was determined to be "Low-intermediate risk for low risk procedure. Pt's daughter, Valery, states she is his medical proxy and will bring in form next week.    4/18: Lasix was held as BPs were low. BPs continued to be low and pt was newly orthostatic positive, tf decrease Losartan to 25 mg (previously 50mg) daily CTM. Pt still severely depressed; outpatient psychiatrist and daughters endorse ECT as having been the only effective treatment in the past. Pt continues to be ambivalent about it. He will not answer regarding SI today but endorsed passive SI on previous interview. Still with significant PMR and latency.    4/19: mute to writer but stares; observed interacting with staff; irritable speech clear and coherent; by observation appears generally Alert; oriented; cognition intact; speech clear; no tremor or evidence of movement impairment. No behavioral issues.      4/20: Continues to be despondent and ambivalent about treatments. Endorses hearing music which may be consistent with auditory hallucinations. Stated he was "hearing music coming from the room and following him around with words that he couldn't quite make out." Similar episode "last time I was suffering from this condition." Pt states the music "does not bother him." This resolved after 10 minutes. Otw no SI/HI/VH//delusions. Walking halls as if in fugue.  ADLs:  Barthel Index for Activities of Daily Living (ADL) from [employer]  on 4/20/2022  RESULT SUMMARY:  65 points  Minimally dependent  INPUTS:  Feeding —> 5 = Needs help  Bathing —> 0 = Unable  Grooming —> 5 = Independent  Dressing —> 10 = Independent  Bowel control —> 5 = Occasional accident  Bladder control —> 5 = Occasional accident  Toilet use —> 5 = Needs help  Transfers (bed to chair and back) —> 15 = Independent  Mobility on level surfaces —> 15 = Independent (but may use any aid, e.g. stick) >50 yards  Stairs —> 0 = Unable    4/21/22: Yesterday (4/20) , pt "consented  verbally " to ECT in presence of RN and daughter. Today, again seems ambivalent. Pt appears more irritable today, shouting at interviewer. Did not ask interviewer to stay in room. Pt had "panic" episode yesterday and was given Zyprexa, which seemed to improve panic. Pt is compliant with all meds today.  Patient did not wish to sign consent and had no response when asked about this issue; however less clingy allowing staff to leave the room without panic.   Reported that patient had panic after "consent" was allegedly given on 4/20 verbally.       ;;04/22: patient states he is anxious rather than sad; Not endorsing  suicidal or homicidal ideation intent or plans; no mention of auditory or visual hallucinations ; does not consent to ECT (at this time).  Appeared less distraught today than yesterday.  However still feels "terrible";  Sleep  appetite ok; no pain issues. Alert; oriented; cognition intact; speech clear; but has tremor at rest thought to be EPS possibly secondary to Zyprexa.    ;;04/25: patient focused on leaving; continues somewhat irritable and feels "terrible" but less so when approached this morning.  Sleep an issue.    tremor not prominent.  makes better eye contact; less thought blocking;  a little more spontaneous.    ;;04/26: "The same"; patient a little more spontaneous; wants to go home; no indication of interest in ECT; appears a little less anxious; a little mobile; suggests improvement; sleep is still poor but appears to be improving.  bp continues with swings in diastolic bp.      Vital Signs Last 24 Hrs ;T(C): 36.4 (26 Apr 2022 08:45), Max: 37 (25 Apr 2022 17:07) ;T(F): 97.6 (26 Apr 2022 08:45), Max: 98.6 (25 Apr 2022 17:07) ;HR: 84 (26 Apr 2022 08:45) (82 - 84) ;BP: 140/62 (26 Apr 2022 08:45) (140/62 - 145/98) ;BP(mean): -- ;ABP: -- ;ABP(mean): -- ;RR: 18 (26 Apr 2022 08:45) (17 - 18) ;SpO2: 96% (26 Apr 2022 08:45) (96% - 97%) ; ;    ;;04/27: seen by cardiology scores 3 on stroke risk with 4 as cut off for too risky ; suggestion that patient be placed on warfarin; Dr. Simmons called and vm left for discussion; patient spoke to writer about breakfast and continues to be willing to consent to ECT ; a change from past discussions; however appears to have improved on Seroquel alone.    ;;04/28: above risk score related to Warfarin use and may not apply to this patient; however cardiology did score patient as low risk; Dr. Simmons contacted and treatment discussed with plan to monitor progress on Seroquel and Zoloft as patient had been stable on Zyprexa and Zoloft for many years; however this morning patient returns to being sad; irritable; "terrible"; and silent when some questions asked despite responding to others; consented for ECT yesterday; first treatment RUL in am tomorrow.      4/28: First ECT session scheduled for tomorrow, 4/29/2022. Seen in room today. NAD. A&Ox3. Still tremulous. Exhibiting new difficulty with getting up from a seated position and with ambulation. Very irritable. Pt ended interview abruptly when lunch was announced. Continues on Seroquel and Zoloft for anxiety and mood.

## 2022-04-28 NOTE — BH INPATIENT PSYCHIATRY PROGRESS NOTE - NSBHFUPINTERVALHXFT_PSY_A_CORE
"Terrible" when asked how he was feeling; actually responded less when hearing aide placed;  however appears more irritable and sad.   Consented for ECT RUL #1 in am.  continues on Seroquel and Zoloft for anxiety and mood.   Pt is left-handed. NAD. A&Ox3. Trunk and BUE exhibiting mild intention tremor. Pt's exhibiting new difficulty with getting up from a seated position and with ambulation, requiring occasional wall support to achieve both. Pt was irritable overnight per nursing. His first ECT session is set for tomorrow, 4/29/2022.    Pt was seen in room today, seated on bed with eyes closed. When told he would be undergoing ECT tomorrow, pt responded "Quit running up the bill. I can't afford it." When informed that interviewer is a medical student with no financial incentive in any treatment, pt responded, "You're too smart for your own good."   He is also complaining of new right-sided knee pain that begun yesterday. States it has always been there but became "worse last night...walking to dinner." Pt did not elaborate any further when prompted. Pt ended interview abruptly when lunch was announced. [place] yelling, "This isn't what I ordered" in hallway in response to lunch.    Continues on Seroquel and Zoloft for anxiety and mood.

## 2022-04-28 NOTE — BH INPATIENT PSYCHIATRY PROGRESS NOTE - CURRENT MEDICATION
MEDICATIONS  (STANDING):  atorvastatin 20 milliGRAM(s) Oral daily  cholecalciferol 2000 Unit(s) Oral daily  magnesium oxide 400 milliGRAM(s) Oral daily  mesalamine DR (24-Hour) Tablet 2.4 Gram(s) Oral <User Schedule>  metoprolol succinate ER 25 milliGRAM(s) Oral daily  mirabegron ER 50 milliGRAM(s) Oral daily  QUEtiapine 50 milliGRAM(s) Oral at bedtime  rivaroxaban 15 milliGRAM(s) Oral daily  senna 2 Tablet(s) Oral at bedtime  sertraline 100 milliGRAM(s) Oral daily    MEDICATIONS  (PRN):  magnesium hydroxide Suspension 30 milliLiter(s) Oral daily PRN Constipation  polyethylene glycol 3350 17 Gram(s) Oral daily PRN Constipation

## 2022-04-28 NOTE — BH CHART NOTE - NSEVENTNOTEFT_PSY_ALL_CORE
Writer received a call from patient's primary physician, Dr. Christianson who recommended discontinuing Zyprexa and starting Seroquel 25 mg nightly. Chart review indicates that pt, 89 yo, admitted fro depression with psychotic features and has refused ECT. he has responded to Zyprexa (as an adjunct to Zoloft 100 mg) but has reported tremor and rigidity this morning. Zyprexa dose was decreased from 7.5 to 5 to 2.5 and Cogentin increased to 1mg today.     Updated plan:    - Discontinue Zyprexa  - Start Seroquel 25 mg nightly  - Continue sertraline 100mg daily  - Benztropine 0.5 mg 1 time for EPS  - Atarax 25 mg 1 time for EPS    
Placing Mr. Arriaga on 1:1 observation while awake because he nearly fell and has severe knee pain that is making it very hard for him to walk. PT consult ordered and he has a walker but he is having a very hard time using the walker as it does not easily glide on floor.

## 2022-04-28 NOTE — BH INPATIENT PSYCHIATRY PROGRESS NOTE - NSBHMETABOLIC_PSY_ALL_CORE_FT
BMI: BMI (kg/m2): 22.7 (04-12-22 @ 09:00)  HbA1c: A1C with Estimated Average Glucose Result: 5.7 % (04-13-22 @ 07:45)    Glucose: POCT Blood Glucose.: 190 mg/dL (04-19-22 @ 06:43)    BP: 148/51 (04-27-22 @ 16:37) (107/36 - 148/51)  Lipid Panel: Date/Time: 04-13-22 @ 07:45  Cholesterol, Serum: 120  Direct LDL: --  HDL Cholesterol, Serum: 60  Total Cholesterol/HDL Ration Measurement: --  Triglycerides, Serum: 50   BMI: BMI (kg/m2): 22.7 (04-12-22 @ 09:00)  HbA1c: A1C with Estimated Average Glucose Result: 5.7 % (04-13-22 @ 07:45)    Glucose: POCT Blood Glucose.: 190 mg/dL (04-19-22 @ 06:43)    BP: 153/46 (04-28-22 @ 08:38) (124/74 - 153/46)  Lipid Panel: Date/Time: 04-13-22 @ 07:45  Cholesterol, Serum: 120  Direct LDL: --  HDL Cholesterol, Serum: 60  Total Cholesterol/HDL Ration Measurement: --  Triglycerides, Serum: 50

## 2022-04-28 NOTE — BH INPATIENT PSYCHIATRY PROGRESS NOTE - NSBHCHARTREVIEWVS_PSY_A_CORE FT
Vital Signs Last 24 Hrs  T(C): 36.7 (04-27-22 @ 16:37), Max: 36.7 (04-27-22 @ 16:37)  T(F): 98 (04-27-22 @ 16:37), Max: 98 (04-27-22 @ 16:37)  HR: 68 (04-27-22 @ 16:37) (68 - 68)  BP: 148/51 (04-27-22 @ 16:37) (148/51 - 148/51)  BP(mean): --  RR: 18 (04-27-22 @ 16:37) (18 - 18)  SpO2: 97% (04-27-22 @ 16:37) (97% - 97%)     Vital Signs Last 24 Hrs  T(C): 36.8 (04-28-22 @ 08:38), Max: 36.8 (04-28-22 @ 08:38)  T(F): 98.3 (04-28-22 @ 08:38), Max: 98.3 (04-28-22 @ 08:38)  HR: 82 (04-28-22 @ 08:38) (68 - 82)  BP: 153/46 (04-28-22 @ 08:38) (148/51 - 153/46)  BP(mean): --  RR: 18 (04-28-22 @ 08:38) (18 - 18)  SpO2: 97% (04-28-22 @ 08:38) (97% - 97%)

## 2022-04-29 LAB
ALBUMIN SERPL ELPH-MCNC: 3.1 G/DL — LOW (ref 3.3–5)
ALP SERPL-CCNC: 61 U/L — SIGNIFICANT CHANGE UP (ref 40–120)
ALT FLD-CCNC: 43 U/L — SIGNIFICANT CHANGE UP (ref 10–45)
ANION GAP SERPL CALC-SCNC: 11 MMOL/L — SIGNIFICANT CHANGE UP (ref 5–17)
AST SERPL-CCNC: 31 U/L — SIGNIFICANT CHANGE UP (ref 10–40)
BILIRUB SERPL-MCNC: 0.4 MG/DL — SIGNIFICANT CHANGE UP (ref 0.2–1.2)
BUN SERPL-MCNC: 35 MG/DL — HIGH (ref 7–23)
CALCIUM SERPL-MCNC: 8.9 MG/DL — SIGNIFICANT CHANGE UP (ref 8.4–10.5)
CHLORIDE SERPL-SCNC: 99 MMOL/L — SIGNIFICANT CHANGE UP (ref 96–108)
CK SERPL-CCNC: 73 U/L — SIGNIFICANT CHANGE UP (ref 30–200)
CO2 SERPL-SCNC: 23 MMOL/L — SIGNIFICANT CHANGE UP (ref 22–31)
CREAT SERPL-MCNC: 1.62 MG/DL — HIGH (ref 0.5–1.3)
EGFR: 39 ML/MIN/1.73M2 — LOW
GLUCOSE SERPL-MCNC: 132 MG/DL — HIGH (ref 70–99)
HCT VFR BLD CALC: 26.2 % — LOW (ref 39–50)
HGB BLD-MCNC: 8.6 G/DL — LOW (ref 13–17)
MCHC RBC-ENTMCNC: 32.8 GM/DL — SIGNIFICANT CHANGE UP (ref 32–36)
MCHC RBC-ENTMCNC: 33.7 PG — SIGNIFICANT CHANGE UP (ref 27–34)
MCV RBC AUTO: 102.7 FL — HIGH (ref 80–100)
NRBC # BLD: 0 /100 WBCS — SIGNIFICANT CHANGE UP (ref 0–0)
PLATELET # BLD AUTO: 143 K/UL — LOW (ref 150–400)
POTASSIUM SERPL-MCNC: 4.8 MMOL/L — SIGNIFICANT CHANGE UP (ref 3.5–5.3)
POTASSIUM SERPL-SCNC: 4.8 MMOL/L — SIGNIFICANT CHANGE UP (ref 3.5–5.3)
PROT SERPL-MCNC: 6 G/DL — SIGNIFICANT CHANGE UP (ref 6–8.3)
RBC # BLD: 2.55 M/UL — LOW (ref 4.2–5.8)
RBC # FLD: 13.6 % — SIGNIFICANT CHANGE UP (ref 10.3–14.5)
SODIUM SERPL-SCNC: 133 MMOL/L — LOW (ref 135–145)
WBC # BLD: 7.26 K/UL — SIGNIFICANT CHANGE UP (ref 3.8–10.5)
WBC # FLD AUTO: 7.26 K/UL — SIGNIFICANT CHANGE UP (ref 3.8–10.5)

## 2022-04-29 PROCEDURE — 90870 ELECTROCONVULSIVE THERAPY: CPT

## 2022-04-29 RX ADMIN — SERTRALINE 100 MILLIGRAM(S): 25 TABLET, FILM COATED ORAL at 10:19

## 2022-04-29 RX ADMIN — SENNA PLUS 2 TABLET(S): 8.6 TABLET ORAL at 22:16

## 2022-04-29 RX ADMIN — MIRABEGRON 50 MILLIGRAM(S): 50 TABLET, EXTENDED RELEASE ORAL at 10:25

## 2022-04-29 RX ADMIN — Medication 2.4 GRAM(S): at 18:56

## 2022-04-29 RX ADMIN — ATORVASTATIN CALCIUM 20 MILLIGRAM(S): 80 TABLET, FILM COATED ORAL at 22:20

## 2022-04-29 RX ADMIN — Medication 2000 UNIT(S): at 10:19

## 2022-04-29 RX ADMIN — RIVAROXABAN 15 MILLIGRAM(S): KIT at 10:24

## 2022-04-29 RX ADMIN — MAGNESIUM OXIDE 400 MG ORAL TABLET 400 MILLIGRAM(S): 241.3 TABLET ORAL at 10:22

## 2022-04-29 RX ADMIN — Medication 25 MILLIGRAM(S): at 10:18

## 2022-04-29 NOTE — BH INPATIENT PSYCHIATRY PROGRESS NOTE - NSBHMETABOLIC_PSY_ALL_CORE_FT
BMI: BMI (kg/m2): 22.7 (04-29-22 @ 07:54)  HbA1c: A1C with Estimated Average Glucose Result: 5.7 % (04-13-22 @ 07:45)    Glucose: POCT Blood Glucose.: 190 mg/dL (04-19-22 @ 06:43)    BP: 119/57 (04-29-22 @ 08:55) (109/45 - 153/46)  Lipid Panel: Date/Time: 04-13-22 @ 07:45  Cholesterol, Serum: 120  Direct LDL: --  HDL Cholesterol, Serum: 60  Total Cholesterol/HDL Ration Measurement: --  Triglycerides, Serum: 50   BMI: BMI (kg/m2): 22.7 (04-29-22 @ 07:54)  HbA1c: A1C with Estimated Average Glucose Result: 5.7 % (04-13-22 @ 07:45)    Glucose: POCT Blood Glucose.: 190 mg/dL (04-19-22 @ 06:43)    BP: 127/50 (04-29-22 @ 09:15) (109/45 - 153/46)  Lipid Panel: Date/Time: 04-13-22 @ 07:45  Cholesterol, Serum: 120  Direct LDL: --  HDL Cholesterol, Serum: 60  Total Cholesterol/HDL Ration Measurement: --  Triglycerides, Serum: 50

## 2022-04-29 NOTE — BH INPATIENT PSYCHIATRY PROGRESS NOTE - NSBHASSESSSUMMFT_PSY_ALL_CORE
89-year-old, left-handed retired man, domiciled with daughter Brianda (), w/ PMH of hearing loss using hearing aid, colitis, HLD, HTN, on Xarelto 15mg daily (PCP Dr Barrett  #1 #5), PPH of depression, multiple psych hospitalization requiring ECT, last 4 years ago at [facility], currently on sertraline 50mg bid, olanzapine 5mg qd, sees Dr Josh Simmons () for outpatient, presenting due to worsening depression and obsessive thought.   MoCA score 20/30. Due to current active depressive symptoms, nonresponsive to outpatient treatment, and collateral from psychiatrist and family, patient meets involuntary admission criteria. He has been admitted to 8U for stabilization and possible ECT as inpatient.     Pt cleared for ECT by medicine on 4/15/22 and cleared by Cardiology on 4/27/2022.     Seen s/p his first ECT session, which occurred earlier this morning. NAD. A&Ox3. Somnolent, but arousable, but almost immediately went back to sleep during conversation. Slurring his speech. Last night, pt had a witnessed near-fall, his mental status exam was unchanged from baseline. Decision was made to allow pt to rest and return later.    MDD w/psychotic features  Plan:  - Continue sertraline 100mg daily  - Seen by medicine for ECT clearance 4/15: They determine pt is Low-intermediate risk for low risk procedure  - Continue medication as follows:     Xarelto 15mg daily with meals     Losartan to 25 mg QD; 4/18/22, decrease Losartan to 25 mg (previously 50mg) daily as BPs have been low 4/18/22 and pt was orthostatics positive     Discontinued Furosemide 4/18/ 22; previously 20mg twice daily     Atorvastatin 20mg daily     Vit D3 2000 units     Magnesium 500mg     Mesalamine 2.4 mg     Mirabegron 50mg po QD (pt occasionally refusing)    Medicine consult, 4/15:  #Pre Op Risk   - EKG with rate controlled Afib, LAFB  - Re HTN: maintain /90   - Re: Afib: maintain anticoagulation with home xarelto   - Low-intermediate risk for low risk procedure    4/12: Pt reluctant to engage today; does not want to be on 8[facility]. Equivocating regarding ECT. Clearly depressed. MoCA 20/30. Will continue to monitor and to discuss ECT.    4/13: Pt remains ambivalent about treatment. Not asking for discharge today. Refused some labs, will try to get these again. Alert and oriented. No SI. Will follow up anemia.    4/14: at this point patient is irritable and negativistic refusing ECT as well as some medications and lab work.  Will move Zyprexa to nighttime (sleep an issue?); and Zoloft 100mg to am; continue to attempt to discuss need for labs and possible ECT if medical evaluation can be completed. Need to assess/address anemia.  Refusal may require consideration of TOO.    4/15: Pt continues to refuse ECT and routine blood work. Pt was seen by medicine team for clearance for ECT, after which he was determined to be "Low-intermediate risk for low risk procedure. Pt's daughter, Valery, states she is his medical proxy and will bring in form next week.    4/18: Lasix was held as BPs were low. BPs continued to be low and pt was newly orthostatic positive, tf decrease Losartan to 25 mg (previously 50mg) daily CTM. Pt still severely depressed; outpatient psychiatrist and daughters endorse ECT as having been the only effective treatment in the past. Pt continues to be ambivalent about it. He will not answer regarding SI today but endorsed passive SI on previous interview. Still with significant PMR and latency.    4/19: mute to writer but stares; observed interacting with staff; irritable speech clear and coherent; by observation appears generally Alert; oriented; cognition intact; speech clear; no tremor or evidence of movement impairment. No behavioral issues.      4/20: Continues to be despondent and ambivalent about treatments. Endorses hearing music which may be consistent with auditory hallucinations. Stated he was "hearing music coming from the room and following him around with words that he couldn't quite make out." Similar episode "last time I was suffering from this condition." Pt states the music "does not bother him." This resolved after 10 minutes. Otw no SI/HI/VH//delusions. Walking halls as if in fugue.  ADLs:  Barthel Index for Activities of Daily Living (ADL) from [email or web address]  on 4/20/2022  RESULT SUMMARY:  65 points  Minimally dependent  INPUTS:  Feeding —> 5 = Needs help  Bathing —> 0 = Unable  Grooming —> 5 = Independent  Dressing —> 10 = Independent  Bowel control —> 5 = Occasional accident  Bladder control —> 5 = Occasional accident  Toilet use —> 5 = Needs help  Transfers (bed to chair and back) —> 15 = Independent  Mobility on level surfaces —> 15 = Independent (but may use any aid, e.g. stick) >50 yards  Stairs —> 0 = Unable    4/21/22: Yesterday (4/20) , pt "consented  verbally " to ECT in presence of RN and daughter. Today, again seems ambivalent. Pt appears more irritable today, shouting at interviewer. Did not ask interviewer to stay in room. Pt had "panic" episode yesterday and was given Zyprexa, which seemed to improve panic. Pt is compliant with all meds today.  Patient did not wish to sign consent and had no response when asked about this issue; however less clingy allowing staff to leave the room without panic.   Reported that patient had panic after "consent" was allegedly given on 4/20 verbally.       ;;04/22: patient states he is anxious rather than sad; Not endorsing  suicidal or homicidal ideation intent or plans; no mention of auditory or visual hallucinations ; does not consent to ECT (at this time).  Appeared less distraught today than yesterday.  However still feels "terrible";  Sleep  appetite ok; no pain issues. Alert; oriented; cognition intact; speech clear; but has tremor at rest thought to be EPS possibly secondary to Zyprexa.    ;;04/25: patient focused on leaving; continues somewhat irritable and feels "terrible" but less so when approached this morning.  Sleep an issue.    tremor not prominent.  makes better eye contact; less thought blocking;  a little more spontaneous.    ;;04/26: "The same"; patient a little more spontaneous; wants to go home; no indication of interest in ECT; appears a little less anxious; a little mobile; suggests improvement; sleep is still poor but appears to be improving.  bp continues with swings in diastolic bp.      Vital Signs Last 24 Hrs ;T(C): 36.4 (26 Apr 2022 08:45), Max: 37 (25 Apr 2022 17:07) ;T(F): 97.6 (26 Apr 2022 08:45), Max: 98.6 (25 Apr 2022 17:07) ;HR: 84 (26 Apr 2022 08:45) (82 - 84) ;BP: 140/62 (26 Apr 2022 08:45) (140/62 - 145/98) ;BP(mean): -- ;ABP: -- ;ABP(mean): -- ;RR: 18 (26 Apr 2022 08:45) (17 - 18) ;SpO2: 96% (26 Apr 2022 08:45) (96% - 97%) ; ;    ;;04/27: seen by cardiology scores 3 on stroke risk with 4 as cut off for too risky ; suggestion that patient be placed on warfarin; Dr. Simmons called and vm left for discussion; patient spoke to writer about breakfast and continues to be willing to consent to ECT ; a change from past discussions; however appears to have improved on Seroquel alone.    ;;04/28: above risk score related to Warfarin use and may not apply to this patient; however cardiology did score patient as low risk; Dr. Simmons contacted and treatment discussed with plan to monitor progress on Seroquel and Zoloft as patient had been stable on Zyprexa and Zoloft for many years; however this morning patient returns to being sad; irritable; "terrible"; and silent when some questions asked despite responding to others; consented for ECT yesterday; first treatment RUL in am tomorrow.      4/28: First ECT session scheduled for tomorrow, 4/29/2022. Seen in room today. NAD. A&Ox3. Still tremulous. Exhibiting new difficulty with getting up from a seated position and with ambulation. Very irritable. Pt ended interview abruptly when lunch was announced. Continues on Seroquel and Zoloft for anxiety and mood.      4/29: Seen s/p his first ECT session, which occurred earlier this morning. NAD. A&Ox3. Somnolent, but arousable, but almost immediately went back to sleep during conversation. Slurring his speech. Last night, pt had a witnessed near-fall, his mental status exam was unchanged from baseline. Decision was made to allow pt to rest and return later. 89-year-old, left-handed retired man, domiciled with daughter Brianda (), w/ PMH of hearing loss using hearing aid, colitis, HLD, HTN, on Xarelto 15mg daily (PCP Dr Barrett  #1 #5), PPH of depression, multiple psych hospitalization requiring ECT, last 4 years ago at [facility], currently on sertraline 50mg bid, olanzapine 5mg qd, sees Dr Josh Simmons () for outpatient, presenting due to worsening depression and obsessive thought.   MoCA score 20/30. Due to current active depressive symptoms, nonresponsive to outpatient treatment, and collateral from psychiatrist and family, patient meets involuntary admission criteria. He has been admitted to 8U for stabilization and possible ECT as inpatient.     Pt cleared for ECT by medicine on 4/15/22 and cleared by Cardiology on 4/27/2022.     Seen s/p his first ECT session, which occurred earlier this morning. NAD. A&Ox3. Somnolent, but arousable, but almost immediately went back to sleep during conversation. Slurring his speech. Last night, pt had a witnessed near-fall, his mental status exam was unchanged from baseline. Decision was made to allow pt to rest and return later.    MDD w/psychotic features  Plan:  - Continue sertraline 100mg daily  - Seen by medicine for ECT clearance 4/15: They determine pt is Low-intermediate risk for low risk procedure  - Continue medication as follows:     Xarelto 15mg daily with meals     Losartan to 25 mg QD; 4/18/22, decrease Losartan to 25 mg (previously 50mg) daily as BPs have been low 4/18/22 and pt was orthostatics positive     Discontinued Furosemide 4/18/ 22; previously 20mg twice daily     Atorvastatin 20mg daily     Vit D3 2000 units     Magnesium 500mg     Mesalamine 2.4 mg     Mirabegron 50mg po QD (pt occasionally refusing)    Medicine consult, 4/15:  #Pre Op Risk   - EKG with rate controlled Afib, LAFB  - Re HTN: maintain /90   - Re: Afib: maintain anticoagulation with home xarelto   - Low-intermediate risk for low risk procedure    4/12: Pt reluctant to engage today; does not want to be on 8[facility]. Equivocating regarding ECT. Clearly depressed. MoCA 20/30. Will continue to monitor and to discuss ECT.    4/13: Pt remains ambivalent about treatment. Not asking for discharge today. Refused some labs, will try to get these again. Alert and oriented. No SI. Will follow up anemia.    4/14: at this point patient is irritable and negativistic refusing ECT as well as some medications and lab work.  Will move Zyprexa to nighttime (sleep an issue?); and Zoloft 100mg to am; continue to attempt to discuss need for labs and possible ECT if medical evaluation can be completed. Need to assess/address anemia.  Refusal may require consideration of TOO.    4/15: Pt continues to refuse ECT and routine blood work. Pt was seen by medicine team for clearance for ECT, after which he was determined to be "Low-intermediate risk for low risk procedure. Pt's daughter, Valery, states she is his medical proxy and will bring in form next week.    4/18: Lasix was held as BPs were low. BPs continued to be low and pt was newly orthostatic positive, tf decrease Losartan to 25 mg (previously 50mg) daily CTM. Pt still severely depressed; outpatient psychiatrist and daughters endorse ECT as having been the only effective treatment in the past. Pt continues to be ambivalent about it. He will not answer regarding SI today but endorsed passive SI on previous interview. Still with significant PMR and latency.    4/19: mute to writer but stares; observed interacting with staff; irritable speech clear and coherent; by observation appears generally Alert; oriented; cognition intact; speech clear; no tremor or evidence of movement impairment. No behavioral issues.      4/20: Continues to be despondent and ambivalent about treatments. Endorses hearing music which may be consistent with auditory hallucinations. Stated he was "hearing music coming from the room and following him around with words that he couldn't quite make out." Similar episode "last time I was suffering from this condition." Pt states the music "does not bother him." This resolved after 10 minutes. Otw no SI/HI/VH//delusions. Walking halls as if in fugue.  ADLs:  Barthel Index for Activities of Daily Living (ADL) from [email or web address]  on 4/20/2022  RESULT SUMMARY:  65 points  Minimally dependent  INPUTS:  Feeding —> 5 = Needs help  Bathing —> 0 = Unable  Grooming —> 5 = Independent  Dressing —> 10 = Independent  Bowel control —> 5 = Occasional accident  Bladder control —> 5 = Occasional accident  Toilet use —> 5 = Needs help  Transfers (bed to chair and back) —> 15 = Independent  Mobility on level surfaces —> 15 = Independent (but may use any aid, e.g. stick) >50 yards  Stairs —> 0 = Unable    4/21/22: Yesterday (4/20) , pt "consented  verbally " to ECT in presence of RN and daughter. Today, again seems ambivalent. Pt appears more irritable today, shouting at interviewer. Did not ask interviewer to stay in room. Pt had "panic" episode yesterday and was given Zyprexa, which seemed to improve panic. Pt is compliant with all meds today.  Patient did not wish to sign consent and had no response when asked about this issue; however less clingy allowing staff to leave the room without panic.   Reported that patient had panic after "consent" was allegedly given on 4/20 verbally.       ;;04/22: patient states he is anxious rather than sad; Not endorsing  suicidal or homicidal ideation intent or plans; no mention of auditory or visual hallucinations ; does not consent to ECT (at this time).  Appeared less distraught today than yesterday.  However still feels "terrible";  Sleep  appetite ok; no pain issues. Alert; oriented; cognition intact; speech clear; but has tremor at rest thought to be EPS possibly secondary to Zyprexa.    ;;04/25: patient focused on leaving; continues somewhat irritable and feels "terrible" but less so when approached this morning.  Sleep an issue.    tremor not prominent.  makes better eye contact; less thought blocking;  a little more spontaneous.    ;;04/26: "The same"; patient a little more spontaneous; wants to go home; no indication of interest in ECT; appears a little less anxious; a little mobile; suggests improvement; sleep is still poor but appears to be improving.  bp continues with swings in diastolic bp.      Vital Signs Last 24 Hrs ;T(C): 36.4 (26 Apr 2022 08:45), Max: 37 (25 Apr 2022 17:07) ;T(F): 97.6 (26 Apr 2022 08:45), Max: 98.6 (25 Apr 2022 17:07) ;HR: 84 (26 Apr 2022 08:45) (82 - 84) ;BP: 140/62 (26 Apr 2022 08:45) (140/62 - 145/98) ;BP(mean): -- ;ABP: -- ;ABP(mean): -- ;RR: 18 (26 Apr 2022 08:45) (17 - 18) ;SpO2: 96% (26 Apr 2022 08:45) (96% - 97%) ; ;    ;;04/27: seen by cardiology scores 3 on stroke risk with 4 as cut off for too risky ; suggestion that patient be placed on warfarin; Dr. Simmons called and vm left for discussion; patient spoke to writer about breakfast and continues to be willing to consent to ECT ; a change from past discussions; however appears to have improved on Seroquel alone.    ;;04/28: above risk score related to Warfarin use and may not apply to this patient; however cardiology did score patient as low risk; Dr. Simmons contacted and treatment discussed with plan to monitor progress on Seroquel and Zoloft as patient had been stable on Zyprexa and Zoloft for many years; however this morning patient returns to being sad; irritable; "terrible"; and silent when some questions asked despite responding to others; consented for ECT yesterday; first treatment RUL in am tomorrow.      4/28: First ECT session scheduled for tomorrow, 4/29/2022. Seen in room today. NAD. A&Ox3. Still tremulous. Exhibiting new difficulty with getting up from a seated position and with ambulation. Very irritable. Pt ended interview abruptly when lunch was announced. Continues on Seroquel and Zoloft for anxiety and mood.      4/29: Seen s/p his first ECT session, which occurred earlier this morning. NAD. A&Ox3. Somnolent, but arousable, but almost immediately went back to sleep during conversation. Slurring his speech. Last night, pt had a witnessed near-fall, after which his mental status exam was unchanged from baseline. Decision was made to allow pt to rest and return later. 89-year-old, left-handed retired man, domiciled with daughter Brianda (), w/ PMH of hearing loss using hearing aid, colitis, HLD, HTN, on Xarelto 15mg daily (PCP Dr Barrett  #1 #5), PPH of depression, multiple psych hospitalization requiring ECT, last 4 years ago at [facility], currently on sertraline 50mg bid, olanzapine 5mg qd, sees Dr Josh Simmons () for outpatient, presenting due to worsening depression and obsessive thought.   MoCA score 20/30. Due to current active depressive symptoms, nonresponsive to outpatient treatment, and collateral from psychiatrist and family, patient meets involuntary admission criteria. He has been admitted to 8U for stabilization and possible ECT as inpatient.     Pt cleared for ECT by medicine on 4/15/22 and cleared by Cardiology on 4/27/2022.     Seen s/p his first ECT session, which occurred earlier this morning. NAD. A&Ox3. Somnolent, but arousable, but almost immediately went back to sleep during conversation. Slurring his speech. Last night, pt had a witnessed near-fall, his mental status exam was unchanged from baseline. Decision was made to allow pt to rest and return later.     -------------------------------------------------------------------------------------------------------------------------------------------------------------------------------------  Interviewer returned 3.5 hours after initial visit. Patient's was much less somnolent; very easily arousable. Pt seemed irritable. When asked "how are you doing?" Pt replied "I haven't eaten in two days," which is inconsistent with first hand accounts by interviewer, who noted pt eating lunch yesterday. Pt then required assistance sitting up and getting up from a seated position. He exhibited whole body tremors that was severe initially and then subsided with time. Pt was able to walk to his desk for lunch with assistance, putting one hand on a walker and the wall. Pt then sat down by himself and was observed eating his salad and tuna heartily, with a mild intention tremor.   -------------------------------------------------------------------------------------------------------------------------------------------------------------------------------------    MDD w/psychotic features  Plan:  - Continue sertraline 100mg daily  - Seen by medicine for ECT clearance 4/15: They determine pt is Low-intermediate risk for low risk procedure  - Continue medication as follows:     Xarelto 15mg daily with meals     Losartan to 25 mg QD; 4/18/22, decrease Losartan to 25 mg (previously 50mg) daily as BPs have been low 4/18/22 and pt was orthostatics positive     Discontinued Furosemide 4/18/ 22; previously 20mg twice daily     Atorvastatin 20mg daily     Vit D3 2000 units     Magnesium 500mg     Mesalamine 2.4 mg     Mirabegron 50mg po QD (pt occasionally refusing)    Medicine consult, 4/15:  #Pre Op Risk   - EKG with rate controlled Afib, LAFB  - Re HTN: maintain /90   - Re: Afib: maintain anticoagulation with home xarelto   - Low-intermediate risk for low risk procedure    4/12: Pt reluctant to engage today; does not want to be on [facility]. Equivocating regarding ECT. Clearly depressed. MoCA 20/30. Will continue to monitor and to discuss ECT.    4/13: Pt remains ambivalent about treatment. Not asking for discharge today. Refused some labs, will try to get these again. Alert and oriented. No SI. Will follow up anemia.    4/14: at this point patient is irritable and negativistic refusing ECT as well as some medications and lab work.  Will move Zyprexa to nighttime (sleep an issue?); and Zoloft 100mg to am; continue to attempt to discuss need for labs and possible ECT if medical evaluation can be completed. Need to assess/address anemia.  Refusal may require consideration of TOO.    4/15: Pt continues to refuse ECT and routine blood work. Pt was seen by medicine team for clearance for ECT, after which he was determined to be "Low-intermediate risk for low risk procedure. Pt's daughter, Valery, states she is his medical proxy and will bring in form next week.    4/18: Lasix was held as BPs were low. BPs continued to be low and pt was newly orthostatic positive, tf decrease Losartan to 25 mg (previously 50mg) daily CTM. Pt still severely depressed; outpatient psychiatrist and daughters endorse ECT as having been the only effective treatment in the past. Pt continues to be ambivalent about it. He will not answer regarding SI today but endorsed passive SI on previous interview. Still with significant PMR and latency.    4/19: mute to writer but stares; observed interacting with staff; irritable speech clear and coherent; by observation appears generally Alert; oriented; cognition intact; speech clear; no tremor or evidence of movement impairment. No behavioral issues.      4/20: Continues to be despondent and ambivalent about treatments. Endorses hearing music which may be consistent with auditory hallucinations. Stated he was "hearing music coming from the room and following him around with words that he couldn't quite make out." Similar episode "last time I was suffering from this condition." Pt states the music "does not bother him." This resolved after 10 minutes. Otw no SI/HI/VH//delusions. Walking halls as if in fugue.  ADLs:  Barthel Index for Activities of Daily Living (ADL) from [email or web address]  on 4/20/2022  RESULT SUMMARY:  65 points  Minimally dependent  INPUTS:  Feeding —> 5 = Needs help  Bathing —> 0 = Unable  Grooming —> 5 = Independent  Dressing —> 10 = Independent  Bowel control —> 5 = Occasional accident  Bladder control —> 5 = Occasional accident  Toilet use —> 5 = Needs help  Transfers (bed to chair and back) —> 15 = Independent  Mobility on level surfaces —> 15 = Independent (but may use any aid, e.g. stick) >50 yards  Stairs —> 0 = Unable    4/21/22: Yesterday (4/20) , pt "consented  verbally " to ECT in presence of RN and daughter. Today, again seems ambivalent. Pt appears more irritable today, shouting at interviewer. Did not ask interviewer to stay in room. Pt had "panic" episode yesterday and was given Zyprexa, which seemed to improve panic. Pt is compliant with all meds today.  Patient did not wish to sign consent and had no response when asked about this issue; however less clingy allowing staff to leave the room without panic.   Reported that patient had panic after "consent" was allegedly given on 4/20 verbally.       ;;04/22: patient states he is anxious rather than sad; Not endorsing  suicidal or homicidal ideation intent or plans; no mention of auditory or visual hallucinations ; does not consent to ECT (at this time).  Appeared less distraught today than yesterday.  However still feels "terrible";  Sleep  appetite ok; no pain issues. Alert; oriented; cognition intact; speech clear; but has tremor at rest thought to be EPS possibly secondary to Zyprexa.    ;;04/25: patient focused on leaving; continues somewhat irritable and feels "terrible" but less so when approached this morning.  Sleep an issue.    tremor not prominent.  makes better eye contact; less thought blocking;  a little more spontaneous.    ;;04/26: "The same"; patient a little more spontaneous; wants to go home; no indication of interest in ECT; appears a little less anxious; a little mobile; suggests improvement; sleep is still poor but appears to be improving.  bp continues with swings in diastolic bp.      Vital Signs Last 24 Hrs ;T(C): 36.4 (26 Apr 2022 08:45), Max: 37 (25 Apr 2022 17:07) ;T(F): 97.6 (26 Apr 2022 08:45), Max: 98.6 (25 Apr 2022 17:07) ;HR: 84 (26 Apr 2022 08:45) (82 - 84) ;BP: 140/62 (26 Apr 2022 08:45) (140/62 - 145/98) ;BP(mean): -- ;ABP: -- ;ABP(mean): -- ;RR: 18 (26 Apr 2022 08:45) (17 - 18) ;SpO2: 96% (26 Apr 2022 08:45) (96% - 97%) ; ;    ;;04/27: seen by cardiology scores 3 on stroke risk with 4 as cut off for too risky ; suggestion that patient be placed on warfarin; Dr. Simmons called and vm left for discussion; patient spoke to writer about breakfast and continues to be willing to consent to ECT ; a change from past discussions; however appears to have improved on Seroquel alone.    ;;04/28: above risk score related to Warfarin use and may not apply to this patient; however cardiology did score patient as low risk; Dr. Simmons contacted and treatment discussed with plan to monitor progress on Seroquel and Zoloft as patient had been stable on Zyprexa and Zoloft for many years; however this morning patient returns to being sad; irritable; "terrible"; and silent when some questions asked despite responding to others; consented for ECT yesterday; first treatment RUL in am tomorrow.      4/28: First ECT session scheduled for tomorrow, 4/29/2022. Seen in room today. NAD. A&Ox3. Still tremulous. Exhibiting new difficulty with getting up from a seated position and with ambulation. Very irritable. Pt ended interview abruptly when lunch was announced. Continues on Seroquel and Zoloft for anxiety and mood.      4/29: Seen s/p his first ECT session, which occurred earlier this morning. NAD. A&Ox3. Somnolent, but arousable, but almost immediately went back to sleep during conversation. Slurring his speech. Last night, pt had a witnessed near-fall, after which his mental status exam was unchanged from baseline. Decision was made to allow pt to rest and return later. 89-year-old, left-handed retired man, domiciled with daughter Brianda (), w/ PMH of hearing loss using hearing aid, colitis, HLD, HTN, on Xarelto 15mg daily (PCP Dr Barrett  #1 #5), PPH of depression, multiple psych hospitalization requiring ECT, last 4 years ago at [facility], currently on sertraline 50mg bid, olanzapine 5mg qd, sees Dr Josh Simmons () for outpatient, presenting due to worsening depression and obsessive thought.   MoCA score 20/30. Due to current active depressive symptoms, nonresponsive to outpatient treatment, and collateral from psychiatrist and family, patient meets involuntary admission criteria. He has been admitted to 8U for stabilization and possible ECT as inpatient.     Pt cleared for ECT by medicine on 4/15/22 and cleared by Cardiology on 4/27/2022.     Seen s/p his first ECT session, which occurred earlier this morning. NAD. A&Ox3. Somnolent, but arousable, but almost immediately went back to sleep during conversation. Slurring his speech. Last night, pt had a witnessed near-fall, his mental status exam was unchanged from baseline. Decision was made to allow pt to rest and return later.     -------------------------------------------------------------------------------------------------------------------------------------------------------------------------------------  Interviewer returned 3.5 hours after initial visit. Patient's was much less somnolent; very easily arousable. Pt seemed irritable. When asked "how are you doing?" Pt replied "I haven't eaten in two days," which is inconsistent with first hand accounts by interviewer, who noted pt eating lunch yesterday. Pt then required assistance sitting up and getting up from a seated position. He exhibited whole body tremors that was severe initially and then subsided with time. Pt was able to walk to his desk for lunch with assistance, putting one hand on a walker and the wall. Pt then sat down by himself and was observed eating his salad and tuna heartily, with a mild intention tremor.   -------------------------------------------------------------------------------------------------------------------------------------------------------------------------------------    MDD w/psychotic features  Plan:  - Continue sertraline 100mg daily  - Seen by medicine for ECT clearance 4/15: They determine pt is Low-intermediate risk for low risk procedure  - Continue medication as follows:     Xarelto 15mg daily with meals     Losartan to 25 mg QD; 4/18/22, decrease Losartan to 25 mg (previously 50mg) daily as BPs have been low 4/18/22 and pt was orthostatics positive     Discontinued Furosemide 4/18/ 22; previously 20mg twice daily     Atorvastatin 20mg daily     Vit D3 2000 units     Magnesium 500mg     Mesalamine 2.4 mg     Mirabegron 50mg po QD (pt occasionally refusing)    Medicine consult, 4/15:  #Pre Op Risk   - EKG with rate controlled Afib, LAFB  - Re HTN: maintain /90   - Re: Afib: maintain anticoagulation with home xarelto   - Low-intermediate risk for low risk procedure    4/12: Pt reluctant to engage today; does not want to be on 8[facility]. Equivocating regarding ECT. Clearly depressed. MoCA 20/30. Will continue to monitor and to discuss ECT.    4/13: Pt remains ambivalent about treatment. Not asking for discharge today. Refused some labs, will try to get these again. Alert and oriented. No SI. Will follow up anemia.    4/14: at this point patient is irritable and negativistic refusing ECT as well as some medications and lab work.  Will move Zyprexa to nighttime (sleep an issue?); and Zoloft 100mg to am; continue to attempt to discuss need for labs and possible ECT if medical evaluation can be completed. Need to assess/address anemia.  Refusal may require consideration of TOO.    4/15: Pt continues to refuse ECT and routine blood work. Pt was seen by medicine team for clearance for ECT, after which he was determined to be "Low-intermediate risk for low risk procedure. Pt's daughter, Valery, states she is his medical proxy and will bring in form next week.    4/18: Lasix was held as BPs were low. BPs continued to be low and pt was newly orthostatic positive, tf decrease Losartan to 25 mg (previously 50mg) daily CTM. Pt still severely depressed; outpatient psychiatrist and daughters endorse ECT as having been the only effective treatment in the past. Pt continues to be ambivalent about it. He will not answer regarding SI today but endorsed passive SI on previous interview. Still with significant PMR and latency.    4/19: mute to writer but stares; observed interacting with staff; irritable speech clear and coherent; by observation appears generally Alert; oriented; cognition intact; speech clear; no tremor or evidence of movement impairment. No behavioral issues.      4/20: Continues to be despondent and ambivalent about treatments. Endorses hearing music which may be consistent with auditory hallucinations. Stated he was "hearing music coming from the room and following him around with words that he couldn't quite make out." Similar episode "last time I was suffering from this condition." Pt states the music "does not bother him." This resolved after 10 minutes. Otw no SI/HI/VH//delusions. Walking halls as if in fugue.  ADLs:  Barthel Index for Activities of Daily Living (ADL) from [email or web address]  on 4/20/2022  RESULT SUMMARY:  65 points  Minimally dependent  INPUTS:  Feeding —> 5 = Needs help  Bathing —> 0 = Unable  Grooming —> 5 = Independent  Dressing —> 10 = Independent  Bowel control —> 5 = Occasional accident  Bladder control —> 5 = Occasional accident  Toilet use —> 5 = Needs help  Transfers (bed to chair and back) —> 15 = Independent  Mobility on level surfaces —> 15 = Independent (but may use any aid, e.g. stick) >50 yards  Stairs —> 0 = Unable    4/21/22: Yesterday (4/20) , pt "consented  verbally " to ECT in presence of RN and daughter. Today, again seems ambivalent. Pt appears more irritable today, shouting at interviewer. Did not ask interviewer to stay in room. Pt had "panic" episode yesterday and was given Zyprexa, which seemed to improve panic. Pt is compliant with all meds today.  Patient did not wish to sign consent and had no response when asked about this issue; however less clingy allowing staff to leave the room without panic.   Reported that patient had panic after "consent" was allegedly given on 4/20 verbally.       ;;04/22: patient states he is anxious rather than sad; Not endorsing  suicidal or homicidal ideation intent or plans; no mention of auditory or visual hallucinations ; does not consent to ECT (at this time).  Appeared less distraught today than yesterday.  However still feels "terrible";  Sleep  appetite ok; no pain issues. Alert; oriented; cognition intact; speech clear; but has tremor at rest thought to be EPS possibly secondary to Zyprexa.    ;;04/25: patient focused on leaving; continues somewhat irritable and feels "terrible" but less so when approached this morning.  Sleep an issue.    tremor not prominent.  makes better eye contact; less thought blocking;  a little more spontaneous.    ;;04/26: "The same"; patient a little more spontaneous; wants to go home; no indication of interest in ECT; appears a little less anxious; a little mobile; suggests improvement; sleep is still poor but appears to be improving.  bp continues with swings in diastolic bp.      Vital Signs Last 24 Hrs ;T(C): 36.4 (26 Apr 2022 08:45), Max: 37 (25 Apr 2022 17:07) ;T(F): 97.6 (26 Apr 2022 08:45), Max: 98.6 (25 Apr 2022 17:07) ;HR: 84 (26 Apr 2022 08:45) (82 - 84) ;BP: 140/62 (26 Apr 2022 08:45) (140/62 - 145/98) ;BP(mean): -- ;ABP: -- ;ABP(mean): -- ;RR: 18 (26 Apr 2022 08:45) (17 - 18) ;SpO2: 96% (26 Apr 2022 08:45) (96% - 97%) ; ;    ;;04/27: seen by cardiology scores 3 on stroke risk with 4 as cut off for too risky ; suggestion that patient be placed on warfarin; Dr. Simmons called and vm left for discussion; patient spoke to writer about breakfast and continues to be willing to consent to ECT ; a change from past discussions; however appears to have improved on Seroquel alone.    ;;04/28: above risk score related to Warfarin use and may not apply to this patient; however cardiology did score patient as low risk; Dr. Simmons contacted and treatment discussed with plan to monitor progress on Seroquel and Zoloft as patient had been stable on Zyprexa and Zoloft for many years; however this morning patient returns to being sad; irritable; "terrible"; and silent when some questions asked despite responding to others; consented for ECT yesterday; first treatment RUL in am tomorrow.      4/28: First ECT session scheduled for tomorrow, 4/29/2022. Seen in room today. NAD. A&Ox3. Still tremulous. Exhibiting new difficulty with getting up from a seated position and with ambulation. Very irritable. Pt ended interview abruptly when lunch was announced. Continues on Seroquel and Zoloft for anxiety and mood.      4/29: Seen s/p his first ECT session, which occurred earlier this morning. NAD. A&Ox3. Somnolent, but arousable, but almost immediately went back to sleep during conversation. Slurring his speech. Last night, pt had a witnessed near-fall, after which his mental status exam was unchanged from baseline. Decision was made to allow pt to rest and return later. Seen 3.5 hours later. Somnolence resolved. Mildly tremulous initially, but it improved with time.  89-year-old, left-handed retired man, domiciled with daughter Brianda (), w/ PMH of hearing loss using hearing aid, colitis, HLD, HTN, on Xarelto 15mg daily (PCP Dr Barrett  #1 #5), PPH of depression, multiple psych hospitalization requiring ECT, last 4 years ago at [facility], currently on sertraline 50mg bid, olanzapine 5mg qd, sees Dr Josh Simmons () for outpatient, presenting due to worsening depression and obsessive thought.   MoCA score 20/30. Due to current active depressive symptoms, nonresponsive to outpatient treatment, and collateral from psychiatrist and family, patient meets involuntary admission criteria. He has been admitted to 8U for stabilization and possible ECT as inpatient.     Pt cleared for ECT by medicine on 4/15/22 and cleared by Cardiology on 4/27/2022.     Seen s/p his first ECT session, which occurred earlier this morning. NAD. A&Ox3. Somnolent, but arousable, but almost immediately went back to sleep during conversation. Slurring his speech. Last night, pt had a witnessed near-fall, his mental status exam was unchanged from baseline. Decision was made to allow pt to rest and return later.     -------------------------------------------------------------------------------------------------------------------------------------------------------------------------------------  Interviewer returned 3.5 hours after initial visit. Patient's was much less somnolent; very easily arousable. Pt seemed irritable. When asked "how are you doing?" Pt replied "I haven't eaten in two days," which is inconsistent with first hand accounts by interviewer, who noted pt eating lunch yesterday. Pt then required assistance sitting up and getting up from a seated position. He exhibited whole body tremors that was severe initially and then subsided with time. Pt was able to walk to his desk for lunch with assistance, putting one hand on a walker and the wall. Pt then sat down by himself and was observed eating his salad and tuna heartily, with a mild intention tremor.     Pt stated he felt "alright. I guess," when asked how he was doing by an additional interviewer, which is a marked improvement from previous answers. Pt endorsed feeling dizzy and stiffer than usual. He also stated that it is not normal for him to be shaking as he is. Mild cogwheel rigidity was appreciated in the right upper extremity. Onset of symptoms correlates with initiation of anti-psychotic augmentation of anti-depressant regimen. Pt is currently on Seroquel 50 as adjuvant treatment. Decision made to d/c Seroquel.   -------------------------------------------------------------------------------------------------------------------------------------------------------------------------------------    MDD w/psychotic features  Plan:  - discontinue Seroquel 2/2 to suspicion for drug-induced parkinsonian  - monitor EPS (tremor, cogwheel rigidity and gait) daily after discontinuation of seroquel   - Continue sertraline 100mg daily  - Seen by medicine for ECT clearance 4/15: They determine pt is Low-intermediate risk for low risk procedure  - Continue medication as follows:     Xarelto 15mg daily with meals     Losartan to 25 mg QD; 4/18/22, decrease Losartan to 25 mg (previously 50mg) daily as BPs have been low 4/18/22 and pt was orthostatics positive     Discontinued Furosemide 4/18/ 22; previously 20mg twice daily     Atorvastatin 20mg daily     Vit D3 2000 units     Magnesium 500mg     Mesalamine 2.4 mg     Mirabegron 50mg po QD (pt occasionally refusing)    Medicine consult, 4/15:  #Pre Op Risk   - EKG with rate controlled Afib, LAFB  - Re HTN: maintain /90   - Re: Afib: maintain anticoagulation with home xarelto   - Low-intermediate risk for low risk procedure    4/12: Pt reluctant to engage today; does not want to be on [facility]. Equivocating regarding ECT. Clearly depressed. MoCA 20/30. Will continue to monitor and to discuss ECT.    4/13: Pt remains ambivalent about treatment. Not asking for discharge today. Refused some labs, will try to get these again. Alert and oriented. No SI. Will follow up anemia.    4/14: at this point patient is irritable and negativistic refusing ECT as well as some medications and lab work.  Will move Zyprexa to nighttime (sleep an issue?); and Zoloft 100mg to am; continue to attempt to discuss need for labs and possible ECT if medical evaluation can be completed. Need to assess/address anemia.  Refusal may require consideration of TOO.    4/15: Pt continues to refuse ECT and routine blood work. Pt was seen by medicine team for clearance for ECT, after which he was determined to be "Low-intermediate risk for low risk procedure. Pt's daughter, Valery, states she is his medical proxy and will bring in form next week.    4/18: Lasix was held as BPs were low. BPs continued to be low and pt was newly orthostatic positive, tf decrease Losartan to 25 mg (previously 50mg) daily CTM. Pt still severely depressed; outpatient psychiatrist and daughters endorse ECT as having been the only effective treatment in the past. Pt continues to be ambivalent about it. He will not answer regarding SI today but endorsed passive SI on previous interview. Still with significant PMR and latency.    4/19: mute to writer but stares; observed interacting with staff; irritable speech clear and coherent; by observation appears generally Alert; oriented; cognition intact; speech clear; no tremor or evidence of movement impairment. No behavioral issues.      4/20: Continues to be despondent and ambivalent about treatments. Endorses hearing music which may be consistent with auditory hallucinations. Stated he was "hearing music coming from the room and following him around with words that he couldn't quite make out." Similar episode "last time I was suffering from this condition." Pt states the music "does not bother him." This resolved after 10 minutes. Otw no SI/HI/VH//delusions. Walking halls as if in fugue.  ADLs:  Barthel Index for Activities of Daily Living (ADL) from [employer]  on 4/20/2022  RESULT SUMMARY:  65 points  Minimally dependent  INPUTS:  Feeding —> 5 = Needs help  Bathing —> 0 = Unable  Grooming —> 5 = Independent  Dressing —> 10 = Independent  Bowel control —> 5 = Occasional accident  Bladder control —> 5 = Occasional accident  Toilet use —> 5 = Needs help  Transfers (bed to chair and back) —> 15 = Independent  Mobility on level surfaces —> 15 = Independent (but may use any aid, e.g. stick) >50 yards  Stairs —> 0 = Unable    4/21/22: Yesterday (4/20) , pt "consented  verbally " to ECT in presence of RN and daughter. Today, again seems ambivalent. Pt appears more irritable today, shouting at interviewer. Did not ask interviewer to stay in room. Pt had "panic" episode yesterday and was given Zyprexa, which seemed to improve panic. Pt is compliant with all meds today.  Patient did not wish to sign consent and had no response when asked about this issue; however less clingy allowing staff to leave the room without panic.   Reported that patient had panic after "consent" was allegedly given on 4/20 verbally.       ;;04/22: patient states he is anxious rather than sad; Not endorsing  suicidal or homicidal ideation intent or plans; no mention of auditory or visual hallucinations ; does not consent to ECT (at this time).  Appeared less distraught today than yesterday.  However still feels "terrible";  Sleep  appetite ok; no pain issues. Alert; oriented; cognition intact; speech clear; but has tremor at rest thought to be EPS possibly secondary to Zyprexa.    ;;04/25: patient focused on leaving; continues somewhat irritable and feels "terrible" but less so when approached this morning.  Sleep an issue.    tremor not prominent.  makes better eye contact; less thought blocking;  a little more spontaneous.    ;;04/26: "The same"; patient a little more spontaneous; wants to go home; no indication of interest in ECT; appears a little less anxious; a little mobile; suggests improvement; sleep is still poor but appears to be improving.  bp continues with swings in diastolic bp.      Vital Signs Last 24 Hrs ;T(C): 36.4 (26 Apr 2022 08:45), Max: 37 (25 Apr 2022 17:07) ;T(F): 97.6 (26 Apr 2022 08:45), Max: 98.6 (25 Apr 2022 17:07) ;HR: 84 (26 Apr 2022 08:45) (82 - 84) ;BP: 140/62 (26 Apr 2022 08:45) (140/62 - 145/98) ;BP(mean): -- ;ABP: -- ;ABP(mean): -- ;RR: 18 (26 Apr 2022 08:45) (17 - 18) ;SpO2: 96% (26 Apr 2022 08:45) (96% - 97%) ; ;    ;;04/27: seen by cardiology scores 3 on stroke risk with 4 as cut off for too risky ; suggestion that patient be placed on warfarin; Dr. Simmons called and vm left for discussion; patient spoke to writer about breakfast and continues to be willing to consent to ECT ; a change from past discussions; however appears to have improved on Seroquel alone.    ;;04/28: above risk score related to Warfarin use and may not apply to this patient; however cardiology did score patient as low risk; Dr. Simmons contacted and treatment discussed with plan to monitor progress on Seroquel and Zoloft as patient had been stable on Zyprexa and Zoloft for many years; however this morning patient returns to being sad; irritable; "terrible"; and silent when some questions asked despite responding to others; consented for ECT yesterday; first treatment RUL in am tomorrow.      4/28: First ECT session scheduled for tomorrow, 4/29/2022. Seen in room today. NAD. A&Ox3. Still tremulous. Exhibiting new difficulty with getting up from a seated position and with ambulation. Very irritable. Pt ended interview abruptly when lunch was announced. Continues on Seroquel and Zoloft for anxiety and mood.      4/29: Seen s/p his first ECT session, which occurred earlier this morning. NAD. A&Ox3. Somnolent, but arousable, but almost immediately went back to sleep during conversation. Slurring his speech. Last night, pt had a witnessed near-fall, after which his mental status exam was unchanged from baseline. Decision was made to allow pt to rest and return later. Seen 3.5 hours later. Somnolence resolved. Mildly tremulous initially, but it improved with time. Pt stated he felt "alright. I guess," when asked how he was doing by an additional interviewer, which is a marked improvement from previous answers. Pt endorsed feeling dizzy and stiffer than usual. He also stated that it is not normal for him to be shaking as he is. Mild cogwheel rigidity was appreciated in the right upper extremity. Onset of symptoms correlates with initiation of anti-psychotic augmentation of anti-depressant regimen. Pt is currently on Seroquel 50 as adjuvant treatment. Decision made to d/c Seroquel.  89-year-old, left-handed retired man, domiciled with daughter Brianda (), w/ PMH of hearing loss using hearing aid, colitis, HLD, HTN, on Xarelto 15mg daily (PCP Dr Barrett  #1 #5), PPH of depression, multiple psych hospitalization requiring ECT, last 4 years ago at [facility], currently on sertraline 50mg bid, olanzapine 5mg qd, sees Dr Josh Simmons () for outpatient, presenting due to worsening depression and obsessive thought.   MoCA score 20/30. Due to current active depressive symptoms, nonresponsive to outpatient treatment, and collateral from psychiatrist and family, patient meets involuntary admission criteria. He has been admitted to 8U for stabilization and possible ECT as inpatient.     Pt cleared for ECT by medicine on 4/15/22 and cleared by Cardiology on 4/27/2022.      MDD w/psychotic features  Plan:  - Received first unilateral ECT session today (4/29); next due Monday 5/2  - Discontinue Seroquel 2/2 to suspicion for drug-induced parkinsonian features and/or dizziness  - Continue sertraline 100mg daily  - Seen by medicine for ECT clearance 4/15: They determine pt is Low-intermediate risk for low risk procedure  - Continue medication as follows:     Xarelto 15mg daily with meals     Losartan to 25 mg QD; 4/18/22, decrease Losartan to 25 mg (previously 50mg) daily as BPs have been low 4/18/22 and pt was orthostatics positive     Discontinued Furosemide 4/18/ 22; previously 20mg twice daily     Atorvastatin 20mg daily     Vit D3 2000 units     Magnesium 500mg     Mesalamine 2.4 mg     Mirabegron 50mg po QD (pt occasionally refusing)    Medicine consult, 4/15:  #Pre Op Risk   - EKG with rate controlled Afib, LAFB  - Re HTN: maintain /90   - Re: Afib: maintain anticoagulation with home xarelto   - Low-intermediate risk for low risk procedure    4/12: Pt reluctant to engage today; does not want to be on [facility]. Equivocating regarding ECT. Clearly depressed. MoCA 20/30. Will continue to monitor and to discuss ECT.    4/13: Pt remains ambivalent about treatment. Not asking for discharge today. Refused some labs, will try to get these again. Alert and oriented. No SI. Will follow up anemia.    4/14: at this point patient is irritable and negativistic refusing ECT as well as some medications and lab work.  Will move Zyprexa to nighttime (sleep an issue?); and Zoloft 100mg to am; continue to attempt to discuss need for labs and possible ECT if medical evaluation can be completed. Need to assess/address anemia.  Refusal may require consideration of TOO.    4/15: Pt continues to refuse ECT and routine blood work. Pt was seen by medicine team for clearance for ECT, after which he was determined to be "Low-intermediate risk for low risk procedure. Pt's daughter, Valery, states she is his medical proxy and will bring in form next week.    4/18: Lasix was held as BPs were low. BPs continued to be low and pt was newly orthostatic positive, tf decrease Losartan to 25 mg (previously 50mg) daily CTM. Pt still severely depressed; outpatient psychiatrist and daughters endorse ECT as having been the only effective treatment in the past. Pt continues to be ambivalent about it. He will not answer regarding SI today but endorsed passive SI on previous interview. Still with significant PMR and latency.    4/19: mute to writer but stares; observed interacting with staff; irritable speech clear and coherent; by observation appears generally Alert; oriented; cognition intact; speech clear; no tremor or evidence of movement impairment. No behavioral issues.      4/20: Continues to be despondent and ambivalent about treatments. Endorses hearing music which may be consistent with auditory hallucinations. Stated he was "hearing music coming from the room and following him around with words that he couldn't quite make out." Similar episode "last time I was suffering from this condition." Pt states the music "does not bother him." This resolved after 10 minutes. Otw no SI/HI/VH//delusions. Walking halls as if in fugue.  ADLs:  Barthel Index for Activities of Daily Living (ADL) from [facility]  on 4/20/2022  RESULT SUMMARY:  65 points  Minimally dependent  INPUTS:  Feeding —> 5 = Needs help  Bathing —> 0 = Unable  Grooming —> 5 = Independent  Dressing —> 10 = Independent  Bowel control —> 5 = Occasional accident  Bladder control —> 5 = Occasional accident  Toilet use —> 5 = Needs help  Transfers (bed to chair and back) —> 15 = Independent  Mobility on level surfaces —> 15 = Independent (but may use any aid, e.g. stick) >50 yards  Stairs —> 0 = Unable    4/21/22: Yesterday (4/20) , pt "consented  verbally " to ECT in presence of RN and daughter. Today, again seems ambivalent. Pt appears more irritable today, shouting at interviewer. Did not ask interviewer to stay in room. Pt had "panic" episode yesterday and was given Zyprexa, which seemed to improve panic. Pt is compliant with all meds today.  Patient did not wish to sign consent and had no response when asked about this issue; however less clingy allowing staff to leave the room without panic.   Reported that patient had panic after "consent" was allegedly given on 4/20 verbally.       ;;04/22: patient states he is anxious rather than sad; Not endorsing  suicidal or homicidal ideation intent or plans; no mention of auditory or visual hallucinations ; does not consent to ECT (at this time).  Appeared less distraught today than yesterday.  However still feels "terrible";  Sleep  appetite ok; no pain issues. Alert; oriented; cognition intact; speech clear; but has tremor at rest thought to be EPS possibly secondary to Zyprexa.    ;;04/25: patient focused on leaving; continues somewhat irritable and feels "terrible" but less so when approached this morning.  Sleep an issue.    tremor not prominent.  makes better eye contact; less thought blocking;  a little more spontaneous.    ;;04/26: "The same"; patient a little more spontaneous; wants to go home; no indication of interest in ECT; appears a little less anxious; a little mobile; suggests improvement; sleep is still poor but appears to be improving.  bp continues with swings in diastolic bp.      Vital Signs Last 24 Hrs ;T(C): 36.4 (26 Apr 2022 08:45), Max: 37 (25 Apr 2022 17:07) ;T(F): 97.6 (26 Apr 2022 08:45), Max: 98.6 (25 Apr 2022 17:07) ;HR: 84 (26 Apr 2022 08:45) (82 - 84) ;BP: 140/62 (26 Apr 2022 08:45) (140/62 - 145/98) ;BP(mean): -- ;ABP: -- ;ABP(mean): -- ;RR: 18 (26 Apr 2022 08:45) (17 - 18) ;SpO2: 96% (26 Apr 2022 08:45) (96% - 97%) ; ;    ;;04/27: seen by cardiology scores 3 on stroke risk with 4 as cut off for too risky ; suggestion that patient be placed on warfarin; Dr. Simmons called and vm left for discussion; patient spoke to writer about breakfast and continues to be willing to consent to ECT ; a change from past discussions; however appears to have improved on Seroquel alone.    ;;04/28: above risk score related to Warfarin use and may not apply to this patient; however cardiology did score patient as low risk; Dr. Simmons contacted and treatment discussed with plan to monitor progress on Seroquel and Zoloft as patient had been stable on Zyprexa and Zoloft for many years; however this morning patient returns to being sad; irritable; "terrible"; and silent when some questions asked despite responding to others; consented for ECT yesterday; first treatment RUL in am tomorrow.      4/28: First ECT session scheduled for tomorrow, 4/29/2022. Seen in room today. NAD. A&Ox3. Still tremulous. Exhibiting new difficulty with getting up from a seated position and with ambulation. Very irritable. Pt ended interview abruptly when lunch was announced. Continues on Seroquel and Zoloft for anxiety and mood.      4/29: Seen s/p his first ECT session, which occurred earlier this morning. NAD. A&Ox3. Somnolent, but arousable, but almost immediately went back to sleep during conversation. Slurring his speech. Last night, pt had a witnessed near-fall, after which his mental status exam was unchanged from baseline. Decision was made to allow pt to rest and return later.  Seen 3.5 hours later. Somnolence resolved. Mildly tremulous initially, but it improved with time.  Mood appeared somewhat improved though still irritable.  Pt is endorsing feeling dizzy and stiffer than when he got to [facility]. He also stated that it is not normal for him to be shaking as he is. Mild cogwheel rigidity was appreciated in the right upper extremity. Onset of symptoms correlates with initiation of anti-psychotic augmentation of anti-depressant regimen. Pt is currently on Seroquel 50 as adjuvant treatment. Decision made to d/c Seroquel.  Of note: Per UpToDate, dizziness can occur in 1-3% of patients taking mirabegron (and in up to 9% with mesalamine but he has not taken this since April 23rd).

## 2022-04-29 NOTE — BH INPATIENT PSYCHIATRY PROGRESS NOTE - NSBHFUPINTERVALHXFT_PSY_A_CORE
Pt is left-handed. NAD. A&Ox3. Seen s/p his first ECT session, which occurred earlier this morning.    Last night, pt had a witnessed near-fall, 2/2 to his new onset gait instability vs his new knee pain, but did not hit his head. After his near fall, his mental status exam was unchanged from baseline.     Pt was seen in room today. Somnolent, but arousable. Slurring his speech. Responded to his name. When asked how he was feeling, pt responded, "I don't know." When asked where he was, he responded, "[facility]." Needed multiple prompts to open his eyes as he'd close them during interview. Decision was made to allow pt to rest and return later. Pt is left-handed. NAD. A&Ox3. Seen s/p his first ECT session, which occurred earlier this morning.    Last night, pt had a witnessed near-fall, 2/2 to his new onset gait instability vs his new knee pain, but did not hit his head. After his near fall, his mental status exam was unchanged from baseline.     Pt was seen in room today. Somnolent, but arousable. Slurring his speech. Responded to his name. When asked how he was feeling, pt responded, "I don't know." When asked where he was, he responded, "[facility]." Needed multiple prompts to open his eyes as he'd close them during interview. Decision was made to allow pt to rest and return later.    Interviewer returned 3.5 hours after initial visit. Patient's was much less somnolent; very easily arousable. Pt seemed irritable. When asked "how are you doing?" Pt replied "I haven't eaten in two days," which is inconsistent with first hand accounts by interviewer, who noted pt eating lunch yesterday. Pt then required assistance sitting up and getting up from a seated position. He exhibited whole body tremors throughout the movements. Pt was able to walk to his desk for lunch with assistance, putting one hand on a walker and the wall. Pt then sat down by himself and was observed eating his salad and tuna heartily.  Pt is left-handed. NAD. A&Ox3. Seen s/p his first ECT session, which occurred earlier this morning.    Last night, pt had a witnessed near-fall, 2/2 to his new onset gait instability vs his new knee pain, but did not hit his head. After his near fall, his mental status exam was unchanged from baseline.     Pt was seen in room today. Somnolent, but arousable. Slurring his speech. Responded to his name. When asked how he was feeling, pt responded, "I don't know." When asked where he was, he responded, "[facility]." Needed multiple prompts to open his eyes as he'd close them during interview. Decision was made to allow pt to rest and return later.    Interviewer returned 3.5 hours after initial visit. Patient's was much less somnolent; very easily arousable. Pt seemed irritable. When asked "how are you doing?" Pt replied "I haven't eaten in two days," which is inconsistent with first hand accounts by interviewer, who noted pt eating lunch yesterday. Pt then required assistance sitting up and getting up from a seated position. He exhibited whole body tremors throughout the movements. Pt was able to walk to his desk for lunch with assistance, putting one hand on a walker and the wall. Pt then sat down by himself and was observed eating his salad and tuna heartily.     Pt stated he felt "alright. I guess," when asked how he was doing by an additional interviewer, which is a marked improvement from previous answers. Pt endorsed feeling dizzy and stiffer than usual. He also stated that it is not normal for him to be shaking as he is. Mild cogwheel rigidity was appreciated in the right upper extremity. Onset of symptoms correlates with initiation of anti-psychotic augmentation of anti-depressant regimen.  Pt is left-handed. NAD. A&Ox3. Seen s/p his first ECT session, which occurred earlier this morning.    Last night, pt had a witnessed near-fall, 2/2 to his new onset gait instability vs his new knee pain, but did not hit his head. After his near fall, his mental status exam was unchanged from baseline.    Pt was seen in room today. Somnolent, but arousable. Slurring his speech. Responded to his name. When asked how he was feeling, pt responded, "I don't know." When asked where he was, he responded, "[facility]." Needed multiple prompts to open his eyes as he'd close them during interview. Decision was made to allow pt to rest and return later.      Interviewer returned 3.5 hours after initial visit. Patient's was much less somnolent; very easily arousable. Pt seemed irritable. When asked "how are you doing?" Pt replied "I haven't eaten in two days," which is inconsistent with first hand accounts by interviewer, who noted pt eating lunch yesterday. Pt then required assistance sitting up and getting up from a seated position. He exhibited whole body tremors throughout the movements. Pt was able to walk to his desk for lunch with assistance, putting one hand on a walker and the wall. Pt then sat down by himself and was observed eating his salad and tuna heartily.    Pt stated he felt "alright. I guess," when asked how he was doing by an additional interviewer (which is a marked improvement from previous answers). Pt endorsed feeling dizzy and stiffer than usual. He also stated that it is not normal for him to be shaking as he is. Pt's passive arm movement was assessed: intermittent freezing and moving consistent with cogwheel rigidity was appreciated in the right upper extremity.      Of note: Per UpToDate, dizziness can occur in 1-3% of patients taking mirabegron (and in up to 9% with mesalamine but he has not taken this since April 23rd).

## 2022-04-29 NOTE — BH INPATIENT PSYCHIATRY PROGRESS NOTE - NSBHCHARTREVIEWVS_PSY_A_CORE FT
Vital Signs Last 24 Hrs  T(C): 35.6 (04-29-22 @ 08:25), Max: 36.8 (04-28-22 @ 16:30)  T(F): 96 (04-29-22 @ 08:25), Max: 98.3 (04-28-22 @ 16:30)  HR: 72 (04-29-22 @ 09:05) (72 - 87)  BP: 119/57 (04-29-22 @ 08:55) (109/45 - 144/74)  BP(mean): 82 (04-29-22 @ 08:55) (82 - 95)  RR: 16 (04-29-22 @ 09:05) (12 - 18)  SpO2: 95% (04-29-22 @ 09:05) (95% - 97%)     Vital Signs Last 24 Hrs  T(C): 36.4 (04-29-22 @ 09:15), Max: 36.8 (04-28-22 @ 16:30)  T(F): 97.5 (04-29-22 @ 09:15), Max: 98.3 (04-28-22 @ 16:30)  HR: 72 (04-29-22 @ 09:15) (72 - 87)  BP: 127/50 (04-29-22 @ 09:15) (109/45 - 144/74)  BP(mean): 82 (04-29-22 @ 08:55) (82 - 95)  RR: 16 (04-29-22 @ 09:05) (12 - 18)  SpO2: 95% (04-29-22 @ 09:05) (95% - 97%)

## 2022-04-29 NOTE — BH INPATIENT PSYCHIATRY PROGRESS NOTE - CURRENT MEDICATION
MEDICATIONS  (STANDING):  atorvastatin 20 milliGRAM(s) Oral daily  cholecalciferol 2000 Unit(s) Oral daily  magnesium oxide 400 milliGRAM(s) Oral daily  mesalamine DR (24-Hour) Tablet 2.4 Gram(s) Oral <User Schedule>  metoprolol succinate ER 25 milliGRAM(s) Oral daily  mirabegron ER 50 milliGRAM(s) Oral daily  QUEtiapine 50 milliGRAM(s) Oral at bedtime  rivaroxaban 15 milliGRAM(s) Oral daily  senna 2 Tablet(s) Oral at bedtime  sertraline 100 milliGRAM(s) Oral daily    MEDICATIONS  (PRN):  magnesium hydroxide Suspension 30 milliLiter(s) Oral daily PRN Constipation  polyethylene glycol 3350 17 Gram(s) Oral daily PRN Constipation   MEDICATIONS  (STANDING):  atorvastatin 20 milliGRAM(s) Oral daily  cholecalciferol 2000 Unit(s) Oral daily  magnesium oxide 400 milliGRAM(s) Oral daily  mesalamine DR (24-Hour) Tablet 2.4 Gram(s) Oral <User Schedule>  metoprolol succinate ER 25 milliGRAM(s) Oral daily  mirabegron ER 50 milliGRAM(s) Oral daily  rivaroxaban 15 milliGRAM(s) Oral daily  senna 2 Tablet(s) Oral at bedtime  sertraline 100 milliGRAM(s) Oral daily    MEDICATIONS  (PRN):  magnesium hydroxide Suspension 30 milliLiter(s) Oral daily PRN Constipation  polyethylene glycol 3350 17 Gram(s) Oral daily PRN Constipation

## 2022-04-30 RX ADMIN — ATORVASTATIN CALCIUM 20 MILLIGRAM(S): 80 TABLET, FILM COATED ORAL at 21:52

## 2022-04-30 RX ADMIN — Medication 25 MILLIGRAM(S): at 10:41

## 2022-04-30 RX ADMIN — MAGNESIUM OXIDE 400 MG ORAL TABLET 400 MILLIGRAM(S): 241.3 TABLET ORAL at 10:42

## 2022-04-30 RX ADMIN — SENNA PLUS 2 TABLET(S): 8.6 TABLET ORAL at 21:52

## 2022-04-30 RX ADMIN — RIVAROXABAN 15 MILLIGRAM(S): KIT at 10:41

## 2022-04-30 RX ADMIN — SERTRALINE 100 MILLIGRAM(S): 25 TABLET, FILM COATED ORAL at 10:41

## 2022-04-30 RX ADMIN — Medication 2000 UNIT(S): at 10:41

## 2022-04-30 RX ADMIN — MIRABEGRON 50 MILLIGRAM(S): 50 TABLET, EXTENDED RELEASE ORAL at 10:47

## 2022-04-30 NOTE — PHYSICAL THERAPY INITIAL EVALUATION ADULT - GAIT DEVIATIONS NOTED, PT EVAL
Diagnosis: Chronic lymphocytic leukemia (CLL), B-cell      Treatment: Given. Patient compliant with Imbruvica.      Cycle and Day: C14     Dr. Crow is the supervising clinician today     Oral Chemo Cycle/Start Date  Cycle Number Started: C14 (10/15/21 1000)  Enter confirmed patient start date of cycle: 10/15/21 (10/15/21 1000)  Oral medications?: Yes (10/15/21 1000)  Ibrutinib (IMBRUVICA): 420mg daily (10/15/21 1000)  Indication & Administration Schedule - Follow Up Education  Indication: Patient Understands & Able to State Diagnosis & Goal of Treatment?: Yes (10/15/21 1000)  Administration Schedule: Dose/Frequency Changed or Medication Discontinued by the Provider?: No (10/15/21 1000)  Total Daily Dose: : 420mg  (10/15/21 1000)  Administration Schedule: Patient is taking the medication appropriately based on assessment of Strength of Medication, Total Daily Dose, Administration Schedule & Special Instructions (i.e. do not break, crush, chew; with/without food?, need to separate from other medications?, following unusual schedule?, other special instruction?) Noted During Initial Education or Follow-Up: Yes (10/15/21 1000)  Toxicity Management - Follow Up Education  Toxicity Assessment Completed:: Yes (10/15/21 1000)  Adherence Assessment - Follow Up Education  Patient is Currently Adherent with Oral Chemotherapy : Yes (10/15/21 1000)  Risk Factors for Non-Adherence Identified: No (10/15/21 1000)    Oral Chemo: Ibrutinib (Imbruvica)     Toxicity Assessment: Patient denies any       Vital Signs: BP: 138/68 (10/15/21 1010)  Heart Rate: 78 (10/15/21 1010)  Resp: 18 (10/15/21 1010)  Temp: 97.3 °F (36.3 °C) (10/15/21 1010)  Temp src: Temporal (10/15/21 1010)  Weight: 76.4 kg (168 lb 6.9 oz) (10/15/21 1010)  Pain Score: 5  (10/15/21 1010)  Pain Loc: Back (10/15/21 1010)      Allergies:    ALLERGIES:   Allergen Reactions   • Bactrim Ds Other (See Comments)     (confirmed by H&P by Dr.Crow) SULFA   • Gemfibrozil NAUSEA          Medications: The medication list was reviewed. No changes noted.     Psychosocial assessment completed? Yes, no concerns      ECOG performance status: 1-Restricted in physically strenuous activity but ambulatory and able to carry out work of a light or sedentary nature, e.g., light house work, office work (10/15/21 1021)      Labs reviewed with the patient:  Yes    Nursing Note:  Bul[place] here for a visit with Dr. Crow and nurse. He is currently on C14 of Ibrutinib. He denies any issues on this medication. New prescription sent to [facility]. He is schedule to return to clinic in 4 weeks with labs prior to the visit. He verbalized understanding    Patient condition stable during treatment? Yes  Questions were answered and understanding was verbalized. Yes  Education provided Yes and Previously given  Next appointment scheduled Yes  Patient Discharged to home Ambulatory with Self     wide DREW/decreased jim/decreased step length/decreased weight-shifting ability

## 2022-04-30 NOTE — PHYSICAL THERAPY INITIAL EVALUATION ADULT - ACTIVE RANGE OF MOTION EXAMINATION, REHAB EVAL
RUE limited by sharp shoulder pain/Left UE Active ROM was WFL (within functional limits)/bilateral  lower extremity Active ROM was WFL (within functional limits)

## 2022-04-30 NOTE — BH INPATIENT PSYCHIATRY PROGRESS NOTE - NSBHASSESSSUMMFT_PSY_ALL_CORE
89-year-old, left-handed retired man, domiciled with daughter Brianda (), w/ PMH of hearing loss using hearing aid, colitis, HLD, HTN, on Xarelto 15mg daily (PCP Dr Barrett  #1 #5), PPH of depression, multiple psych hospitalization requiring ECT, last 4 years ago at [facility], currently on sertraline 50mg bid, olanzapine 5mg qd, sees Dr Josh Simmons () for outpatient, presenting due to worsening depression and obsessive thought.   MoCA score 20/30. Due to current active depressive symptoms, nonresponsive to outpatient treatment, and collateral from psychiatrist and family, patient meets involuntary admission criteria. He has been admitted to 8U for stabilization and possible ECT as inpatient.     Pt cleared for ECT by medicine on 4/15/22 and cleared by Cardiology on 4/27/2022.      MDD w/psychotic features  Plan:  - Received first unilateral ECT session today (4/29); next due Monday 5/2  - Seroquel has been discontinued 2/2 to suspicion for drug-induced parkinsonian features and/or dizziness  - Continue sertraline 100mg daily  - Seen by medicine for ECT clearance 4/15: They determine pt is Low-intermediate risk for low risk procedure  - Continue medication as follows:     Xarelto 15mg daily with meals     Losartan to 25 mg QD; 4/18/22, decrease Losartan to 25 mg (previously 50mg) daily as BPs have been low 4/18/22 and pt was orthostatics positive     Discontinued Furosemide 4/18/ 22; previously 20mg twice daily     Atorvastatin 20mg daily     Vit D3 2000 units     Magnesium 500mg     Mesalamine 2.4 mg     Mirabegron 50mg po QD (pt occasionally refusing)    Medicine consult, 4/15:  #Pre Op Risk   - EKG with rate controlled Afib, LAFB  - Re HTN: maintain /90   - Re: Afib: maintain anticoagulation with home xarelto   - Low-intermediate risk for low risk procedure    4/12: Pt reluctant to engage today; does not want to be on 8[facility]. Equivocating regarding ECT. Clearly depressed. MoCA 20/30. Will continue to monitor and to discuss ECT.    4/13: Pt remains ambivalent about treatment. Not asking for discharge today. Refused some labs, will try to get these again. Alert and oriented. No SI. Will follow up anemia.    4/14: at this point patient is irritable and negativistic refusing ECT as well as some medications and lab work.  Will move Zyprexa to nighttime (sleep an issue?); and Zoloft 100mg to am; continue to attempt to discuss need for labs and possible ECT if medical evaluation can be completed. Need to assess/address anemia.  Refusal may require consideration of TOO.    4/15: Pt continues to refuse ECT and routine blood work. Pt was seen by medicine team for clearance for ECT, after which he was determined to be "Low-intermediate risk for low risk procedure. Pt's daughter, Valery, states she is his medical proxy and will bring in form next week.    4/18: Lasix was held as BPs were low. BPs continued to be low and pt was newly orthostatic positive, tf decrease Losartan to 25 mg (previously 50mg) daily CTM. Pt still severely depressed; outpatient psychiatrist and daughters endorse ECT as having been the only effective treatment in the past. Pt continues to be ambivalent about it. He will not answer regarding SI today but endorsed passive SI on previous interview. Still with significant PMR and latency.    4/19: mute to writer but stares; observed interacting with staff; irritable speech clear and coherent; by observation appears generally Alert; oriented; cognition intact; speech clear; no tremor or evidence of movement impairment. No behavioral issues.      4/20: Continues to be despondent and ambivalent about treatments. Endorses hearing music which may be consistent with auditory hallucinations. Stated he was "hearing music coming from the room and following him around with words that he couldn't quite make out." Similar episode "last time I was suffering from this condition." Pt states the music "does not bother him." This resolved after 10 minutes. Otw no SI/HI/VH//delusions. Walking halls as if in fugue.  ADLs:  Barthel Index for Activities of Daily Living (ADL) from [employer]  on 4/20/2022  RESULT SUMMARY:  65 points  Minimally dependent  INPUTS:  Feeding —> 5 = Needs help  Bathing —> 0 = Unable  Grooming —> 5 = Independent  Dressing —> 10 = Independent  Bowel control —> 5 = Occasional accident  Bladder control —> 5 = Occasional accident  Toilet use —> 5 = Needs help  Transfers (bed to chair and back) —> 15 = Independent  Mobility on level surfaces —> 15 = Independent (but may use any aid, e.g. stick) >50 yards  Stairs —> 0 = Unable    4/21/22: Yesterday (4/20) , pt "consented  verbally " to ECT in presence of RN and daughter. Today, again seems ambivalent. Pt appears more irritable today, shouting at interviewer. Did not ask interviewer to stay in room. Pt had "panic" episode yesterday and was given Zyprexa, which seemed to improve panic. Pt is compliant with all meds today.  Patient did not wish to sign consent and had no response when asked about this issue; however less clingy allowing staff to leave the room without panic.   Reported that patient had panic after "consent" was allegedly given on 4/20 verbally.       ;;04/22: patient states he is anxious rather than sad; Not endorsing  suicidal or homicidal ideation intent or plans; no mention of auditory or visual hallucinations ; does not consent to ECT (at this time).  Appeared less distraught today than yesterday.  However still feels "terrible";  Sleep  appetite ok; no pain issues. Alert; oriented; cognition intact; speech clear; but has tremor at rest thought to be EPS possibly secondary to Zyprexa.    ;;04/25: patient focused on leaving; continues somewhat irritable and feels "terrible" but less so when approached this morning.  Sleep an issue.    tremor not prominent.  makes better eye contact; less thought blocking;  a little more spontaneous.    ;;04/26: "The same"; patient a little more spontaneous; wants to go home; no indication of interest in ECT; appears a little less anxious; a little mobile; suggests improvement; sleep is still poor but appears to be improving.  bp continues with swings in diastolic bp.      Vital Signs Last 24 Hrs ;T(C): 36.4 (26 Apr 2022 08:45), Max: 37 (25 Apr 2022 17:07) ;T(F): 97.6 (26 Apr 2022 08:45), Max: 98.6 (25 Apr 2022 17:07) ;HR: 84 (26 Apr 2022 08:45) (82 - 84) ;BP: 140/62 (26 Apr 2022 08:45) (140/62 - 145/98) ;BP(mean): -- ;ABP: -- ;ABP(mean): -- ;RR: 18 (26 Apr 2022 08:45) (17 - 18) ;SpO2: 96% (26 Apr 2022 08:45) (96% - 97%) ; ;    ;;04/27: seen by cardiology scores 3 on stroke risk with 4 as cut off for too risky ; suggestion that patient be placed on warfarin; Dr. Simmons called and vm left for discussion; patient spoke to writer about breakfast and continues to be willing to consent to ECT ; a change from past discussions; however appears to have improved on Seroquel alone.    ;;04/28: above risk score related to Warfarin use and may not apply to this patient; however cardiology did score patient as low risk; Dr. Simmons contacted and treatment discussed with plan to monitor progress on Seroquel and Zoloft as patient had been stable on Zyprexa and Zoloft for many years; however this morning patient returns to being sad; irritable; "terrible"; and silent when some questions asked despite responding to others; consented for ECT yesterday; first treatment RUL in am tomorrow.      4/28: First ECT session scheduled for tomorrow, 4/29/2022. Seen in room today. NAD. A&Ox3. Still tremulous. Exhibiting new difficulty with getting up from a seated position and with ambulation. Very irritable. Pt ended interview abruptly when lunch was announced. Continues on Seroquel and Zoloft for anxiety and mood.      4/29: Seen s/p his first ECT session, which occurred earlier this morning. NAD. A&Ox3. Somnolent, but arousable, but almost immediately went back to sleep during conversation. Slurring his speech. Last night, pt had a witnessed near-fall, after which his mental status exam was unchanged from baseline. Decision was made to allow pt to rest and return later.  Seen 3.5 hours later. Somnolence resolved. Mildly tremulous initially, but it improved with time.  Mood appeared somewhat improved though still irritable.  Pt is endorsing feeling dizzy and stiffer than when he got to [facility]. He also stated that it is not normal for him to be shaking as he is. Mild cogwheel rigidity was appreciated in the right upper extremity. Onset of symptoms correlates with initiation of anti-psychotic augmentation of anti-depressant regimen. Pt is currently on Seroquel 50 as adjuvant treatment. Decision made to d/c Seroquel.  Of note: Per UpToDate, dizziness can occur in 1-3% of patients taking mirabegron (and in up to 9% with mesalamine but he has not taken this since April 23rd).    4/30/22: pt less somnolent and more responsive than yesterday; denies SI/HI/AVH; expresses ambivalence about ECT

## 2022-04-30 NOTE — BH INPATIENT PSYCHIATRY PROGRESS NOTE - NSBHFUPINTERVALHXFT_PSY_A_CORE
Patient not somnolent or endorsing dizziness as is documented in yesterday's progress note. Pt was eating breakfast during interview; describes mood as "lousy." Reports he slept "semi-well," at least 6 hours. Denies SI/HI/AVH. When asked about SI, states "not yet." When asked to elaborate on what he means by that, patient states "just making conversation." Pt talked about generalized body pain, saying he feels pain in both upper extremities with movements; offered PRN pain medication but didn't want them at this time. Expressed ambivalence about continuing with ECT; writer encouraged patient to continue to think about ECT and discuss his preferences with the main weekday team; patient had no further questions or concerns.

## 2022-04-30 NOTE — PHYSICAL THERAPY INITIAL EVALUATION ADULT - TRANSFER SAFETY CONCERNS NOTED: SIT/STAND, REHAB EVAL
Pt performed STS from chair with arm rests; cued for scooting forward, safe hand placement and weight shifting to safely perform transfer./decreased weight-shifting ability

## 2022-04-30 NOTE — PHYSICAL THERAPY INITIAL EVALUATION ADULT - ADDITIONAL COMMENTS
Pt reports he lives in an apartment with his daughter, no DILLON and elevator access. He reports she does not work and stays with him in the home at all times but "lets see what happens now." Pt denies having any equipment in the home or needing assist prior to this admission. Will need to confirm details with SW/CM team.

## 2022-04-30 NOTE — BH INPATIENT PSYCHIATRY PROGRESS NOTE - CURRENT MEDICATION
MEDICATIONS  (STANDING):  atorvastatin 20 milliGRAM(s) Oral daily  cholecalciferol 2000 Unit(s) Oral daily  magnesium oxide 400 milliGRAM(s) Oral daily  mesalamine DR (24-Hour) Tablet 2.4 Gram(s) Oral <User Schedule>  metoprolol succinate ER 25 milliGRAM(s) Oral daily  mirabegron ER 50 milliGRAM(s) Oral daily  rivaroxaban 15 milliGRAM(s) Oral daily  senna 2 Tablet(s) Oral at bedtime  sertraline 100 milliGRAM(s) Oral daily    MEDICATIONS  (PRN):  magnesium hydroxide Suspension 30 milliLiter(s) Oral daily PRN Constipation  polyethylene glycol 3350 17 Gram(s) Oral daily PRN Constipation

## 2022-04-30 NOTE — PHYSICAL THERAPY INITIAL EVALUATION ADULT - PERTINENT HX OF CURRENT PROBLEM, REHAB EVAL
89yo M,, domiciled with daughter Brianda, w/ PMH of hearing loss using hearing aid, colitis, HLD, HTN, , PPH of depression, multiple psych hospitalization requiring ECT presenting to [facility] due to worsening depression and obsessive thought. 89yo M domiciled with daughter Brianda, w/ PMH of hearing loss using hearing aid, colitis, HLD, HTN, , PPH of depression, multiple psych hospitalization requiring ECT presenting to [facility] due to worsening depression and obsessive thought.

## 2022-04-30 NOTE — BH INPATIENT PSYCHIATRY PROGRESS NOTE - NSBHMETABOLIC_PSY_ALL_CORE_FT
BMI: BMI (kg/m2): 22.7 (04-29-22 @ 07:54)  HbA1c: A1C with Estimated Average Glucose Result: 5.7 % (04-13-22 @ 07:45)    Glucose: POCT Blood Glucose.: 190 mg/dL (04-19-22 @ 06:43)    BP: 127/50 (04-29-22 @ 09:15) (109/45 - 153/46)  Lipid Panel: Date/Time: 04-13-22 @ 07:45  Cholesterol, Serum: 120  Direct LDL: --  HDL Cholesterol, Serum: 60  Total Cholesterol/HDL Ration Measurement: --  Triglycerides, Serum: 50

## 2022-04-30 NOTE — BH INPATIENT PSYCHIATRY PROGRESS NOTE - OTHER
not assessed today patient seated throughout today's encounter  "Lousy"  Denies AVH Quite thin Denies SI/HI; no delusional content elicited

## 2022-04-30 NOTE — PHYSICAL THERAPY INITIAL EVALUATION ADULT - GAIT PATTERN USED, PT EVAL
Pt ambulated in hallway with decreased gait speed and mild unsteadiness. Pt with 1 episode of LOB with turning to the R, requiring Leon x1 to recover. Pt with increased RR upon return to room with HR 109bpm and spO2 94%, improved to 96bpm and spO2 97% with education on inspiration/expiration for fatigue./3-point gait

## 2022-04-30 NOTE — PHYSICAL THERAPY INITIAL EVALUATION ADULT - GENERAL OBSERVATIONS, REHAB EVAL
PT IE Completed. Pt received sitting at desk with 1:1 supervision, R hearing aid ([place]), +RA,+heplock, reporting shoulder pain and agreeable to work with PT, PRICILA Cota notified.Pt presents to [facility] with worsening depression and obsessive thought. PT exam shows impaired gait and balance with R shoulder and L knee pain. Pt completed STS and gait assessment.  Pt left as found with 1:1, +call doshi within reach, PRICILA Null notified. Pt can benefit from PT in order to improve quality of life by increasing strength/endurance/balance with functional mobility and ADLS. PT IE Completed. Pt received sitting at desk with 1:1 supervision, R hearing aid ([place]), +RA,+heplock, reporting shoulder pain and agreeable to work with PT, PRICILA Cota notified.Pt left as found with 1:1, +call doshi within reach, PRICILA Null notified. Pt can benefit from PT in order to improve quality of life by increasing strength/endurance/balance with functional mobility and ADLS.

## 2022-05-01 PROCEDURE — 99233 SBSQ HOSP IP/OBS HIGH 50: CPT

## 2022-05-01 RX ADMIN — RIVAROXABAN 15 MILLIGRAM(S): KIT at 15:14

## 2022-05-01 RX ADMIN — Medication 25 MILLIGRAM(S): at 15:13

## 2022-05-01 RX ADMIN — MIRABEGRON 50 MILLIGRAM(S): 50 TABLET, EXTENDED RELEASE ORAL at 15:12

## 2022-05-01 RX ADMIN — ATORVASTATIN CALCIUM 20 MILLIGRAM(S): 80 TABLET, FILM COATED ORAL at 20:45

## 2022-05-01 RX ADMIN — SERTRALINE 100 MILLIGRAM(S): 25 TABLET, FILM COATED ORAL at 15:13

## 2022-05-01 RX ADMIN — SENNA PLUS 2 TABLET(S): 8.6 TABLET ORAL at 20:45

## 2022-05-01 RX ADMIN — Medication 2.4 GRAM(S): at 15:11

## 2022-05-01 RX ADMIN — Medication 2000 UNIT(S): at 15:13

## 2022-05-01 RX ADMIN — MAGNESIUM OXIDE 400 MG ORAL TABLET 400 MILLIGRAM(S): 241.3 TABLET ORAL at 15:13

## 2022-05-01 NOTE — BH INPATIENT PSYCHIATRY PROGRESS NOTE - NSBHCHARTREVIEWVS_PSY_A_CORE FT
Vital Signs Last 24 Hrs  T(C): 36.4 (04-30-22 @ 17:27), Max: 37.2 (04-30-22 @ 09:00)  T(F): 97.6 (04-30-22 @ 17:27), Max: 98.9 (04-30-22 @ 09:00)  HR: 74 (04-30-22 @ 17:27) (74 - 86)  BP: 124/73 (04-30-22 @ 17:27) (124/73 - 134/68)  BP(mean): --  RR: 18 (04-30-22 @ 17:27) (18 - 18)  SpO2: 97% (04-30-22 @ 17:27) (94% - 97%)     Vital Signs Last 24 Hrs  T(C): 36.4 (04-30-22 @ 17:27), Max: 36.4 (04-30-22 @ 17:27)  T(F): 97.6 (04-30-22 @ 17:27), Max: 97.6 (04-30-22 @ 17:27)  HR: 74 (04-30-22 @ 17:27) (74 - 74)  BP: 124/73 (04-30-22 @ 17:27) (124/73 - 124/73)  BP(mean): --  RR: 18 (04-30-22 @ 17:27) (18 - 18)  SpO2: 97% (04-30-22 @ 17:27) (97% - 97%)

## 2022-05-01 NOTE — BH INPATIENT PSYCHIATRY PROGRESS NOTE - NSBHASSESSSUMMFT_PSY_ALL_CORE
89-year-old, left-handed retired man, domiciled with daughter Brianda (), w/ PMH of hearing loss using hearing aid, colitis, HLD, HTN, on Xarelto 15mg daily (PCP Dr Barrett  #1 #5), PPH of depression, multiple psych hospitalization requiring ECT, last 4 years ago at [facility], currently on sertraline 50mg bid, olanzapine 5mg qd, sees Dr Josh Simmons () for outpatient, presenting due to worsening depression and obsessive thought.   MoCA score 20/30. Due to current active depressive symptoms, nonresponsive to outpatient treatment, and collateral from psychiatrist and family, patient meets involuntary admission criteria. He has been admitted to 8U for stabilization and possible ECT as inpatient.     Pt cleared for ECT by medicine on 4/15/22 and cleared by Cardiology on 4/27/2022.      MDD w/psychotic features  Plan:  - Received first unilateral ECT session today (4/29); next due Monday 5/2  - Discontinue Seroquel 2/2 to suspicion for drug-induced parkinsonian features and/or dizziness  - Continue sertraline 100mg daily  - Seen by medicine for ECT clearance 4/15: They determine pt is Low-intermediate risk for low risk procedure  - Continue medication as follows:     Xarelto 15mg daily with meals     Losartan to 25 mg QD; 4/18/22, decrease Losartan to 25 mg (previously 50mg) daily as BPs have been low 4/18/22 and pt was orthostatics positive     Discontinued Furosemide 4/18/ 22; previously 20mg twice daily     Atorvastatin 20mg daily     Vit D3 2000 units     Magnesium 500mg     Mesalamine 2.4 mg     Mirabegron 50mg po QD (pt occasionally refusing)    Medicine consult, 4/15:  #Pre Op Risk   - EKG with rate controlled Afib, LAFB  - Re HTN: maintain /90   - Re: Afib: maintain anticoagulation with home xarelto   - Low-intermediate risk for low risk procedure    4/12: Pt reluctant to engage today; does not want to be on [facility]. Equivocating regarding ECT. Clearly depressed. MoCA 20/30. Will continue to monitor and to discuss ECT.    4/13: Pt remains ambivalent about treatment. Not asking for discharge today. Refused some labs, will try to get these again. Alert and oriented. No SI. Will follow up anemia.    4/14: at this point patient is irritable and negativistic refusing ECT as well as some medications and lab work.  Will move Zyprexa to nighttime (sleep an issue?); and Zoloft 100mg to am; continue to attempt to discuss need for labs and possible ECT if medical evaluation can be completed. Need to assess/address anemia.  Refusal may require consideration of TOO.    4/15: Pt continues to refuse ECT and routine blood work. Pt was seen by medicine team for clearance for ECT, after which he was determined to be "Low-intermediate risk for low risk procedure. Pt's daughter, Valery, states she is his medical proxy and will bring in form next week.    4/18: Lasix was held as BPs were low. BPs continued to be low and pt was newly orthostatic positive, tf decrease Losartan to 25 mg (previously 50mg) daily CTM. Pt still severely depressed; outpatient psychiatrist and daughters endorse ECT as having been the only effective treatment in the past. Pt continues to be ambivalent about it. He will not answer regarding SI today but endorsed passive SI on previous interview. Still with significant PMR and latency.    4/19: mute to writer but stares; observed interacting with staff; irritable speech clear and coherent; by observation appears generally Alert; oriented; cognition intact; speech clear; no tremor or evidence of movement impairment. No behavioral issues.      4/20: Continues to be despondent and ambivalent about treatments. Endorses hearing music which may be consistent with auditory hallucinations. Stated he was "hearing music coming from the room and following him around with words that he couldn't quite make out." Similar episode "last time I was suffering from this condition." Pt states the music "does not bother him." This resolved after 10 minutes. Otw no SI/HI/VH//delusions. Walking halls as if in fugue.  ADLs:  Barthel Index for Activities of Daily Living (ADL) from [email or web address]  on 4/20/2022  RESULT SUMMARY:  65 points  Minimally dependent  INPUTS:  Feeding —> 5 = Needs help  Bathing —> 0 = Unable  Grooming —> 5 = Independent  Dressing —> 10 = Independent  Bowel control —> 5 = Occasional accident  Bladder control —> 5 = Occasional accident  Toilet use —> 5 = Needs help  Transfers (bed to chair and back) —> 15 = Independent  Mobility on level surfaces —> 15 = Independent (but may use any aid, e.g. stick) >50 yards  Stairs —> 0 = Unable    4/21/22: Yesterday (4/20) , pt "consented  verbally " to ECT in presence of RN and daughter. Today, again seems ambivalent. Pt appears more irritable today, shouting at interviewer. Did not ask interviewer to stay in room. Pt had "panic" episode yesterday and was given Zyprexa, which seemed to improve panic. Pt is compliant with all meds today.  Patient did not wish to sign consent and had no response when asked about this issue; however less clingy allowing staff to leave the room without panic.   Reported that patient had panic after "consent" was allegedly given on 4/20 verbally.       ;;04/22: patient states he is anxious rather than sad; Not endorsing  suicidal or homicidal ideation intent or plans; no mention of auditory or visual hallucinations ; does not consent to ECT (at this time).  Appeared less distraught today than yesterday.  However still feels "terrible";  Sleep  appetite ok; no pain issues. Alert; oriented; cognition intact; speech clear; but has tremor at rest thought to be EPS possibly secondary to Zyprexa.    ;;04/25: patient focused on leaving; continues somewhat irritable and feels "terrible" but less so when approached this morning.  Sleep an issue.    tremor not prominent.  makes better eye contact; less thought blocking;  a little more spontaneous.    ;;04/26: "The same"; patient a little more spontaneous; wants to go home; no indication of interest in ECT; appears a little less anxious; a little mobile; suggests improvement; sleep is still poor but appears to be improving.  bp continues with swings in diastolic bp.      Vital Signs Last 24 Hrs ;T(C): 36.4 (26 Apr 2022 08:45), Max: 37 (25 Apr 2022 17:07) ;T(F): 97.6 (26 Apr 2022 08:45), Max: 98.6 (25 Apr 2022 17:07) ;HR: 84 (26 Apr 2022 08:45) (82 - 84) ;BP: 140/62 (26 Apr 2022 08:45) (140/62 - 145/98) ;BP(mean): -- ;ABP: -- ;ABP(mean): -- ;RR: 18 (26 Apr 2022 08:45) (17 - 18) ;SpO2: 96% (26 Apr 2022 08:45) (96% - 97%) ; ;    ;;04/27: seen by cardiology scores 3 on stroke risk with 4 as cut off for too risky ; suggestion that patient be placed on warfarin; Dr. Simmons called and vm left for discussion; patient spoke to writer about breakfast and continues to be willing to consent to ECT ; a change from past discussions; however appears to have improved on Seroquel alone.    ;;04/28: above risk score related to Warfarin use and may not apply to this patient; however cardiology did score patient as low risk; Dr. Simmons contacted and treatment discussed with plan to monitor progress on Seroquel and Zoloft as patient had been stable on Zyprexa and Zoloft for many years; however this morning patient returns to being sad; irritable; "terrible"; and silent when some questions asked despite responding to others; consented for ECT yesterday; first treatment RUL in am tomorrow.      4/28: First ECT session scheduled for tomorrow, 4/29/2022. Seen in room today. NAD. A&Ox3. Still tremulous. Exhibiting new difficulty with getting up from a seated position and with ambulation. Very irritable. Pt ended interview abruptly when lunch was announced. Continues on Seroquel and Zoloft for anxiety and mood.      4/29: Seen s/p his first ECT session, which occurred earlier this morning. NAD. A&Ox3. Somnolent, but arousable, but almost immediately went back to sleep during conversation. Slurring his speech. Last night, pt had a witnessed near-fall, after which his mental status exam was unchanged from baseline. Decision was made to allow pt to rest and return later.  Seen 3.5 hours later. Somnolence resolved. Mildly tremulous initially, but it improved with time.  Mood appeared somewhat improved though still irritable.  Pt is endorsing feeling dizzy and stiffer than when he got to [facility]. He also stated that it is not normal for him to be shaking as he is. Mild cogwheel rigidity was appreciated in the right upper extremity. Onset of symptoms correlates with initiation of anti-psychotic augmentation of anti-depressant regimen. Pt is currently on Seroquel 50 as adjuvant treatment. Decision made to d/c Seroquel.  Of note: Per UpToDate, dizziness can occur in 1-3% of patients taking mirabegron (and in up to 9% with mesalamine but he has not taken this since April 23rd).    ;;05/01: may be demonstrating slight improvement; aware that Seroquel was stopped but this needs to be re-evaluated in view of early gains on meds.  On CO for falls; coarse tremor still present.  RUL ECT#2 on 5/2.   Discussion with Dr. Murry was that patient was stable on Zyprexa for years after ECT.

## 2022-05-01 NOTE — BH INPATIENT PSYCHIATRY PROGRESS NOTE - NSBHFUPINTERVALHXFT_PSY_A_CORE
"So so" response to question how are you feeling today.   When distressed the patient usually responds with the word 'terrible' .  Ambivalent about continuing ECT but appears to have tolerated RUL #1 ; eating breakfast; calmer that in past but not endorsing feeling  better per se.  on Constant [place] for falls.  anxious but less than in past.  To monitor over course of day.   Had been in communication with Dr. Murry private psychiatrist and will update on progress.

## 2022-05-01 NOTE — BH INPATIENT PSYCHIATRY PROGRESS NOTE - OTHER
not assessed today Denies AVH "Lousy"  Quite thin Denies SI/HI; no delusional content elicited  patient seated throughout today's encounter

## 2022-05-01 NOTE — BH INPATIENT PSYCHIATRY PROGRESS NOTE - NSBHMETABOLIC_PSY_ALL_CORE_FT
BMI: BMI (kg/m2): 22.7 (04-29-22 @ 07:54)  HbA1c: A1C with Estimated Average Glucose Result: 5.7 % (04-13-22 @ 07:45)    Glucose: POCT Blood Glucose.: 190 mg/dL (04-19-22 @ 06:43)    BP: 124/73 (04-30-22 @ 17:27) (109/45 - 153/46)  Lipid Panel: Date/Time: 04-13-22 @ 07:45  Cholesterol, Serum: 120  Direct LDL: --  HDL Cholesterol, Serum: 60  Total Cholesterol/HDL Ration Measurement: --  Triglycerides, Serum: 50   BMI: BMI (kg/m2): 22.7 (04-29-22 @ 07:54)  HbA1c: A1C with Estimated Average Glucose Result: 5.7 % (04-13-22 @ 07:45)    Glucose: POCT Blood Glucose.: 190 mg/dL (04-19-22 @ 06:43)    BP: 124/73 (04-30-22 @ 17:27) (109/45 - 144/74)  Lipid Panel: Date/Time: 04-13-22 @ 07:45  Cholesterol, Serum: 120  Direct LDL: --  HDL Cholesterol, Serum: 60  Total Cholesterol/HDL Ration Measurement: --  Triglycerides, Serum: 50

## 2022-05-02 PROCEDURE — 99233 SBSQ HOSP IP/OBS HIGH 50: CPT

## 2022-05-02 PROCEDURE — 99232 SBSQ HOSP IP/OBS MODERATE 35: CPT

## 2022-05-02 RX ADMIN — Medication 2.4 GRAM(S): at 10:09

## 2022-05-02 RX ADMIN — MAGNESIUM OXIDE 400 MG ORAL TABLET 400 MILLIGRAM(S): 241.3 TABLET ORAL at 10:09

## 2022-05-02 RX ADMIN — MIRABEGRON 50 MILLIGRAM(S): 50 TABLET, EXTENDED RELEASE ORAL at 10:10

## 2022-05-02 RX ADMIN — SERTRALINE 100 MILLIGRAM(S): 25 TABLET, FILM COATED ORAL at 10:09

## 2022-05-02 RX ADMIN — RIVAROXABAN 15 MILLIGRAM(S): KIT at 10:09

## 2022-05-02 RX ADMIN — ATORVASTATIN CALCIUM 20 MILLIGRAM(S): 80 TABLET, FILM COATED ORAL at 21:40

## 2022-05-02 RX ADMIN — Medication 2000 UNIT(S): at 10:09

## 2022-05-02 RX ADMIN — Medication 2.4 GRAM(S): at 17:36

## 2022-05-02 RX ADMIN — SENNA PLUS 2 TABLET(S): 8.6 TABLET ORAL at 21:40

## 2022-05-02 RX ADMIN — Medication 25 MILLIGRAM(S): at 10:09

## 2022-05-02 NOTE — PROGRESS NOTE ADULT - SUBJECTIVE AND OBJECTIVE BOX
INTERVAL EVENTS: hemal    PAST MEDICAL & SURGICAL HISTORY:  Atrial fibrillation    History of colitis    HTN (hypertension)    Hyperlipidemia    Overactive bladder    Stage 3 chronic kidney disease    Macrocytic anemia        MEDICATIONS  (STANDING):  atorvastatin 20 milliGRAM(s) Oral daily  cholecalciferol 2000 Unit(s) Oral daily  magnesium oxide 400 milliGRAM(s) Oral daily  mesalamine DR (24-Hour) Tablet 2.4 Gram(s) Oral <User Schedule>  metoprolol succinate ER 25 milliGRAM(s) Oral daily  mirabegron ER 50 milliGRAM(s) Oral daily  rivaroxaban 15 milliGRAM(s) Oral daily  senna 2 Tablet(s) Oral at bedtime  sertraline 100 milliGRAM(s) Oral daily    MEDICATIONS  (PRN):  magnesium hydroxide Suspension 30 milliLiter(s) Oral daily PRN Constipation  polyethylene glycol 3350 17 Gram(s) Oral daily PRN Constipation    Vital Signs Last 24 Hrs  T(C): 36.9 (02 May 2022 08:52), Max: 37.2 (02 May 2022 08:17)  T(F): 98.5 (02 May 2022 08:52), Max: 99 (02 May 2022 08:17)  HR: 80 (02 May 2022 08:52) (75 - 95)  BP: 164/71 (02 May 2022 08:52) (117/51 - 174/84)  BP(mean): 106 (02 May 2022 08:38) (106 - 139)  RR: 16 (02 May 2022 08:52) (16 - 18)  SpO2: 96% (02 May 2022 08:52) (93% - 99%)    PHYSICAL EXAM:  GEN: NAD  HEENT: EOMI   RESP: CTA b/l  CV: RRR. Normal S1/S2. No m/r/g.  ABD: soft, non-distended  EXT: No edema   NEURO: alert and attentive    LABS:                  I&O's Summary

## 2022-05-02 NOTE — BH INPATIENT PSYCHIATRY PROGRESS NOTE - NSBHMETABOLIC_PSY_ALL_CORE_FT
BMI: BMI (kg/m2): 22.7 (05-02-22 @ 06:32)  HbA1c: A1C with Estimated Average Glucose Result: 5.7 % (04-13-22 @ 07:45)    Glucose: POCT Blood Glucose.: 190 mg/dL (04-19-22 @ 06:43)    BP: 164/71 (05-02-22 @ 08:52) (117/51 - 174/84)  Lipid Panel: Date/Time: 04-13-22 @ 07:45  Cholesterol, Serum: 120  Direct LDL: --  HDL Cholesterol, Serum: 60  Total Cholesterol/HDL Ration Measurement: --  Triglycerides, Serum: 50

## 2022-05-02 NOTE — PROGRESS NOTE ADULT - NUTRITIONAL ASSESSMENT
This patient has been assessed with a concern for Malnutrition and has been determined to have a diagnosis/diagnoses of Moderate protein-calorie malnutrition.    This patient is being managed with:   Diet Regular-  Entered: Apr 12 2022 10:15AM

## 2022-05-02 NOTE — PROGRESS NOTE ADULT - ATTENDING COMMENTS
Initial attending contact date  4/29/22    . See fellow note written above for details. I reviewed the fellow documentation. I have personally seen and examined this patient. I reviewed vitals, labs, medications, cardiac studies, and additional imaging. I agree with the above fellow's findings and plans as written above with the following additions/statements.    89 yo M, hx of HTN, HLD, AF on xarelto, depression, multiple psych hospitalization requiring ECT, who presents for ECT.  Per primary team, ECT will be under general anesthesia, with brief induced seizures. Cardiology consulted for pre-op in the setting of AF.      #Pre-op  Patient at low risk for an intermediate risk procedure. Not in ACS or decompensated HF. Unable to assess mets.   - no further cardiac testing needed prior to ECT   - now s/p ECT without active cardiac issues   - c/w rivaroxaban 15mg daily   -Cont  toprol 25mg po daily .

## 2022-05-02 NOTE — BH INPATIENT PSYCHIATRY PROGRESS NOTE - NSBHCHARTREVIEWVS_PSY_A_CORE FT
Vital Signs Last 24 Hrs  T(C): 36.9 (05-02-22 @ 08:52), Max: 37.2 (05-02-22 @ 08:17)  T(F): 98.5 (05-02-22 @ 08:52), Max: 99 (05-02-22 @ 08:17)  HR: 80 (05-02-22 @ 08:52) (75 - 95)  BP: 164/71 (05-02-22 @ 08:52) (117/51 - 174/84)  BP(mean): 106 (05-02-22 @ 08:38) (106 - 139)  RR: 16 (05-02-22 @ 08:52) (16 - 18)  SpO2: 96% (05-02-22 @ 08:52) (93% - 99%)

## 2022-05-02 NOTE — BH TREATMENT PLAN - NSTXDEPRESINTERMD_PSY_ALL_CORE
Ambivalent about ECT as a consideration but appears to be accepting Zyprexa and Zoloft at this time  Has signed consent for ECT and had two treatments, seroquel stopped due to suspected EPS

## 2022-05-02 NOTE — PROGRESS NOTE ADULT - ASSESSMENT
89 yo M, hx of HTN, HLD, AF on xarelto, depression, multiple psych hospitalization requiring ECT, who presents for ECT.  Per primary team, ECT will be under general anesthesia, with brief induced seizures. Cardiology consulted for pre-op in the setting of AF.      #Pre-op  Patient at low risk for an intermediate risk procedure. Not in ACS or decompensated HF. Unable to assess mets.   - no further cardiac testing needed prior to ECT  - patient stable post ECT     #AF  Rate controlled in the 80s, though with induction of ECT, may cause worsening of tachycardia.   - c/w rivaroxaban 15mg daily   - recommend switching losartan 25 to toprol 25mg po daily     Cardiology will sign off. Please re-consult as needed.

## 2022-05-02 NOTE — BH INPATIENT PSYCHIATRY PROGRESS NOTE - NSBHASSESSSUMMFT_PSY_ALL_CORE
89-year-old, left-handed retired man, domiciled with daughter Brianda (), w/ PMH of hearing loss using hearing aid, colitis, HLD, HTN, on Xarelto 15mg daily (PCP Dr Barrett  #1 #5), PPH of depression, multiple psych hospitalization requiring ECT, last 4 years ago at [facility], currently on sertraline 50mg bid, olanzapine 5mg qd, sees Dr Josh Simmons () for outpatient, presenting due to worsening depression and obsessive thought.   MoCA score 20/30. Due to current active depressive symptoms, nonresponsive to outpatient treatment, and collateral from psychiatrist and family, patient meets involuntary admission criteria. He has been admitted to 8U for stabilization and possible ECT as inpatient.     Pt cleared for ECT by medicine on 4/15/22 and cleared by Cardiology on 4/27/2022.      MDD w/psychotic features  Plan:  - Received first unilateral ECT session today (4/29); next due Monday 5/2  - Discontinue Seroquel 2/2 to suspicion for drug-induced parkinsonian features and/or dizziness  - Continue sertraline 100mg daily  - Seen by medicine for ECT clearance 4/15: They determine pt is Low-intermediate risk for low risk procedure  - Continue medication as follows:     Xarelto 15mg daily with meals     Losartan to 25 mg QD; 4/18/22, decrease Losartan to 25 mg (previously 50mg) daily as BPs have been low 4/18/22 and pt was orthostatics positive     Discontinued Furosemide 4/18/ 22; previously 20mg twice daily     Atorvastatin 20mg daily     Vit D3 2000 units     Magnesium 500mg     Mesalamine 2.4 mg     Mirabegron 50mg po QD (pt occasionally refusing)    Medicine consult, 4/15:  #Pre Op Risk   - EKG with rate controlled Afib, LAFB  - Re HTN: maintain /90   - Re: Afib: maintain anticoagulation with home xarelto   - Low-intermediate risk for low risk procedure    4/12: Pt reluctant to engage today; does not want to be on [place]. Equivocating regarding ECT. Clearly depressed. MoCA 20/30. Will continue to monitor and to discuss ECT.    4/13: Pt remains ambivalent about treatment. Not asking for discharge today. Refused some labs, will try to get these again. Alert and oriented. No SI. Will follow up anemia.    4/14: at this point patient is irritable and negativistic refusing ECT as well as some medications and lab work.  Will move Zyprexa to nighttime (sleep an issue?); and Zoloft 100mg to am; continue to attempt to discuss need for labs and possible ECT if medical evaluation can be completed. Need to assess/address anemia.  Refusal may require consideration of TOO.    4/15: Pt continues to refuse ECT and routine blood work. Pt was seen by medicine team for clearance for ECT, after which he was determined to be "Low-intermediate risk for low risk procedure. Pt's daughter, Valery, states she is his medical proxy and will bring in form next week.    4/18: Lasix was held as BPs were low. BPs continued to be low and pt was newly orthostatic positive, tf decrease Losartan to 25 mg (previously 50mg) daily CTM. Pt still severely depressed; outpatient psychiatrist and daughters endorse ECT as having been the only effective treatment in the past. Pt continues to be ambivalent about it. He will not answer regarding SI today but endorsed passive SI on previous interview. Still with significant PMR and latency.    4/19: mute to writer but stares; observed interacting with staff; irritable speech clear and coherent; by observation appears generally Alert; oriented; cognition intact; speech clear; no tremor or evidence of movement impairment. No behavioral issues.      4/20: Continues to be despondent and ambivalent about treatments. Endorses hearing music which may be consistent with auditory hallucinations. Stated he was "hearing music coming from the room and following him around with words that he couldn't quite make out." Similar episode "last time I was suffering from this condition." Pt states the music "does not bother him." This resolved after 10 minutes. Otw no SI/HI/VH//delusions. Walking halls as if in fugue.  ADLs:  Barthel Index for Activities of Daily Living (ADL) from [employer]  on 4/20/2022  RESULT SUMMARY:  65 points  Minimally dependent  INPUTS:  Feeding —> 5 = Needs help  Bathing —> 0 = Unable  Grooming —> 5 = Independent  Dressing —> 10 = Independent  Bowel control —> 5 = Occasional accident  Bladder control —> 5 = Occasional accident  Toilet use —> 5 = Needs help  Transfers (bed to chair and back) —> 15 = Independent  Mobility on level surfaces —> 15 = Independent (but may use any aid, e.g. stick) >50 yards  Stairs —> 0 = Unable    4/21/22: Yesterday (4/20) , pt "consented  verbally " to ECT in presence of RN and daughter. Today, again seems ambivalent. Pt appears more irritable today, shouting at interviewer. Did not ask interviewer to stay in room. Pt had "panic" episode yesterday and was given Zyprexa, which seemed to improve panic. Pt is compliant with all meds today.  Patient did not wish to sign consent and had no response when asked about this issue; however less clingy allowing staff to leave the room without panic.   Reported that patient had panic after "consent" was allegedly given on 4/20 verbally.       ;;04/22: patient states he is anxious rather than sad; Not endorsing  suicidal or homicidal ideation intent or plans; no mention of auditory or visual hallucinations ; does not consent to ECT (at this time).  Appeared less distraught today than yesterday.  However still feels "terrible";  Sleep  appetite ok; no pain issues. Alert; oriented; cognition intact; speech clear; but has tremor at rest thought to be EPS possibly secondary to Zyprexa.    ;;04/25: patient focused on leaving; continues somewhat irritable and feels "terrible" but less so when approached this morning.  Sleep an issue.    tremor not prominent.  makes better eye contact; less thought blocking;  a little more spontaneous.    ;;04/26: "The same"; patient a little more spontaneous; wants to go home; no indication of interest in ECT; appears a little less anxious; a little mobile; suggests improvement; sleep is still poor but appears to be improving.  bp continues with swings in diastolic bp.      Vital Signs Last 24 Hrs ;T(C): 36.4 (26 Apr 2022 08:45), Max: 37 (25 Apr 2022 17:07) ;T(F): 97.6 (26 Apr 2022 08:45), Max: 98.6 (25 Apr 2022 17:07) ;HR: 84 (26 Apr 2022 08:45) (82 - 84) ;BP: 140/62 (26 Apr 2022 08:45) (140/62 - 145/98) ;BP(mean): -- ;ABP: -- ;ABP(mean): -- ;RR: 18 (26 Apr 2022 08:45) (17 - 18) ;SpO2: 96% (26 Apr 2022 08:45) (96% - 97%) ; ;    ;;04/27: seen by cardiology scores 3 on stroke risk with 4 as cut off for too risky ; suggestion that patient be placed on warfarin; Dr. Simmons called and vm left for discussion; patient spoke to writer about breakfast and continues to be willing to consent to ECT ; a change from past discussions; however appears to have improved on Seroquel alone.    ;;04/28: above risk score related to Warfarin use and may not apply to this patient; however cardiology did score patient as low risk; Dr. Simmons contacted and treatment discussed with plan to monitor progress on Seroquel and Zoloft as patient had been stable on Zyprexa and Zoloft for many years; however this morning patient returns to being sad; irritable; "terrible"; and silent when some questions asked despite responding to others; consented for ECT yesterday; first treatment RUL in am tomorrow.      4/28: First ECT session scheduled for tomorrow, 4/29/2022. Seen in room today. NAD. A&Ox3. Still tremulous. Exhibiting new difficulty with getting up from a seated position and with ambulation. Very irritable. Pt ended interview abruptly when lunch was announced. Continues on Seroquel and Zoloft for anxiety and mood.      4/29: Seen s/p his first ECT session, which occurred earlier this morning. NAD. A&Ox3. Somnolent, but arousable, but almost immediately went back to sleep during conversation. Slurring his speech. Last night, pt had a witnessed near-fall, after which his mental status exam was unchanged from baseline. Decision was made to allow pt to rest and return later.  Seen 3.5 hours later. Somnolence resolved. Mildly tremulous initially, but it improved with time.  Mood appeared somewhat improved though still irritable.  Pt is endorsing feeling dizzy and stiffer than when he got to [facility]. He also stated that it is not normal for him to be shaking as he is. Mild cogwheel rigidity was appreciated in the right upper extremity. Onset of symptoms correlates with initiation of anti-psychotic augmentation of anti-depressant regimen. Pt is currently on Seroquel 50 as adjuvant treatment. Decision made to d/c Seroquel [out of abundance of caution re EPS and falls issues-RR] .  Of note: Per [employer], dizziness can occur in 1-3% of patients taking mirabegron (and in up to 9% with mesalamine but he has not taken this since April 23rd).    ;;05/01: may be demonstrating slight improvement; aware that Seroquel was stopped but this needs to be re-evaluated in view of early gains on meds.  On CO for falls; coarse tremor still present.  RUL ECT#2 on 5/2.   Discussion with Dr. Arthur (not [place];  error) was that patient was stable on Zyprexa for years after ECT.   ;;05/02: no headache or physical distress ;no clear evidence of improvement; RUL may not be sufficient to be effective; however will encourage RUL ECT #3 as appears tolerated.  awaiting PT for falls assessment; on CO as falls risk.

## 2022-05-02 NOTE — BH INPATIENT PSYCHIATRY PROGRESS NOTE - NSBHFUPINTERVALHXFT_PSY_A_CORE
sitting at his desk in his room after RUL ECT#2;  "not so good" ; shrugs; seems annoyed; no evidence for improved mood today;   on Constant Observation for falls.  anxious but less than in past.  To monitor over course of day.   Left voice mail  with Dr. Arthur private psychiatrist and will update on progress.

## 2022-05-03 PROCEDURE — 99233 SBSQ HOSP IP/OBS HIGH 50: CPT

## 2022-05-03 RX ADMIN — SENNA PLUS 2 TABLET(S): 8.6 TABLET ORAL at 20:37

## 2022-05-03 RX ADMIN — MAGNESIUM OXIDE 400 MG ORAL TABLET 400 MILLIGRAM(S): 241.3 TABLET ORAL at 11:01

## 2022-05-03 RX ADMIN — RIVAROXABAN 15 MILLIGRAM(S): KIT at 11:02

## 2022-05-03 RX ADMIN — Medication 2000 UNIT(S): at 11:02

## 2022-05-03 RX ADMIN — SERTRALINE 100 MILLIGRAM(S): 25 TABLET, FILM COATED ORAL at 11:01

## 2022-05-03 RX ADMIN — MIRABEGRON 50 MILLIGRAM(S): 50 TABLET, EXTENDED RELEASE ORAL at 10:01

## 2022-05-03 RX ADMIN — Medication 25 MILLIGRAM(S): at 11:01

## 2022-05-03 RX ADMIN — Medication 2.4 GRAM(S): at 19:23

## 2022-05-03 RX ADMIN — Medication 2.4 GRAM(S): at 06:18

## 2022-05-03 RX ADMIN — ATORVASTATIN CALCIUM 20 MILLIGRAM(S): 80 TABLET, FILM COATED ORAL at 20:37

## 2022-05-03 NOTE — BH INPATIENT PSYCHIATRY PROGRESS NOTE - NSBHMETABOLIC_PSY_ALL_CORE_FT
BMI: BMI (kg/m2): 22.7 (05-02-22 @ 06:32)  HbA1c: A1C with Estimated Average Glucose Result: 5.7 % (04-13-22 @ 07:45)    Glucose: POCT Blood Glucose.: 190 mg/dL (04-19-22 @ 06:43)    BP: 156/53 (05-02-22 @ 17:15) (117/51 - 174/84)  Lipid Panel: Date/Time: 04-13-22 @ 07:45  Cholesterol, Serum: 120  Direct LDL: --  HDL Cholesterol, Serum: 60  Total Cholesterol/HDL Ration Measurement: --  Triglycerides, Serum: 50   BMI: BMI (kg/m2): 22.7 (05-02-22 @ 06:32)  HbA1c: A1C with Estimated Average Glucose Result: 5.7 % (04-13-22 @ 07:45)    Glucose: POCT Blood Glucose.: 190 mg/dL (04-19-22 @ 06:43)    BP: 162/48 (05-03-22 @ 08:40) (117/51 - 174/84)  Lipid Panel: Date/Time: 04-13-22 @ 07:45  Cholesterol, Serum: 120  Direct LDL: --  HDL Cholesterol, Serum: 60  Total Cholesterol/HDL Ration Measurement: --  Triglycerides, Serum: 50

## 2022-05-03 NOTE — BH INPATIENT PSYCHIATRY PROGRESS NOTE - NSTXDEPRESINTERMD_PSY_ALL_CORE
Ambivalent about ECT as a consideration but appears to be accepting Zyprexa and Zoloft at this time  ECT for depression ; holding Seroquel for now.  Might reinstate in future for depression.

## 2022-05-03 NOTE — BH INPATIENT PSYCHIATRY PROGRESS NOTE - NSBHFUPINTERVALHXFT_PSY_A_CORE
"so so" makes good eye contact; a little more spontaneous; breakfast was "all right"  .  "I had three (ECT treatments) [actually had only two] ) .  anxious but a little less constricted.  on CO for falls.

## 2022-05-03 NOTE — BH INPATIENT PSYCHIATRY PROGRESS NOTE - NSBHASSESSSUMMFT_PSY_ALL_CORE
89-year-old, left-handed retired man, domiciled with daughter Brianda (), w/ PMH of hearing loss using hearing aid, colitis, HLD, HTN, on Xarelto 15mg daily (PCP Dr Barrett  #1 #5), PPH of depression, multiple psych hospitalization requiring ECT, last 4 years ago at [facility], currently on sertraline 50mg bid, olanzapine 5mg qd, sees Dr Josh Simmons () for outpatient, presenting due to worsening depression and obsessive thought.   MoCA score 20/30. Due to current active depressive symptoms, nonresponsive to outpatient treatment, and collateral from psychiatrist and family, patient meets involuntary admission criteria. He has been admitted to 8U for stabilization and possible ECT as inpatient.     Pt cleared for ECT by medicine on 4/15/22 and cleared by Cardiology on 4/27/2022.      MDD w/psychotic features  Plan:  - Received first unilateral ECT session today (4/29); next due Monday 5/2  - Discontinue Seroquel 2/2 to suspicion for drug-induced parkinsonian features and/or dizziness  - Continue sertraline 100mg daily  - Seen by medicine for ECT clearance 4/15: They determine pt is Low-intermediate risk for low risk procedure  - Continue medication as follows:     Xarelto 15mg daily with meals     Losartan to 25 mg QD; 4/18/22, decrease Losartan to 25 mg (previously 50mg) daily as BPs have been low 4/18/22 and pt was orthostatics positive     Discontinued Furosemide 4/18/ 22; previously 20mg twice daily     Atorvastatin 20mg daily     Vit D3 2000 units     Magnesium 500mg     Mesalamine 2.4 mg     Mirabegron 50mg po QD (pt occasionally refusing)    Medicine consult, 4/15:  #Pre Op Risk   - EKG with rate controlled Afib, LAFB  - Re HTN: maintain /90   - Re: Afib: maintain anticoagulation with home xarelto   - Low-intermediate risk for low risk procedure    4/12: Pt reluctant to engage today; does not want to be on [facility]. Equivocating regarding ECT. Clearly depressed. MoCA 20/30. Will continue to monitor and to discuss ECT.    4/13: Pt remains ambivalent about treatment. Not asking for discharge today. Refused some labs, will try to get these again. Alert and oriented. No SI. Will follow up anemia.    4/14: at this point patient is irritable and negativistic refusing ECT as well as some medications and lab work.  Will move Zyprexa to nighttime (sleep an issue?); and Zoloft 100mg to am; continue to attempt to discuss need for labs and possible ECT if medical evaluation can be completed. Need to assess/address anemia.  Refusal may require consideration of TOO.    4/15: Pt continues to refuse ECT and routine blood work. Pt was seen by medicine team for clearance for ECT, after which he was determined to be "Low-intermediate risk for low risk procedure. Pt's daughter, Valery, states she is his medical proxy and will bring in form next week.    4/18: Lasix was held as BPs were low. BPs continued to be low and pt was newly orthostatic positive, tf decrease Losartan to 25 mg (previously 50mg) daily CTM. Pt still severely depressed; outpatient psychiatrist and daughters endorse ECT as having been the only effective treatment in the past. Pt continues to be ambivalent about it. He will not answer regarding SI today but endorsed passive SI on previous interview. Still with significant PMR and latency.    4/19: mute to writer but stares; observed interacting with staff; irritable speech clear and coherent; by observation appears generally Alert; oriented; cognition intact; speech clear; no tremor or evidence of movement impairment. No behavioral issues.      4/20: Continues to be despondent and ambivalent about treatments. Endorses hearing music which may be consistent with auditory hallucinations. Stated he was "hearing music coming from the room and following him around with words that he couldn't quite make out." Similar episode "last time I was suffering from this condition." Pt states the music "does not bother him." This resolved after 10 minutes. Otw no SI/HI/VH//delusions. Walking halls as if in fugue.  ADLs:  Barthel Index for Activities of Daily Living (ADL) from [email or web address]  on 4/20/2022  RESULT SUMMARY:  65 points  Minimally dependent  INPUTS:  Feeding —> 5 = Needs help  Bathing —> 0 = Unable  Grooming —> 5 = Independent  Dressing —> 10 = Independent  Bowel control —> 5 = Occasional accident  Bladder control —> 5 = Occasional accident  Toilet use —> 5 = Needs help  Transfers (bed to chair and back) —> 15 = Independent  Mobility on level surfaces —> 15 = Independent (but may use any aid, e.g. stick) >50 yards  Stairs —> 0 = Unable    4/21/22: Yesterday (4/20) , pt "consented  verbally " to ECT in presence of RN and daughter. Today, again seems ambivalent. Pt appears more irritable today, shouting at interviewer. Did not ask interviewer to stay in room. Pt had "panic" episode yesterday and was given Zyprexa, which seemed to improve panic. Pt is compliant with all meds today.  Patient did not wish to sign consent and had no response when asked about this issue; however less clingy allowing staff to leave the room without panic.   Reported that patient had panic after "consent" was allegedly given on 4/20 verbally.       ;;04/22: patient states he is anxious rather than sad; Not endorsing  suicidal or homicidal ideation intent or plans; no mention of auditory or visual hallucinations ; does not consent to ECT (at this time).  Appeared less distraught today than yesterday.  However still feels "terrible";  Sleep  appetite ok; no pain issues. Alert; oriented; cognition intact; speech clear; but has tremor at rest thought to be EPS possibly secondary to Zyprexa.    ;;04/25: patient focused on leaving; continues somewhat irritable and feels "terrible" but less so when approached this morning.  Sleep an issue.    tremor not prominent.  makes better eye contact; less thought blocking;  a little more spontaneous.    ;;04/26: "The same"; patient a little more spontaneous; wants to go home; no indication of interest in ECT; appears a little less anxious; a little mobile; suggests improvement; sleep is still poor but appears to be improving.  bp continues with swings in diastolic bp.      Vital Signs Last 24 Hrs ;T(C): 36.4 (26 Apr 2022 08:45), Max: 37 (25 Apr 2022 17:07) ;T(F): 97.6 (26 Apr 2022 08:45), Max: 98.6 (25 Apr 2022 17:07) ;HR: 84 (26 Apr 2022 08:45) (82 - 84) ;BP: 140/62 (26 Apr 2022 08:45) (140/62 - 145/98) ;BP(mean): -- ;ABP: -- ;ABP(mean): -- ;RR: 18 (26 Apr 2022 08:45) (17 - 18) ;SpO2: 96% (26 Apr 2022 08:45) (96% - 97%) ; ;    ;;04/27: seen by cardiology scores 3 on stroke risk with 4 as cut off for too risky ; suggestion that patient be placed on warfarin; Dr. Simmons called and vm left for discussion; patient spoke to writer about breakfast and continues to be willing to consent to ECT ; a change from past discussions; however appears to have improved on Seroquel alone.    ;;04/28: above risk score related to Warfarin use and may not apply to this patient; however cardiology did score patient as low risk; Dr. Simmons contacted and treatment discussed with plan to monitor progress on Seroquel and Zoloft as patient had been stable on Zyprexa and Zoloft for many years; however this morning patient returns to being sad; irritable; "terrible"; and silent when some questions asked despite responding to others; consented for ECT yesterday; first treatment RUL in am tomorrow.      4/28: First ECT session scheduled for tomorrow, 4/29/2022. Seen in room today. NAD. A&Ox3. Still tremulous. Exhibiting new difficulty with getting up from a seated position and with ambulation. Very irritable. Pt ended interview abruptly when lunch was announced. Continues on Seroquel and Zoloft for anxiety and mood.      4/29: Seen s/p his first ECT session, which occurred earlier this morning. NAD. A&Ox3. Somnolent, but arousable, but almost immediately went back to sleep during conversation. Slurring his speech. Last night, pt had a witnessed near-fall, after which his mental status exam was unchanged from baseline. Decision was made to allow pt to rest and return later.  Seen 3.5 hours later. Somnolence resolved. Mildly tremulous initially, but it improved with time.  Mood appeared somewhat improved though still irritable.  Pt is endorsing feeling dizzy and stiffer than when he got to [facility]. He also stated that it is not normal for him to be shaking as he is. Mild cogwheel rigidity was appreciated in the right upper extremity. Onset of symptoms correlates with initiation of anti-psychotic augmentation of anti-depressant regimen. Pt is currently on Seroquel 50 as adjuvant treatment. Decision made to d/c Seroquel [out of abundance of caution re EPS and falls issues-RR] .  Of note: Per UpToDate, dizziness can occur in 1-3% of patients taking mirabegron (and in up to 9% with mesalamine but he has not taken this since April 23rd).    ;;05/01: may be demonstrating slight improvement; aware that Seroquel was stopped but this needs to be re-evaluated in view of early gains on meds.  On CO for falls; coarse tremor still present.  RUL ECT#2 on 5/2.   Discussion with Dr. Arthur (not [place];  error) was that patient was stable on Zyprexa for years after ECT.   ;;05/02: no headache or physical distress ;no clear evidence of improvement; RUL may not be sufficient to be effective; however will encourage RUL ECT #3 as appears tolerated.  awaiting PT for falls assessment; on CO as falls risk.    ;;05/03: a little less constricted; while denies feeling better appears slightly brighter; on CO for falls risk; ECT #3 RUL in am.  No headaches reported.

## 2022-05-03 NOTE — BH INPATIENT PSYCHIATRY PROGRESS NOTE - NSBHCHARTREVIEWVS_PSY_A_CORE FT
Vital Signs Last 24 Hrs  T(C): 36.9 (05-02-22 @ 08:52), Max: 36.9 (05-02-22 @ 08:52)  T(F): 98.5 (05-02-22 @ 08:52), Max: 98.5 (05-02-22 @ 08:52)  HR: 67 (05-02-22 @ 17:15) (67 - 80)  BP: 156/53 (05-02-22 @ 17:15) (156/53 - 171/74)  BP(mean): 106 (05-02-22 @ 08:38) (106 - 106)  RR: 16 (05-02-22 @ 17:15) (16 - 17)  SpO2: 96% (05-02-22 @ 17:15) (96% - 96%)     Vital Signs Last 24 Hrs  T(C): 36.8 (05-03-22 @ 08:40), Max: 36.8 (05-03-22 @ 08:40)  T(F): 98.3 (05-03-22 @ 08:40), Max: 98.3 (05-03-22 @ 08:40)  HR: 98 (05-03-22 @ 08:40) (67 - 98)  BP: 162/48 (05-03-22 @ 08:40) (156/53 - 162/48)  BP(mean): --  RR: 18 (05-03-22 @ 08:40) (16 - 18)  SpO2: 98% (05-03-22 @ 08:40) (96% - 98%)

## 2022-05-04 PROCEDURE — 99233 SBSQ HOSP IP/OBS HIGH 50: CPT

## 2022-05-04 PROCEDURE — 90870 ELECTROCONVULSIVE THERAPY: CPT

## 2022-05-04 RX ORDER — METOPROLOL TARTRATE 50 MG
2.5 TABLET ORAL ONCE
Refills: 0 | Status: COMPLETED | OUTPATIENT
Start: 2022-05-04 | End: 2022-05-04

## 2022-05-04 RX ORDER — RIVAROXABAN 15 MG-20MG
15 KIT ORAL DAILY
Refills: 0 | Status: DISCONTINUED | OUTPATIENT
Start: 2022-05-04 | End: 2022-05-11

## 2022-05-04 RX ADMIN — Medication 2.5 MILLIGRAM(S): at 09:10

## 2022-05-04 RX ADMIN — Medication 2000 UNIT(S): at 12:09

## 2022-05-04 RX ADMIN — POLYETHYLENE GLYCOL 3350 17 GRAM(S): 17 POWDER, FOR SOLUTION ORAL at 12:09

## 2022-05-04 RX ADMIN — RIVAROXABAN 15 MILLIGRAM(S): KIT at 12:09

## 2022-05-04 RX ADMIN — Medication 2.4 GRAM(S): at 19:14

## 2022-05-04 RX ADMIN — SERTRALINE 100 MILLIGRAM(S): 25 TABLET, FILM COATED ORAL at 12:09

## 2022-05-04 RX ADMIN — ATORVASTATIN CALCIUM 20 MILLIGRAM(S): 80 TABLET, FILM COATED ORAL at 21:18

## 2022-05-04 RX ADMIN — Medication 2.4 GRAM(S): at 12:10

## 2022-05-04 RX ADMIN — SENNA PLUS 2 TABLET(S): 8.6 TABLET ORAL at 21:17

## 2022-05-04 NOTE — BH INPATIENT PSYCHIATRY PROGRESS NOTE - NSBHCHARTREVIEWVS_PSY_A_CORE FT
Vital Signs Last 24 Hrs  T(C): 36.7 (05-04-22 @ 08:17), Max: 36.7 (05-03-22 @ 15:55)  T(F): 98 (05-04-22 @ 08:17), Max: 98 (05-03-22 @ 15:55)  HR: 75 (05-04-22 @ 09:02) (70 - 95)  BP: 157/68 (05-04-22 @ 09:02) (140/54 - 210/79)  BP(mean): 98 (05-04-22 @ 09:02) (98 - 120)  RR: 18 (05-04-22 @ 09:02) (16 - 24)  SpO2: 96% (05-04-22 @ 09:02) (96% - 100%)

## 2022-05-04 NOTE — BH INPATIENT PSYCHIATRY PROGRESS NOTE - NSBHASSESSSUMMFT_PSY_ALL_CORE
89-year-old, left-handed retired man, domiciled with daughter Brianda (), w/ PMH of hearing loss using hearing aid, colitis, HLD, HTN, on Xarelto 15mg daily (PCP Dr Barrett  #1 #5), PPH of depression, multiple psych hospitalization requiring ECT, last 4 years ago at [facility], currently on sertraline 50mg bid, olanzapine 5mg qd, sees Dr Josh Simmons () for outpatient, presenting due to worsening depression and obsessive thought.   MoCA score 20/30. Due to current active depressive symptoms, nonresponsive to outpatient treatment, and collateral from psychiatrist and family, patient meets involuntary admission criteria. He has been admitted to 8U for stabilization and possible ECT as inpatient.     Pt cleared for ECT by medicine on 4/15/22 and cleared by Cardiology on 4/27/2022.      MDD w/psychotic features  Plan:  - Received first unilateral ECT session today (4/29); next due Monday 5/2  - Discontinue Seroquel 2/2 to suspicion for drug-induced parkinsonian features and/or dizziness  - Continue sertraline 100mg daily  - Seen by medicine for ECT clearance 4/15: They determine pt is Low-intermediate risk for low risk procedure  - Continue medication as follows:     Xarelto 15mg daily with meals     Losartan to 25 mg QD; 4/18/22, decrease Losartan to 25 mg (previously 50mg) daily as BPs have been low 4/18/22 and pt was orthostatics positive     Discontinued Furosemide 4/18/ 22; previously 20mg twice daily     Atorvastatin 20mg daily     Vit D3 2000 units     Magnesium 500mg     Mesalamine 2.4 mg     Mirabegron 50mg po QD (pt occasionally refusing)    Medicine consult, 4/15:  #Pre Op Risk   - EKG with rate controlled Afib, LAFB  - Re HTN: maintain /90   - Re: Afib: maintain anticoagulation with home xarelto   - Low-intermediate risk for low risk procedure    4/12: Pt reluctant to engage today; does not want to be on [facility]. Equivocating regarding ECT. Clearly depressed. MoCA 20/30. Will continue to monitor and to discuss ECT.    4/13: Pt remains ambivalent about treatment. Not asking for discharge today. Refused some labs, will try to get these again. Alert and oriented. No SI. Will follow up anemia.    4/14: at this point patient is irritable and negativistic refusing ECT as well as some medications and lab work.  Will move Zyprexa to nighttime (sleep an issue?); and Zoloft 100mg to am; continue to attempt to discuss need for labs and possible ECT if medical evaluation can be completed. Need to assess/address anemia.  Refusal may require consideration of TOO.    4/15: Pt continues to refuse ECT and routine blood work. Pt was seen by medicine team for clearance for ECT, after which he was determined to be "Low-intermediate risk for low risk procedure. Pt's daughter, Valery, states she is his medical proxy and will bring in form next week.    4/18: Lasix was held as BPs were low. BPs continued to be low and pt was newly orthostatic positive, tf decrease Losartan to 25 mg (previously 50mg) daily CTM. Pt still severely depressed; outpatient psychiatrist and daughters endorse ECT as having been the only effective treatment in the past. Pt continues to be ambivalent about it. He will not answer regarding SI today but endorsed passive SI on previous interview. Still with significant PMR and latency.    4/19: mute to writer but stares; observed interacting with staff; irritable speech clear and coherent; by observation appears generally Alert; oriented; cognition intact; speech clear; no tremor or evidence of movement impairment. No behavioral issues.      4/20: Continues to be despondent and ambivalent about treatments. Endorses hearing music which may be consistent with auditory hallucinations. Stated he was "hearing music coming from the room and following him around with words that he couldn't quite make out." Similar episode "last time I was suffering from this condition." Pt states the music "does not bother him." This resolved after 10 minutes. Otw no SI/HI/VH//delusions. Walking halls as if in fugue.  ADLs:  Barthel Index for Activities of Daily Living (ADL) from [employer]  on 4/20/2022  RESULT SUMMARY:  65 points  Minimally dependent  INPUTS:  Feeding —> 5 = Needs help  Bathing —> 0 = Unable  Grooming —> 5 = Independent  Dressing —> 10 = Independent  Bowel control —> 5 = Occasional accident  Bladder control —> 5 = Occasional accident  Toilet use —> 5 = Needs help  Transfers (bed to chair and back) —> 15 = Independent  Mobility on level surfaces —> 15 = Independent (but may use any aid, e.g. stick) >50 yards  Stairs —> 0 = Unable    4/21/22: Yesterday (4/20) , pt "consented  verbally " to ECT in presence of RN and daughter. Today, again seems ambivalent. Pt appears more irritable today, shouting at interviewer. Did not ask interviewer to stay in room. Pt had "panic" episode yesterday and was given Zyprexa, which seemed to improve panic. Pt is compliant with all meds today.  Patient did not wish to sign consent and had no response when asked about this issue; however less clingy allowing staff to leave the room without panic.   Reported that patient had panic after "consent" was allegedly given on 4/20 verbally.       ;;04/22: patient states he is anxious rather than sad; Not endorsing  suicidal or homicidal ideation intent or plans; no mention of auditory or visual hallucinations ; does not consent to ECT (at this time).  Appeared less distraught today than yesterday.  However still feels "terrible";  Sleep  appetite ok; no pain issues. Alert; oriented; cognition intact; speech clear; but has tremor at rest thought to be EPS possibly secondary to Zyprexa.    ;;04/25: patient focused on leaving; continues somewhat irritable and feels "terrible" but less so when approached this morning.  Sleep an issue.    tremor not prominent.  makes better eye contact; less thought blocking;  a little more spontaneous.    ;;04/26: "The same"; patient a little more spontaneous; wants to go home; no indication of interest in ECT; appears a little less anxious; a little mobile; suggests improvement; sleep is still poor but appears to be improving.  bp continues with swings in diastolic bp.      Vital Signs Last 24 Hrs ;T(C): 36.4 (26 Apr 2022 08:45), Max: 37 (25 Apr 2022 17:07) ;T(F): 97.6 (26 Apr 2022 08:45), Max: 98.6 (25 Apr 2022 17:07) ;HR: 84 (26 Apr 2022 08:45) (82 - 84) ;BP: 140/62 (26 Apr 2022 08:45) (140/62 - 145/98) ;BP(mean): -- ;ABP: -- ;ABP(mean): -- ;RR: 18 (26 Apr 2022 08:45) (17 - 18) ;SpO2: 96% (26 Apr 2022 08:45) (96% - 97%) ; ;    ;;04/27: seen by cardiology scores 3 on stroke risk with 4 as cut off for too risky ; suggestion that patient be placed on warfarin; Dr. Simmons called and vm left for discussion; patient spoke to writer about breakfast and continues to be willing to consent to ECT ; a change from past discussions; however appears to have improved on Seroquel alone.    ;;04/28: above risk score related to Warfarin use and may not apply to this patient; however cardiology did score patient as low risk; Dr. Simmons contacted and treatment discussed with plan to monitor progress on Seroquel and Zoloft as patient had been stable on Zyprexa and Zoloft for many years; however this morning patient returns to being sad; irritable; "terrible"; and silent when some questions asked despite responding to others; consented for ECT yesterday; first treatment RUL in am tomorrow.      4/28: First ECT session scheduled for tomorrow, 4/29/2022. Seen in room today. NAD. A&Ox3. Still tremulous. Exhibiting new difficulty with getting up from a seated position and with ambulation. Very irritable. Pt ended interview abruptly when lunch was announced. Continues on Seroquel and Zoloft for anxiety and mood.      4/29: Seen s/p his first ECT session, which occurred earlier this morning. NAD. A&Ox3. Somnolent, but arousable, but almost immediately went back to sleep during conversation. Slurring his speech. Last night, pt had a witnessed near-fall, after which his mental status exam was unchanged from baseline. Decision was made to allow pt to rest and return later.  Seen 3.5 hours later. Somnolence resolved. Mildly tremulous initially, but it improved with time.  Mood appeared somewhat improved though still irritable.  Pt is endorsing feeling dizzy and stiffer than when he got to [facility]. He also stated that it is not normal for him to be shaking as he is. Mild cogwheel rigidity was appreciated in the right upper extremity. Onset of symptoms correlates with initiation of anti-psychotic augmentation of anti-depressant regimen. Pt is currently on Seroquel 50 as adjuvant treatment. Decision made to d/c Seroquel [out of abundance of caution re EPS and falls issues-RR] .  Of note: Per UpToDate, dizziness can occur in 1-3% of patients taking mirabegron (and in up to 9% with mesalamine but he has not taken this since April 23rd).    ;;05/01: may be demonstrating slight improvement; aware that Seroquel was stopped but this needs to be re-evaluated in view of early gains on meds.  On CO for falls; coarse tremor still present.  RUL ECT#2 on 5/2.   Discussion with Dr. Arthur (not [place];  error) was that patient was stable on Zyprexa for years after ECT.   ;;05/02: no headache or physical distress ;no clear evidence of improvement; RUL may not be sufficient to be effective; however will encourage RUL ECT #3 as appears tolerated.  awaiting PT for falls assessment; on CO as falls risk.    ;;05/03: a little less constricted; while denies feeling better appears slightly brighter; on CO for falls risk; ECT #3 RUL in am.  No headaches reported.    ;;05/04: suggestion of improved mood (also observed by others) after RUL ECT #3; patient ambivalent about ECT after discharge; will continue inpatient course if patient continues to consent.

## 2022-05-04 NOTE — BH INPATIENT PSYCHIATRY PROGRESS NOTE - NSBHFUPINTERVALHXFT_PSY_A_CORE
"ok" patient seen after RUL ECT #3 and appears a little more animated and with quicker responses; indicates he does not want to continue ECT after discharge; Alert; oriented; cognition intact; ; less tremor less evidence of movement impairment.  speech limited but loud and mostly clear.

## 2022-05-04 NOTE — BH INPATIENT PSYCHIATRY PROGRESS NOTE - NSBHMETABOLIC_PSY_ALL_CORE_FT
BMI: BMI (kg/m2): 22.7 (05-04-22 @ 06:54)  HbA1c: A1C with Estimated Average Glucose Result: 5.7 % (04-13-22 @ 07:45)    Glucose: POCT Blood Glucose.: 190 mg/dL (04-19-22 @ 06:43)    BP: 157/68 (05-04-22 @ 09:02) (117/51 - 210/79)  Lipid Panel: Date/Time: 04-13-22 @ 07:45  Cholesterol, Serum: 120  Direct LDL: --  HDL Cholesterol, Serum: 60  Total Cholesterol/HDL Ration Measurement: --  Triglycerides, Serum: 50

## 2022-05-05 PROCEDURE — 99233 SBSQ HOSP IP/OBS HIGH 50: CPT

## 2022-05-05 RX ADMIN — MAGNESIUM OXIDE 400 MG ORAL TABLET 400 MILLIGRAM(S): 241.3 TABLET ORAL at 10:10

## 2022-05-05 RX ADMIN — ATORVASTATIN CALCIUM 20 MILLIGRAM(S): 80 TABLET, FILM COATED ORAL at 20:45

## 2022-05-05 RX ADMIN — Medication 2000 UNIT(S): at 10:10

## 2022-05-05 RX ADMIN — SENNA PLUS 2 TABLET(S): 8.6 TABLET ORAL at 20:45

## 2022-05-05 RX ADMIN — RIVAROXABAN 15 MILLIGRAM(S): KIT at 10:09

## 2022-05-05 RX ADMIN — Medication 25 MILLIGRAM(S): at 10:13

## 2022-05-05 RX ADMIN — SERTRALINE 100 MILLIGRAM(S): 25 TABLET, FILM COATED ORAL at 10:10

## 2022-05-05 NOTE — BH INPATIENT PSYCHIATRY PROGRESS NOTE - NSBHASSESSSUMMFT_PSY_ALL_CORE
89-year-old, left-handed retired man, domiciled with daughter Brianda (), w/ PMH of hearing loss using hearing aid, colitis, HLD, HTN, on Xarelto 15mg daily (PCP Dr Barrett  #1 #5), PPH of depression, multiple psych hospitalization requiring ECT, last 4 years ago at [facility], currently on sertraline 50mg bid, olanzapine 5mg qd, sees Dr Josh Simmons () for outpatient, presenting due to worsening depression and obsessive thought.   MoCA score 20/30. Due to current active depressive symptoms, nonresponsive to outpatient treatment, and collateral from psychiatrist and family, patient meets involuntary admission criteria. He has been admitted to 8U for stabilization and possible ECT as inpatient.     Pt cleared for ECT by medicine on 4/15/22 and cleared by Cardiology on 4/27/2022.      MDD w/psychotic features  Plan:  - Received first unilateral ECT session today (4/29); next due Monday 5/2  - Discontinue Seroquel 2/2 to suspicion for drug-induced parkinsonian features and/or dizziness  - Continue sertraline 100mg daily  - Seen by medicine for ECT clearance 4/15: They determine pt is Low-intermediate risk for low risk procedure  - Continue medication as follows:     Xarelto 15mg daily with meals     Losartan to 25 mg QD; 4/18/22, decrease Losartan to 25 mg (previously 50mg) daily as BPs have been low 4/18/22 and pt was orthostatics positive     Discontinued Furosemide 4/18/ 22; previously 20mg twice daily     Atorvastatin 20mg daily     Vit D3 2000 units     Magnesium 500mg     Mesalamine 2.4 mg     Mirabegron 50mg po QD (pt occasionally refusing)    Medicine consult, 4/15:  #Pre Op Risk   - EKG with rate controlled Afib, LAFB  - Re HTN: maintain /90   - Re: Afib: maintain anticoagulation with home xarelto   - Low-intermediate risk for low risk procedure    4/12: Pt reluctant to engage today; does not want to be on [facility]. Equivocating regarding ECT. Clearly depressed. MoCA 20/30. Will continue to monitor and to discuss ECT.    4/13: Pt remains ambivalent about treatment. Not asking for discharge today. Refused some labs, will try to get these again. Alert and oriented. No SI. Will follow up anemia.    4/14: at this point patient is irritable and negativistic refusing ECT as well as some medications and lab work.  Will move Zyprexa to nighttime (sleep an issue?); and Zoloft 100mg to am; continue to attempt to discuss need for labs and possible ECT if medical evaluation can be completed. Need to assess/address anemia.  Refusal may require consideration of TOO.    4/15: Pt continues to refuse ECT and routine blood work. Pt was seen by medicine team for clearance for ECT, after which he was determined to be "Low-intermediate risk for low risk procedure. Pt's daughter, Valery, states she is his medical proxy and will bring in form next week.    4/18: Lasix was held as BPs were low. BPs continued to be low and pt was newly orthostatic positive, tf decrease Losartan to 25 mg (previously 50mg) daily CTM. Pt still severely depressed; outpatient psychiatrist and daughters endorse ECT as having been the only effective treatment in the past. Pt continues to be ambivalent about it. He will not answer regarding SI today but endorsed passive SI on previous interview. Still with significant PMR and latency.    4/19: mute to writer but stares; observed interacting with staff; irritable speech clear and coherent; by observation appears generally Alert; oriented; cognition intact; speech clear; no tremor or evidence of movement impairment. No behavioral issues.      4/20: Continues to be despondent and ambivalent about treatments. Endorses hearing music which may be consistent with auditory hallucinations. Stated he was "hearing music coming from the room and following him around with words that he couldn't quite make out." Similar episode "last time I was suffering from this condition." Pt states the music "does not bother him." This resolved after 10 minutes. Otw no SI/HI/VH//delusions. Walking halls as if in fugue.  ADLs:  Barthel Index for Activities of Daily Living (ADL) from [email or web address]  on 4/20/2022  RESULT SUMMARY:  65 points  Minimally dependent  INPUTS:  Feeding —> 5 = Needs help  Bathing —> 0 = Unable  Grooming —> 5 = Independent  Dressing —> 10 = Independent  Bowel control —> 5 = Occasional accident  Bladder control —> 5 = Occasional accident  Toilet use —> 5 = Needs help  Transfers (bed to chair and back) —> 15 = Independent  Mobility on level surfaces —> 15 = Independent (but may use any aid, e.g. stick) >50 yards  Stairs —> 0 = Unable    4/21/22: Yesterday (4/20) , pt "consented  verbally " to ECT in presence of RN and daughter. Today, again seems ambivalent. Pt appears more irritable today, shouting at interviewer. Did not ask interviewer to stay in room. Pt had "panic" episode yesterday and was given Zyprexa, which seemed to improve panic. Pt is compliant with all meds today.  Patient did not wish to sign consent and had no response when asked about this issue; however less clingy allowing staff to leave the room without panic.   Reported that patient had panic after "consent" was allegedly given on 4/20 verbally.       ;;04/22: patient states he is anxious rather than sad; Not endorsing  suicidal or homicidal ideation intent or plans; no mention of auditory or visual hallucinations ; does not consent to ECT (at this time).  Appeared less distraught today than yesterday.  However still feels "terrible";  Sleep  appetite ok; no pain issues. Alert; oriented; cognition intact; speech clear; but has tremor at rest thought to be EPS possibly secondary to Zyprexa.    ;;04/25: patient focused on leaving; continues somewhat irritable and feels "terrible" but less so when approached this morning.  Sleep an issue.    tremor not prominent.  makes better eye contact; less thought blocking;  a little more spontaneous.    ;;04/26: "The same"; patient a little more spontaneous; wants to go home; no indication of interest in ECT; appears a little less anxious; a little mobile; suggests improvement; sleep is still poor but appears to be improving.  bp continues with swings in diastolic bp.      Vital Signs Last 24 Hrs ;T(C): 36.4 (26 Apr 2022 08:45), Max: 37 (25 Apr 2022 17:07) ;T(F): 97.6 (26 Apr 2022 08:45), Max: 98.6 (25 Apr 2022 17:07) ;HR: 84 (26 Apr 2022 08:45) (82 - 84) ;BP: 140/62 (26 Apr 2022 08:45) (140/62 - 145/98) ;BP(mean): -- ;ABP: -- ;ABP(mean): -- ;RR: 18 (26 Apr 2022 08:45) (17 - 18) ;SpO2: 96% (26 Apr 2022 08:45) (96% - 97%) ; ;    ;;04/27: seen by cardiology scores 3 on stroke risk with 4 as cut off for too risky ; suggestion that patient be placed on warfarin; Dr. Simmons called and vm left for discussion; patient spoke to writer about breakfast and continues to be willing to consent to ECT ; a change from past discussions; however appears to have improved on Seroquel alone.    ;;04/28: above risk score related to Warfarin use and may not apply to this patient; however cardiology did score patient as low risk; Dr. Simmons contacted and treatment discussed with plan to monitor progress on Seroquel and Zoloft as patient had been stable on Zyprexa and Zoloft for many years; however this morning patient returns to being sad; irritable; "terrible"; and silent when some questions asked despite responding to others; consented for ECT yesterday; first treatment RUL in am tomorrow.      4/28: First ECT session scheduled for tomorrow, 4/29/2022. Seen in room today. NAD. A&Ox3. Still tremulous. Exhibiting new difficulty with getting up from a seated position and with ambulation. Very irritable. Pt ended interview abruptly when lunch was announced. Continues on Seroquel and Zoloft for anxiety and mood.      4/29: Seen s/p his first ECT session, which occurred earlier this morning. NAD. A&Ox3. Somnolent, but arousable, but almost immediately went back to sleep during conversation. Slurring his speech. Last night, pt had a witnessed near-fall, after which his mental status exam was unchanged from baseline. Decision was made to allow pt to rest and return later.  Seen 3.5 hours later. Somnolence resolved. Mildly tremulous initially, but it improved with time.  Mood appeared somewhat improved though still irritable.  Pt is endorsing feeling dizzy and stiffer than when he got to [facility]. He also stated that it is not normal for him to be shaking as he is. Mild cogwheel rigidity was appreciated in the right upper extremity. Onset of symptoms correlates with initiation of anti-psychotic augmentation of anti-depressant regimen. Pt is currently on Seroquel 50 as adjuvant treatment. Decision made to d/c Seroquel [out of abundance of caution re EPS and falls issues-RR] .  Of note: Per [employer], dizziness can occur in 1-3% of patients taking mirabegron (and in up to 9% with mesalamine but he has not taken this since April 23rd).    ;;05/01: may be demonstrating slight improvement; aware that Seroquel was stopped but this needs to be re-evaluated in view of early gains on meds.  On CO for falls; coarse tremor still present.  RUL ECT#2 on 5/2.   Discussion with Dr. Arthur (not Gaffney;  error) was that patient was stable on Zyprexa for years after ECT.   ;;05/02: no headache or physical distress ;no clear evidence of improvement; RUL may not be sufficient to be effective; however will encourage RUL ECT #3 as appears tolerated.  awaiting PT for falls assessment; on CO as falls risk.    ;;05/03: a little less constricted; while denies feeling better appears slightly brighter; on CO for falls risk; ECT #3 RUL in am.  No headaches reported.    ;;05/04: suggestion of improved mood (also observed by others) after RUL ECT #3; patient ambivalent about ECT after discharge; will continue inpatient course if patient continues to consent.   ;;05/05: reports of talking in his sleep but patient is brighter and less blocked this morning making good eye contact; almost smiling and agreeing to RUL ECT #4 tomorrow; Sleep  appetite ok; no pain issues.  eating breakfast.  On CO for falls.

## 2022-05-05 NOTE — BH INPATIENT PSYCHIATRY PROGRESS NOTE - NSBHMETABOLIC_PSY_ALL_CORE_FT
BMI: BMI (kg/m2): 22.7 (05-04-22 @ 06:54)  HbA1c: A1C with Estimated Average Glucose Result: 5.7 % (04-13-22 @ 07:45)    Glucose: POCT Blood Glucose.: 190 mg/dL (04-19-22 @ 06:43)    BP: 139/55 (05-05-22 @ 10:15) (123/51 - 210/79)  Lipid Panel: Date/Time: 04-13-22 @ 07:45  Cholesterol, Serum: 120  Direct LDL: --  HDL Cholesterol, Serum: 60  Total Cholesterol/HDL Ration Measurement: --  Triglycerides, Serum: 50

## 2022-05-05 NOTE — BH INPATIENT PSYCHIATRY PROGRESS NOTE - NSBHFUPINTERVALHXFT_PSY_A_CORE
reports of talking in his sleep but patient is brighter and less blocked this morning making good eye contact; almost smiling and agreeing to RUL ECT #4 tomorrow; Sleep  appetite ok; no pain issues.  eating breakfast.  On CO for falls.

## 2022-05-05 NOTE — BH INPATIENT PSYCHIATRY PROGRESS NOTE - MSE UNSTRUCTURED FT
Isolative; sits by desk; makes limited  eye contact; not conversational.  Not endorsing  suicidal or homicidal ideation intent or plans; no mention of auditory or visual hallucinations but tenative replies; Alert; oriented; cognition intact; speech clear; some coarse tremor at rest.   

## 2022-05-05 NOTE — BH INPATIENT PSYCHIATRY PROGRESS NOTE - NSBHCHARTREVIEWVS_PSY_A_CORE FT
Vital Signs Last 24 Hrs  T(C): 36.7 (05-05-22 @ 10:15), Max: 37.2 (05-04-22 @ 16:43)  T(F): 98 (05-05-22 @ 10:15), Max: 99 (05-04-22 @ 16:43)  HR: 100 (05-05-22 @ 10:15) (83 - 100)  BP: 139/55 (05-05-22 @ 10:15) (123/51 - 139/55)  BP(mean): --  RR: 17 (05-05-22 @ 10:15) (17 - 18)  SpO2: 96% (05-05-22 @ 10:15) (96% - 97%)

## 2022-05-06 PROCEDURE — 90870 ELECTROCONVULSIVE THERAPY: CPT

## 2022-05-06 PROCEDURE — 99233 SBSQ HOSP IP/OBS HIGH 50: CPT

## 2022-05-06 RX ORDER — METOPROLOL TARTRATE 50 MG
2.5 TABLET ORAL ONCE
Refills: 0 | Status: COMPLETED | OUTPATIENT
Start: 2022-05-06 | End: 2022-05-06

## 2022-05-06 RX ADMIN — Medication 25 MILLIGRAM(S): at 09:42

## 2022-05-06 RX ADMIN — RIVAROXABAN 15 MILLIGRAM(S): KIT at 09:42

## 2022-05-06 RX ADMIN — SENNA PLUS 2 TABLET(S): 8.6 TABLET ORAL at 21:53

## 2022-05-06 RX ADMIN — ATORVASTATIN CALCIUM 20 MILLIGRAM(S): 80 TABLET, FILM COATED ORAL at 21:53

## 2022-05-06 RX ADMIN — SERTRALINE 100 MILLIGRAM(S): 25 TABLET, FILM COATED ORAL at 09:42

## 2022-05-06 RX ADMIN — Medication 2000 UNIT(S): at 09:43

## 2022-05-06 RX ADMIN — Medication 2.5 MILLIGRAM(S): at 08:30

## 2022-05-06 NOTE — BH INPATIENT PSYCHIATRY PROGRESS NOTE - NSBHCHARTREVIEWVS_PSY_A_CORE FT
Vital Signs Last 24 Hrs  T(C): 37 (05-06-22 @ 09:00), Max: 37.2 (05-06-22 @ 08:12)  T(F): 98.6 (05-06-22 @ 09:00), Max: 99 (05-06-22 @ 08:12)  HR: 82 (05-06-22 @ 09:00) (76 - 109)  BP: 170/67 (05-06-22 @ 09:00) (150/80 - 220/120)  BP(mean): 101 (05-06-22 @ 08:42) (101 - 163)  RR: 18 (05-06-22 @ 09:00) (16 - 34)  SpO2: 96% (05-06-22 @ 09:00) (95% - 98%)

## 2022-05-06 NOTE — BH INPATIENT PSYCHIATRY PROGRESS NOTE - CURRENT MEDICATION
MEDICATIONS  (STANDING):  atorvastatin 20 milliGRAM(s) Oral daily  cholecalciferol 2000 Unit(s) Oral daily  magnesium oxide 400 milliGRAM(s) Oral daily  mesalamine DR (24-Hour) Tablet 2.4 Gram(s) Oral <User Schedule>  metoprolol succinate ER 25 milliGRAM(s) Oral daily  mirabegron ER 50 milliGRAM(s) Oral daily  rivaroxaban 15 milliGRAM(s) Oral daily  senna 2 Tablet(s) Oral at bedtime  sertraline 100 milliGRAM(s) Oral daily    MEDICATIONS  (PRN):  magnesium hydroxide Suspension 30 milliLiter(s) Oral daily PRN Constipation  polyethylene glycol 3350 17 Gram(s) Oral daily PRN Constipation   None

## 2022-05-06 NOTE — BH INPATIENT PSYCHIATRY PROGRESS NOTE - NSBHMETABOLIC_PSY_ALL_CORE_FT
BMI: BMI (kg/m2): 22.7 (05-06-22 @ 06:21)  HbA1c: A1C with Estimated Average Glucose Result: 5.7 % (04-13-22 @ 07:45)    Glucose: POCT Blood Glucose.: 190 mg/dL (04-19-22 @ 06:43)    BP: 170/67 (05-06-22 @ 09:00) (123/51 - 220/120)  Lipid Panel: Date/Time: 04-13-22 @ 07:45  Cholesterol, Serum: 120  Direct LDL: --  HDL Cholesterol, Serum: 60  Total Cholesterol/HDL Ration Measurement: --  Triglycerides, Serum: 50

## 2022-05-06 NOTE — BH INPATIENT PSYCHIATRY PROGRESS NOTE - NSBHFUPINTERVALHXFT_PSY_A_CORE
Patient seen in his room today, remains on CO for fall risk, is initially sleeping but arousable to verbal stimulation. He states that he is 'ok' denies having any complaints. He had ECT #4 today and reports that it was also 'ok'.  ECT treatment note reviewed which indicate pt had good response to treatment with 52 second duration noted on EEG. Recovery was uneventful.   Pt noted to be drowsy during our interaction, falling asleep during it and when woken up stated 'I wanna sleep' and terminated our interaction.   Order for CO and ECT #5 (5/9/22) in place

## 2022-05-06 NOTE — BH INPATIENT PSYCHIATRY PROGRESS NOTE - NSBHASSESSSUMMFT_PSY_ALL_CORE
89-year-old, left-handed retired man, domiciled with daughter Brianda (), w/ PMH of hearing loss using hearing aid, colitis, HLD, HTN, on Xarelto 15mg daily (PCP Dr Barrett  #1 #5), PPH of depression, multiple psych hospitalization requiring ECT, last 4 years ago at [facility], currently on sertraline 50mg bid, olanzapine 5mg qd, sees Dr Josh Simmons () for outpatient, presenting due to worsening depression and obsessive thought.   MoCA score 20/30. Due to current active depressive symptoms, nonresponsive to outpatient treatment, and collateral from psychiatrist and family, patient meets involuntary admission criteria. He has been admitted to 8U for stabilization and possible ECT as inpatient.     Pt cleared for ECT by medicine on 4/15/22 and cleared by Cardiology on 4/27/2022.      MDD w/psychotic features  Plan:  - Received first unilateral ECT session today (4/29); next due Monday 5/2  - Discontinue Seroquel 2/2 to suspicion for drug-induced parkinsonian features and/or dizziness  - Continue sertraline 100mg daily  - Seen by medicine for ECT clearance 4/15: They determine pt is Low-intermediate risk for low risk procedure  - Continue medication as follows:     Xarelto 15mg daily with meals     Losartan to 25 mg QD; 4/18/22, decrease Losartan to 25 mg (previously 50mg) daily as BPs have been low 4/18/22 and pt was orthostatics positive     Discontinued Furosemide 4/18/ 22; previously 20mg twice daily     Atorvastatin 20mg daily     Vit D3 2000 units     Magnesium 500mg     Mesalamine 2.4 mg     Mirabegron 50mg po QD (pt occasionally refusing)    Medicine consult, 4/15:  #Pre Op Risk   - EKG with rate controlled Afib, LAFB  - Re HTN: maintain /90   - Re: Afib: maintain anticoagulation with home xarelto   - Low-intermediate risk for low risk procedure    4/12: Pt reluctant to engage today; does not want to be on [facility]. Equivocating regarding ECT. Clearly depressed. MoCA 20/30. Will continue to monitor and to discuss ECT.    4/13: Pt remains ambivalent about treatment. Not asking for discharge today. Refused some labs, will try to get these again. Alert and oriented. No SI. Will follow up anemia.    4/14: at this point patient is irritable and negativistic refusing ECT as well as some medications and lab work.  Will move Zyprexa to nighttime (sleep an issue?); and Zoloft 100mg to am; continue to attempt to discuss need for labs and possible ECT if medical evaluation can be completed. Need to assess/address anemia.  Refusal may require consideration of TOO.    4/15: Pt continues to refuse ECT and routine blood work. Pt was seen by medicine team for clearance for ECT, after which he was determined to be "Low-intermediate risk for low risk procedure. Pt's daughter, Valery, states she is his medical proxy and will bring in form next week.    4/18: Lasix was held as BPs were low. BPs continued to be low and pt was newly orthostatic positive, tf decrease Losartan to 25 mg (previously 50mg) daily CTM. Pt still severely depressed; outpatient psychiatrist and daughters endorse ECT as having been the only effective treatment in the past. Pt continues to be ambivalent about it. He will not answer regarding SI today but endorsed passive SI on previous interview. Still with significant PMR and latency.    4/19: mute to writer but stares; observed interacting with staff; irritable speech clear and coherent; by observation appears generally Alert; oriented; cognition intact; speech clear; no tremor or evidence of movement impairment. No behavioral issues.      4/20: Continues to be despondent and ambivalent about treatments. Endorses hearing music which may be consistent with auditory hallucinations. Stated he was "hearing music coming from the room and following him around with words that he couldn't quite make out." Similar episode "last time I was suffering from this condition." Pt states the music "does not bother him." This resolved after 10 minutes. Otw no SI/HI/VH//delusions. Walking halls as if in fugue.  ADLs:  Barthel Index for Activities of Daily Living (ADL) from [email or web address]  on 4/20/2022  RESULT SUMMARY:  65 points  Minimally dependent  INPUTS:  Feeding —> 5 = Needs help  Bathing —> 0 = Unable  Grooming —> 5 = Independent  Dressing —> 10 = Independent  Bowel control —> 5 = Occasional accident  Bladder control —> 5 = Occasional accident  Toilet use —> 5 = Needs help  Transfers (bed to chair and back) —> 15 = Independent  Mobility on level surfaces —> 15 = Independent (but may use any aid, e.g. stick) >50 yards  Stairs —> 0 = Unable    4/21/22: Yesterday (4/20) , pt "consented  verbally " to ECT in presence of RN and daughter. Today, again seems ambivalent. Pt appears more irritable today, shouting at interviewer. Did not ask interviewer to stay in room. Pt had "panic" episode yesterday and was given Zyprexa, which seemed to improve panic. Pt is compliant with all meds today.  Patient did not wish to sign consent and had no response when asked about this issue; however less clingy allowing staff to leave the room without panic.   Reported that patient had panic after "consent" was allegedly given on 4/20 verbally.       ;;04/22: patient states he is anxious rather than sad; Not endorsing  suicidal or homicidal ideation intent or plans; no mention of auditory or visual hallucinations ; does not consent to ECT (at this time).  Appeared less distraught today than yesterday.  However still feels "terrible";  Sleep  appetite ok; no pain issues. Alert; oriented; cognition intact; speech clear; but has tremor at rest thought to be EPS possibly secondary to Zyprexa.    ;;04/25: patient focused on leaving; continues somewhat irritable and feels "terrible" but less so when approached this morning.  Sleep an issue.    tremor not prominent.  makes better eye contact; less thought blocking;  a little more spontaneous.    ;;04/26: "The same"; patient a little more spontaneous; wants to go home; no indication of interest in ECT; appears a little less anxious; a little mobile; suggests improvement; sleep is still poor but appears to be improving.  bp continues with swings in diastolic bp.      Vital Signs Last 24 Hrs ;T(C): 36.4 (26 Apr 2022 08:45), Max: 37 (25 Apr 2022 17:07) ;T(F): 97.6 (26 Apr 2022 08:45), Max: 98.6 (25 Apr 2022 17:07) ;HR: 84 (26 Apr 2022 08:45) (82 - 84) ;BP: 140/62 (26 Apr 2022 08:45) (140/62 - 145/98) ;BP(mean): -- ;ABP: -- ;ABP(mean): -- ;RR: 18 (26 Apr 2022 08:45) (17 - 18) ;SpO2: 96% (26 Apr 2022 08:45) (96% - 97%) ; ;    ;;04/27: seen by cardiology scores 3 on stroke risk with 4 as cut off for too risky ; suggestion that patient be placed on warfarin; Dr. Simmons called and vm left for discussion; patient spoke to writer about breakfast and continues to be willing to consent to ECT ; a change from past discussions; however appears to have improved on Seroquel alone.    ;;04/28: above risk score related to Warfarin use and may not apply to this patient; however cardiology did score patient as low risk; Dr. Simmons contacted and treatment discussed with plan to monitor progress on Seroquel and Zoloft as patient had been stable on Zyprexa and Zoloft for many years; however this morning patient returns to being sad; irritable; "terrible"; and silent when some questions asked despite responding to others; consented for ECT yesterday; first treatment RUL in am tomorrow.      4/28: First ECT session scheduled for tomorrow, 4/29/2022. Seen in room today. NAD. A&Ox3. Still tremulous. Exhibiting new difficulty with getting up from a seated position and with ambulation. Very irritable. Pt ended interview abruptly when lunch was announced. Continues on Seroquel and Zoloft for anxiety and mood.      4/29: Seen s/p his first ECT session, which occurred earlier this morning. NAD. A&Ox3. Somnolent, but arousable, but almost immediately went back to sleep during conversation. Slurring his speech. Last night, pt had a witnessed near-fall, after which his mental status exam was unchanged from baseline. Decision was made to allow pt to rest and return later.  Seen 3.5 hours later. Somnolence resolved. Mildly tremulous initially, but it improved with time.  Mood appeared somewhat improved though still irritable.  Pt is endorsing feeling dizzy and stiffer than when he got to [facility]. He also stated that it is not normal for him to be shaking as he is. Mild cogwheel rigidity was appreciated in the right upper extremity. Onset of symptoms correlates with initiation of anti-psychotic augmentation of anti-depressant regimen. Pt is currently on Seroquel 50 as adjuvant treatment. Decision made to d/c Seroquel [out of abundance of caution re EPS and falls issues-RR] .  Of note: Per [employer], dizziness can occur in 1-3% of patients taking mirabegron (and in up to 9% with mesalamine but he has not taken this since April 23rd).    ;;05/01: may be demonstrating slight improvement; aware that Seroquel was stopped but this needs to be re-evaluated in view of early gains on meds.  On CO for falls; coarse tremor still present.  RUL ECT#2 on 5/2.   Discussion with Dr. Arthur (not [place];  error) was that patient was stable on Zyprexa for years after ECT.   ;;05/02: no headache or physical distress ;no clear evidence of improvement; RUL may not be sufficient to be effective; however will encourage RUL ECT #3 as appears tolerated.  awaiting PT for falls assessment; on CO as falls risk.    ;;05/03: a little less constricted; while denies feeling better appears slightly brighter; on CO for falls risk; ECT #3 RUL in am.  No headaches reported.    ;;05/04: suggestion of improved mood (also observed by others) after RUL ECT #3; patient ambivalent about ECT after discharge; will continue inpatient course if patient continues to consent.   ;;05/05: reports of talking in his sleep but patient is brighter and less blocked this morning making good eye contact; almost smiling and agreeing to RUL ECT #4 tomorrow; Sleep  appetite ok; no pain issues.  eating breakfast.  On CO for falls.     5/6 ECT #4 completed with good response, lethargic today

## 2022-05-07 RX ADMIN — MIRABEGRON 50 MILLIGRAM(S): 50 TABLET, EXTENDED RELEASE ORAL at 10:01

## 2022-05-07 RX ADMIN — RIVAROXABAN 15 MILLIGRAM(S): KIT at 10:02

## 2022-05-07 RX ADMIN — SERTRALINE 100 MILLIGRAM(S): 25 TABLET, FILM COATED ORAL at 10:03

## 2022-05-07 RX ADMIN — ATORVASTATIN CALCIUM 20 MILLIGRAM(S): 80 TABLET, FILM COATED ORAL at 21:51

## 2022-05-07 RX ADMIN — Medication 2.4 GRAM(S): at 07:04

## 2022-05-07 RX ADMIN — Medication 2000 UNIT(S): at 10:01

## 2022-05-07 RX ADMIN — Medication 25 MILLIGRAM(S): at 10:02

## 2022-05-07 RX ADMIN — SENNA PLUS 2 TABLET(S): 8.6 TABLET ORAL at 21:51

## 2022-05-07 RX ADMIN — MAGNESIUM OXIDE 400 MG ORAL TABLET 400 MILLIGRAM(S): 241.3 TABLET ORAL at 10:02

## 2022-05-07 NOTE — BH INPATIENT PSYCHIATRY PROGRESS NOTE - NSBHFUPINTERVALHXFT_PSY_A_CORE
Patient seen in his room today, remains on CO for fall risk, is initially sleeping but arousable to verbal stimulation. He states that he is 'ok' denies having any complaints. He had ECT #4 today and reports that it was also 'ok'.  ECT treatment note reviewed which indicate pt had good response to treatment with 52 second duration noted on EEG. Recovery was uneventful.   Pt noted to be drowsy during our interaction, falling asleep during it and when woken up stated 'I wanna sleep' and terminated our interaction.   Order for CO and ECT #5 (5/9/22) in place   Chart and nursing notes reviewed.  No acute events overnight, VSS.  The patient has been interacting appropriately with staff and peers and has been compliant with medications.  The patient continues to tolerate medications and denies any medication side effects.  Per nursing report, the patient has been more sedated since initiation of ECT and he has been sleeping more.      On approach the patient is sleeping quietly with CO 1:1 for fall risk at bedside.  The patient arouses to verbal stimuli.  On evaluation, the patient is calm, cooperative, demonstrating good behavioral control and linear thought processes.  The patient states that he is "feeling better" but that he has belt more sleepy since initiating ECT.  The patient denies SI/HI, intent and plan.  The patient had ECT #4 yesterday and reports that it went 'okay'.  ECT treatment note reviewed which indicates the patient had a good response to treatment with 52 second duration noted on EEG. Recovery was uneventful.  All questions and concerns addressed.

## 2022-05-07 NOTE — BH INPATIENT PSYCHIATRY PROGRESS NOTE - NSBHMETABOLIC_PSY_ALL_CORE_FT
BMI: BMI (kg/m2): 22.7 (05-06-22 @ 06:21)  HbA1c: A1C with Estimated Average Glucose Result: 5.7 % (04-13-22 @ 07:45)    Glucose: POCT Blood Glucose.: 190 mg/dL (04-19-22 @ 06:43)    BP: 143/64 (05-06-22 @ 17:02) (123/51 - 220/120)  Lipid Panel: Date/Time: 04-13-22 @ 07:45  Cholesterol, Serum: 120  Direct LDL: --  HDL Cholesterol, Serum: 60  Total Cholesterol/HDL Ration Measurement: --  Triglycerides, Serum: 50   BMI: BMI (kg/m2): 22.7 (05-06-22 @ 06:21)  HbA1c: A1C with Estimated Average Glucose Result: 5.7 % (04-13-22 @ 07:45)    Glucose: POCT Blood Glucose.: 190 mg/dL (04-19-22 @ 06:43)    BP: 170/65 (05-07-22 @ 16:53) (121/41 - 220/120)  Lipid Panel: Date/Time: 04-13-22 @ 07:45  Cholesterol, Serum: 120  Direct LDL: --  HDL Cholesterol, Serum: 60  Total Cholesterol/HDL Ration Measurement: --  Triglycerides, Serum: 50

## 2022-05-07 NOTE — BH INPATIENT PSYCHIATRY PROGRESS NOTE - NSBHFUPINTERVALCCFT_PSY_A_CORE
Patient treated for severe anxiety and possible worsening depression.    'I wanna sleep!' Patient treated for severe anxiety and possible worsening depression.  "I'm feeling better."

## 2022-05-07 NOTE — BH INPATIENT PSYCHIATRY PROGRESS NOTE - NSBHCHARTREVIEWVS_PSY_A_CORE FT
Vital Signs Last 24 Hrs  T(C): 36.6 (05-06-22 @ 17:02), Max: 36.6 (05-06-22 @ 17:02)  T(F): 97.9 (05-06-22 @ 17:02), Max: 97.9 (05-06-22 @ 17:02)  HR: 74 (05-06-22 @ 17:02) (74 - 74)  BP: 143/64 (05-06-22 @ 17:02) (143/64 - 143/64)  BP(mean): --  RR: 18 (05-06-22 @ 17:02) (18 - 18)  SpO2: 96% (05-06-22 @ 17:02) (96% - 96%)     Vital Signs Last 24 Hrs  T(C): 36.7 (05-07-22 @ 16:53), Max: 37.1 (05-07-22 @ 09:00)  T(F): 98.1 (05-07-22 @ 16:53), Max: 98.7 (05-07-22 @ 09:00)  HR: 70 (05-07-22 @ 16:53) (69 - 70)  BP: 170/65 (05-07-22 @ 16:53) (121/41 - 170/65)  BP(mean): --  RR: 16 (05-07-22 @ 16:53) (16 - 18)  SpO2: 97% (05-07-22 @ 16:53) (97% - 98%)

## 2022-05-07 NOTE — BH INPATIENT PSYCHIATRY PROGRESS NOTE - NSBHASSESSSUMMFT_PSY_ALL_CORE
89-year-old, left-handed retired man, domiciled with daughter Brianda (), w/ PMH of hearing loss using hearing aid, colitis, HLD, HTN, on Xarelto 15mg daily (PCP Dr Barrett  #1 #5), PPH of depression, multiple psych hospitalization requiring ECT, last 4 years ago at [facility], currently on sertraline 50mg bid, olanzapine 5mg qd, sees Dr Josh Simmons () for outpatient, presenting due to worsening depression and obsessive thought.   MoCA score 20/30. Due to current active depressive symptoms, nonresponsive to outpatient treatment, and collateral from psychiatrist and family, patient meets involuntary admission criteria. He has been admitted to 8U for stabilization and possible ECT as inpatient.     Pt cleared for ECT by medicine on 4/15/22 and cleared by Cardiology on 4/27/2022.      MDD w/psychotic features  Plan:  - Received first unilateral ECT session today (4/29); next due Monday 5/2  - Discontinue Seroquel 2/2 to suspicion for drug-induced parkinsonian features and/or dizziness  - Continue sertraline 100mg daily  - Seen by medicine for ECT clearance 4/15: They determine pt is Low-intermediate risk for low risk procedure  - Continue medication as follows:     Xarelto 15mg daily with meals     Losartan to 25 mg QD; 4/18/22, decrease Losartan to 25 mg (previously 50mg) daily as BPs have been low 4/18/22 and pt was orthostatics positive     Discontinued Furosemide 4/18/ 22; previously 20mg twice daily     Atorvastatin 20mg daily     Vit D3 2000 units     Magnesium 500mg     Mesalamine 2.4 mg     Mirabegron 50mg po QD (pt occasionally refusing)    Medicine consult, 4/15:  #Pre Op Risk   - EKG with rate controlled Afib, LAFB  - Re HTN: maintain /90   - Re: Afib: maintain anticoagulation with home xarelto   - Low-intermediate risk for low risk procedure    4/12: Pt reluctant to engage today; does not want to be on [facility]. Equivocating regarding ECT. Clearly depressed. MoCA 20/30. Will continue to monitor and to discuss ECT.    4/13: Pt remains ambivalent about treatment. Not asking for discharge today. Refused some labs, will try to get these again. Alert and oriented. No SI. Will follow up anemia.    4/14: at this point patient is irritable and negativistic refusing ECT as well as some medications and lab work.  Will move Zyprexa to nighttime (sleep an issue?); and Zoloft 100mg to am; continue to attempt to discuss need for labs and possible ECT if medical evaluation can be completed. Need to assess/address anemia.  Refusal may require consideration of TOO.    4/15: Pt continues to refuse ECT and routine blood work. Pt was seen by medicine team for clearance for ECT, after which he was determined to be "Low-intermediate risk for low risk procedure. Pt's daughter, Valery, states she is his medical proxy and will bring in form next week.    4/18: Lasix was held as BPs were low. BPs continued to be low and pt was newly orthostatic positive, tf decrease Losartan to 25 mg (previously 50mg) daily CTM. Pt still severely depressed; outpatient psychiatrist and daughters endorse ECT as having been the only effective treatment in the past. Pt continues to be ambivalent about it. He will not answer regarding SI today but endorsed passive SI on previous interview. Still with significant PMR and latency.    4/19: mute to writer but stares; observed interacting with staff; irritable speech clear and coherent; by observation appears generally Alert; oriented; cognition intact; speech clear; no tremor or evidence of movement impairment. No behavioral issues.      4/20: Continues to be despondent and ambivalent about treatments. Endorses hearing music which may be consistent with auditory hallucinations. Stated he was "hearing music coming from the room and following him around with words that he couldn't quite make out." Similar episode "last time I was suffering from this condition." Pt states the music "does not bother him." This resolved after 10 minutes. Otw no SI/HI/VH//delusions. Walking halls as if in fugue.  ADLs:  Barthel Index for Activities of Daily Living (ADL) from [employer]  on 4/20/2022  RESULT SUMMARY:  65 points  Minimally dependent  INPUTS:  Feeding —> 5 = Needs help  Bathing —> 0 = Unable  Grooming —> 5 = Independent  Dressing —> 10 = Independent  Bowel control —> 5 = Occasional accident  Bladder control —> 5 = Occasional accident  Toilet use —> 5 = Needs help  Transfers (bed to chair and back) —> 15 = Independent  Mobility on level surfaces —> 15 = Independent (but may use any aid, e.g. stick) >50 yards  Stairs —> 0 = Unable    4/21/22: Yesterday (4/20) , pt "consented  verbally " to ECT in presence of RN and daughter. Today, again seems ambivalent. Pt appears more irritable today, shouting at interviewer. Did not ask interviewer to stay in room. Pt had "panic" episode yesterday and was given Zyprexa, which seemed to improve panic. Pt is compliant with all meds today.  Patient did not wish to sign consent and had no response when asked about this issue; however less clingy allowing staff to leave the room without panic.   Reported that patient had panic after "consent" was allegedly given on 4/20 verbally.       ;;04/22: patient states he is anxious rather than sad; Not endorsing  suicidal or homicidal ideation intent or plans; no mention of auditory or visual hallucinations ; does not consent to ECT (at this time).  Appeared less distraught today than yesterday.  However still feels "terrible";  Sleep  appetite ok; no pain issues. Alert; oriented; cognition intact; speech clear; but has tremor at rest thought to be EPS possibly secondary to Zyprexa.    ;;04/25: patient focused on leaving; continues somewhat irritable and feels "terrible" but less so when approached this morning.  Sleep an issue.    tremor not prominent.  makes better eye contact; less thought blocking;  a little more spontaneous.    ;;04/26: "The same"; patient a little more spontaneous; wants to go home; no indication of interest in ECT; appears a little less anxious; a little mobile; suggests improvement; sleep is still poor but appears to be improving.  bp continues with swings in diastolic bp.      Vital Signs Last 24 Hrs ;T(C): 36.4 (26 Apr 2022 08:45), Max: 37 (25 Apr 2022 17:07) ;T(F): 97.6 (26 Apr 2022 08:45), Max: 98.6 (25 Apr 2022 17:07) ;HR: 84 (26 Apr 2022 08:45) (82 - 84) ;BP: 140/62 (26 Apr 2022 08:45) (140/62 - 145/98) ;BP(mean): -- ;ABP: -- ;ABP(mean): -- ;RR: 18 (26 Apr 2022 08:45) (17 - 18) ;SpO2: 96% (26 Apr 2022 08:45) (96% - 97%) ; ;    ;;04/27: seen by cardiology scores 3 on stroke risk with 4 as cut off for too risky ; suggestion that patient be placed on warfarin; Dr. Simmons called and vm left for discussion; patient spoke to writer about breakfast and continues to be willing to consent to ECT ; a change from past discussions; however appears to have improved on Seroquel alone.    ;;04/28: above risk score related to Warfarin use and may not apply to this patient; however cardiology did score patient as low risk; Dr. Simmons contacted and treatment discussed with plan to monitor progress on Seroquel and Zoloft as patient had been stable on Zyprexa and Zoloft for many years; however this morning patient returns to being sad; irritable; "terrible"; and silent when some questions asked despite responding to others; consented for ECT yesterday; first treatment RUL in am tomorrow.      4/28: First ECT session scheduled for tomorrow, 4/29/2022. Seen in room today. NAD. A&Ox3. Still tremulous. Exhibiting new difficulty with getting up from a seated position and with ambulation. Very irritable. Pt ended interview abruptly when lunch was announced. Continues on Seroquel and Zoloft for anxiety and mood.      4/29: Seen s/p his first ECT session, which occurred earlier this morning. NAD. A&Ox3. Somnolent, but arousable, but almost immediately went back to sleep during conversation. Slurring his speech. Last night, pt had a witnessed near-fall, after which his mental status exam was unchanged from baseline. Decision was made to allow pt to rest and return later.  Seen 3.5 hours later. Somnolence resolved. Mildly tremulous initially, but it improved with time.  Mood appeared somewhat improved though still irritable.  Pt is endorsing feeling dizzy and stiffer than when he got to [facility]. He also stated that it is not normal for him to be shaking as he is. Mild cogwheel rigidity was appreciated in the right upper extremity. Onset of symptoms correlates with initiation of anti-psychotic augmentation of anti-depressant regimen. Pt is currently on Seroquel 50 as adjuvant treatment. Decision made to d/c Seroquel [out of abundance of caution re EPS and falls issues-RR] .  Of note: Per UpToDate, dizziness can occur in 1-3% of patients taking mirabegron (and in up to 9% with mesalamine but he has not taken this since April 23rd).    ;;05/01: may be demonstrating slight improvement; aware that Seroquel was stopped but this needs to be re-evaluated in view of early gains on meds.  On CO for falls; coarse tremor still present.  RUL ECT#2 on 5/2.   Discussion with Dr. Arthur (not [place];  error) was that patient was stable on Zyprexa for years after ECT.   ;;05/02: no headache or physical distress ;no clear evidence of improvement; RUL may not be sufficient to be effective; however will encourage RUL ECT #3 as appears tolerated.  awaiting PT for falls assessment; on CO as falls risk.    ;;05/03: a little less constricted; while denies feeling better appears slightly brighter; on CO for falls risk; ECT #3 RUL in am.  No headaches reported.    ;;05/04: suggestion of improved mood (also observed by others) after RUL ECT #3; patient ambivalent about ECT after discharge; will continue inpatient course if patient continues to consent.   ;;05/05: reports of talking in his sleep but patient is brighter and less blocked this morning making good eye contact; almost smiling and agreeing to RUL ECT #4 tomorrow; Sleep  appetite ok; no pain issues.  eating breakfast.  On CO for falls.     ;;05/06: ECT #4 completed with good response, lethargic today  ;;05/07: 89-year-old, left-handed retired man, domiciled with daughter Brianda (), w/ PMH of hearing loss using hearing aid, colitis, HLD, HTN, on Xarelto 15mg daily (PCP Dr Barrett  #1 #5), PPH of depression, multiple psych hospitalization requiring ECT, last 4 years ago at [facility], currently on sertraline 50mg bid, olanzapine 5mg qd, sees Dr Josh Simmons () for outpatient, presenting due to worsening depression and obsessive thought.   MoCA score 20/30. Due to current active depressive symptoms, nonresponsive to outpatient treatment, and collateral from psychiatrist and family, patient meets involuntary admission criteria. He has been admitted to 8U for stabilization and possible ECT as inpatient.     Pt cleared for ECT by medicine on 4/15/22 and cleared by Cardiology on 4/27/2022.      MDD w/psychotic features  Plan:  - Received first unilateral ECT session on (4/29); Patient s/p ECT Session 4 on 05/06/22.    - Discontinued Seroquel 2/2 to suspicion for drug-induced parkinsonian features and/or dizziness  - Continue sertraline 100 mg PO daily  - Seen by medicine for ECT clearance 4/15: They determine pt is Low-intermediate risk for low risk procedure  - Continue medication as follows:     Xarelto 15mg daily with meals     Losartan to 25 mg QD; 4/18/22, decrease Losartan to 25 mg (previously 50mg) daily as BPs have been low 4/18/22 and pt was orthostatics positive     Discontinued Furosemide 4/18/ 22; previously 20mg twice daily     Atorvastatin 20mg daily     Vit D3 2000 units     Magnesium 500mg     Mesalamine 2.4 mg     Mirabegron 50mg po QD (pt occasionally refusing)    Medicine consult, 4/15:  #Pre Op Risk   - EKG with rate controlled Afib, LAFB  - Re HTN: maintain /90   - Re: Afib: maintain anticoagulation with home xarelto   - Low-intermediate risk for low risk procedure    4/12: Pt reluctant to engage today; does not want to be on [facility]. Equivocating regarding ECT. Clearly depressed. MoCA 20/30. Will continue to monitor and to discuss ECT.    4/13: Pt remains ambivalent about treatment. Not asking for discharge today. Refused some labs, will try to get these again. Alert and oriented. No SI. Will follow up anemia.    4/14: at this point patient is irritable and negativistic refusing ECT as well as some medications and lab work.  Will move Zyprexa to nighttime (sleep an issue?); and Zoloft 100mg to am; continue to attempt to discuss need for labs and possible ECT if medical evaluation can be completed. Need to assess/address anemia.  Refusal may require consideration of TOO.    4/15: Pt continues to refuse ECT and routine blood work. Pt was seen by medicine team for clearance for ECT, after which he was determined to be "Low-intermediate risk for low risk procedure. Pt's daughter, Valery, states she is his medical proxy and will bring in form next week.    4/18: Lasix was held as BPs were low. BPs continued to be low and pt was newly orthostatic positive, tf decrease Losartan to 25 mg (previously 50mg) daily CTM. Pt still severely depressed; outpatient psychiatrist and daughters endorse ECT as having been the only effective treatment in the past. Pt continues to be ambivalent about it. He will not answer regarding SI today but endorsed passive SI on previous interview. Still with significant PMR and latency.    4/19: mute to writer but stares; observed interacting with staff; irritable speech clear and coherent; by observation appears generally Alert; oriented; cognition intact; speech clear; no tremor or evidence of movement impairment. No behavioral issues.      4/20: Continues to be despondent and ambivalent about treatments. Endorses hearing music which may be consistent with auditory hallucinations. Stated he was "hearing music coming from the room and following him around with words that he couldn't quite make out." Similar episode "last time I was suffering from this condition." Pt states the music "does not bother him." This resolved after 10 minutes. Otw no SI/HI/VH//delusions. Walking halls as if in fugue.  ADLs:  Barthel Index for Activities of Daily Living (ADL) from [employer]  on 4/20/2022  RESULT SUMMARY:  65 points  Minimally dependent  INPUTS:  Feeding —> 5 = Needs help  Bathing —> 0 = Unable  Grooming —> 5 = Independent  Dressing —> 10 = Independent  Bowel control —> 5 = Occasional accident  Bladder control —> 5 = Occasional accident  Toilet use —> 5 = Needs help  Transfers (bed to chair and back) —> 15 = Independent  Mobility on level surfaces —> 15 = Independent (but may use any aid, e.g. stick) >50 yards  Stairs —> 0 = Unable    4/21/22: Yesterday (4/20) , pt "consented  verbally " to ECT in presence of RN and daughter. Today, again seems ambivalent. Pt appears more irritable today, shouting at interviewer. Did not ask interviewer to stay in room. Pt had "panic" episode yesterday and was given Zyprexa, which seemed to improve panic. Pt is compliant with all meds today.  Patient did not wish to sign consent and had no response when asked about this issue; however less clingy allowing staff to leave the room without panic.   Reported that patient had panic after "consent" was allegedly given on 4/20 verbally.       ;;04/22: patient states he is anxious rather than sad; Not endorsing  suicidal or homicidal ideation intent or plans; no mention of auditory or visual hallucinations ; does not consent to ECT (at this time).  Appeared less distraught today than yesterday.  However still feels "terrible";  Sleep  appetite ok; no pain issues. Alert; oriented; cognition intact; speech clear; but has tremor at rest thought to be EPS possibly secondary to Zyprexa.    ;;04/25: patient focused on leaving; continues somewhat irritable and feels "terrible" but less so when approached this morning.  Sleep an issue.    tremor not prominent.  makes better eye contact; less thought blocking;  a little more spontaneous.    ;;04/26: "The same"; patient a little more spontaneous; wants to go home; no indication of interest in ECT; appears a little less anxious; a little mobile; suggests improvement; sleep is still poor but appears to be improving.  bp continues with swings in diastolic bp.      Vital Signs Last 24 Hrs ;T(C): 36.4 (26 Apr 2022 08:45), Max: 37 (25 Apr 2022 17:07) ;T(F): 97.6 (26 Apr 2022 08:45), Max: 98.6 (25 Apr 2022 17:07) ;HR: 84 (26 Apr 2022 08:45) (82 - 84) ;BP: 140/62 (26 Apr 2022 08:45) (140/62 - 145/98) ;BP(mean): -- ;ABP: -- ;ABP(mean): -- ;RR: 18 (26 Apr 2022 08:45) (17 - 18) ;SpO2: 96% (26 Apr 2022 08:45) (96% - 97%) ; ;    ;;04/27: seen by cardiology scores 3 on stroke risk with 4 as cut off for too risky ; suggestion that patient be placed on warfarin; Dr. Simmons called and vm left for discussion; patient spoke to writer about breakfast and continues to be willing to consent to ECT ; a change from past discussions; however appears to have improved on Seroquel alone.    ;;04/28: above risk score related to Warfarin use and may not apply to this patient; however cardiology did score patient as low risk; Dr. Simmons contacted and treatment discussed with plan to monitor progress on Seroquel and Zoloft as patient had been stable on Zyprexa and Zoloft for many years; however this morning patient returns to being sad; irritable; "terrible"; and silent when some questions asked despite responding to others; consented for ECT yesterday; first treatment RUL in am tomorrow.      4/28: First ECT session scheduled for tomorrow, 4/29/2022. Seen in room today. NAD. A&Ox3. Still tremulous. Exhibiting new difficulty with getting up from a seated position and with ambulation. Very irritable. Pt ended interview abruptly when lunch was announced. Continues on Seroquel and Zoloft for anxiety and mood.      4/29: Seen s/p his first ECT session, which occurred earlier this morning. NAD. A&Ox3. Somnolent, but arousable, but almost immediately went back to sleep during conversation. Slurring his speech. Last night, pt had a witnessed near-fall, after which his mental status exam was unchanged from baseline. Decision was made to allow pt to rest and return later.  Seen 3.5 hours later. Somnolence resolved. Mildly tremulous initially, but it improved with time.  Mood appeared somewhat improved though still irritable.  Pt is endorsing feeling dizzy and stiffer than when he got to [facility]. He also stated that it is not normal for him to be shaking as he is. Mild cogwheel rigidity was appreciated in the right upper extremity. Onset of symptoms correlates with initiation of anti-psychotic augmentation of anti-depressant regimen. Pt is currently on Seroquel 50 as adjuvant treatment. Decision made to d/c Seroquel [out of abundance of caution re EPS and falls issues-RR] .  Of note: Per UpToDate, dizziness can occur in 1-3% of patients taking mirabegron (and in up to 9% with mesalamine but he has not taken this since April 23rd).    ;;05/01: may be demonstrating slight improvement; aware that Seroquel was stopped but this needs to be re-evaluated in view of early gains on meds.  On CO for falls; coarse tremor still present.  RUL ECT#2 on 5/2.   Discussion with Dr. Arthur (not [employer];  error) was that patient was stable on Zyprexa for years after ECT.   ;;05/02: no headache or physical distress ;no clear evidence of improvement; RUL may not be sufficient to be effective; however will encourage RUL ECT #3 as appears tolerated.  awaiting PT for falls assessment; on CO as falls risk.    ;;05/03: a little less constricted; while denies feeling better appears slightly brighter; on CO for falls risk; ECT #3 RUL in am.  No headaches reported.    ;;05/04: suggestion of improved mood (also observed by others) after RUL ECT #3; patient ambivalent about ECT after discharge; will continue inpatient course if patient continues to consent.   ;;05/05: reports of talking in his sleep but patient is brighter and less blocked this morning making good eye contact; almost smiling and agreeing to RUL ECT #4 tomorrow; Sleep  appetite ok; no pain issues.  eating breakfast.  On CO for falls.     ;;05/06: ECT #4 completed with good response, lethargic today  ;;05/07: Patient again lethargic today.  Patient reports "feeling better" and denies SI/HI, intent, plan and AVH.  The patient's mood is improved from yesterday.

## 2022-05-08 PROCEDURE — 99231 SBSQ HOSP IP/OBS SF/LOW 25: CPT

## 2022-05-08 RX ADMIN — Medication 2000 UNIT(S): at 11:44

## 2022-05-08 RX ADMIN — MAGNESIUM OXIDE 400 MG ORAL TABLET 400 MILLIGRAM(S): 241.3 TABLET ORAL at 11:46

## 2022-05-08 RX ADMIN — MIRABEGRON 50 MILLIGRAM(S): 50 TABLET, EXTENDED RELEASE ORAL at 11:54

## 2022-05-08 RX ADMIN — Medication 25 MILLIGRAM(S): at 11:46

## 2022-05-08 RX ADMIN — RIVAROXABAN 15 MILLIGRAM(S): KIT at 11:46

## 2022-05-08 RX ADMIN — SENNA PLUS 2 TABLET(S): 8.6 TABLET ORAL at 21:24

## 2022-05-08 RX ADMIN — SERTRALINE 100 MILLIGRAM(S): 25 TABLET, FILM COATED ORAL at 11:45

## 2022-05-08 RX ADMIN — ATORVASTATIN CALCIUM 20 MILLIGRAM(S): 80 TABLET, FILM COATED ORAL at 21:24

## 2022-05-08 NOTE — BH INPATIENT PSYCHIATRY PROGRESS NOTE - NSBHCHARTREVIEWVS_PSY_A_CORE FT
Vital Signs Last 24 Hrs  T(C): 36.7 (05-08-22 @ 16:05), Max: 36.7 (05-08-22 @ 16:05)  T(F): 98 (05-08-22 @ 16:05), Max: 98 (05-08-22 @ 16:05)  HR: 76 (05-08-22 @ 16:05) (76 - 93)  BP: 163/89 (05-08-22 @ 16:05) (147/74 - 163/89)  BP(mean): --  RR: 18 (05-08-22 @ 16:05) (18 - 18)  SpO2: 99% (05-08-22 @ 16:05) (98% - 99%)

## 2022-05-08 NOTE — BH INPATIENT PSYCHIATRY PROGRESS NOTE - NSBHFUPINTERVALHXFT_PSY_A_CORE
For ECT #5 tomorrow. Pt seemed tired and did not want to speak to me this evening but as per 1:1 he had been out earlier and seemed to be doing well. Pt on 1:1 for fall risk. Still off seroquel.

## 2022-05-08 NOTE — BH INPATIENT PSYCHIATRY PROGRESS NOTE - NSBHMETABOLIC_PSY_ALL_CORE_FT
BMI: BMI (kg/m2): 22.7 (05-06-22 @ 06:21)  HbA1c: A1C with Estimated Average Glucose Result: 5.7 % (04-13-22 @ 07:45)    Glucose: POCT Blood Glucose.: 190 mg/dL (04-19-22 @ 06:43)    BP: 163/89 (05-08-22 @ 16:05) (121/41 - 220/120)  Lipid Panel: Date/Time: 04-13-22 @ 07:45  Cholesterol, Serum: 120  Direct LDL: --  HDL Cholesterol, Serum: 60  Total Cholesterol/HDL Ration Measurement: --  Triglycerides, Serum: 50

## 2022-05-08 NOTE — BH INPATIENT PSYCHIATRY PROGRESS NOTE - NSBHASSESSSUMMFT_PSY_ALL_CORE
89-year-old, left-handed retired man, domiciled with daughter Brianda (), w/ PMH of hearing loss using hearing aid, colitis, HLD, HTN, on Xarelto 15mg daily (PCP Dr Barrett  #1 #5), PPH of depression, multiple psych hospitalization requiring ECT, last 4 years ago at [facility], currently on sertraline 50mg bid, olanzapine 5mg qd, sees Dr Josh Simmons () for outpatient, presenting due to worsening depression and obsessive thought.   MoCA score 20/30. Due to current active depressive symptoms, nonresponsive to outpatient treatment, and collateral from psychiatrist and family, patient meets involuntary admission criteria. He has been admitted to 8U for stabilization and possible ECT as inpatient.     Pt cleared for ECT by medicine on 4/15/22 and cleared by Cardiology on 4/27/2022.      MDD w/psychotic features  Plan:  - Received first unilateral ECT session on (4/29); Patient s/p ECT Session 4 on 05/06/22.    - Discontinued Seroquel 2/2 to suspicion for drug-induced parkinsonian features and/or dizziness  - Continue sertraline 100 mg PO daily  - Seen by medicine for ECT clearance 4/15: They determine pt is Low-intermediate risk for low risk procedure  - Continue medication as follows:     Xarelto 15mg daily with meals     Losartan to 25 mg QD; 4/18/22, decrease Losartan to 25 mg (previously 50mg) daily as BPs have been low 4/18/22 and pt was orthostatics positive     Discontinued Furosemide 4/18/ 22; previously 20mg twice daily     Atorvastatin 20mg daily     Vit D3 2000 units     Magnesium 500mg     Mesalamine 2.4 mg     Mirabegron 50mg po QD (pt occasionally refusing)    Medicine consult, 4/15:  #Pre Op Risk   - EKG with rate controlled Afib, LAFB  - Re HTN: maintain /90   - Re: Afib: maintain anticoagulation with home xarelto   - Low-intermediate risk for low risk procedure    4/12: Pt reluctant to engage today; does not want to be on [facility]. Equivocating regarding ECT. Clearly depressed. MoCA 20/30. Will continue to monitor and to discuss ECT.    4/13: Pt remains ambivalent about treatment. Not asking for discharge today. Refused some labs, will try to get these again. Alert and oriented. No SI. Will follow up anemia.    4/14: at this point patient is irritable and negativistic refusing ECT as well as some medications and lab work.  Will move Zyprexa to nighttime (sleep an issue?); and Zoloft 100mg to am; continue to attempt to discuss need for labs and possible ECT if medical evaluation can be completed. Need to assess/address anemia.  Refusal may require consideration of TOO.    4/15: Pt continues to refuse ECT and routine blood work. Pt was seen by medicine team for clearance for ECT, after which he was determined to be "Low-intermediate risk for low risk procedure. Pt's daughter, Valery, states she is his medical proxy and will bring in form next week.    4/18: Lasix was held as BPs were low. BPs continued to be low and pt was newly orthostatic positive, tf decrease Losartan to 25 mg (previously 50mg) daily CTM. Pt still severely depressed; outpatient psychiatrist and daughters endorse ECT as having been the only effective treatment in the past. Pt continues to be ambivalent about it. He will not answer regarding SI today but endorsed passive SI on previous interview. Still with significant PMR and latency.    4/19: mute to writer but stares; observed interacting with staff; irritable speech clear and coherent; by observation appears generally Alert; oriented; cognition intact; speech clear; no tremor or evidence of movement impairment. No behavioral issues.      4/20: Continues to be despondent and ambivalent about treatments. Endorses hearing music which may be consistent with auditory hallucinations. Stated he was "hearing music coming from the room and following him around with words that he couldn't quite make out." Similar episode "last time I was suffering from this condition." Pt states the music "does not bother him." This resolved after 10 minutes. Otw no SI/HI/VH//delusions. Walking halls as if in fugue.  ADLs:  Barthel Index for Activities of Daily Living (ADL) from [email or web address]  on 4/20/2022  RESULT SUMMARY:  65 points  Minimally dependent  INPUTS:  Feeding —> 5 = Needs help  Bathing —> 0 = Unable  Grooming —> 5 = Independent  Dressing —> 10 = Independent  Bowel control —> 5 = Occasional accident  Bladder control —> 5 = Occasional accident  Toilet use —> 5 = Needs help  Transfers (bed to chair and back) —> 15 = Independent  Mobility on level surfaces —> 15 = Independent (but may use any aid, e.g. stick) >50 yards  Stairs —> 0 = Unable    4/21/22: Yesterday (4/20) , pt "consented  verbally " to ECT in presence of RN and daughter. Today, again seems ambivalent. Pt appears more irritable today, shouting at interviewer. Did not ask interviewer to stay in room. Pt had "panic" episode yesterday and was given Zyprexa, which seemed to improve panic. Pt is compliant with all meds today.  Patient did not wish to sign consent and had no response when asked about this issue; however less clingy allowing staff to leave the room without panic.   Reported that patient had panic after "consent" was allegedly given on 4/20 verbally.       ;;04/22: patient states he is anxious rather than sad; Not endorsing  suicidal or homicidal ideation intent or plans; no mention of auditory or visual hallucinations ; does not consent to ECT (at this time).  Appeared less distraught today than yesterday.  However still feels "terrible";  Sleep  appetite ok; no pain issues. Alert; oriented; cognition intact; speech clear; but has tremor at rest thought to be EPS possibly secondary to Zyprexa.    ;;04/25: patient focused on leaving; continues somewhat irritable and feels "terrible" but less so when approached this morning.  Sleep an issue.    tremor not prominent.  makes better eye contact; less thought blocking;  a little more spontaneous.    ;;04/26: "The same"; patient a little more spontaneous; wants to go home; no indication of interest in ECT; appears a little less anxious; a little mobile; suggests improvement; sleep is still poor but appears to be improving.  bp continues with swings in diastolic bp.      Vital Signs Last 24 Hrs ;T(C): 36.4 (26 Apr 2022 08:45), Max: 37 (25 Apr 2022 17:07) ;T(F): 97.6 (26 Apr 2022 08:45), Max: 98.6 (25 Apr 2022 17:07) ;HR: 84 (26 Apr 2022 08:45) (82 - 84) ;BP: 140/62 (26 Apr 2022 08:45) (140/62 - 145/98) ;BP(mean): -- ;ABP: -- ;ABP(mean): -- ;RR: 18 (26 Apr 2022 08:45) (17 - 18) ;SpO2: 96% (26 Apr 2022 08:45) (96% - 97%) ; ;    ;;04/27: seen by cardiology scores 3 on stroke risk with 4 as cut off for too risky ; suggestion that patient be placed on warfarin; Dr. Simmons called and vm left for discussion; patient spoke to writer about breakfast and continues to be willing to consent to ECT ; a change from past discussions; however appears to have improved on Seroquel alone.    ;;04/28: above risk score related to Warfarin use and may not apply to this patient; however cardiology did score patient as low risk; Dr. Simmons contacted and treatment discussed with plan to monitor progress on Seroquel and Zoloft as patient had been stable on Zyprexa and Zoloft for many years; however this morning patient returns to being sad; irritable; "terrible"; and silent when some questions asked despite responding to others; consented for ECT yesterday; first treatment RUL in am tomorrow.      4/28: First ECT session scheduled for tomorrow, 4/29/2022. Seen in room today. NAD. A&Ox3. Still tremulous. Exhibiting new difficulty with getting up from a seated position and with ambulation. Very irritable. Pt ended interview abruptly when lunch was announced. Continues on Seroquel and Zoloft for anxiety and mood.      4/29: Seen s/p his first ECT session, which occurred earlier this morning. NAD. A&Ox3. Somnolent, but arousable, but almost immediately went back to sleep during conversation. Slurring his speech. Last night, pt had a witnessed near-fall, after which his mental status exam was unchanged from baseline. Decision was made to allow pt to rest and return later.  Seen 3.5 hours later. Somnolence resolved. Mildly tremulous initially, but it improved with time.  Mood appeared somewhat improved though still irritable.  Pt is endorsing feeling dizzy and stiffer than when he got to [facility]. He also stated that it is not normal for him to be shaking as he is. Mild cogwheel rigidity was appreciated in the right upper extremity. Onset of symptoms correlates with initiation of anti-psychotic augmentation of anti-depressant regimen. Pt is currently on Seroquel 50 as adjuvant treatment. Decision made to d/c Seroquel [out of abundance of caution re EPS and falls issues-RR] .  Of note: Per [employer], dizziness can occur in 1-3% of patients taking mirabegron (and in up to 9% with mesalamine but he has not taken this since April 23rd).    ;;05/01: may be demonstrating slight improvement; aware that Seroquel was stopped but this needs to be re-evaluated in view of early gains on meds.  On CO for falls; coarse tremor still present.  RUL ECT#2 on 5/2.   Discussion with Dr. Arthur (not [place];  error) was that patient was stable on Zyprexa for years after ECT.   ;;05/02: no headache or physical distress ;no clear evidence of improvement; RUL may not be sufficient to be effective; however will encourage RUL ECT #3 as appears tolerated.  awaiting PT for falls assessment; on CO as falls risk.    ;;05/03: a little less constricted; while denies feeling better appears slightly brighter; on CO for falls risk; ECT #3 RUL in am.  No headaches reported.    ;;05/04: suggestion of improved mood (also observed by others) after RUL ECT #3; patient ambivalent about ECT after discharge; will continue inpatient course if patient continues to consent.   ;;05/05: reports of talking in his sleep but patient is brighter and less blocked this morning making good eye contact; almost smiling and agreeing to RUL ECT #4 tomorrow; Sleep  appetite ok; no pain issues.  eating breakfast.  On CO for falls.     ;;05/06: ECT #4 completed with good response, lethargic today  ;;05/07: Patient again lethargic today.  Patient reports "feeling better" and denies SI/HI, intent, plan and AVH.  The patient's mood is improved from yesterday.

## 2022-05-09 PROCEDURE — 99233 SBSQ HOSP IP/OBS HIGH 50: CPT

## 2022-05-09 RX ORDER — MIRABEGRON 50 MG/1
50 TABLET, EXTENDED RELEASE ORAL DAILY
Refills: 0 | Status: DISCONTINUED | OUTPATIENT
Start: 2022-05-10 | End: 2022-05-20

## 2022-05-09 RX ADMIN — RIVAROXABAN 15 MILLIGRAM(S): KIT at 10:53

## 2022-05-09 RX ADMIN — MAGNESIUM OXIDE 400 MG ORAL TABLET 400 MILLIGRAM(S): 241.3 TABLET ORAL at 10:52

## 2022-05-09 RX ADMIN — Medication 25 MILLIGRAM(S): at 10:52

## 2022-05-09 RX ADMIN — SERTRALINE 100 MILLIGRAM(S): 25 TABLET, FILM COATED ORAL at 10:52

## 2022-05-09 RX ADMIN — Medication 2.4 GRAM(S): at 10:58

## 2022-05-09 RX ADMIN — SENNA PLUS 2 TABLET(S): 8.6 TABLET ORAL at 21:04

## 2022-05-09 RX ADMIN — MIRABEGRON 50 MILLIGRAM(S): 50 TABLET, EXTENDED RELEASE ORAL at 10:59

## 2022-05-09 RX ADMIN — Medication 2000 UNIT(S): at 10:51

## 2022-05-09 RX ADMIN — ATORVASTATIN CALCIUM 20 MILLIGRAM(S): 80 TABLET, FILM COATED ORAL at 21:04

## 2022-05-09 NOTE — BH INPATIENT PSYCHIATRY PROGRESS NOTE - NSBHASSESSSUMMFT_PSY_ALL_CORE
89-year-old, left-handed retired man, domiciled with daughter Brianda (), w/ PMH of hearing loss using hearing aid, colitis, HLD, HTN, on Xarelto 15mg daily (PCP Dr Barrett  #1 #5), PPH of depression, multiple psych hospitalization requiring ECT, last 4 years ago at [facility], currently on sertraline 50mg bid, olanzapine 5mg qd, sees Dr Josh Simmons () for outpatient, presenting due to worsening depression and obsessive thought.   MoCA score 20/30. Due to current active depressive symptoms, nonresponsive to outpatient treatment, and collateral from psychiatrist and family, patient meets involuntary admission criteria. He has been admitted to 8U for stabilization and possible ECT as inpatient.     Pt cleared for ECT by medicine on 4/15/22 and cleared by Cardiology on 4/27/2022.      MDD w/psychotic features  Plan:  - Received first unilateral ECT session on (4/29); Patient s/p ECT Session 4 on 05/06/22.    - Discontinued Seroquel 2/2 to suspicion for drug-induced parkinsonian features and/or dizziness  - Continue sertraline 100 mg PO daily  - Seen by medicine for ECT clearance 4/15: They determine pt is Low-intermediate risk for low risk procedure  - Continue medication as follows:     Xarelto 15mg daily with meals     Losartan to 25 mg QD; 4/18/22, decrease Losartan to 25 mg (previously 50mg) daily as BPs have been low 4/18/22 and pt was orthostatics positive     Discontinued Furosemide 4/18/ 22; previously 20mg twice daily     Atorvastatin 20mg daily     Vit D3 2000 units     Magnesium 500mg     Mesalamine 2.4 mg     Mirabegron 50mg po QD (pt occasionally refusing)    Medicine consult, 4/15:  #Pre Op Risk   - EKG with rate controlled Afib, LAFB  - Re HTN: maintain /90   - Re: Afib: maintain anticoagulation with home xarelto   - Low-intermediate risk for low risk procedure    4/12: Pt reluctant to engage today; does not want to be on [facility]. Equivocating regarding ECT. Clearly depressed. MoCA 20/30. Will continue to monitor and to discuss ECT.    4/13: Pt remains ambivalent about treatment. Not asking for discharge today. Refused some labs, will try to get these again. Alert and oriented. No SI. Will follow up anemia.    4/14: at this point patient is irritable and negativistic refusing ECT as well as some medications and lab work.  Will move Zyprexa to nighttime (sleep an issue?); and Zoloft 100mg to am; continue to attempt to discuss need for labs and possible ECT if medical evaluation can be completed. Need to assess/address anemia.  Refusal may require consideration of TOO.    4/15: Pt continues to refuse ECT and routine blood work. Pt was seen by medicine team for clearance for ECT, after which he was determined to be "Low-intermediate risk for low risk procedure. Pt's daughter, Valery, states she is his medical proxy and will bring in form next week.    4/18: Lasix was held as BPs were low. BPs continued to be low and pt was newly orthostatic positive, tf decrease Losartan to 25 mg (previously 50mg) daily CTM. Pt still severely depressed; outpatient psychiatrist and daughters endorse ECT as having been the only effective treatment in the past. Pt continues to be ambivalent about it. He will not answer regarding SI today but endorsed passive SI on previous interview. Still with significant PMR and latency.    4/19: mute to writer but stares; observed interacting with staff; irritable speech clear and coherent; by observation appears generally Alert; oriented; cognition intact; speech clear; no tremor or evidence of movement impairment. No behavioral issues.      4/20: Continues to be despondent and ambivalent about treatments. Endorses hearing music which may be consistent with auditory hallucinations. Stated he was "hearing music coming from the room and following him around with words that he couldn't quite make out." Similar episode "last time I was suffering from this condition." Pt states the music "does not bother him." This resolved after 10 minutes. Otw no SI/HI/VH//delusions. Walking halls as if in fugue.  ADLs:  Barthel Index for Activities of Daily Living (ADL) from [employer]  on 4/20/2022  RESULT SUMMARY:  65 points  Minimally dependent  INPUTS:  Feeding —> 5 = Needs help  Bathing —> 0 = Unable  Grooming —> 5 = Independent  Dressing —> 10 = Independent  Bowel control —> 5 = Occasional accident  Bladder control —> 5 = Occasional accident  Toilet use —> 5 = Needs help  Transfers (bed to chair and back) —> 15 = Independent  Mobility on level surfaces —> 15 = Independent (but may use any aid, e.g. stick) >50 yards  Stairs —> 0 = Unable    4/21/22: Yesterday (4/20) , pt "consented  verbally " to ECT in presence of RN and daughter. Today, again seems ambivalent. Pt appears more irritable today, shouting at interviewer. Did not ask interviewer to stay in room. Pt had "panic" episode yesterday and was given Zyprexa, which seemed to improve panic. Pt is compliant with all meds today.  Patient did not wish to sign consent and had no response when asked about this issue; however less clingy allowing staff to leave the room without panic.   Reported that patient had panic after "consent" was allegedly given on 4/20 verbally.       ;;04/22: patient states he is anxious rather than sad; Not endorsing  suicidal or homicidal ideation intent or plans; no mention of auditory or visual hallucinations ; does not consent to ECT (at this time).  Appeared less distraught today than yesterday.  However still feels "terrible";  Sleep  appetite ok; no pain issues. Alert; oriented; cognition intact; speech clear; but has tremor at rest thought to be EPS possibly secondary to Zyprexa.    ;;04/25: patient focused on leaving; continues somewhat irritable and feels "terrible" but less so when approached this morning.  Sleep an issue.    tremor not prominent.  makes better eye contact; less thought blocking;  a little more spontaneous.    ;;04/26: "The same"; patient a little more spontaneous; wants to go home; no indication of interest in ECT; appears a little less anxious; a little mobile; suggests improvement; sleep is still poor but appears to be improving.  bp continues with swings in diastolic bp.      Vital Signs Last 24 Hrs ;T(C): 36.4 (26 Apr 2022 08:45), Max: 37 (25 Apr 2022 17:07) ;T(F): 97.6 (26 Apr 2022 08:45), Max: 98.6 (25 Apr 2022 17:07) ;HR: 84 (26 Apr 2022 08:45) (82 - 84) ;BP: 140/62 (26 Apr 2022 08:45) (140/62 - 145/98) ;BP(mean): -- ;ABP: -- ;ABP(mean): -- ;RR: 18 (26 Apr 2022 08:45) (17 - 18) ;SpO2: 96% (26 Apr 2022 08:45) (96% - 97%) ; ;    ;;04/27: seen by cardiology scores 3 on stroke risk with 4 as cut off for too risky ; suggestion that patient be placed on warfarin; Dr. Simmons called and vm left for discussion; patient spoke to writer about breakfast and continues to be willing to consent to ECT ; a change from past discussions; however appears to have improved on Seroquel alone.    ;;04/28: above risk score related to Warfarin use and may not apply to this patient; however cardiology did score patient as low risk; Dr. Simmons contacted and treatment discussed with plan to monitor progress on Seroquel and Zoloft as patient had been stable on Zyprexa and Zoloft for many years; however this morning patient returns to being sad; irritable; "terrible"; and silent when some questions asked despite responding to others; consented for ECT yesterday; first treatment RUL in am tomorrow.      4/28: First ECT session scheduled for tomorrow, 4/29/2022. Seen in room today. NAD. A&Ox3. Still tremulous. Exhibiting new difficulty with getting up from a seated position and with ambulation. Very irritable. Pt ended interview abruptly when lunch was announced. Continues on Seroquel and Zoloft for anxiety and mood.      4/29: Seen s/p his first ECT session, which occurred earlier this morning. NAD. A&Ox3. Somnolent, but arousable, but almost immediately went back to sleep during conversation. Slurring his speech. Last night, pt had a witnessed near-fall, after which his mental status exam was unchanged from baseline. Decision was made to allow pt to rest and return later.  Seen 3.5 hours later. Somnolence resolved. Mildly tremulous initially, but it improved with time.  Mood appeared somewhat improved though still irritable.  Pt is endorsing feeling dizzy and stiffer than when he got to [facility]. He also stated that it is not normal for him to be shaking as he is. Mild cogwheel rigidity was appreciated in the right upper extremity. Onset of symptoms correlates with initiation of anti-psychotic augmentation of anti-depressant regimen. Pt is currently on Seroquel 50 as adjuvant treatment. Decision made to d/c Seroquel [out of abundance of caution re EPS and falls issues-RR] .  Of note: Per UpToDate, dizziness can occur in 1-3% of patients taking mirabegron (and in up to 9% with mesalamine but he has not taken this since April 23rd).    ;;05/01: may be demonstrating slight improvement; aware that Seroquel was stopped but this needs to be re-evaluated in view of early gains on meds.  On CO for falls; coarse tremor still present.  RUL ECT#2 on 5/2.   Discussion with Dr. Arthur (not Success;  error) was that patient was stable on Zyprexa for years after ECT.   ;;05/02: no headache or physical distress ;no clear evidence of improvement; RUL may not be sufficient to be effective; however will encourage RUL ECT #3 as appears tolerated.  awaiting PT for falls assessment; on CO as falls risk.    ;;05/03: a little less constricted; while denies feeling better appears slightly brighter; on CO for falls risk; ECT #3 RUL in am.  No headaches reported.    ;;05/04: suggestion of improved mood (also observed by others) after RUL ECT #3; patient ambivalent about ECT after discharge; will continue inpatient course if patient continues to consent.   ;;05/05: reports of talking in his sleep but patient is brighter and less blocked this morning making good eye contact; almost smiling and agreeing to RUL ECT #4 tomorrow; Sleep  appetite ok; no pain issues.  eating breakfast.  On CO for falls.     05/06: ECT #4 completed with good response, lethargic today  05/07: Patient again lethargic today.  Patient reports "feeling better" and denies SI/HI, intent, plan and AVH.  The patient's mood is improved from yesterday.   ;;05/09: refused ECT #5 this am; discharged focused; monitor progression encourage completion of course of treatment.   89-year-old, left-handed retired man, domiciled with daughter Brianda (), w/ PMH of hearing loss using hearing aid, colitis, HLD, HTN, on Xarelto 15mg daily (PCP Dr Barrett  #1 #5), PPH of depression, multiple psych hospitalization requiring ECT, last 4 years ago at [facility], currently on sertraline 50mg bid, olanzapine 5mg qd, sees Dr Josh Simmons () for outpatient, presenting due to worsening depression and obsessive thought.   MoCA score 20/30. Due to current active depressive symptoms, nonresponsive to outpatient treatment, and collateral from psychiatrist and family, patient meets involuntary admission criteria. He has been admitted to 8U for stabilization and possible ECT as inpatient.     Pt cleared for ECT by medicine on 4/15/22 and cleared by Cardiology on 4/27/2022.      MDD w/psychotic features  Plan:  - Received first unilateral ECT session on (4/29); Patient s/p ECT Session 4 on 05/06/22. On 5/9 patient refused ECT and is now requesting to be discharged.     - Discontinued Seroquel 2/2 to suspicion for drug-induced parkinsonian features and/or dizziness   - Continue sertraline 100 mg PO daily  - Seen by medicine for ECT clearance 4/15: They determine pt is Low-intermediate risk for low risk procedure  - Continue medication as follows:     Xarelto 15mg daily with meals     Losartan to 25 mg QD; 4/18/22, decrease Losartan to 25 mg (previously 50mg) daily as BPs have been low 4/18/22 and pt was orthostatics positive     Discontinued Furosemide 4/18/ 22; previously 20mg twice daily     Atorvastatin 20mg daily     Vit D3 2000 units     Magnesium 500mg     Mesalamine 2.4 mg     Mirabegron 50mg po QD (pt occasionally refusing)    Medicine consult, 4/15:  #Pre Op Risk   - EKG with rate controlled Afib, LAFB  - Re HTN: maintain /90   - Re: Afib: maintain anticoagulation with home xarelto   - Low-intermediate risk for low risk procedure    4/12: Pt reluctant to engage today; does not want to be on [facility]. Equivocating regarding ECT. Clearly depressed. MoCA 20/30. Will continue to monitor and to discuss ECT.    4/13: Pt remains ambivalent about treatment. Not asking for discharge today. Refused some labs, will try to get these again. Alert and oriented. No SI. Will follow up anemia.    4/14: at this point patient is irritable and negativistic refusing ECT as well as some medications and lab work.  Will move Zyprexa to nighttime (sleep an issue?); and Zoloft 100mg to am; continue to attempt to discuss need for labs and possible ECT if medical evaluation can be completed. Need to assess/address anemia.  Refusal may require consideration of TOO.    4/15: Pt continues to refuse ECT and routine blood work. Pt was seen by medicine team for clearance for ECT, after which he was determined to be "Low-intermediate risk for low risk procedure. Pt's daughter, Valery, states she is his medical proxy and will bring in form next week.    4/18: Lasix was held as BPs were low. BPs continued to be low and pt was newly orthostatic positive, tf decrease Losartan to 25 mg (previously 50mg) daily CTM. Pt still severely depressed; outpatient psychiatrist and daughters endorse ECT as having been the only effective treatment in the past. Pt continues to be ambivalent about it. He will not answer regarding SI today but endorsed passive SI on previous interview. Still with significant PMR and latency.    4/19: mute to writer but stares; observed interacting with staff; irritable speech clear and coherent; by observation appears generally Alert; oriented; cognition intact; speech clear; no tremor or evidence of movement impairment. No behavioral issues.      4/20: Continues to be despondent and ambivalent about treatments. Endorses hearing music which may be consistent with auditory hallucinations. Stated he was "hearing music coming from the room and following him around with words that he couldn't quite make out." Similar episode "last time I was suffering from this condition." Pt states the music "does not bother him." This resolved after 10 minutes. Otw no SI/HI/VH//delusions. Walking halls as if in fugue.  ADLs:  Barthel Index for Activities of Daily Living (ADL) from [employer]  on 4/20/2022  RESULT SUMMARY:  65 points  Minimally dependent  INPUTS:  Feeding —> 5 = Needs help  Bathing —> 0 = Unable  Grooming —> 5 = Independent  Dressing —> 10 = Independent  Bowel control —> 5 = Occasional accident  Bladder control —> 5 = Occasional accident  Toilet use —> 5 = Needs help  Transfers (bed to chair and back) —> 15 = Independent  Mobility on level surfaces —> 15 = Independent (but may use any aid, e.g. stick) >50 yards  Stairs —> 0 = Unable    4/21/22: Yesterday (4/20) , pt "consented  verbally " to ECT in presence of RN and daughter. Today, again seems ambivalent. Pt appears more irritable today, shouting at interviewer. Did not ask interviewer to stay in room. Pt had "panic" episode yesterday and was given Zyprexa, which seemed to improve panic. Pt is compliant with all meds today.  Patient did not wish to sign consent and had no response when asked about this issue; however less clingy allowing staff to leave the room without panic.   Reported that patient had panic after "consent" was allegedly given on 4/20 verbally.       ;;04/22: patient states he is anxious rather than sad; Not endorsing  suicidal or homicidal ideation intent or plans; no mention of auditory or visual hallucinations ; does not consent to ECT (at this time).  Appeared less distraught today than yesterday.  However still feels "terrible";  Sleep  appetite ok; no pain issues. Alert; oriented; cognition intact; speech clear; but has tremor at rest thought to be EPS possibly secondary to Zyprexa.    ;;04/25: patient focused on leaving; continues somewhat irritable and feels "terrible" but less so when approached this morning.  Sleep an issue.    tremor not prominent.  makes better eye contact; less thought blocking;  a little more spontaneous.    ;;04/26: "The same"; patient a little more spontaneous; wants to go home; no indication of interest in ECT; appears a little less anxious; a little mobile; suggests improvement; sleep is still poor but appears to be improving.  bp continues with swings in diastolic bp.      Vital Signs Last 24 Hrs ;T(C): 36.4 (26 Apr 2022 08:45), Max: 37 (25 Apr 2022 17:07) ;T(F): 97.6 (26 Apr 2022 08:45), Max: 98.6 (25 Apr 2022 17:07) ;HR: 84 (26 Apr 2022 08:45) (82 - 84) ;BP: 140/62 (26 Apr 2022 08:45) (140/62 - 145/98) ;BP(mean): -- ;ABP: -- ;ABP(mean): -- ;RR: 18 (26 Apr 2022 08:45) (17 - 18) ;SpO2: 96% (26 Apr 2022 08:45) (96% - 97%) ; ;    ;;04/27: seen by cardiology scores 3 on stroke risk with 4 as cut off for too risky ; suggestion that patient be placed on warfarin; Dr. Simmons called and vm left for discussion; patient spoke to writer about breakfast and continues to be willing to consent to ECT ; a change from past discussions; however appears to have improved on Seroquel alone.    ;;04/28: above risk score related to Warfarin use and may not apply to this patient; however cardiology did score patient as low risk; Dr. Simmons contacted and treatment discussed with plan to monitor progress on Seroquel and Zoloft as patient had been stable on Zyprexa and Zoloft for many years; however this morning patient returns to being sad; irritable; "terrible"; and silent when some questions asked despite responding to others; consented for ECT yesterday; first treatment RUL in am tomorrow.      4/28: First ECT session scheduled for tomorrow, 4/29/2022. Seen in room today. NAD. A&Ox3. Still tremulous. Exhibiting new difficulty with getting up from a seated position and with ambulation. Very irritable. Pt ended interview abruptly when lunch was announced. Continues on Seroquel and Zoloft for anxiety and mood.      4/29: Seen s/p his first ECT session, which occurred earlier this morning. NAD. A&Ox3. Somnolent, but arousable, but almost immediately went back to sleep during conversation. Slurring his speech. Last night, pt had a witnessed near-fall, after which his mental status exam was unchanged from baseline. Decision was made to allow pt to rest and return later.  Seen 3.5 hours later. Somnolence resolved. Mildly tremulous initially, but it improved with time.  Mood appeared somewhat improved though still irritable.  Pt is endorsing feeling dizzy and stiffer than when he got to [facility]. He also stated that it is not normal for him to be shaking as he is. Mild cogwheel rigidity was appreciated in the right upper extremity. Onset of symptoms correlates with initiation of anti-psychotic augmentation of anti-depressant regimen. Pt is currently on Seroquel 50 as adjuvant treatment. Decision made to d/c Seroquel [out of abundance of caution re EPS and falls issues-RR] .  Of note: Per UpToDate, dizziness can occur in 1-3% of patients taking mirabegron (and in up to 9% with mesalamine but he has not taken this since April 23rd).    ;;05/01: may be demonstrating slight improvement; aware that Seroquel was stopped but this needs to be re-evaluated in view of early gains on meds.  On CO for falls; coarse tremor still present.  RUL ECT#2 on 5/2.   Discussion with Dr. Arthur (not Yany;  error) was that patient was stable on Zyprexa for years after ECT.   ;;05/02: no headache or physical distress ;no clear evidence of improvement; RUL may not be sufficient to be effective; however will encourage RUL ECT #3 as appears tolerated.  awaiting PT for falls assessment; on CO as falls risk.    ;;05/03: a little less constricted; while denies feeling better appears slightly brighter; on CO for falls risk; ECT #3 RUL in am.  No headaches reported.    ;;05/04: suggestion of improved mood (also observed by others) after RUL ECT #3; patient ambivalent about ECT after discharge; will continue inpatient course if patient continues to consent.   ;;05/05: reports of talking in his sleep but patient is brighter and less blocked this morning making good eye contact; almost smiling and agreeing to RUL ECT #4 tomorrow; Sleep  appetite ok; no pain issues.  eating breakfast.  On CO for falls.     05/06: ECT #4 completed with good response, lethargic today  05/07: Patient again lethargic today.  Patient reports "feeling better" and denies SI/HI, intent, plan and AVH.  The patient's mood is improved from yesterday.   ;;05/09: refused ECT #5 this am; discharged focused; monitor progression encourage completion of course of treatment.

## 2022-05-09 NOTE — BH INPATIENT PSYCHIATRY PROGRESS NOTE - CASE SUMMARY
89-year-old, left-handed retired man, domiciled with daughter Brianda (), w/ PMH of hearing loss using hearing aid, colitis, HLD, HTN, on Xarelto 15mg daily (PCP Dr Barrett  #1 #5), PPH of depression, multiple psych hospitalization requiring ECT, last 4 years ago at [facility], currently on sertraline 50mg bid, olanzapine 5mg qd, sees Dr Josh Simmons () for outpatient, presenting due to worsening depression and obsessive thought.   MoCA score 20/30. Due to current active depressive symptoms, nonresponsive to outpatient treatment, and collateral from psychiatrist and family, patient meets involuntary admission criteria. He has been admitted to 8U for stabilization and possible ECT as inpatient.     Pt cleared for ECT by medicine on 4/15/22 and cleared by Cardiology on 4/27/2022.      MDD w/psychotic features  Plan:  - Received first unilateral ECT session on (4/29); Patient s/p ECT Session 4 on 05/06/22.    - Discontinued Seroquel 2/2 to suspicion for drug-induced parkinsonian features and/or dizziness  - Continue sertraline 100 mg PO daily  - Seen by medicine for ECT clearance 4/15: They determine pt is Low-intermediate risk for low risk procedure  - Continue medication as follows:     Xarelto 15mg daily with meals     Losartan to 25 mg QD; 4/18/22, decrease Losartan to 25 mg (previously 50mg) daily as BPs have been low 4/18/22 and pt was orthostatics positive     Discontinued Furosemide 4/18/ 22; previously 20mg twice daily     Atorvastatin 20mg daily     Vit D3 2000 units     Magnesium 500mg     Mesalamine 2.4 mg     Mirabegron 50mg po QD (pt occasionally refusing)    Medicine consult, 4/15:  #Pre Op Risk   - EKG with rate controlled Afib, LAFB  - Re HTN: maintain /90   - Re: Afib: maintain anticoagulation with home xarelto   - Low-intermediate risk for low risk procedure    4/12: Pt reluctant to engage today; does not want to be on [facility]. Equivocating regarding ECT. Clearly depressed. MoCA 20/30. Will continue to monitor and to discuss ECT.    4/13: Pt remains ambivalent about treatment. Not asking for discharge today. Refused some labs, will try to get these again. Alert and oriented. No SI. Will follow up anemia.    4/14: at this point patient is irritable and negativistic refusing ECT as well as some medications and lab work.  Will move Zyprexa to nighttime (sleep an issue?); and Zoloft 100mg to am; continue to attempt to discuss need for labs and possible ECT if medical evaluation can be completed. Need to assess/address anemia.  Refusal may require consideration of TOO.    4/15: Pt continues to refuse ECT and routine blood work. Pt was seen by medicine team for clearance for ECT, after which he was determined to be "Low-intermediate risk for low risk procedure. Pt's daughter, Valery, states she is his medical proxy and will bring in form next week.    4/18: Lasix was held as BPs were low. BPs continued to be low and pt was newly orthostatic positive, tf decrease Losartan to 25 mg (previously 50mg) daily CTM. Pt still severely depressed; outpatient psychiatrist and daughters endorse ECT as having been the only effective treatment in the past. Pt continues to be ambivalent about it. He will not answer regarding SI today but endorsed passive SI on previous interview. Still with significant PMR and latency.    4/19: mute to writer but stares; observed interacting with staff; irritable speech clear and coherent; by observation appears generally Alert; oriented; cognition intact; speech clear; no tremor or evidence of movement impairment. No behavioral issues.      4/20: Continues to be despondent and ambivalent about treatments. Endorses hearing music which may be consistent with auditory hallucinations. Stated he was "hearing music coming from the room and following him around with words that he couldn't quite make out." Similar episode "last time I was suffering from this condition." Pt states the music "does not bother him." This resolved after 10 minutes. Otw no SI/HI/VH//delusions. Walking halls as if in fugue.  ADLs:  Barthel Index for Activities of Daily Living (ADL) from [employer]  on 4/20/2022  RESULT SUMMARY:  65 points  Minimally dependent  INPUTS:  Feeding —> 5 = Needs help  Bathing —> 0 = Unable  Grooming —> 5 = Independent  Dressing —> 10 = Independent  Bowel control —> 5 = Occasional accident  Bladder control —> 5 = Occasional accident  Toilet use —> 5 = Needs help  Transfers (bed to chair and back) —> 15 = Independent  Mobility on level surfaces —> 15 = Independent (but may use any aid, e.g. stick) >50 yards  Stairs —> 0 = Unable    4/21/22: Yesterday (4/20) , pt "consented  verbally " to ECT in presence of RN and daughter. Today, again seems ambivalent. Pt appears more irritable today, shouting at interviewer. Did not ask interviewer to stay in room. Pt had "panic" episode yesterday and was given Zyprexa, which seemed to improve panic. Pt is compliant with all meds today.  Patient did not wish to sign consent and had no response when asked about this issue; however less clingy allowing staff to leave the room without panic.   Reported that patient had panic after "consent" was allegedly given on 4/20 verbally.       ;;04/22: patient states he is anxious rather than sad; Not endorsing  suicidal or homicidal ideation intent or plans; no mention of auditory or visual hallucinations ; does not consent to ECT (at this time).  Appeared less distraught today than yesterday.  However still feels "terrible";  Sleep  appetite ok; no pain issues. Alert; oriented; cognition intact; speech clear; but has tremor at rest thought to be EPS possibly secondary to Zyprexa.    ;;04/25: patient focused on leaving; continues somewhat irritable and feels "terrible" but less so when approached this morning.  Sleep an issue.    tremor not prominent.  makes better eye contact; less thought blocking;  a little more spontaneous.    ;;04/26: "The same"; patient a little more spontaneous; wants to go home; no indication of interest in ECT; appears a little less anxious; a little mobile; suggests improvement; sleep is still poor but appears to be improving.  bp continues with swings in diastolic bp.      Vital Signs Last 24 Hrs ;T(C): 36.4 (26 Apr 2022 08:45), Max: 37 (25 Apr 2022 17:07) ;T(F): 97.6 (26 Apr 2022 08:45), Max: 98.6 (25 Apr 2022 17:07) ;HR: 84 (26 Apr 2022 08:45) (82 - 84) ;BP: 140/62 (26 Apr 2022 08:45) (140/62 - 145/98) ;BP(mean): -- ;ABP: -- ;ABP(mean): -- ;RR: 18 (26 Apr 2022 08:45) (17 - 18) ;SpO2: 96% (26 Apr 2022 08:45) (96% - 97%) ; ;    ;;04/27: seen by cardiology scores 3 on stroke risk with 4 as cut off for too risky ; suggestion that patient be placed on warfarin; Dr. Simmons called and vm left for discussion; patient spoke to writer about breakfast and continues to be willing to consent to ECT ; a change from past discussions; however appears to have improved on Seroquel alone.    ;;04/28: above risk score related to Warfarin use and may not apply to this patient; however cardiology did score patient as low risk; Dr. Simmons contacted and treatment discussed with plan to monitor progress on Seroquel and Zoloft as patient had been stable on Zyprexa and Zoloft for many years; however this morning patient returns to being sad; irritable; "terrible"; and silent when some questions asked despite responding to others; consented for ECT yesterday; first treatment RUL in am tomorrow.      4/28: First ECT session scheduled for tomorrow, 4/29/2022. Seen in room today. NAD. A&Ox3. Still tremulous. Exhibiting new difficulty with getting up from a seated position and with ambulation. Very irritable. Pt ended interview abruptly when lunch was announced. Continues on Seroquel and Zoloft for anxiety and mood.      4/29: Seen s/p his first ECT session, which occurred earlier this morning. NAD. A&Ox3. Somnolent, but arousable, but almost immediately went back to sleep during conversation. Slurring his speech. Last night, pt had a witnessed near-fall, after which his mental status exam was unchanged from baseline. Decision was made to allow pt to rest and return later.  Seen 3.5 hours later. Somnolence resolved. Mildly tremulous initially, but it improved with time.  Mood appeared somewhat improved though still irritable.  Pt is endorsing feeling dizzy and stiffer than when he got to [facility]. He also stated that it is not normal for him to be shaking as he is. Mild cogwheel rigidity was appreciated in the right upper extremity. Onset of symptoms correlates with initiation of anti-psychotic augmentation of anti-depressant regimen. Pt is currently on Seroquel 50 as adjuvant treatment. Decision made to d/c Seroquel [out of abundance of caution re EPS and falls issues-RR] .  Of note: Per [employer], dizziness can occur in 1-3% of patients taking mirabegron (and in up to 9% with mesalamine but he has not taken this since April 23rd).    ;;05/01: may be demonstrating slight improvement; aware that Seroquel was stopped but this needs to be re-evaluated in view of early gains on meds.  On CO for falls; coarse tremor still present.  RUL ECT#2 on 5/2.   Discussion with Dr. Arthur (not Chama;  error) was that patient was stable on Zyprexa for years after ECT.   ;;05/02: no headache or physical distress ;no clear evidence of improvement; RUL may not be sufficient to be effective; however will encourage RUL ECT #3 as appears tolerated.  awaiting PT for falls assessment; on CO as falls risk.    ;;05/03: a little less constricted; while denies feeling better appears slightly brighter; on CO for falls risk; ECT #3 RUL in am.  No headaches reported.    ;;05/04: suggestion of improved mood (also observed by others) after RUL ECT #3; patient ambivalent about ECT after discharge; will continue inpatient course if patient continues to consent.   ;;05/05: reports of talking in his sleep but patient is brighter and less blocked this morning making good eye contact; almost smiling and agreeing to RUL ECT #4 tomorrow; Sleep  appetite ok; no pain issues.  eating breakfast.  On CO for falls.     05/06: ECT #4 completed with good response, lethargic today  05/07: Patient again lethargic today.  Patient reports "feeling better" and denies SI/HI, intent, plan and AVH.  The patient's mood is improved from yesterday.   ;;05/09: refused ECT #5 this am; discharged focused; monitor progression encourage completion of course of treatment.

## 2022-05-09 NOTE — BH INPATIENT PSYCHIATRY PROGRESS NOTE - CURRENT MEDICATION
MEDICATIONS  (STANDING):  atorvastatin 20 milliGRAM(s) Oral daily  cholecalciferol 2000 Unit(s) Oral daily  magnesium oxide 400 milliGRAM(s) Oral daily  mesalamine DR (24-Hour) Tablet 2.4 Gram(s) Oral <User Schedule>  metoprolol succinate ER 25 milliGRAM(s) Oral daily  mirabegron ER 50 milliGRAM(s) Oral daily  rivaroxaban 15 milliGRAM(s) Oral daily  senna 2 Tablet(s) Oral at bedtime  sertraline 100 milliGRAM(s) Oral daily    MEDICATIONS  (PRN):  magnesium hydroxide Suspension 30 milliLiter(s) Oral daily PRN Constipation  polyethylene glycol 3350 17 Gram(s) Oral daily PRN Constipation   MEDICATIONS  (STANDING):  atorvastatin 20 milliGRAM(s) Oral daily  cholecalciferol 2000 Unit(s) Oral daily  magnesium oxide 400 milliGRAM(s) Oral daily  mesalamine DR (24-Hour) Tablet 2.4 Gram(s) Oral <User Schedule>  metoprolol succinate ER 25 milliGRAM(s) Oral daily  rivaroxaban 15 milliGRAM(s) Oral daily  senna 2 Tablet(s) Oral at bedtime  sertraline 100 milliGRAM(s) Oral daily    MEDICATIONS  (PRN):  magnesium hydroxide Suspension 30 milliLiter(s) Oral daily PRN Constipation  polyethylene glycol 3350 17 Gram(s) Oral daily PRN Constipation

## 2022-05-09 NOTE — BH INPATIENT PSYCHIATRY PROGRESS NOTE - NSBHCHARTREVIEWVS_PSY_A_CORE FT
Vital Signs Last 24 Hrs  T(C): 36.9 (05-09-22 @ 06:20), Max: 36.9 (05-09-22 @ 06:15)  T(F): 98.5 (05-09-22 @ 06:20), Max: 98.5 (05-09-22 @ 06:15)  HR: 73 (05-09-22 @ 06:20) (73 - 93)  BP: 163/69 (05-09-22 @ 06:20) (147/74 - 163/89)  BP(mean): --  RR: 16 (05-09-22 @ 06:20) (16 - 18)  SpO2: 98% (05-09-22 @ 06:20) (98% - 99%)     Vital Signs Last 24 Hrs  T(C): 36.5 (05-09-22 @ 08:55), Max: 36.9 (05-09-22 @ 06:15)  T(F): 97.7 (05-09-22 @ 08:55), Max: 98.5 (05-09-22 @ 06:15)  HR: 89 (05-09-22 @ 08:55) (73 - 89)  BP: 144/66 (05-09-22 @ 08:55) (144/66 - 163/89)  BP(mean): --  RR: 18 (05-09-22 @ 08:55) (16 - 18)  SpO2: 96% (05-09-22 @ 08:55) (96% - 99%)     Vital Signs Last 24 Hrs  T(C): 36.5 (05-09-22 @ 08:55), Max: 36.9 (05-09-22 @ 06:15)  T(F): 97.7 (05-09-22 @ 08:55), Max: 98.5 (05-09-22 @ 06:15)  HR: 89 (05-09-22 @ 08:55) (73 - 89)  BP: 144/66 (05-09-22 @ 08:55) (144/66 - 163/69)  BP(mean): --  RR: 18 (05-09-22 @ 08:55) (16 - 18)  SpO2: 96% (05-09-22 @ 08:55) (96% - 98%)

## 2022-05-09 NOTE — BH INPATIENT PSYCHIATRY PROGRESS NOTE - NSBHFUPINTERVALHXFT_PSY_A_CORE
Refused ECT this am.  Focused on discharge; wants to leave.  Will attempt to engage patient to complete course of treatment if possible.  Irritable but shorter response latencies; better eye contact; less blocked.  Patient refused ECT this morning and was very discharge focused. Multiple attempts were made to engage patient in meaningful discussion regarding his treatment course with ECT, he refused to explain any reason for discontinuing treatment other than "because I said I don't want to!". Though he is notably more irritable today, he is also more spontaneous with his speech, and more reactive. He denies SI/HI. Patient removed his hearing aid and claimed the battery  in an attempt to terminate the conversation.

## 2022-05-09 NOTE — BH INPATIENT PSYCHIATRY PROGRESS NOTE - NSBHMETABOLIC_PSY_ALL_CORE_FT
BMI: BMI (kg/m2): 22.7 (05-09-22 @ 06:20)  HbA1c: A1C with Estimated Average Glucose Result: 5.7 % (04-13-22 @ 07:45)    Glucose: POCT Blood Glucose.: 190 mg/dL (04-19-22 @ 06:43)    BP: 163/69 (05-09-22 @ 06:20) (121/41 - 170/67)  Lipid Panel: Date/Time: 04-13-22 @ 07:45  Cholesterol, Serum: 120  Direct LDL: --  HDL Cholesterol, Serum: 60  Total Cholesterol/HDL Ration Measurement: --  Triglycerides, Serum: 50   BMI: BMI (kg/m2): 22.7 (05-09-22 @ 06:20)  HbA1c: A1C with Estimated Average Glucose Result: 5.7 % (04-13-22 @ 07:45)    Glucose: POCT Blood Glucose.: 190 mg/dL (04-19-22 @ 06:43)    BP: 144/66 (05-09-22 @ 08:55) (121/41 - 170/65)  Lipid Panel: Date/Time: 04-13-22 @ 07:45  Cholesterol, Serum: 120  Direct LDL: --  HDL Cholesterol, Serum: 60  Total Cholesterol/HDL Ration Measurement: --  Triglycerides, Serum: 50

## 2022-05-10 PROCEDURE — 99232 SBSQ HOSP IP/OBS MODERATE 35: CPT

## 2022-05-10 RX ADMIN — RIVAROXABAN 15 MILLIGRAM(S): KIT at 10:24

## 2022-05-10 RX ADMIN — ATORVASTATIN CALCIUM 20 MILLIGRAM(S): 80 TABLET, FILM COATED ORAL at 21:12

## 2022-05-10 RX ADMIN — MIRABEGRON 50 MILLIGRAM(S): 50 TABLET, EXTENDED RELEASE ORAL at 10:25

## 2022-05-10 RX ADMIN — MAGNESIUM OXIDE 400 MG ORAL TABLET 400 MILLIGRAM(S): 241.3 TABLET ORAL at 10:24

## 2022-05-10 RX ADMIN — SERTRALINE 100 MILLIGRAM(S): 25 TABLET, FILM COATED ORAL at 10:24

## 2022-05-10 RX ADMIN — SENNA PLUS 2 TABLET(S): 8.6 TABLET ORAL at 21:12

## 2022-05-10 RX ADMIN — Medication 25 MILLIGRAM(S): at 10:25

## 2022-05-10 RX ADMIN — Medication 2000 UNIT(S): at 10:24

## 2022-05-10 NOTE — BH INPATIENT PSYCHIATRY PROGRESS NOTE - NSBHASSESSSUMMFT_PSY_ALL_CORE
89-year-old, left-handed retired man, domiciled with daughter Brianda (), w/ PMH of hearing loss using hearing aid, colitis, HLD, HTN, on Xarelto 15mg daily (PCP Dr Barrett  #1 #5), PPH of depression, multiple psych hospitalization requiring ECT, last 4 years ago at [facility], currently on sertraline 50mg bid, olanzapine 5mg qd, sees Dr Josh Simmons () for outpatient, presenting due to worsening depression and obsessive thought.   MoCA score 20/30. Due to current active depressive symptoms, nonresponsive to outpatient treatment, and collateral from psychiatrist and family, patient meets involuntary admission criteria. He has been admitted to 8U for stabilization and possible ECT as inpatient.     Pt cleared for ECT by medicine on 4/15/22 and cleared by Cardiology on 4/27/2022.      MDD w/psychotic features  Plan:  - Received first unilateral ECT session on (4/29); Patient s/p ECT Session 4 on 05/06/22. On 5/9 patient refused ECT and is now requesting to be discharged.     - Discontinued Seroquel 2/2 to suspicion for drug-induced parkinsonian features and/or dizziness   - Continue sertraline 100 mg PO daily  - Seen by medicine for ECT clearance 4/15: They determine pt is Low-intermediate risk for low risk procedure  - Continue medication as follows:     Xarelto 15mg daily with meals     Losartan to 25 mg QD; 4/18/22, decrease Losartan to 25 mg (previously 50mg) daily as BPs have been low 4/18/22 and pt was orthostatics positive     Discontinued Furosemide 4/18/ 22; previously 20mg twice daily     Atorvastatin 20mg daily     Vit D3 2000 units     Magnesium 500mg     Mesalamine 2.4 mg     Mirabegron 50mg po QD (pt occasionally refusing)    Medicine consult, 4/15:  #Pre Op Risk   - EKG with rate controlled Afib, LAFB  - Re HTN: maintain /90   - Re: Afib: maintain anticoagulation with home xarelto   - Low-intermediate risk for low risk procedure    4/12: Pt reluctant to engage today; does not want to be on [facility]. Equivocating regarding ECT. Clearly depressed. MoCA 20/30. Will continue to monitor and to discuss ECT.    4/13: Pt remains ambivalent about treatment. Not asking for discharge today. Refused some labs, will try to get these again. Alert and oriented. No SI. Will follow up anemia.    4/14: at this point patient is irritable and negativistic refusing ECT as well as some medications and lab work.  Will move Zyprexa to nighttime (sleep an issue?); and Zoloft 100mg to am; continue to attempt to discuss need for labs and possible ECT if medical evaluation can be completed. Need to assess/address anemia.  Refusal may require consideration of TOO.    4/15: Pt continues to refuse ECT and routine blood work. Pt was seen by medicine team for clearance for ECT, after which he was determined to be "Low-intermediate risk for low risk procedure. Pt's daughter, Valery, states she is his medical proxy and will bring in form next week.    4/18: Lasix was held as BPs were low. BPs continued to be low and pt was newly orthostatic positive, tf decrease Losartan to 25 mg (previously 50mg) daily CTM. Pt still severely depressed; outpatient psychiatrist and daughters endorse ECT as having been the only effective treatment in the past. Pt continues to be ambivalent about it. He will not answer regarding SI today but endorsed passive SI on previous interview. Still with significant PMR and latency.    4/19: mute to writer but stares; observed interacting with staff; irritable speech clear and coherent; by observation appears generally Alert; oriented; cognition intact; speech clear; no tremor or evidence of movement impairment. No behavioral issues.      4/20: Continues to be despondent and ambivalent about treatments. Endorses hearing music which may be consistent with auditory hallucinations. Stated he was "hearing music coming from the room and following him around with words that he couldn't quite make out." Similar episode "last time I was suffering from this condition." Pt states the music "does not bother him." This resolved after 10 minutes. Otw no SI/HI/VH//delusions. Walking halls as if in fugue.  ADLs:  Barthel Index for Activities of Daily Living (ADL) from [employer]  on 4/20/2022  RESULT SUMMARY:  65 points  Minimally dependent  INPUTS:  Feeding —> 5 = Needs help  Bathing —> 0 = Unable  Grooming —> 5 = Independent  Dressing —> 10 = Independent  Bowel control —> 5 = Occasional accident  Bladder control —> 5 = Occasional accident  Toilet use —> 5 = Needs help  Transfers (bed to chair and back) —> 15 = Independent  Mobility on level surfaces —> 15 = Independent (but may use any aid, e.g. stick) >50 yards  Stairs —> 0 = Unable    4/21/22: Yesterday (4/20) , pt "consented  verbally " to ECT in presence of RN and daughter. Today, again seems ambivalent. Pt appears more irritable today, shouting at interviewer. Did not ask interviewer to stay in room. Pt had "panic" episode yesterday and was given Zyprexa, which seemed to improve panic. Pt is compliant with all meds today.  Patient did not wish to sign consent and had no response when asked about this issue; however less clingy allowing staff to leave the room without panic.   Reported that patient had panic after "consent" was allegedly given on 4/20 verbally.       ;;04/22: patient states he is anxious rather than sad; Not endorsing  suicidal or homicidal ideation intent or plans; no mention of auditory or visual hallucinations ; does not consent to ECT (at this time).  Appeared less distraught today than yesterday.  However still feels "terrible";  Sleep  appetite ok; no pain issues. Alert; oriented; cognition intact; speech clear; but has tremor at rest thought to be EPS possibly secondary to Zyprexa.    ;;04/25: patient focused on leaving; continues somewhat irritable and feels "terrible" but less so when approached this morning.  Sleep an issue.    tremor not prominent.  makes better eye contact; less thought blocking;  a little more spontaneous.    ;;04/26: "The same"; patient a little more spontaneous; wants to go home; no indication of interest in ECT; appears a little less anxious; a little mobile; suggests improvement; sleep is still poor but appears to be improving.  bp continues with swings in diastolic bp.      Vital Signs Last 24 Hrs ;T(C): 36.4 (26 Apr 2022 08:45), Max: 37 (25 Apr 2022 17:07) ;T(F): 97.6 (26 Apr 2022 08:45), Max: 98.6 (25 Apr 2022 17:07) ;HR: 84 (26 Apr 2022 08:45) (82 - 84) ;BP: 140/62 (26 Apr 2022 08:45) (140/62 - 145/98) ;BP(mean): -- ;ABP: -- ;ABP(mean): -- ;RR: 18 (26 Apr 2022 08:45) (17 - 18) ;SpO2: 96% (26 Apr 2022 08:45) (96% - 97%) ; ;    ;;04/27: seen by cardiology scores 3 on stroke risk with 4 as cut off for too risky ; suggestion that patient be placed on warfarin; Dr. Simmons called and vm left for discussion; patient spoke to writer about breakfast and continues to be willing to consent to ECT ; a change from past discussions; however appears to have improved on Seroquel alone.    ;;04/28: above risk score related to Warfarin use and may not apply to this patient; however cardiology did score patient as low risk; Dr. Simmons contacted and treatment discussed with plan to monitor progress on Seroquel and Zoloft as patient had been stable on Zyprexa and Zoloft for many years; however this morning patient returns to being sad; irritable; "terrible"; and silent when some questions asked despite responding to others; consented for ECT yesterday; first treatment RUL in am tomorrow.      4/28: First ECT session scheduled for tomorrow, 4/29/2022. Seen in room today. NAD. A&Ox3. Still tremulous. Exhibiting new difficulty with getting up from a seated position and with ambulation. Very irritable. Pt ended interview abruptly when lunch was announced. Continues on Seroquel and Zoloft for anxiety and mood.      4/29: Seen s/p his first ECT session, which occurred earlier this morning. NAD. A&Ox3. Somnolent, but arousable, but almost immediately went back to sleep during conversation. Slurring his speech. Last night, pt had a witnessed near-fall, after which his mental status exam was unchanged from baseline. Decision was made to allow pt to rest and return later.  Seen 3.5 hours later. Somnolence resolved. Mildly tremulous initially, but it improved with time.  Mood appeared somewhat improved though still irritable.  Pt is endorsing feeling dizzy and stiffer than when he got to [facility]. He also stated that it is not normal for him to be shaking as he is. Mild cogwheel rigidity was appreciated in the right upper extremity. Onset of symptoms correlates with initiation of anti-psychotic augmentation of anti-depressant regimen. Pt is currently on Seroquel 50 as adjuvant treatment. Decision made to d/c Seroquel [out of abundance of caution re EPS and falls issues-RR] .  Of note: Per UpToDate, dizziness can occur in 1-3% of patients taking mirabegron (and in up to 9% with mesalamine but he has not taken this since April 23rd).    ;;05/01: may be demonstrating slight improvement; aware that Seroquel was stopped but this needs to be re-evaluated in view of early gains on meds.  On CO for falls; coarse tremor still present.  RUL ECT#2 on 5/2.   Discussion with Dr. Arthur (not Yany;  error) was that patient was stable on Zyprexa for years after ECT.   ;;05/02: no headache or physical distress ;no clear evidence of improvement; RUL may not be sufficient to be effective; however will encourage RUL ECT #3 as appears tolerated.  awaiting PT for falls assessment; on CO as falls risk.    ;;05/03: a little less constricted; while denies feeling better appears slightly brighter; on CO for falls risk; ECT #3 RUL in am.  No headaches reported.    ;;05/04: suggestion of improved mood (also observed by others) after RUL ECT #3; patient ambivalent about ECT after discharge; will continue inpatient course if patient continues to consent.   ;;05/05: reports of talking in his sleep but patient is brighter and less blocked this morning making good eye contact; almost smiling and agreeing to RUL ECT #4 tomorrow; Sleep  appetite ok; no pain issues.  eating breakfast.  On CO for falls.     05/06: ECT #4 completed with good response, lethargic today  05/07: Patient again lethargic today.  Patient reports "feeling better" and denies SI/HI, intent, plan and AVH.  The patient's mood is improved from yesterday.   ;;05/09: refused ECT #5 this am; discharged focused; monitor progression encourage completion of course of treatment.   89-year-old, left-handed retired man, domiciled with daughter Brianda (), w/ PMH of hearing loss using hearing aid, colitis, HLD, HTN, on Xarelto 15mg daily (PCP Dr Barrett  #1 #5), PPH of depression, multiple psych hospitalization requiring ECT, last 4 years ago at [facility], currently on sertraline 50mg bid, olanzapine 5mg qd, sees Dr Josh Simmons () for outpatient, presenting due to worsening depression and obsessive thought.   MoCA score 20/30. Due to current active depressive symptoms, nonresponsive to outpatient treatment, and collateral from psychiatrist and family, patient meets involuntary admission criteria. He has been admitted to 8U for stabilization and possible ECT as inpatient.     Pt cleared for ECT by medicine on 4/15/22 and cleared by Cardiology on 4/27/2022.      MDD w/psychotic features  Plan:  - Received first unilateral ECT session on (4/29); Patient s/p ECT Session 4 on 05/06/22. On 5/9 patient refused ECT and is now requesting to be discharged.     - Discontinued Seroquel 2/2 to suspicion for drug-induced parkinsonian features and/or dizziness   - Continue sertraline 100 mg PO daily  - Seen by medicine for ECT clearance 4/15: They determine pt is Low-intermediate risk for low risk procedure  - Continue medication as follows:     Xarelto 15mg daily with meals     Losartan to 25 mg QD; 4/18/22, decrease Losartan to 25 mg (previously 50mg) daily as BPs have been low 4/18/22 and pt was orthostatics positive     Discontinued Furosemide 4/18/ 22; previously 20mg twice daily     Atorvastatin 20mg daily     Vit D3 2000 units     Magnesium 500mg     Mesalamine 2.4 mg     Mirabegron 50mg po QD (pt occasionally refusing)    Medicine consult, 4/15:  #Pre Op Risk   - EKG with rate controlled Afib, LAFB  - Re HTN: maintain /90   - Re: Afib: maintain anticoagulation with home xarelto   - Low-intermediate risk for low risk procedure    4/12: Pt reluctant to engage today; does not want to be on [facility]. Equivocating regarding ECT. Clearly depressed. MoCA 20/30. Will continue to monitor and to discuss ECT.    4/13: Pt remains ambivalent about treatment. Not asking for discharge today. Refused some labs, will try to get these again. Alert and oriented. No SI. Will follow up anemia.    4/14: at this point patient is irritable and negativistic refusing ECT as well as some medications and lab work.  Will move Zyprexa to nighttime (sleep an issue?); and Zoloft 100mg to am; continue to attempt to discuss need for labs and possible ECT if medical evaluation can be completed. Need to assess/address anemia.  Refusal may require consideration of TOO.    4/15: Pt continues to refuse ECT and routine blood work. Pt was seen by medicine team for clearance for ECT, after which he was determined to be "Low-intermediate risk for low risk procedure. Pt's daughter, Valery, states she is his medical proxy and will bring in form next week.    4/18: Lasix was held as BPs were low. BPs continued to be low and pt was newly orthostatic positive, tf decrease Losartan to 25 mg (previously 50mg) daily CTM. Pt still severely depressed; outpatient psychiatrist and daughters endorse ECT as having been the only effective treatment in the past. Pt continues to be ambivalent about it. He will not answer regarding SI today but endorsed passive SI on previous interview. Still with significant PMR and latency.    4/19: mute to writer but stares; observed interacting with staff; irritable speech clear and coherent; by observation appears generally Alert; oriented; cognition intact; speech clear; no tremor or evidence of movement impairment. No behavioral issues.      4/20: Continues to be despondent and ambivalent about treatments. Endorses hearing music which may be consistent with auditory hallucinations. Stated he was "hearing music coming from the room and following him around with words that he couldn't quite make out." Similar episode "last time I was suffering from this condition." Pt states the music "does not bother him." This resolved after 10 minutes. Otw no SI/HI/VH//delusions. Walking halls as if in fugue.  ADLs:  Barthel Index for Activities of Daily Living (ADL) from [employer]  on 4/20/2022  RESULT SUMMARY:  65 points  Minimally dependent  INPUTS:  Feeding —> 5 = Needs help  Bathing —> 0 = Unable  Grooming —> 5 = Independent  Dressing —> 10 = Independent  Bowel control —> 5 = Occasional accident  Bladder control —> 5 = Occasional accident  Toilet use —> 5 = Needs help  Transfers (bed to chair and back) —> 15 = Independent  Mobility on level surfaces —> 15 = Independent (but may use any aid, e.g. stick) >50 yards  Stairs —> 0 = Unable    4/21/22: Yesterday (4/20) , pt "consented  verbally " to ECT in presence of RN and daughter. Today, again seems ambivalent. Pt appears more irritable today, shouting at interviewer. Did not ask interviewer to stay in room. Pt had "panic" episode yesterday and was given Zyprexa, which seemed to improve panic. Pt is compliant with all meds today.  Patient did not wish to sign consent and had no response when asked about this issue; however less clingy allowing staff to leave the room without panic.   Reported that patient had panic after "consent" was allegedly given on 4/20 verbally.       ;;04/22: patient states he is anxious rather than sad; Not endorsing  suicidal or homicidal ideation intent or plans; no mention of auditory or visual hallucinations ; does not consent to ECT (at this time).  Appeared less distraught today than yesterday.  However still feels "terrible";  Sleep  appetite ok; no pain issues. Alert; oriented; cognition intact; speech clear; but has tremor at rest thought to be EPS possibly secondary to Zyprexa.    ;;04/25: patient focused on leaving; continues somewhat irritable and feels "terrible" but less so when approached this morning.  Sleep an issue.    tremor not prominent.  makes better eye contact; less thought blocking;  a little more spontaneous.    ;;04/26: "The same"; patient a little more spontaneous; wants to go home; no indication of interest in ECT; appears a little less anxious; a little mobile; suggests improvement; sleep is still poor but appears to be improving.  bp continues with swings in diastolic bp.      Vital Signs Last 24 Hrs ;T(C): 36.4 (26 Apr 2022 08:45), Max: 37 (25 Apr 2022 17:07) ;T(F): 97.6 (26 Apr 2022 08:45), Max: 98.6 (25 Apr 2022 17:07) ;HR: 84 (26 Apr 2022 08:45) (82 - 84) ;BP: 140/62 (26 Apr 2022 08:45) (140/62 - 145/98) ;BP(mean): -- ;ABP: -- ;ABP(mean): -- ;RR: 18 (26 Apr 2022 08:45) (17 - 18) ;SpO2: 96% (26 Apr 2022 08:45) (96% - 97%) ; ;    ;;04/27: seen by cardiology scores 3 on stroke risk with 4 as cut off for too risky ; suggestion that patient be placed on warfarin; Dr. Simmons called and vm left for discussion; patient spoke to writer about breakfast and continues to be willing to consent to ECT ; a change from past discussions; however appears to have improved on Seroquel alone.    ;;04/28: above risk score related to Warfarin use and may not apply to this patient; however cardiology did score patient as low risk; Dr. Simmons contacted and treatment discussed with plan to monitor progress on Seroquel and Zoloft as patient had been stable on Zyprexa and Zoloft for many years; however this morning patient returns to being sad; irritable; "terrible"; and silent when some questions asked despite responding to others; consented for ECT yesterday; first treatment RUL in am tomorrow.      4/28: First ECT session scheduled for tomorrow, 4/29/2022. Seen in room today. NAD. A&Ox3. Still tremulous. Exhibiting new difficulty with getting up from a seated position and with ambulation. Very irritable. Pt ended interview abruptly when lunch was announced. Continues on Seroquel and Zoloft for anxiety and mood.      4/29: Seen s/p his first ECT session, which occurred earlier this morning. NAD. A&Ox3. Somnolent, but arousable, but almost immediately went back to sleep during conversation. Slurring his speech. Last night, pt had a witnessed near-fall, after which his mental status exam was unchanged from baseline. Decision was made to allow pt to rest and return later.  Seen 3.5 hours later. Somnolence resolved. Mildly tremulous initially, but it improved with time.  Mood appeared somewhat improved though still irritable.  Pt is endorsing feeling dizzy and stiffer than when he got to [facility]. He also stated that it is not normal for him to be shaking as he is. Mild cogwheel rigidity was appreciated in the right upper extremity. Onset of symptoms correlates with initiation of anti-psychotic augmentation of anti-depressant regimen. Pt is currently on Seroquel 50 as adjuvant treatment. Decision made to d/c Seroquel [out of abundance of caution re EPS and falls issues-RR] .  Of note: Per [employer], dizziness can occur in 1-3% of patients taking mirabegron (and in up to 9% with mesalamine but he has not taken this since April 23rd).    ;;05/01: may be demonstrating slight improvement; aware that Seroquel was stopped but this needs to be re-evaluated in view of early gains on meds.  On CO for falls; coarse tremor still present.  RUL ECT#2 on 5/2.   Discussion with Dr. Arthur (not Yany;  error) was that patient was stable on Zyprexa for years after ECT.   ;;05/02: no headache or physical distress ;no clear evidence of improvement; RUL may not be sufficient to be effective; however will encourage RUL ECT #3 as appears tolerated.  awaiting PT for falls assessment; on CO as falls risk.    ;;05/03: a little less constricted; while denies feeling better appears slightly brighter; on CO for falls risk; ECT #3 RUL in am.  No headaches reported.    ;;05/04: suggestion of improved mood (also observed by others) after RUL ECT #3; patient ambivalent about ECT after discharge; will continue inpatient course if patient continues to consent.   ;;05/05: reports of talking in his sleep but patient is brighter and less blocked this morning making good eye contact; almost smiling and agreeing to RUL ECT #4 tomorrow; Sleep  appetite ok; no pain issues.  eating breakfast.  On CO for falls.     05/06: ECT #4 completed with good response, lethargic today  05/07: Patient again lethargic today.  Patient reports "feeling better" and denies SI/HI, intent, plan and AVH.  The patient's mood is improved from yesterday.   ;;05/09: refused ECT #5 this am; discharged focused; monitor progression encourage completion of course of treatment.    05/10: Patient was convinced by his daughter to complete his ECT course, and is now agreeable to continue with his sessions tomorrow morning.

## 2022-05-10 NOTE — BH TREATMENT PLAN - NSTXECTINTERRN_PSY_ALL_CORE
Encourage patient to adhere with medications and treatment. Encourage patient to attend groups and verbalize her feelings and concerns. Help patient identify coping strategies that have worked in the past and support the use of these strategies.

## 2022-05-10 NOTE — BH INPATIENT PSYCHIATRY PROGRESS NOTE - MODIFICATIONS
patient continues to refuse ECT; in view of some progress on RUL ECT four sessions and Zoloft would begin transition back to community care and home.   patient continues to refuse ECT; in view of some progress on RUL ECT four sessions and Zoloft would begin transition back to community care and home.  Need to confirm consent in am.

## 2022-05-10 NOTE — BH INPATIENT PSYCHIATRY PROGRESS NOTE - NSBHMETABOLIC_PSY_ALL_CORE_FT
BMI: BMI (kg/m2): 22.7 (05-09-22 @ 06:20)  HbA1c: A1C with Estimated Average Glucose Result: 5.7 % (04-13-22 @ 07:45)    Glucose: POCT Blood Glucose.: 190 mg/dL (04-19-22 @ 06:43)    BP: 144/66 (05-09-22 @ 08:55) (121/41 - 170/65)  Lipid Panel: Date/Time: 04-13-22 @ 07:45  Cholesterol, Serum: 120  Direct LDL: --  HDL Cholesterol, Serum: 60  Total Cholesterol/HDL Ration Measurement: --  Triglycerides, Serum: 50   BMI: BMI (kg/m2): 22.7 (05-09-22 @ 06:20)  HbA1c: A1C with Estimated Average Glucose Result: 5.7 % (04-13-22 @ 07:45)    Glucose: POCT Blood Glucose.: 190 mg/dL (04-19-22 @ 06:43)    BP: 171/70 (05-10-22 @ 09:00) (144/66 - 171/70)  Lipid Panel: Date/Time: 04-13-22 @ 07:45  Cholesterol, Serum: 120  Direct LDL: --  HDL Cholesterol, Serum: 60  Total Cholesterol/HDL Ration Measurement: --  Triglycerides, Serum: 50   BMI: BMI (kg/m2): 22.7 (05-09-22 @ 06:20)  HbA1c: A1C with Estimated Average Glucose Result: 5.7 % (04-13-22 @ 07:45)    Glucose: POCT Blood Glucose.: 190 mg/dL (04-19-22 @ 06:43)    BP: 156/69 (05-10-22 @ 16:52) (144/66 - 171/70)  Lipid Panel: Date/Time: 04-13-22 @ 07:45  Cholesterol, Serum: 120  Direct LDL: --  HDL Cholesterol, Serum: 60  Total Cholesterol/HDL Ration Measurement: --  Triglycerides, Serum: 50

## 2022-05-10 NOTE — BH INPATIENT PSYCHIATRY PROGRESS NOTE - NSBHCHARTREVIEWVS_PSY_A_CORE FT
Vital Signs Last 24 Hrs  T(C): 36.5 (05-09-22 @ 08:55), Max: 36.5 (05-09-22 @ 08:55)  T(F): 97.7 (05-09-22 @ 08:55), Max: 97.7 (05-09-22 @ 08:55)  HR: 89 (05-09-22 @ 08:55) (89 - 89)  BP: 144/66 (05-09-22 @ 08:55) (144/66 - 144/66)  BP(mean): --  RR: 18 (05-09-22 @ 08:55) (18 - 18)  SpO2: 96% (05-09-22 @ 08:55) (96% - 96%)     Vital Signs Last 24 Hrs  T(C): 36.6 (05-10-22 @ 09:00), Max: 36.6 (05-10-22 @ 09:00)  T(F): 97.8 (05-10-22 @ 09:00), Max: 97.8 (05-10-22 @ 09:00)  HR: 86 (05-10-22 @ 09:00) (86 - 86)  BP: 171/70 (05-10-22 @ 09:00) (171/70 - 171/70)  BP(mean): --  RR: 17 (05-10-22 @ 09:00) (17 - 17)  SpO2: 97% (05-10-22 @ 09:00) (97% - 97%)     Vital Signs Last 24 Hrs  T(C): 36.6 (05-10-22 @ 16:52), Max: 36.6 (05-10-22 @ 09:00)  T(F): 97.8 (05-10-22 @ 16:52), Max: 97.8 (05-10-22 @ 09:00)  HR: 84 (05-10-22 @ 16:52) (84 - 86)  BP: 156/69 (05-10-22 @ 16:52) (156/69 - 171/70)  BP(mean): --  RR: 17 (05-10-22 @ 16:52) (17 - 17)  SpO2: 96% (05-10-22 @ 16:52) (96% - 97%)

## 2022-05-10 NOTE — BH INPATIENT PSYCHIATRY PROGRESS NOTE - NSBHFUPINTERVALHXFT_PSY_A_CORE
Patient initially seen this AM at bedside and asked if willing to continue with another ECT session. He again refused, and again would not clarify any reason.     Later in the day he was visited by his daughter Brianda. Brianda was able to encourage patient to agree to an additional session tomorrow morning and is hopeful he will be improved enough to want to continue on his own. In the past she states he has had remarkable improvement. She also states he was much brighter and engaging last time she visited him on Friday. She is hopefully after a few more sessions he will be able to return to her home. Prior to being admitted here, he would go to the gym three times a week religiously. Writer asked Mr. Arriaga if he is ready to the return to the gym, he responded that he went to that gym for 58 straight years, and he will hopefully go back soon.

## 2022-05-10 NOTE — BH INPATIENT PSYCHIATRY PROGRESS NOTE - CASE SUMMARY
89-year-old, left-handed retired man, domiciled with daughter Brianda (), w/ PMH of hearing loss using hearing aid, colitis, HLD, HTN, on Xarelto 15mg daily (PCP Dr Barrett  #1 #5), PPH of depression, multiple psych hospitalization requiring ECT, last 4 years ago at [facility], currently on sertraline 50mg bid, olanzapine 5mg qd, sees Dr Josh Simmons () for outpatient, presenting due to worsening depression and obsessive thought.   MoCA score 20/30. Due to current active depressive symptoms, nonresponsive to outpatient treatment, and collateral from psychiatrist and family, patient meets involuntary admission criteria. He has been admitted to 8U for stabilization and possible ECT as inpatient.     Pt cleared for ECT by medicine on 4/15/22 and cleared by Cardiology on 4/27/2022.      MDD w/psychotic features  Plan:  - Received first unilateral ECT session on (4/29); Patient s/p ECT Session 4 on 05/06/22.    - Discontinued Seroquel 2/2 to suspicion for drug-induced parkinsonian features and/or dizziness  - Continue sertraline 100 mg PO daily  - Seen by medicine for ECT clearance 4/15: They determine pt is Low-intermediate risk for low risk procedure  - Continue medication as follows:     Xarelto 15mg daily with meals     Losartan to 25 mg QD; 4/18/22, decrease Losartan to 25 mg (previously 50mg) daily as BPs have been low 4/18/22 and pt was orthostatics positive     Discontinued Furosemide 4/18/ 22; previously 20mg twice daily     Atorvastatin 20mg daily     Vit D3 2000 units     Magnesium 500mg     Mesalamine 2.4 mg     Mirabegron 50mg po QD (pt occasionally refusing)    Medicine consult, 4/15:  #Pre Op Risk   - EKG with rate controlled Afib, LAFB  - Re HTN: maintain /90   - Re: Afib: maintain anticoagulation with home xarelto   - Low-intermediate risk for low risk procedure    4/12: Pt reluctant to engage today; does not want to be on [facility]. Equivocating regarding ECT. Clearly depressed. MoCA 20/30. Will continue to monitor and to discuss ECT.    4/13: Pt remains ambivalent about treatment. Not asking for discharge today. Refused some labs, will try to get these again. Alert and oriented. No SI. Will follow up anemia.    4/14: at this point patient is irritable and negativistic refusing ECT as well as some medications and lab work.  Will move Zyprexa to nighttime (sleep an issue?); and Zoloft 100mg to am; continue to attempt to discuss need for labs and possible ECT if medical evaluation can be completed. Need to assess/address anemia.  Refusal may require consideration of TOO.    4/15: Pt continues to refuse ECT and routine blood work. Pt was seen by medicine team for clearance for ECT, after which he was determined to be "Low-intermediate risk for low risk procedure. Pt's daughter, Valery, states she is his medical proxy and will bring in form next week.    4/18: Lasix was held as BPs were low. BPs continued to be low and pt was newly orthostatic positive, tf decrease Losartan to 25 mg (previously 50mg) daily CTM. Pt still severely depressed; outpatient psychiatrist and daughters endorse ECT as having been the only effective treatment in the past. Pt continues to be ambivalent about it. He will not answer regarding SI today but endorsed passive SI on previous interview. Still with significant PMR and latency.    4/19: mute to writer but stares; observed interacting with staff; irritable speech clear and coherent; by observation appears generally Alert; oriented; cognition intact; speech clear; no tremor or evidence of movement impairment. No behavioral issues.      4/20: Continues to be despondent and ambivalent about treatments. Endorses hearing music which may be consistent with auditory hallucinations. Stated he was "hearing music coming from the room and following him around with words that he couldn't quite make out." Similar episode "last time I was suffering from this condition." Pt states the music "does not bother him." This resolved after 10 minutes. Otw no SI/HI/VH//delusions. Walking halls as if in fugue.  ADLs:  Barthel Index for Activities of Daily Living (ADL) from [email or web address]  on 4/20/2022  RESULT SUMMARY:  65 points  Minimally dependent  INPUTS:  Feeding —> 5 = Needs help  Bathing —> 0 = Unable  Grooming —> 5 = Independent  Dressing —> 10 = Independent  Bowel control —> 5 = Occasional accident  Bladder control —> 5 = Occasional accident  Toilet use —> 5 = Needs help  Transfers (bed to chair and back) —> 15 = Independent  Mobility on level surfaces —> 15 = Independent (but may use any aid, e.g. stick) >50 yards  Stairs —> 0 = Unable    4/21/22: Yesterday (4/20) , pt "consented  verbally " to ECT in presence of RN and daughter. Today, again seems ambivalent. Pt appears more irritable today, shouting at interviewer. Did not ask interviewer to stay in room. Pt had "panic" episode yesterday and was given Zyprexa, which seemed to improve panic. Pt is compliant with all meds today.  Patient did not wish to sign consent and had no response when asked about this issue; however less clingy allowing staff to leave the room without panic.   Reported that patient had panic after "consent" was allegedly given on 4/20 verbally.       ;;04/22: patient states he is anxious rather than sad; Not endorsing  suicidal or homicidal ideation intent or plans; no mention of auditory or visual hallucinations ; does not consent to ECT (at this time).  Appeared less distraught today than yesterday.  However still feels "terrible";  Sleep  appetite ok; no pain issues. Alert; oriented; cognition intact; speech clear; but has tremor at rest thought to be EPS possibly secondary to Zyprexa.    ;;04/25: patient focused on leaving; continues somewhat irritable and feels "terrible" but less so when approached this morning.  Sleep an issue.    tremor not prominent.  makes better eye contact; less thought blocking;  a little more spontaneous.    ;;04/26: "The same"; patient a little more spontaneous; wants to go home; no indication of interest in ECT; appears a little less anxious; a little mobile; suggests improvement; sleep is still poor but appears to be improving.  bp continues with swings in diastolic bp.      Vital Signs Last 24 Hrs ;T(C): 36.4 (26 Apr 2022 08:45), Max: 37 (25 Apr 2022 17:07) ;T(F): 97.6 (26 Apr 2022 08:45), Max: 98.6 (25 Apr 2022 17:07) ;HR: 84 (26 Apr 2022 08:45) (82 - 84) ;BP: 140/62 (26 Apr 2022 08:45) (140/62 - 145/98) ;BP(mean): -- ;ABP: -- ;ABP(mean): -- ;RR: 18 (26 Apr 2022 08:45) (17 - 18) ;SpO2: 96% (26 Apr 2022 08:45) (96% - 97%) ; ;    ;;04/27: seen by cardiology scores 3 on stroke risk with 4 as cut off for too risky ; suggestion that patient be placed on warfarin; Dr. Simmons called and vm left for discussion; patient spoke to writer about breakfast and continues to be willing to consent to ECT ; a change from past discussions; however appears to have improved on Seroquel alone.    ;;04/28: above risk score related to Warfarin use and may not apply to this patient; however cardiology did score patient as low risk; Dr. Simmons contacted and treatment discussed with plan to monitor progress on Seroquel and Zoloft as patient had been stable on Zyprexa and Zoloft for many years; however this morning patient returns to being sad; irritable; "terrible"; and silent when some questions asked despite responding to others; consented for ECT yesterday; first treatment RUL in am tomorrow.      4/28: First ECT session scheduled for tomorrow, 4/29/2022. Seen in room today. NAD. A&Ox3. Still tremulous. Exhibiting new difficulty with getting up from a seated position and with ambulation. Very irritable. Pt ended interview abruptly when lunch was announced. Continues on Seroquel and Zoloft for anxiety and mood.      4/29: Seen s/p his first ECT session, which occurred earlier this morning. NAD. A&Ox3. Somnolent, but arousable, but almost immediately went back to sleep during conversation. Slurring his speech. Last night, pt had a witnessed near-fall, after which his mental status exam was unchanged from baseline. Decision was made to allow pt to rest and return later.  Seen 3.5 hours later. Somnolence resolved. Mildly tremulous initially, but it improved with time.  Mood appeared somewhat improved though still irritable.  Pt is endorsing feeling dizzy and stiffer than when he got to [facility]. He also stated that it is not normal for him to be shaking as he is. Mild cogwheel rigidity was appreciated in the right upper extremity. Onset of symptoms correlates with initiation of anti-psychotic augmentation of anti-depressant regimen. Pt is currently on Seroquel 50 as adjuvant treatment. Decision made to d/c Seroquel [out of abundance of caution re EPS and falls issues-RR] .  Of note: Per UpToDate, dizziness can occur in 1-3% of patients taking mirabegron (and in up to 9% with mesalamine but he has not taken this since April 23rd).    ;;05/01: may be demonstrating slight improvement; aware that Seroquel was stopped but this needs to be re-evaluated in view of early gains on meds.  On CO for falls; coarse tremor still present.  RUL ECT#2 on 5/2.   Discussion with Dr. Arthur (not [place];  error) was that patient was stable on Zyprexa for years after ECT.   ;;05/02: no headache or physical distress ;no clear evidence of improvement; RUL may not be sufficient to be effective; however will encourage RUL ECT #3 as appears tolerated.  awaiting PT for falls assessment; on CO as falls risk.    ;;05/03: a little less constricted; while denies feeling better appears slightly brighter; on CO for falls risk; ECT #3 RUL in am.  No headaches reported.    ;;05/04: suggestion of improved mood (also observed by others) after RUL ECT #3; patient ambivalent about ECT after discharge; will continue inpatient course if patient continues to consent.   ;;05/05: reports of talking in his sleep but patient is brighter and less blocked this morning making good eye contact; almost smiling and agreeing to RUL ECT #4 tomorrow; Sleep  appetite ok; no pain issues.  eating breakfast.  On CO for falls.     05/06: ECT #4 completed with good response, lethargic today  05/07: Patient again lethargic today.  Patient reports "feeling better" and denies SI/HI, intent, plan and AVH.  The patient's mood is improved from yesterday.   ;;05/09: refused ECT #5 this am; discharged focused; monitor progression encourage completion of course of treatment.    ;;05/10: irritable but Not endorsing  suicidal or homicidal ideation intent or plans; no mention of auditory or visual hallucinations . Does not want any further ECT and Focused on discharge; wants to leave.  On CO for falls risk.   89-year-old, left-handed retired man, domiciled with daughter Brianda (), w/ PMH of hearing loss using hearing aid, colitis, HLD, HTN, on Xarelto 15mg daily (PCP Dr Barrett  #1 #5), PPH of depression, multiple psych hospitalization requiring ECT, last 4 years ago at [facility], currently on sertraline 50mg bid, olanzapine 5mg qd, sees Dr Josh Simmons () for outpatient, presenting due to worsening depression and obsessive thought.   MoCA score 20/30. Due to current active depressive symptoms, nonresponsive to outpatient treatment, and collateral from psychiatrist and family, patient meets involuntary admission criteria. He has been admitted to 8U for stabilization and possible ECT as inpatient.     Pt cleared for ECT by medicine on 4/15/22 and cleared by Cardiology on 4/27/2022.      MDD w/psychotic features  Plan:  - Received first unilateral ECT session on (4/29); Patient s/p ECT Session 4 on 05/06/22.    - Discontinued Seroquel 2/2 to suspicion for drug-induced parkinsonian features and/or dizziness  - Continue sertraline 100 mg PO daily  - Seen by medicine for ECT clearance 4/15: They determine pt is Low-intermediate risk for low risk procedure  - Continue medication as follows:     Xarelto 15mg daily with meals     Losartan to 25 mg QD; 4/18/22, decrease Losartan to 25 mg (previously 50mg) daily as BPs have been low 4/18/22 and pt was orthostatics positive     Discontinued Furosemide 4/18/ 22; previously 20mg twice daily     Atorvastatin 20mg daily     Vit D3 2000 units     Magnesium 500mg     Mesalamine 2.4 mg     Mirabegron 50mg po QD (pt occasionally refusing)    Medicine consult, 4/15:  #Pre Op Risk   - EKG with rate controlled Afib, LAFB  - Re HTN: maintain /90   - Re: Afib: maintain anticoagulation with home xarelto   - Low-intermediate risk for low risk procedure    4/12: Pt reluctant to engage today; does not want to be on [facility]. Equivocating regarding ECT. Clearly depressed. MoCA 20/30. Will continue to monitor and to discuss ECT.    4/13: Pt remains ambivalent about treatment. Not asking for discharge today. Refused some labs, will try to get these again. Alert and oriented. No SI. Will follow up anemia.    4/14: at this point patient is irritable and negativistic refusing ECT as well as some medications and lab work.  Will move Zyprexa to nighttime (sleep an issue?); and Zoloft 100mg to am; continue to attempt to discuss need for labs and possible ECT if medical evaluation can be completed. Need to assess/address anemia.  Refusal may require consideration of TOO.    4/15: Pt continues to refuse ECT and routine blood work. Pt was seen by medicine team for clearance for ECT, after which he was determined to be "Low-intermediate risk for low risk procedure. Pt's daughter, Valery, states she is his medical proxy and will bring in form next week.    4/18: Lasix was held as BPs were low. BPs continued to be low and pt was newly orthostatic positive, tf decrease Losartan to 25 mg (previously 50mg) daily CTM. Pt still severely depressed; outpatient psychiatrist and daughters endorse ECT as having been the only effective treatment in the past. Pt continues to be ambivalent about it. He will not answer regarding SI today but endorsed passive SI on previous interview. Still with significant PMR and latency.    4/19: mute to writer but stares; observed interacting with staff; irritable speech clear and coherent; by observation appears generally Alert; oriented; cognition intact; speech clear; no tremor or evidence of movement impairment. No behavioral issues.      4/20: Continues to be despondent and ambivalent about treatments. Endorses hearing music which may be consistent with auditory hallucinations. Stated he was "hearing music coming from the room and following him around with words that he couldn't quite make out." Similar episode "last time I was suffering from this condition." Pt states the music "does not bother him." This resolved after 10 minutes. Otw no SI/HI/VH//delusions. Walking halls as if in fugue.  ADLs:  Barthel Index for Activities of Daily Living (ADL) from [email or web address]  on 4/20/2022  RESULT SUMMARY:  65 points  Minimally dependent  INPUTS:  Feeding —> 5 = Needs help  Bathing —> 0 = Unable  Grooming —> 5 = Independent  Dressing —> 10 = Independent  Bowel control —> 5 = Occasional accident  Bladder control —> 5 = Occasional accident  Toilet use —> 5 = Needs help  Transfers (bed to chair and back) —> 15 = Independent  Mobility on level surfaces —> 15 = Independent (but may use any aid, e.g. stick) >50 yards  Stairs —> 0 = Unable    4/21/22: Yesterday (4/20) , pt "consented  verbally " to ECT in presence of RN and daughter. Today, again seems ambivalent. Pt appears more irritable today, shouting at interviewer. Did not ask interviewer to stay in room. Pt had "panic" episode yesterday and was given Zyprexa, which seemed to improve panic. Pt is compliant with all meds today.  Patient did not wish to sign consent and had no response when asked about this issue; however less clingy allowing staff to leave the room without panic.   Reported that patient had panic after "consent" was allegedly given on 4/20 verbally.       ;;04/22: patient states he is anxious rather than sad; Not endorsing  suicidal or homicidal ideation intent or plans; no mention of auditory or visual hallucinations ; does not consent to ECT (at this time).  Appeared less distraught today than yesterday.  However still feels "terrible";  Sleep  appetite ok; no pain issues. Alert; oriented; cognition intact; speech clear; but has tremor at rest thought to be EPS possibly secondary to Zyprexa.    ;;04/25: patient focused on leaving; continues somewhat irritable and feels "terrible" but less so when approached this morning.  Sleep an issue.    tremor not prominent.  makes better eye contact; less thought blocking;  a little more spontaneous.    ;;04/26: "The same"; patient a little more spontaneous; wants to go home; no indication of interest in ECT; appears a little less anxious; a little mobile; suggests improvement; sleep is still poor but appears to be improving.  bp continues with swings in diastolic bp.      Vital Signs Last 24 Hrs ;T(C): 36.4 (26 Apr 2022 08:45), Max: 37 (25 Apr 2022 17:07) ;T(F): 97.6 (26 Apr 2022 08:45), Max: 98.6 (25 Apr 2022 17:07) ;HR: 84 (26 Apr 2022 08:45) (82 - 84) ;BP: 140/62 (26 Apr 2022 08:45) (140/62 - 145/98) ;BP(mean): -- ;ABP: -- ;ABP(mean): -- ;RR: 18 (26 Apr 2022 08:45) (17 - 18) ;SpO2: 96% (26 Apr 2022 08:45) (96% - 97%) ; ;    ;;04/27: seen by cardiology scores 3 on stroke risk with 4 as cut off for too risky ; suggestion that patient be placed on warfarin; Dr. Simmons called and vm left for discussion; patient spoke to writer about breakfast and continues to be willing to consent to ECT ; a change from past discussions; however appears to have improved on Seroquel alone.    ;;04/28: above risk score related to Warfarin use and may not apply to this patient; however cardiology did score patient as low risk; Dr. Simmons contacted and treatment discussed with plan to monitor progress on Seroquel and Zoloft as patient had been stable on Zyprexa and Zoloft for many years; however this morning patient returns to being sad; irritable; "terrible"; and silent when some questions asked despite responding to others; consented for ECT yesterday; first treatment RUL in am tomorrow.      4/28: First ECT session scheduled for tomorrow, 4/29/2022. Seen in room today. NAD. A&Ox3. Still tremulous. Exhibiting new difficulty with getting up from a seated position and with ambulation. Very irritable. Pt ended interview abruptly when lunch was announced. Continues on Seroquel and Zoloft for anxiety and mood.      4/29: Seen s/p his first ECT session, which occurred earlier this morning. NAD. A&Ox3. Somnolent, but arousable, but almost immediately went back to sleep during conversation. Slurring his speech. Last night, pt had a witnessed near-fall, after which his mental status exam was unchanged from baseline. Decision was made to allow pt to rest and return later.  Seen 3.5 hours later. Somnolence resolved. Mildly tremulous initially, but it improved with time.  Mood appeared somewhat improved though still irritable.  Pt is endorsing feeling dizzy and stiffer than when he got to [facility]. He also stated that it is not normal for him to be shaking as he is. Mild cogwheel rigidity was appreciated in the right upper extremity. Onset of symptoms correlates with initiation of anti-psychotic augmentation of anti-depressant regimen. Pt is currently on Seroquel 50 as adjuvant treatment. Decision made to d/c Seroquel [out of abundance of caution re EPS and falls issues-RR] .  Of note: Per UpToDate, dizziness can occur in 1-3% of patients taking mirabegron (and in up to 9% with mesalamine but he has not taken this since April 23rd).    ;;05/01: may be demonstrating slight improvement; aware that Seroquel was stopped but this needs to be re-evaluated in view of early gains on meds.  On CO for falls; coarse tremor still present.  RUL ECT#2 on 5/2.   Discussion with Dr. Arthur (not [place];  error) was that patient was stable on Zyprexa for years after ECT.   ;;05/02: no headache or physical distress ;no clear evidence of improvement; RUL may not be sufficient to be effective; however will encourage RUL ECT #3 as appears tolerated.  awaiting PT for falls assessment; on CO as falls risk.    ;;05/03: a little less constricted; while denies feeling better appears slightly brighter; on CO for falls risk; ECT #3 RUL in am.  No headaches reported.    ;;05/04: suggestion of improved mood (also observed by others) after RUL ECT #3; patient ambivalent about ECT after discharge; will continue inpatient course if patient continues to consent.   ;;05/05: reports of talking in his sleep but patient is brighter and less blocked this morning making good eye contact; almost smiling and agreeing to RUL ECT #4 tomorrow; Sleep  appetite ok; no pain issues.  eating breakfast.  On CO for falls.     05/06: ECT #4 completed with good response, lethargic today  05/07: Patient again lethargic today.  Patient reports "feeling better" and denies SI/HI, intent, plan and AVH.  The patient's mood is improved from yesterday.   ;;05/09: refused ECT #5 this am; discharged focused; monitor progression encourage completion of course of treatment.    ;;05/10: irritable but Not endorsing  suicidal or homicidal ideation intent or plans; no mention of auditory or visual hallucinations . Does not want any further ECT and Focused on discharge; wants to leave.  On CO for falls risk.    While the patient appears to agreed to ECT in am under intense discussion with daughter will need to confirm in am and make proper arrangements for timely preparation for a subsequent ECT on 5/13.

## 2022-05-11 PROCEDURE — 99233 SBSQ HOSP IP/OBS HIGH 50: CPT

## 2022-05-11 RX ORDER — RIVAROXABAN 15 MG-20MG
15 KIT ORAL DAILY
Refills: 0 | Status: DISCONTINUED | OUTPATIENT
Start: 2022-05-11 | End: 2022-05-18

## 2022-05-11 RX ADMIN — SENNA PLUS 2 TABLET(S): 8.6 TABLET ORAL at 22:07

## 2022-05-11 RX ADMIN — MIRABEGRON 50 MILLIGRAM(S): 50 TABLET, EXTENDED RELEASE ORAL at 12:51

## 2022-05-11 RX ADMIN — RIVAROXABAN 15 MILLIGRAM(S): KIT at 12:48

## 2022-05-11 RX ADMIN — SERTRALINE 100 MILLIGRAM(S): 25 TABLET, FILM COATED ORAL at 12:48

## 2022-05-11 RX ADMIN — Medication 2000 UNIT(S): at 12:47

## 2022-05-11 RX ADMIN — Medication 2.4 GRAM(S): at 07:45

## 2022-05-11 RX ADMIN — MAGNESIUM OXIDE 400 MG ORAL TABLET 400 MILLIGRAM(S): 241.3 TABLET ORAL at 12:55

## 2022-05-11 RX ADMIN — Medication 25 MILLIGRAM(S): at 12:48

## 2022-05-11 RX ADMIN — ATORVASTATIN CALCIUM 20 MILLIGRAM(S): 80 TABLET, FILM COATED ORAL at 22:07

## 2022-05-11 NOTE — ADVANCED PRACTICE NURSE CONSULT - RECOMMEDATIONS
Left arm skin tears: Cleanse with NS. Apply Cavilon to intact skin. Apply Adaptic Touch over wound. Apply WounDres Hydrogel to open wound. Cover with foam. Change every other day. USE ADHESIVE REMOVER TO REMOVE ALL ADHESIVES!

## 2022-05-11 NOTE — BH INPATIENT PSYCHIATRY PROGRESS NOTE - NSBHCHARTREVIEWVS_PSY_A_CORE FT
Vital Signs Last 24 Hrs  T(C): 36.6 (05-10-22 @ 16:52), Max: 36.6 (05-10-22 @ 09:00)  T(F): 97.8 (05-10-22 @ 16:52), Max: 97.8 (05-10-22 @ 09:00)  HR: 84 (05-10-22 @ 16:52) (84 - 86)  BP: 156/69 (05-10-22 @ 16:52) (156/69 - 171/70)  BP(mean): --  RR: 17 (05-10-22 @ 16:52) (17 - 17)  SpO2: 96% (05-10-22 @ 16:52) (96% - 97%)     Vital Signs Last 24 Hrs  T(C): 36.6 (05-10-22 @ 16:52), Max: 36.6 (05-10-22 @ 16:52)  T(F): 97.8 (05-10-22 @ 16:52), Max: 97.8 (05-10-22 @ 16:52)  HR: 84 (05-10-22 @ 16:52) (84 - 84)  BP: 156/69 (05-10-22 @ 16:52) (156/69 - 156/69)  BP(mean): --  RR: 17 (05-10-22 @ 16:52) (17 - 17)  SpO2: 96% (05-10-22 @ 16:52) (96% - 96%)     Vital Signs Last 24 Hrs  T(C): 36.5 (05-11-22 @ 09:00), Max: 36.6 (05-10-22 @ 16:52)  T(F): 97.7 (05-11-22 @ 09:00), Max: 97.8 (05-10-22 @ 16:52)  HR: 88 (05-11-22 @ 09:00) (84 - 88)  BP: 142/76 (05-11-22 @ 09:00) (142/76 - 156/69)  BP(mean): --  RR: 16 (05-11-22 @ 09:00) (16 - 17)  SpO2: 96% (05-11-22 @ 09:00) (96% - 96%)

## 2022-05-11 NOTE — BH INPATIENT PSYCHIATRY PROGRESS NOTE - NSBHASSESSSUMMFT_PSY_ALL_CORE
89-year-old, left-handed retired man, domiciled with daughter Brianda (), w/ PMH of hearing loss using hearing aid, colitis, HLD, HTN, on Xarelto 15mg daily (PCP Dr Barrett  #1 #5), PPH of depression, multiple psych hospitalization requiring ECT, last 4 years ago at [facility], currently on sertraline 50mg bid, olanzapine 5mg qd, sees Dr Josh Simmons () for outpatient, presenting due to worsening depression and obsessive thought.   MoCA score 20/30. Due to current active depressive symptoms, nonresponsive to outpatient treatment, and collateral from psychiatrist and family, patient meets involuntary admission criteria. He has been admitted to 8U for stabilization and possible ECT as inpatient.     Pt cleared for ECT by medicine on 4/15/22 and cleared by Cardiology on 4/27/2022.      MDD w/psychotic features  Plan:  - Received first unilateral ECT session on (4/29); Patient s/p ECT Session 4 on 05/06/22. On 5/9 patient refused ECT and is now requesting to be discharged.     - Discontinued Seroquel 2/2 to suspicion for drug-induced parkinsonian features and/or dizziness   - Continue sertraline 100 mg PO daily  - Seen by medicine for ECT clearance 4/15: They determine pt is Low-intermediate risk for low risk procedure  - Continue medication as follows:     Xarelto 15mg daily with meals     Losartan to 25 mg QD; 4/18/22, decrease Losartan to 25 mg (previously 50mg) daily as BPs have been low 4/18/22 and pt was orthostatics positive     Discontinued Furosemide 4/18/ 22; previously 20mg twice daily     Atorvastatin 20mg daily     Vit D3 2000 units     Magnesium 500mg     Mesalamine 2.4 mg     Mirabegron 50mg po QD (pt occasionally refusing)    Medicine consult, 4/15:  #Pre Op Risk   - EKG with rate controlled Afib, LAFB  - Re HTN: maintain /90   - Re: Afib: maintain anticoagulation with home xarelto   - Low-intermediate risk for low risk procedure    4/12: Pt reluctant to engage today; does not want to be on [facility]. Equivocating regarding ECT. Clearly depressed. MoCA 20/30. Will continue to monitor and to discuss ECT.    4/13: Pt remains ambivalent about treatment. Not asking for discharge today. Refused some labs, will try to get these again. Alert and oriented. No SI. Will follow up anemia.    4/14: at this point patient is irritable and negativistic refusing ECT as well as some medications and lab work.  Will move Zyprexa to nighttime (sleep an issue?); and Zoloft 100mg to am; continue to attempt to discuss need for labs and possible ECT if medical evaluation can be completed. Need to assess/address anemia.  Refusal may require consideration of TOO.    4/15: Pt continues to refuse ECT and routine blood work. Pt was seen by medicine team for clearance for ECT, after which he was determined to be "Low-intermediate risk for low risk procedure. Pt's daughter, Valery, states she is his medical proxy and will bring in form next week.    4/18: Lasix was held as BPs were low. BPs continued to be low and pt was newly orthostatic positive, tf decrease Losartan to 25 mg (previously 50mg) daily CTM. Pt still severely depressed; outpatient psychiatrist and daughters endorse ECT as having been the only effective treatment in the past. Pt continues to be ambivalent about it. He will not answer regarding SI today but endorsed passive SI on previous interview. Still with significant PMR and latency.    4/19: mute to writer but stares; observed interacting with staff; irritable speech clear and coherent; by observation appears generally Alert; oriented; cognition intact; speech clear; no tremor or evidence of movement impairment. No behavioral issues.      4/20: Continues to be despondent and ambivalent about treatments. Endorses hearing music which may be consistent with auditory hallucinations. Stated he was "hearing music coming from the room and following him around with words that he couldn't quite make out." Similar episode "last time I was suffering from this condition." Pt states the music "does not bother him." This resolved after 10 minutes. Otw no SI/HI/VH//delusions. Walking halls as if in fugue.  ADLs:  Barthel Index for Activities of Daily Living (ADL) from [employer]  on 4/20/2022  RESULT SUMMARY:  65 points  Minimally dependent  INPUTS:  Feeding —> 5 = Needs help  Bathing —> 0 = Unable  Grooming —> 5 = Independent  Dressing —> 10 = Independent  Bowel control —> 5 = Occasional accident  Bladder control —> 5 = Occasional accident  Toilet use —> 5 = Needs help  Transfers (bed to chair and back) —> 15 = Independent  Mobility on level surfaces —> 15 = Independent (but may use any aid, e.g. stick) >50 yards  Stairs —> 0 = Unable    4/21/22: Yesterday (4/20) , pt "consented  verbally " to ECT in presence of RN and daughter. Today, again seems ambivalent. Pt appears more irritable today, shouting at interviewer. Did not ask interviewer to stay in room. Pt had "panic" episode yesterday and was given Zyprexa, which seemed to improve panic. Pt is compliant with all meds today.  Patient did not wish to sign consent and had no response when asked about this issue; however less clingy allowing staff to leave the room without panic.   Reported that patient had panic after "consent" was allegedly given on 4/20 verbally.       ;;04/22: patient states he is anxious rather than sad; Not endorsing  suicidal or homicidal ideation intent or plans; no mention of auditory or visual hallucinations ; does not consent to ECT (at this time).  Appeared less distraught today than yesterday.  However still feels "terrible";  Sleep  appetite ok; no pain issues. Alert; oriented; cognition intact; speech clear; but has tremor at rest thought to be EPS possibly secondary to Zyprexa.    ;;04/25: patient focused on leaving; continues somewhat irritable and feels "terrible" but less so when approached this morning.  Sleep an issue.    tremor not prominent.  makes better eye contact; less thought blocking;  a little more spontaneous.    ;;04/26: "The same"; patient a little more spontaneous; wants to go home; no indication of interest in ECT; appears a little less anxious; a little mobile; suggests improvement; sleep is still poor but appears to be improving.  bp continues with swings in diastolic bp.      Vital Signs Last 24 Hrs ;T(C): 36.4 (26 Apr 2022 08:45), Max: 37 (25 Apr 2022 17:07) ;T(F): 97.6 (26 Apr 2022 08:45), Max: 98.6 (25 Apr 2022 17:07) ;HR: 84 (26 Apr 2022 08:45) (82 - 84) ;BP: 140/62 (26 Apr 2022 08:45) (140/62 - 145/98) ;BP(mean): -- ;ABP: -- ;ABP(mean): -- ;RR: 18 (26 Apr 2022 08:45) (17 - 18) ;SpO2: 96% (26 Apr 2022 08:45) (96% - 97%) ; ;    ;;04/27: seen by cardiology scores 3 on stroke risk with 4 as cut off for too risky ; suggestion that patient be placed on warfarin; Dr. Simmons called and vm left for discussion; patient spoke to writer about breakfast and continues to be willing to consent to ECT ; a change from past discussions; however appears to have improved on Seroquel alone.    ;;04/28: above risk score related to Warfarin use and may not apply to this patient; however cardiology did score patient as low risk; Dr. Simmons contacted and treatment discussed with plan to monitor progress on Seroquel and Zoloft as patient had been stable on Zyprexa and Zoloft for many years; however this morning patient returns to being sad; irritable; "terrible"; and silent when some questions asked despite responding to others; consented for ECT yesterday; first treatment RUL in am tomorrow.      4/28: First ECT session scheduled for tomorrow, 4/29/2022. Seen in room today. NAD. A&Ox3. Still tremulous. Exhibiting new difficulty with getting up from a seated position and with ambulation. Very irritable. Pt ended interview abruptly when lunch was announced. Continues on Seroquel and Zoloft for anxiety and mood.      4/29: Seen s/p his first ECT session, which occurred earlier this morning. NAD. A&Ox3. Somnolent, but arousable, but almost immediately went back to sleep during conversation. Slurring his speech. Last night, pt had a witnessed near-fall, after which his mental status exam was unchanged from baseline. Decision was made to allow pt to rest and return later.  Seen 3.5 hours later. Somnolence resolved. Mildly tremulous initially, but it improved with time.  Mood appeared somewhat improved though still irritable.  Pt is endorsing feeling dizzy and stiffer than when he got to [facility]. He also stated that it is not normal for him to be shaking as he is. Mild cogwheel rigidity was appreciated in the right upper extremity. Onset of symptoms correlates with initiation of anti-psychotic augmentation of anti-depressant regimen. Pt is currently on Seroquel 50 as adjuvant treatment. Decision made to d/c Seroquel [out of abundance of caution re EPS and falls issues-RR] .  Of note: Per UpToDate, dizziness can occur in 1-3% of patients taking mirabegron (and in up to 9% with mesalamine but he has not taken this since April 23rd).    ;;05/01: may be demonstrating slight improvement; aware that Seroquel was stopped but this needs to be re-evaluated in view of early gains on meds.  On CO for falls; coarse tremor still present.  RUL ECT#2 on 5/2.   Discussion with Dr. Arthur (not Yany;  error) was that patient was stable on Zyprexa for years after ECT.   ;;05/02: no headache or physical distress ;no clear evidence of improvement; RUL may not be sufficient to be effective; however will encourage RUL ECT #3 as appears tolerated.  awaiting PT for falls assessment; on CO as falls risk.    ;;05/03: a little less constricted; while denies feeling better appears slightly brighter; on CO for falls risk; ECT #3 RUL in am.  No headaches reported.    ;;05/04: suggestion of improved mood (also observed by others) after RUL ECT #3; patient ambivalent about ECT after discharge; will continue inpatient course if patient continues to consent.   ;;05/05: reports of talking in his sleep but patient is brighter and less blocked this morning making good eye contact; almost smiling and agreeing to RUL ECT #4 tomorrow; Sleep  appetite ok; no pain issues.  eating breakfast.  On CO for falls.     05/06: ECT #4 completed with good response, lethargic today  05/07: Patient again lethargic today.  Patient reports "feeling better" and denies SI/HI, intent, plan and AVH.  The patient's mood is improved from yesterday.   ;;05/09: refused ECT #5 this am; discharged focused; monitor progression encourage completion of course of treatment.    05/10: Patient was convinced by his daughter to complete his ECT course, and is now agreeable to continue with his sessions tomorrow morning.  89-year-old, left-handed retired man, domiciled with daughter Brianda (), w/ PMH of hearing loss using hearing aid, colitis, HLD, HTN, on Xarelto 15mg daily (PCP Dr Barrett  #1 #5), PPH of depression, multiple psych hospitalization requiring ECT, last 4 years ago at [facility], currently on sertraline 50mg bid, olanzapine 5mg qd, sees Dr Josh Simmons () for outpatient, presenting due to worsening depression and obsessive thought.   MoCA score 20/30. Due to current active depressive symptoms, nonresponsive to outpatient treatment, and collateral from psychiatrist and family, patient meets involuntary admission criteria. He has been admitted to 8U for stabilization and possible ECT as inpatient.     Pt cleared for ECT by medicine on 4/15/22 and cleared by Cardiology on 4/27/2022.      MDD w/psychotic features  Plan:  - Received first unilateral ECT session on (4/29); Patient s/p ECT Session 4 on 05/06/22. On 5/9 patient refused ECT and is now requesting to be discharged.     - Discontinued Seroquel 2/2 to suspicion for drug-induced parkinsonian features and/or dizziness   - Continue sertraline 100 mg PO daily  - Seen by medicine for ECT clearance 4/15: They determine pt is Low-intermediate risk for low risk procedure  - Continue medication as follows:     Xarelto 15mg daily with meals     Losartan to 25 mg QD; 4/18/22, decrease Losartan to 25 mg (previously 50mg) daily as BPs have been low 4/18/22 and pt was orthostatics positive     Discontinued Furosemide 4/18/ 22; previously 20mg twice daily     Atorvastatin 20mg daily     Vit D3 2000 units     Magnesium 500mg     Mesalamine 2.4 mg     Mirabegron 50mg po QD (pt occasionally refusing)    Medicine consult, 4/15:  #Pre Op Risk   - EKG with rate controlled Afib, LAFB  - Re HTN: maintain /90   - Re: Afib: maintain anticoagulation with home xarelto   - Low-intermediate risk for low risk procedure    4/12: Pt reluctant to engage today; does not want to be on [facility]. Equivocating regarding ECT. Clearly depressed. MoCA 20/30. Will continue to monitor and to discuss ECT.    4/13: Pt remains ambivalent about treatment. Not asking for discharge today. Refused some labs, will try to get these again. Alert and oriented. No SI. Will follow up anemia.    4/14: at this point patient is irritable and negativistic refusing ECT as well as some medications and lab work.  Will move Zyprexa to nighttime (sleep an issue?); and Zoloft 100mg to am; continue to attempt to discuss need for labs and possible ECT if medical evaluation can be completed. Need to assess/address anemia.  Refusal may require consideration of TOO.    4/15: Pt continues to refuse ECT and routine blood work. Pt was seen by medicine team for clearance for ECT, after which he was determined to be "Low-intermediate risk for low risk procedure. Pt's daughter, Valery, states she is his medical proxy and will bring in form next week.    4/18: Lasix was held as BPs were low. BPs continued to be low and pt was newly orthostatic positive, tf decrease Losartan to 25 mg (previously 50mg) daily CTM. Pt still severely depressed; outpatient psychiatrist and daughters endorse ECT as having been the only effective treatment in the past. Pt continues to be ambivalent about it. He will not answer regarding SI today but endorsed passive SI on previous interview. Still with significant PMR and latency.    4/19: mute to writer but stares; observed interacting with staff; irritable speech clear and coherent; by observation appears generally Alert; oriented; cognition intact; speech clear; no tremor or evidence of movement impairment. No behavioral issues.      4/20: Continues to be despondent and ambivalent about treatments. Endorses hearing music which may be consistent with auditory hallucinations. Stated he was "hearing music coming from the room and following him around with words that he couldn't quite make out." Similar episode "last time I was suffering from this condition." Pt states the music "does not bother him." This resolved after 10 minutes. Otw no SI/HI/VH//delusions. Walking halls as if in fugue.  ADLs:  Barthel Index for Activities of Daily Living (ADL) from [employer]  on 4/20/2022  RESULT SUMMARY:  65 points  Minimally dependent  INPUTS:  Feeding —> 5 = Needs help  Bathing —> 0 = Unable  Grooming —> 5 = Independent  Dressing —> 10 = Independent  Bowel control —> 5 = Occasional accident  Bladder control —> 5 = Occasional accident  Toilet use —> 5 = Needs help  Transfers (bed to chair and back) —> 15 = Independent  Mobility on level surfaces —> 15 = Independent (but may use any aid, e.g. stick) >50 yards  Stairs —> 0 = Unable    4/21/22: Yesterday (4/20) , pt "consented  verbally " to ECT in presence of RN and daughter. Today, again seems ambivalent. Pt appears more irritable today, shouting at interviewer. Did not ask interviewer to stay in room. Pt had "panic" episode yesterday and was given Zyprexa, which seemed to improve panic. Pt is compliant with all meds today.  Patient did not wish to sign consent and had no response when asked about this issue; however less clingy allowing staff to leave the room without panic.   Reported that patient had panic after "consent" was allegedly given on 4/20 verbally.       ;;04/22: patient states he is anxious rather than sad; Not endorsing  suicidal or homicidal ideation intent or plans; no mention of auditory or visual hallucinations ; does not consent to ECT (at this time).  Appeared less distraught today than yesterday.  However still feels "terrible";  Sleep  appetite ok; no pain issues. Alert; oriented; cognition intact; speech clear; but has tremor at rest thought to be EPS possibly secondary to Zyprexa.    ;;04/25: patient focused on leaving; continues somewhat irritable and feels "terrible" but less so when approached this morning.  Sleep an issue.    tremor not prominent.  makes better eye contact; less thought blocking;  a little more spontaneous.    ;;04/26: "The same"; patient a little more spontaneous; wants to go home; no indication of interest in ECT; appears a little less anxious; a little mobile; suggests improvement; sleep is still poor but appears to be improving.  bp continues with swings in diastolic bp.      Vital Signs Last 24 Hrs ;T(C): 36.4 (26 Apr 2022 08:45), Max: 37 (25 Apr 2022 17:07) ;T(F): 97.6 (26 Apr 2022 08:45), Max: 98.6 (25 Apr 2022 17:07) ;HR: 84 (26 Apr 2022 08:45) (82 - 84) ;BP: 140/62 (26 Apr 2022 08:45) (140/62 - 145/98) ;BP(mean): -- ;ABP: -- ;ABP(mean): -- ;RR: 18 (26 Apr 2022 08:45) (17 - 18) ;SpO2: 96% (26 Apr 2022 08:45) (96% - 97%) ; ;    ;;04/27: seen by cardiology scores 3 on stroke risk with 4 as cut off for too risky ; suggestion that patient be placed on warfarin; Dr. Simmons called and vm left for discussion; patient spoke to writer about breakfast and continues to be willing to consent to ECT ; a change from past discussions; however appears to have improved on Seroquel alone.    ;;04/28: above risk score related to Warfarin use and may not apply to this patient; however cardiology did score patient as low risk; Dr. Simmons contacted and treatment discussed with plan to monitor progress on Seroquel and Zoloft as patient had been stable on Zyprexa and Zoloft for many years; however this morning patient returns to being sad; irritable; "terrible"; and silent when some questions asked despite responding to others; consented for ECT yesterday; first treatment RUL in am tomorrow.      4/28: First ECT session scheduled for tomorrow, 4/29/2022. Seen in room today. NAD. A&Ox3. Still tremulous. Exhibiting new difficulty with getting up from a seated position and with ambulation. Very irritable. Pt ended interview abruptly when lunch was announced. Continues on Seroquel and Zoloft for anxiety and mood.      4/29: Seen s/p his first ECT session, which occurred earlier this morning. NAD. A&Ox3. Somnolent, but arousable, but almost immediately went back to sleep during conversation. Slurring his speech. Last night, pt had a witnessed near-fall, after which his mental status exam was unchanged from baseline. Decision was made to allow pt to rest and return later.  Seen 3.5 hours later. Somnolence resolved. Mildly tremulous initially, but it improved with time.  Mood appeared somewhat improved though still irritable.  Pt is endorsing feeling dizzy and stiffer than when he got to [facility]. He also stated that it is not normal for him to be shaking as he is. Mild cogwheel rigidity was appreciated in the right upper extremity. Onset of symptoms correlates with initiation of anti-psychotic augmentation of anti-depressant regimen. Pt is currently on Seroquel 50 as adjuvant treatment. Decision made to d/c Seroquel [out of abundance of caution re EPS and falls issues-RR] .  Of note: Per UpToDate, dizziness can occur in 1-3% of patients taking mirabegron (and in up to 9% with mesalamine but he has not taken this since April 23rd).    ;;05/01: may be demonstrating slight improvement; aware that Seroquel was stopped but this needs to be re-evaluated in view of early gains on meds.  On CO for falls; coarse tremor still present.  RUL ECT#2 on 5/2.   Discussion with Dr. Arthur (not Yany;  error) was that patient was stable on Zyprexa for years after ECT.   ;;05/02: no headache or physical distress ;no clear evidence of improvement; RUL may not be sufficient to be effective; however will encourage RUL ECT #3 as appears tolerated.  awaiting PT for falls assessment; on CO as falls risk.    ;;05/03: a little less constricted; while denies feeling better appears slightly brighter; on CO for falls risk; ECT #3 RUL in am.  No headaches reported.    ;;05/04: suggestion of improved mood (also observed by others) after RUL ECT #3; patient ambivalent about ECT after discharge; will continue inpatient course if patient continues to consent.   ;;05/05: reports of talking in his sleep but patient is brighter and less blocked this morning making good eye contact; almost smiling and agreeing to RUL ECT #4 tomorrow; Sleep  appetite ok; no pain issues.  eating breakfast.  On CO for falls.     05/06: ECT #4 completed with good response, lethargic today  05/07: Patient again lethargic today.  Patient reports "feeling better" and denies SI/HI, intent, plan and AVH.  The patient's mood is improved from yesterday.   ;;05/09: refused ECT #5 this am; discharged focused; monitor progression encourage completion of course of treatment.    05/10: Patient was convinced by his daughter to complete his ECT course, and is now agreeable to continue with his sessions tomorrow morning.   5/11: Patient was observed to be pressured by his daughter to agree to continue his ECT course. Their is some concern that he is only agreeing due to coercion and therefore treatment team believes it is best to take a step back and observe if patient consistently sticks to this plan for a couple of days before preceding. It is imperative that we are acting according to the wishes of the competent patient, as he has a right to autonomy.  89-year-old, left-handed retired man, domiciled with daughter Brianda (), w/ PMH of hearing loss using hearing aid, colitis, HLD, HTN, on Xarelto 15mg daily (PCP Dr Barrett  #1 #5), PPH of depression, multiple psych hospitalization requiring ECT, last 4 years ago at [facility], currently on sertraline 50mg bid, olanzapine 5mg qd, sees Dr Josh Simmons () for outpatient, presenting due to worsening depression and obsessive thought.   MoCA score 20/30. Due to current active depressive symptoms, nonresponsive to outpatient treatment, and collateral from psychiatrist and family, patient meets involuntary admission criteria. He has been admitted to 8U for stabilization and possible ECT as inpatient.     Pt cleared for ECT by medicine on 4/15/22 and cleared by Cardiology on 4/27/2022.      MDD w/psychotic features  Plan:  - Received first unilateral ECT session on (4/29); Patient s/p ECT Session 4 on 05/06/22. On 5/9 patient refused ECT and is now requesting to be discharged.     - Discontinued Seroquel 2/2 to suspicion for drug-induced parkinsonian features and/or dizziness   - Continue sertraline 100 mg PO daily  - Seen by medicine for ECT clearance 4/15: They determine pt is Low-intermediate risk for low risk procedure  - Continue medication as follows:     Xarelto 15mg daily with meals     Losartan to 25 mg QD; 4/18/22, decrease Losartan to 25 mg (previously 50mg) daily as BPs have been low 4/18/22 and pt was orthostatics positive     Discontinued Furosemide 4/18/ 22; previously 20mg twice daily     Atorvastatin 20mg daily     Vit D3 2000 units     Magnesium 500mg     Mesalamine 2.4 mg     Mirabegron 50mg po QD (pt occasionally refusing)    Medicine consult, 4/15:  #Pre Op Risk   - EKG with rate controlled Afib, LAFB  - Re HTN: maintain /90   - Re: Afib: maintain anticoagulation with home xarelto   - Low-intermediate risk for low risk procedure    4/12: Pt reluctant to engage today; does not want to be on [facility]. Equivocating regarding ECT. Clearly depressed. MoCA 20/30. Will continue to monitor and to discuss ECT.    4/13: Pt remains ambivalent about treatment. Not asking for discharge today. Refused some labs, will try to get these again. Alert and oriented. No SI. Will follow up anemia.    4/14: at this point patient is irritable and negativistic refusing ECT as well as some medications and lab work.  Will move Zyprexa to nighttime (sleep an issue?); and Zoloft 100mg to am; continue to attempt to discuss need for labs and possible ECT if medical evaluation can be completed. Need to assess/address anemia.  Refusal may require consideration of TOO.    4/15: Pt continues to refuse ECT and routine blood work. Pt was seen by medicine team for clearance for ECT, after which he was determined to be "Low-intermediate risk for low risk procedure. Pt's daughter, Valery, states she is his medical proxy and will bring in form next week.    4/18: Lasix was held as BPs were low. BPs continued to be low and pt was newly orthostatic positive, tf decrease Losartan to 25 mg (previously 50mg) daily CTM. Pt still severely depressed; outpatient psychiatrist and daughters endorse ECT as having been the only effective treatment in the past. Pt continues to be ambivalent about it. He will not answer regarding SI today but endorsed passive SI on previous interview. Still with significant PMR and latency.    4/19: mute to writer but stares; observed interacting with staff; irritable speech clear and coherent; by observation appears generally Alert; oriented; cognition intact; speech clear; no tremor or evidence of movement impairment. No behavioral issues.      4/20: Continues to be despondent and ambivalent about treatments. Endorses hearing music which may be consistent with auditory hallucinations. Stated he was "hearing music coming from the room and following him around with words that he couldn't quite make out." Similar episode "last time I was suffering from this condition." Pt states the music "does not bother him." This resolved after 10 minutes. Otw no SI/HI/VH//delusions. Walking halls as if in fugue.  ADLs:  Barthel Index for Activities of Daily Living (ADL) from [email or web address]  on 4/20/2022  RESULT SUMMARY:  65 points  Minimally dependent  INPUTS:  Feeding —> 5 = Needs help  Bathing —> 0 = Unable  Grooming —> 5 = Independent  Dressing —> 10 = Independent  Bowel control —> 5 = Occasional accident  Bladder control —> 5 = Occasional accident  Toilet use —> 5 = Needs help  Transfers (bed to chair and back) —> 15 = Independent  Mobility on level surfaces —> 15 = Independent (but may use any aid, e.g. stick) >50 yards  Stairs —> 0 = Unable    4/21/22: Yesterday (4/20) , pt "consented  verbally " to ECT in presence of RN and daughter. Today, again seems ambivalent. Pt appears more irritable today, shouting at interviewer. Did not ask interviewer to stay in room. Pt had "panic" episode yesterday and was given Zyprexa, which seemed to improve panic. Pt is compliant with all meds today.  Patient did not wish to sign consent and had no response when asked about this issue; however less clingy allowing staff to leave the room without panic.   Reported that patient had panic after "consent" was allegedly given on 4/20 verbally.       ;;04/22: patient states he is anxious rather than sad; Not endorsing  suicidal or homicidal ideation intent or plans; no mention of auditory or visual hallucinations ; does not consent to ECT (at this time).  Appeared less distraught today than yesterday.  However still feels "terrible";  Sleep  appetite ok; no pain issues. Alert; oriented; cognition intact; speech clear; but has tremor at rest thought to be EPS possibly secondary to Zyprexa.    ;;04/25: patient focused on leaving; continues somewhat irritable and feels "terrible" but less so when approached this morning.  Sleep an issue.    tremor not prominent.  makes better eye contact; less thought blocking;  a little more spontaneous.    ;;04/26: "The same"; patient a little more spontaneous; wants to go home; no indication of interest in ECT; appears a little less anxious; a little mobile; suggests improvement; sleep is still poor but appears to be improving.  bp continues with swings in diastolic bp.      Vital Signs Last 24 Hrs ;T(C): 36.4 (26 Apr 2022 08:45), Max: 37 (25 Apr 2022 17:07) ;T(F): 97.6 (26 Apr 2022 08:45), Max: 98.6 (25 Apr 2022 17:07) ;HR: 84 (26 Apr 2022 08:45) (82 - 84) ;BP: 140/62 (26 Apr 2022 08:45) (140/62 - 145/98) ;BP(mean): -- ;ABP: -- ;ABP(mean): -- ;RR: 18 (26 Apr 2022 08:45) (17 - 18) ;SpO2: 96% (26 Apr 2022 08:45) (96% - 97%) ; ;    ;;04/27: seen by cardiology scores 3 on stroke risk with 4 as cut off for too risky ; suggestion that patient be placed on warfarin; Dr. Simmons called and vm left for discussion; patient spoke to writer about breakfast and continues to be willing to consent to ECT ; a change from past discussions; however appears to have improved on Seroquel alone.    ;;04/28: above risk score related to Warfarin use and may not apply to this patient; however cardiology did score patient as low risk; Dr. Simmons contacted and treatment discussed with plan to monitor progress on Seroquel and Zoloft as patient had been stable on Zyprexa and Zoloft for many years; however this morning patient returns to being sad; irritable; "terrible"; and silent when some questions asked despite responding to others; consented for ECT yesterday; first treatment RUL in am tomorrow.      4/28: First ECT session scheduled for tomorrow, 4/29/2022. Seen in room today. NAD. A&Ox3. Still tremulous. Exhibiting new difficulty with getting up from a seated position and with ambulation. Very irritable. Pt ended interview abruptly when lunch was announced. Continues on Seroquel and Zoloft for anxiety and mood.      4/29: Seen s/p his first ECT session, which occurred earlier this morning. NAD. A&Ox3. Somnolent, but arousable, but almost immediately went back to sleep during conversation. Slurring his speech. Last night, pt had a witnessed near-fall, after which his mental status exam was unchanged from baseline. Decision was made to allow pt to rest and return later.  Seen 3.5 hours later. Somnolence resolved. Mildly tremulous initially, but it improved with time.  Mood appeared somewhat improved though still irritable.  Pt is endorsing feeling dizzy and stiffer than when he got to [facility]. He also stated that it is not normal for him to be shaking as he is. Mild cogwheel rigidity was appreciated in the right upper extremity. Onset of symptoms correlates with initiation of anti-psychotic augmentation of anti-depressant regimen. Pt is currently on Seroquel 50 as adjuvant treatment. Decision made to d/c Seroquel [out of abundance of caution re EPS and falls issues-RR] .  Of note: Per UpToDate, dizziness can occur in 1-3% of patients taking mirabegron (and in up to 9% with mesalamine but he has not taken this since April 23rd).    ;;05/01: may be demonstrating slight improvement; aware that Seroquel was stopped but this needs to be re-evaluated in view of early gains on meds.  On CO for falls; coarse tremor still present.  RUL ECT#2 on 5/2.   Discussion with Dr. Arthur (not [place];  error) was that patient was stable on Zyprexa for years after ECT.   ;;05/02: no headache or physical distress ;no clear evidence of improvement; RUL may not be sufficient to be effective; however will encourage RUL ECT #3 as appears tolerated.  awaiting PT for falls assessment; on CO as falls risk.    ;;05/03: a little less constricted; while denies feeling better appears slightly brighter; on CO for falls risk; ECT #3 RUL in am.  No headaches reported.    ;;05/04: suggestion of improved mood (also observed by others) after RUL ECT #3; patient ambivalent about ECT after discharge; will continue inpatient course if patient continues to consent.   ;;05/05: reports of talking in his sleep but patient is brighter and less blocked this morning making good eye contact; almost smiling and agreeing to RUL ECT #4 tomorrow; Sleep  appetite ok; no pain issues.  eating breakfast.  On CO for falls.     05/06: ECT #4 completed with good response, lethargic today  05/07: Patient again lethargic today.  Patient reports "feeling better" and denies SI/HI, intent, plan and AVH.  The patient's mood is improved from yesterday.   ;;05/09: refused ECT #5 this am; discharged focused; monitor progression encourage completion of course of treatment.    05/10: Patient was convinced by his daughter to complete his ECT course, and is now agreeable to continue with his sessions tomorrow morning.   5/11: Patient observed having an intense discussion with his  daughter regarding continuing ECT. . Treatment team believes it is best to take a step back and observe if patient consistently sticks to this plan for a couple of days before preceding.

## 2022-05-11 NOTE — BH INPATIENT PSYCHIATRY PROGRESS NOTE - NSBHFUPINTERVALHXFT_PSY_A_CORE
Patient this morning was asking if he will be receiving ECT today. He was informed that since he was refusing up until late afternoon yesterday evening, we were not able to get him on the schedule for treatment this morning, and treatment will commence this on Friday. He was a bit frustrated by the delay but ultimately in agreement with the plan.     Nursing staff who witnessed conversation Mr. Arriaga had with his daughter were concerned that she is pressuring him into accepting treatment.   Patient this morning was asking if he will be receiving ECT today. He was informed that since he was refusing up until late afternoon yesterday evening, we were not able to get him on the schedule for treatment this morning, and treatment will commence this on Friday. He was a bit frustrated by the delay but ultimately in agreement with the plan.

## 2022-05-11 NOTE — BH INPATIENT PSYCHIATRY PROGRESS NOTE - MODIFICATIONS
Patient consents after intense meeting yesterday with daughter; will query again tomorrow; will schedule for ECT 5/13 if consistently requests ECT.  Would hold adding Seroquel for now.

## 2022-05-11 NOTE — [facility] NURSE CONSULT - ASSESSMENT
Patient seen for left forearm skin tear measuring 4x6x0.1cm. Skin tear is full thickness, flap is partially attached. wound bed is clean, small amount of serosanguineous drainage, periwound with ecchymosis. Skin tear to lateral elbow measuring 1x0.3cm with attached skin flap and scant serosanguineous drainage. Both cleansed with NS. Cavilon applied to periwound. Adaptic Touch applied over wound. WounDres Hydrogel to open wound bed. Covered with foam.

## 2022-05-11 NOTE — BH INPATIENT PSYCHIATRY PROGRESS NOTE - CASE SUMMARY
89-year-old, left-handed retired man, domiciled with daughter Brianda (), w/ PMH of hearing loss using hearing aid, colitis, HLD, HTN, on Xarelto 15mg daily (PCP Dr Barrett  #1 #5), PPH of depression, multiple psych hospitalization requiring ECT, last 4 years ago at [facility], currently on sertraline 50mg bid, olanzapine 5mg qd, sees Dr Josh Simmons () for outpatient, presenting due to worsening depression and obsessive thought.   MoCA score 20/30. Due to current active depressive symptoms, nonresponsive to outpatient treatment, and collateral from psychiatrist and family, patient meets involuntary admission criteria. He has been admitted to 8U for stabilization and possible ECT as inpatient.     Pt cleared for ECT by medicine on 4/15/22 and cleared by Cardiology on 4/27/2022.      MDD w/psychotic features  Plan:  - Received first unilateral ECT session on (4/29); Patient s/p ECT Session 4 on 05/06/22.    - Discontinued Seroquel 2/2 to suspicion for drug-induced parkinsonian features and/or dizziness  - Continue sertraline 100 mg PO daily  - Seen by medicine for ECT clearance 4/15: They determine pt is Low-intermediate risk for low risk procedure  - Continue medication as follows:     Xarelto 15mg daily with meals     Losartan to 25 mg QD; 4/18/22, decrease Losartan to 25 mg (previously 50mg) daily as BPs have been low 4/18/22 and pt was orthostatics positive     Discontinued Furosemide 4/18/ 22; previously 20mg twice daily     Atorvastatin 20mg daily     Vit D3 2000 units     Magnesium 500mg     Mesalamine 2.4 mg     Mirabegron 50mg po QD (pt occasionally refusing)    Medicine consult, 4/15:  #Pre Op Risk   - EKG with rate controlled Afib, LAFB  - Re HTN: maintain /90   - Re: Afib: maintain anticoagulation with home xarelto   - Low-intermediate risk for low risk procedure    4/12: Pt reluctant to engage today; does not want to be on [facility]. Equivocating regarding ECT. Clearly depressed. MoCA 20/30. Will continue to monitor and to discuss ECT.    4/13: Pt remains ambivalent about treatment. Not asking for discharge today. Refused some labs, will try to get these again. Alert and oriented. No SI. Will follow up anemia.    4/14: at this point patient is irritable and negativistic refusing ECT as well as some medications and lab work.  Will move Zyprexa to nighttime (sleep an issue?); and Zoloft 100mg to am; continue to attempt to discuss need for labs and possible ECT if medical evaluation can be completed. Need to assess/address anemia.  Refusal may require consideration of TOO.    4/15: Pt continues to refuse ECT and routine blood work. Pt was seen by medicine team for clearance for ECT, after which he was determined to be "Low-intermediate risk for low risk procedure. Pt's daughter, Valery, states she is his medical proxy and will bring in form next week.    4/18: Lasix was held as BPs were low. BPs continued to be low and pt was newly orthostatic positive, tf decrease Losartan to 25 mg (previously 50mg) daily CTM. Pt still severely depressed; outpatient psychiatrist and daughters endorse ECT as having been the only effective treatment in the past. Pt continues to be ambivalent about it. He will not answer regarding SI today but endorsed passive SI on previous interview. Still with significant PMR and latency.    4/19: mute to writer but stares; observed interacting with staff; irritable speech clear and coherent; by observation appears generally Alert; oriented; cognition intact; speech clear; no tremor or evidence of movement impairment. No behavioral issues.      4/20: Continues to be despondent and ambivalent about treatments. Endorses hearing music which may be consistent with auditory hallucinations. Stated he was "hearing music coming from the room and following him around with words that he couldn't quite make out." Similar episode "last time I was suffering from this condition." Pt states the music "does not bother him." This resolved after 10 minutes. Otw no SI/HI/VH//delusions. Walking halls as if in fugue.  ADLs:  Barthel Index for Activities of Daily Living (ADL) from [email or web address]  on 4/20/2022  RESULT SUMMARY:  65 points  Minimally dependent  INPUTS:  Feeding —> 5 = Needs help  Bathing —> 0 = Unable  Grooming —> 5 = Independent  Dressing —> 10 = Independent  Bowel control —> 5 = Occasional accident  Bladder control —> 5 = Occasional accident  Toilet use —> 5 = Needs help  Transfers (bed to chair and back) —> 15 = Independent  Mobility on level surfaces —> 15 = Independent (but may use any aid, e.g. stick) >50 yards  Stairs —> 0 = Unable    4/21/22: Yesterday (4/20) , pt "consented  verbally " to ECT in presence of RN and daughter. Today, again seems ambivalent. Pt appears more irritable today, shouting at interviewer. Did not ask interviewer to stay in room. Pt had "panic" episode yesterday and was given Zyprexa, which seemed to improve panic. Pt is compliant with all meds today.  Patient did not wish to sign consent and had no response when asked about this issue; however less clingy allowing staff to leave the room without panic.   Reported that patient had panic after "consent" was allegedly given on 4/20 verbally.       ;;04/22: patient states he is anxious rather than sad; Not endorsing  suicidal or homicidal ideation intent or plans; no mention of auditory or visual hallucinations ; does not consent to ECT (at this time).  Appeared less distraught today than yesterday.  However still feels "terrible";  Sleep  appetite ok; no pain issues. Alert; oriented; cognition intact; speech clear; but has tremor at rest thought to be EPS possibly secondary to Zyprexa.    ;;04/25: patient focused on leaving; continues somewhat irritable and feels "terrible" but less so when approached this morning.  Sleep an issue.    tremor not prominent.  makes better eye contact; less thought blocking;  a little more spontaneous.    ;;04/26: "The same"; patient a little more spontaneous; wants to go home; no indication of interest in ECT; appears a little less anxious; a little mobile; suggests improvement; sleep is still poor but appears to be improving.  bp continues with swings in diastolic bp.      Vital Signs Last 24 Hrs ;T(C): 36.4 (26 Apr 2022 08:45), Max: 37 (25 Apr 2022 17:07) ;T(F): 97.6 (26 Apr 2022 08:45), Max: 98.6 (25 Apr 2022 17:07) ;HR: 84 (26 Apr 2022 08:45) (82 - 84) ;BP: 140/62 (26 Apr 2022 08:45) (140/62 - 145/98) ;BP(mean): -- ;ABP: -- ;ABP(mean): -- ;RR: 18 (26 Apr 2022 08:45) (17 - 18) ;SpO2: 96% (26 Apr 2022 08:45) (96% - 97%) ; ;    ;;04/27: seen by cardiology scores 3 on stroke risk with 4 as cut off for too risky ; suggestion that patient be placed on warfarin; Dr. Simmons called and vm left for discussion; patient spoke to writer about breakfast and continues to be willing to consent to ECT ; a change from past discussions; however appears to have improved on Seroquel alone.    ;;04/28: above risk score related to Warfarin use and may not apply to this patient; however cardiology did score patient as low risk; Dr. Simmons contacted and treatment discussed with plan to monitor progress on Seroquel and Zoloft as patient had been stable on Zyprexa and Zoloft for many years; however this morning patient returns to being sad; irritable; "terrible"; and silent when some questions asked despite responding to others; consented for ECT yesterday; first treatment RUL in am tomorrow.      4/28: First ECT session scheduled for tomorrow, 4/29/2022. Seen in room today. NAD. A&Ox3. Still tremulous. Exhibiting new difficulty with getting up from a seated position and with ambulation. Very irritable. Pt ended interview abruptly when lunch was announced. Continues on Seroquel and Zoloft for anxiety and mood.      4/29: Seen s/p his first ECT session, which occurred earlier this morning. NAD. A&Ox3. Somnolent, but arousable, but almost immediately went back to sleep during conversation. Slurring his speech. Last night, pt had a witnessed near-fall, after which his mental status exam was unchanged from baseline. Decision was made to allow pt to rest and return later.  Seen 3.5 hours later. Somnolence resolved. Mildly tremulous initially, but it improved with time.  Mood appeared somewhat improved though still irritable.  Pt is endorsing feeling dizzy and stiffer than when he got to [facility]. He also stated that it is not normal for him to be shaking as he is. Mild cogwheel rigidity was appreciated in the right upper extremity. Onset of symptoms correlates with initiation of anti-psychotic augmentation of anti-depressant regimen. Pt is currently on Seroquel 50 as adjuvant treatment. Decision made to d/c Seroquel [out of abundance of caution re EPS and falls issues-RR] .  Of note: Per UpToDate, dizziness can occur in 1-3% of patients taking mirabegron (and in up to 9% with mesalamine but he has not taken this since April 23rd).    ;;05/01: may be demonstrating slight improvement; aware that Seroquel was stopped but this needs to be re-evaluated in view of early gains on meds.  On CO for falls; coarse tremor still present.  RUL ECT#2 on 5/2.   Discussion with Dr. Arthur (not [place];  error) was that patient was stable on Zyprexa for years after ECT.   ;;05/02: no headache or physical distress ;no clear evidence of improvement; RUL may not be sufficient to be effective; however will encourage RUL ECT #3 as appears tolerated.  awaiting PT for falls assessment; on CO as falls risk.    ;;05/03: a little less constricted; while denies feeling better appears slightly brighter; on CO for falls risk; ECT #3 RUL in am.  No headaches reported.    ;;05/04: suggestion of improved mood (also observed by others) after RUL ECT #3; patient ambivalent about ECT after discharge; will continue inpatient course if patient continues to consent.   ;;05/05: reports of talking in his sleep but patient is brighter and less blocked this morning making good eye contact; almost smiling and agreeing to RUL ECT #4 tomorrow; Sleep  appetite ok; no pain issues.  eating breakfast.  On CO for falls.     05/06: ECT #4 completed with good response, lethargic today  05/07: Patient again lethargic today.  Patient reports "feeling better" and denies SI/HI, intent, plan and AVH.  The patient's mood is improved from yesterday.   ;;05/09: refused ECT #5 this am; discharged focused; monitor progression encourage completion of course of treatment.    ;;05/10: irritable but Not endorsing  suicidal or homicidal ideation intent or plans; no mention of auditory or visual hallucinations . Does not want any further ECT and Focused on discharge; wants to leave.  On CO for falls risk.    While the patient appears to agreed to ECT in am under intense discussion with daughter will need to confirm in am and make proper arrangements for timely preparation for a subsequent ECT on 5/13.   ;;05/11: as per staff meeting with daughter was intense; patient now asked "When are you doing it? (ECT)"  forgot that he had refused consistently before; when asked how he was doing "All right";  prior focused on going home.  Not endorsing  suicidal or homicidal ideation intent or plans; no mention of auditory or visual hallucinations .  If patient consents again tomorrow will schedule for ECT on 5/13 RUL #5.

## 2022-05-11 NOTE — BH INPATIENT PSYCHIATRY PROGRESS NOTE - NSBHMETABOLIC_PSY_ALL_CORE_FT
BMI: BMI (kg/m2): 22.7 (05-09-22 @ 06:20)  HbA1c: A1C with Estimated Average Glucose Result: 5.7 % (04-13-22 @ 07:45)    Glucose: POCT Blood Glucose.: 190 mg/dL (04-19-22 @ 06:43)    BP: 156/69 (05-10-22 @ 16:52) (144/66 - 171/70)  Lipid Panel: Date/Time: 04-13-22 @ 07:45  Cholesterol, Serum: 120  Direct LDL: --  HDL Cholesterol, Serum: 60  Total Cholesterol/HDL Ration Measurement: --  Triglycerides, Serum: 50   BMI: BMI (kg/m2): 22.7 (05-09-22 @ 06:20)  HbA1c: A1C with Estimated Average Glucose Result: 5.7 % (04-13-22 @ 07:45)    Glucose: POCT Blood Glucose.: 190 mg/dL (04-19-22 @ 06:43)    BP: 142/76 (05-11-22 @ 09:00) (142/76 - 171/70)  Lipid Panel: Date/Time: 04-13-22 @ 07:45  Cholesterol, Serum: 120  Direct LDL: --  HDL Cholesterol, Serum: 60  Total Cholesterol/HDL Ration Measurement: --  Triglycerides, Serum: 50

## 2022-05-12 PROCEDURE — 99233 SBSQ HOSP IP/OBS HIGH 50: CPT

## 2022-05-12 RX ORDER — MAGNESIUM OXIDE 400 MG ORAL TABLET 241.3 MG
400 TABLET ORAL DAILY
Refills: 0 | Status: DISCONTINUED | OUTPATIENT
Start: 2022-05-12 | End: 2022-05-20

## 2022-05-12 RX ORDER — ATORVASTATIN CALCIUM 80 MG/1
20 TABLET, FILM COATED ORAL DAILY
Refills: 0 | Status: DISCONTINUED | OUTPATIENT
Start: 2022-05-12 | End: 2022-05-20

## 2022-05-12 RX ORDER — CHOLECALCIFEROL (VITAMIN D3) 125 MCG
2000 CAPSULE ORAL DAILY
Refills: 0 | Status: DISCONTINUED | OUTPATIENT
Start: 2022-05-12 | End: 2022-05-20

## 2022-05-12 RX ADMIN — MIRABEGRON 50 MILLIGRAM(S): 50 TABLET, EXTENDED RELEASE ORAL at 09:56

## 2022-05-12 RX ADMIN — SERTRALINE 100 MILLIGRAM(S): 25 TABLET, FILM COATED ORAL at 09:56

## 2022-05-12 RX ADMIN — RIVAROXABAN 15 MILLIGRAM(S): KIT at 09:56

## 2022-05-12 RX ADMIN — SENNA PLUS 2 TABLET(S): 8.6 TABLET ORAL at 22:48

## 2022-05-12 RX ADMIN — ATORVASTATIN CALCIUM 20 MILLIGRAM(S): 80 TABLET, FILM COATED ORAL at 21:28

## 2022-05-12 RX ADMIN — Medication 25 MILLIGRAM(S): at 09:56

## 2022-05-12 NOTE — BH INPATIENT PSYCHIATRY PROGRESS NOTE - NSBHFUPINTERVALHXFT_PSY_A_CORE
"Your tomorrows are very flexible"  wants to have ECT and expected it yesterday;  adamantly denies every having refused this treatment (observed by staff consistently in the past);  otherwise no reports of endorsing  suicidal or homicidal ideation intent or plans; no mention of auditory or visual hallucinations   Loud but hearing challenged.  Sleep  appetite ok; no pain issues.

## 2022-05-12 NOTE — BH INPATIENT PSYCHIATRY PROGRESS NOTE - NSBHMETABOLIC_PSY_ALL_CORE_FT
BMI: BMI (kg/m2): 22.7 (05-09-22 @ 06:20)  HbA1c: A1C with Estimated Average Glucose Result: 5.7 % (04-13-22 @ 07:45)    Glucose: POCT Blood Glucose.: 190 mg/dL (04-19-22 @ 06:43)    BP: 142/76 (05-11-22 @ 09:00) (142/76 - 171/70)  Lipid Panel: Date/Time: 04-13-22 @ 07:45  Cholesterol, Serum: 120  Direct LDL: --  HDL Cholesterol, Serum: 60  Total Cholesterol/HDL Ration Measurement: --  Triglycerides, Serum: 50   BMI: BMI (kg/m2): 22.7 (05-09-22 @ 06:20)  HbA1c: A1C with Estimated Average Glucose Result: 5.7 % (04-13-22 @ 07:45)    Glucose: POCT Blood Glucose.: 190 mg/dL (04-19-22 @ 06:43)    BP: 115/53 (05-12-22 @ 09:00) (115/53 - 171/70)  Lipid Panel: Date/Time: 04-13-22 @ 07:45  Cholesterol, Serum: 120  Direct LDL: --  HDL Cholesterol, Serum: 60  Total Cholesterol/HDL Ration Measurement: --  Triglycerides, Serum: 50

## 2022-05-12 NOTE — BH INPATIENT PSYCHIATRY PROGRESS NOTE - NSBHASSESSSUMMFT_PSY_ALL_CORE
89-year-old, left-handed retired man, domiciled with daughter Brianda (), w/ PMH of hearing loss using hearing aid, colitis, HLD, HTN, on Xarelto 15mg daily (PCP Dr Barrett  #1 #5), PPH of depression, multiple psych hospitalization requiring ECT, last 4 years ago at [facility], currently on sertraline 50mg bid, olanzapine 5mg qd, sees Dr Josh Simmons () for outpatient, presenting due to worsening depression and obsessive thought.   MoCA score 20/30. Due to current active depressive symptoms, nonresponsive to outpatient treatment, and collateral from psychiatrist and family, patient meets involuntary admission criteria. He has been admitted to 8U for stabilization and possible ECT as inpatient.     Pt cleared for ECT by medicine on 4/15/22 and cleared by Cardiology on 4/27/2022.      MDD w/psychotic features  Plan:  - Received first unilateral ECT session on (4/29); Patient s/p ECT Session 4 on 05/06/22.    - Discontinued Seroquel 2/2 to suspicion for drug-induced parkinsonian features and/or dizziness  - Continue sertraline 100 mg PO daily  - Seen by medicine for ECT clearance 4/15: They determine pt is Low-intermediate risk for low risk procedure  - Continue medication as follows:     Xarelto 15mg daily with meals     Losartan to 25 mg QD; 4/18/22, decrease Losartan to 25 mg (previously 50mg) daily as BPs have been low 4/18/22 and pt was orthostatics positive     Discontinued Furosemide 4/18/ 22; previously 20mg twice daily     Atorvastatin 20mg daily     Vit D3 2000 units     Magnesium 500mg     Mesalamine 2.4 mg     Mirabegron 50mg po QD (pt occasionally refusing)    Medicine consult, 4/15:  #Pre Op Risk   - EKG with rate controlled Afib, LAFB  - Re HTN: maintain /90   - Re: Afib: maintain anticoagulation with home xarelto   - Low-intermediate risk for low risk procedure    4/12: Pt reluctant to engage today; does not want to be on [facility]. Equivocating regarding ECT. Clearly depressed. MoCA 20/30. Will continue to monitor and to discuss ECT.    4/13: Pt remains ambivalent about treatment. Not asking for discharge today. Refused some labs, will try to get these again. Alert and oriented. No SI. Will follow up anemia.    4/14: at this point patient is irritable and negativistic refusing ECT as well as some medications and lab work.  Will move Zyprexa to nighttime (sleep an issue?); and Zoloft 100mg to am; continue to attempt to discuss need for labs and possible ECT if medical evaluation can be completed. Need to assess/address anemia.  Refusal may require consideration of TOO.    4/15: Pt continues to refuse ECT and routine blood work. Pt was seen by medicine team for clearance for ECT, after which he was determined to be "Low-intermediate risk for low risk procedure. Pt's daughter, Valery, states she is his medical proxy and will bring in form next week.    4/18: Lasix was held as BPs were low. BPs continued to be low and pt was newly orthostatic positive, tf decrease Losartan to 25 mg (previously 50mg) daily CTM. Pt still severely depressed; outpatient psychiatrist and daughters endorse ECT as having been the only effective treatment in the past. Pt continues to be ambivalent about it. He will not answer regarding SI today but endorsed passive SI on previous interview. Still with significant PMR and latency.    4/19: mute to writer but stares; observed interacting with staff; irritable speech clear and coherent; by observation appears generally Alert; oriented; cognition intact; speech clear; no tremor or evidence of movement impairment. No behavioral issues.      4/20: Continues to be despondent and ambivalent about treatments. Endorses hearing music which may be consistent with auditory hallucinations. Stated he was "hearing music coming from the room and following him around with words that he couldn't quite make out." Similar episode "last time I was suffering from this condition." Pt states the music "does not bother him." This resolved after 10 minutes. Otw no SI/HI/VH//delusions. Walking halls as if in fugue.  ADLs:  Barthel Index for Activities of Daily Living (ADL) from [employer]  on 4/20/2022  RESULT SUMMARY:  65 points  Minimally dependent  INPUTS:  Feeding —> 5 = Needs help  Bathing —> 0 = Unable  Grooming —> 5 = Independent  Dressing —> 10 = Independent  Bowel control —> 5 = Occasional accident  Bladder control —> 5 = Occasional accident  Toilet use —> 5 = Needs help  Transfers (bed to chair and back) —> 15 = Independent  Mobility on level surfaces —> 15 = Independent (but may use any aid, e.g. stick) >50 yards  Stairs —> 0 = Unable    4/21/22: Yesterday (4/20) , pt "consented  verbally " to ECT in presence of RN and daughter. Today, again seems ambivalent. Pt appears more irritable today, shouting at interviewer. Did not ask interviewer to stay in room. Pt had "panic" episode yesterday and was given Zyprexa, which seemed to improve panic. Pt is compliant with all meds today.  Patient did not wish to sign consent and had no response when asked about this issue; however less clingy allowing staff to leave the room without panic.   Reported that patient had panic after "consent" was allegedly given on 4/20 verbally.       ;;04/22: patient states he is anxious rather than sad; Not endorsing  suicidal or homicidal ideation intent or plans; no mention of auditory or visual hallucinations ; does not consent to ECT (at this time).  Appeared less distraught today than yesterday.  However still feels "terrible";  Sleep  appetite ok; no pain issues. Alert; oriented; cognition intact; speech clear; but has tremor at rest thought to be EPS possibly secondary to Zyprexa.    ;;04/25: patient focused on leaving; continues somewhat irritable and feels "terrible" but less so when approached this morning.  Sleep an issue.    tremor not prominent.  makes better eye contact; less thought blocking;  a little more spontaneous.    ;;04/26: "The same"; patient a little more spontaneous; wants to go home; no indication of interest in ECT; appears a little less anxious; a little mobile; suggests improvement; sleep is still poor but appears to be improving.  bp continues with swings in diastolic bp.      Vital Signs Last 24 Hrs ;T(C): 36.4 (26 Apr 2022 08:45), Max: 37 (25 Apr 2022 17:07) ;T(F): 97.6 (26 Apr 2022 08:45), Max: 98.6 (25 Apr 2022 17:07) ;HR: 84 (26 Apr 2022 08:45) (82 - 84) ;BP: 140/62 (26 Apr 2022 08:45) (140/62 - 145/98) ;BP(mean): -- ;ABP: -- ;ABP(mean): -- ;RR: 18 (26 Apr 2022 08:45) (17 - 18) ;SpO2: 96% (26 Apr 2022 08:45) (96% - 97%) ; ;    ;;04/27: seen by cardiology scores 3 on stroke risk with 4 as cut off for too risky ; suggestion that patient be placed on warfarin; Dr. Simmons called and vm left for discussion; patient spoke to writer about breakfast and continues to be willing to consent to ECT ; a change from past discussions; however appears to have improved on Seroquel alone.    ;;04/28: above risk score related to Warfarin use and may not apply to this patient; however cardiology did score patient as low risk; Dr. Simmons contacted and treatment discussed with plan to monitor progress on Seroquel and Zoloft as patient had been stable on Zyprexa and Zoloft for many years; however this morning patient returns to being sad; irritable; "terrible"; and silent when some questions asked despite responding to others; consented for ECT yesterday; first treatment RUL in am tomorrow.      4/28: First ECT session scheduled for tomorrow, 4/29/2022. Seen in room today. NAD. A&Ox3. Still tremulous. Exhibiting new difficulty with getting up from a seated position and with ambulation. Very irritable. Pt ended interview abruptly when lunch was announced. Continues on Seroquel and Zoloft for anxiety and mood.      4/29: Seen s/p his first ECT session, which occurred earlier this morning. NAD. A&Ox3. Somnolent, but arousable, but almost immediately went back to sleep during conversation. Slurring his speech. Last night, pt had a witnessed near-fall, after which his mental status exam was unchanged from baseline. Decision was made to allow pt to rest and return later.  Seen 3.5 hours later. Somnolence resolved. Mildly tremulous initially, but it improved with time.  Mood appeared somewhat improved though still irritable.  Pt is endorsing feeling dizzy and stiffer than when he got to [facility]. He also stated that it is not normal for him to be shaking as he is. Mild cogwheel rigidity was appreciated in the right upper extremity. Onset of symptoms correlates with initiation of anti-psychotic augmentation of anti-depressant regimen. Pt is currently on Seroquel 50 as adjuvant treatment. Decision made to d/c Seroquel [out of abundance of caution re EPS and falls issues-RR] .  Of note: Per [employer], dizziness can occur in 1-3% of patients taking mirabegron (and in up to 9% with mesalamine but he has not taken this since April 23rd).    ;;05/01: may be demonstrating slight improvement; aware that Seroquel was stopped but this needs to be re-evaluated in view of early gains on meds.  On CO for falls; coarse tremor still present.  RUL ECT#2 on 5/2.   Discussion with Dr. Arthur (not [place];  error) was that patient was stable on Zyprexa for years after ECT.   ;;05/02: no headache or physical distress ;no clear evidence of improvement; RUL may not be sufficient to be effective; however will encourage RUL ECT #3 as appears tolerated.  awaiting PT for falls assessment; on CO as falls risk.    ;;05/03: a little less constricted; while denies feeling better appears slightly brighter; on CO for falls risk; ECT #3 RUL in am.  No headaches reported.    ;;05/04: suggestion of improved mood (also observed by others) after RUL ECT #3; patient ambivalent about ECT after discharge; will continue inpatient course if patient continues to consent.   ;;05/05: reports of talking in his sleep but patient is brighter and less blocked this morning making good eye contact; almost smiling and agreeing to RUL ECT #4 tomorrow; Sleep  appetite ok; no pain issues.  eating breakfast.  On CO for falls.     05/06: ECT #4 completed with good response, lethargic today  05/07: Patient again lethargic today.  Patient reports "feeling better" and denies SI/HI, intent, plan and AVH.  The patient's mood is improved from yesterday.   ;;05/09: refused ECT #5 this am; discharged focused; monitor progression encourage completion of course of treatment.    ;;05/10: irritable but Not endorsing  suicidal or homicidal ideation intent or plans; no mention of auditory or visual hallucinations . Does not want any further ECT and Focused on discharge; wants to leave.  On CO for falls risk.    While the patient appears to agreed to ECT in am under intense discussion with daughter will need to confirm in am and make proper arrangements for timely preparation for a subsequent ECT on 5/13.   ;;05/11: as per staff meeting with daughter was intense; patient now asked "When are you doing it? (ECT)"  forgot that he had refused consistently before; when asked how he was doing "All right";  prior focused on going home.  Not endorsing  suicidal or homicidal ideation intent or plans; no mention of auditory or visual hallucinations .  If patient consents again tomorrow will schedule for ECT on 5/13 RUL #5.    ;;05/12: consistently requests further ECT; team conference including Dr. Franklin as consultant in agreement to proceed but at this point risk/benefit ratio of inpatient treatment is less favorable and will be evaluated on 5/15 for response before continuing inpatient ECT or referring for outpatient care.  Seroquel will NOT be started at this time ; wound care consulted and wraps provided; Dr. Doyle of ECT updated with discussion.  WIll attempt to update outpatient provider.

## 2022-05-12 NOTE — BH INPATIENT PSYCHIATRY PROGRESS NOTE - CURRENT MEDICATION
MEDICATIONS  (STANDING):  atorvastatin 20 milliGRAM(s) Oral daily  cholecalciferol 2000 Unit(s) Oral daily  magnesium oxide 400 milliGRAM(s) Oral daily  mesalamine DR (24-Hour) Tablet 2.4 Gram(s) Oral <User Schedule>  metoprolol succinate ER 25 milliGRAM(s) Oral daily  mirabegron ER 50 milliGRAM(s) Oral daily  rivaroxaban 15 milliGRAM(s) Oral daily  senna 2 Tablet(s) Oral at bedtime  sertraline 100 milliGRAM(s) Oral daily    MEDICATIONS  (PRN):  magnesium hydroxide Suspension 30 milliLiter(s) Oral daily PRN Constipation  polyethylene glycol 3350 17 Gram(s) Oral daily PRN Constipation   MEDICATIONS  (STANDING):  atorvastatin 20 milliGRAM(s) Oral daily  cholecalciferol 2000 Unit(s) Oral daily  magnesium oxide 400 milliGRAM(s) Oral daily  metoprolol succinate ER 25 milliGRAM(s) Oral daily  mirabegron ER 50 milliGRAM(s) Oral daily  rivaroxaban 15 milliGRAM(s) Oral daily  senna 2 Tablet(s) Oral at bedtime  sertraline 100 milliGRAM(s) Oral daily    MEDICATIONS  (PRN):  magnesium hydroxide Suspension 30 milliLiter(s) Oral daily PRN Constipation  polyethylene glycol 3350 17 Gram(s) Oral daily PRN Constipation

## 2022-05-12 NOTE — BH INPATIENT PSYCHIATRY PROGRESS NOTE - NSBHCHARTREVIEWVS_PSY_A_CORE FT
Vital Signs Last 24 Hrs  T(C): 36.5 (05-11-22 @ 09:00), Max: 36.5 (05-11-22 @ 09:00)  T(F): 97.7 (05-11-22 @ 09:00), Max: 97.7 (05-11-22 @ 09:00)  HR: 88 (05-11-22 @ 09:00) (88 - 88)  BP: 142/76 (05-11-22 @ 09:00) (142/76 - 142/76)  BP(mean): --  RR: 16 (05-11-22 @ 09:00) (16 - 16)  SpO2: 96% (05-11-22 @ 09:00) (96% - 96%)     Vital Signs Last 24 Hrs  T(C): 36.4 (05-12-22 @ 09:00), Max: 36.4 (05-12-22 @ 09:00)  T(F): 97.5 (05-12-22 @ 09:00), Max: 97.5 (05-12-22 @ 09:00)  HR: 90 (05-12-22 @ 09:00) (90 - 90)  BP: 115/53 (05-12-22 @ 09:00) (115/53 - 115/53)  BP(mean): --  RR: 18 (05-12-22 @ 09:00) (18 - 18)  SpO2: 97% (05-12-22 @ 09:00) (97% - 97%)

## 2022-05-13 PROCEDURE — 90870 ELECTROCONVULSIVE THERAPY: CPT

## 2022-05-13 RX ADMIN — RIVAROXABAN 15 MILLIGRAM(S): KIT at 10:25

## 2022-05-13 RX ADMIN — Medication 2000 UNIT(S): at 10:24

## 2022-05-13 RX ADMIN — SENNA PLUS 2 TABLET(S): 8.6 TABLET ORAL at 21:01

## 2022-05-13 RX ADMIN — MAGNESIUM OXIDE 400 MG ORAL TABLET 400 MILLIGRAM(S): 241.3 TABLET ORAL at 10:24

## 2022-05-13 RX ADMIN — ATORVASTATIN CALCIUM 20 MILLIGRAM(S): 80 TABLET, FILM COATED ORAL at 21:01

## 2022-05-13 RX ADMIN — MIRABEGRON 50 MILLIGRAM(S): 50 TABLET, EXTENDED RELEASE ORAL at 10:34

## 2022-05-13 RX ADMIN — SERTRALINE 100 MILLIGRAM(S): 25 TABLET, FILM COATED ORAL at 10:24

## 2022-05-13 RX ADMIN — Medication 25 MILLIGRAM(S): at 10:25

## 2022-05-13 NOTE — ECT TREATMENT NOTE - NSECTPOSTTXSUMMARY_PSY_ALL_CORE
GMS under GA - tolerated well  Alert and responsive in NAD
Patient had a well modified GMS under GA and muscle relaxant.  The patient is alert, responsive, in NAD.  Recovery uneventful
The patient had a well modified grand mal seizure under general anesthesia and a muscle relaxant.  The patient is alert, responsive, in no acute distress.  Recovery uneventful. 
Patient seen prior to treatment, continues to have symptoms of depression.  Agreeing to ECT, has capacity to consent.   Treatment 4/29 was to determine seizure threshold.  Future treatments will be at 6x ST.     The patient had a well modified grand mal seizure under general anesthesia and a muscle relaxant.  The patient is alert, responsive, in no acute distress.  Recovery uneventful.

## 2022-05-13 NOTE — ECT PRE-PROCEDURE CHECKLIST - NS PRO AD PATIENT TYPE ON CHART
Health Care Proxy (HCP)

## 2022-05-13 NOTE — ECT PRE-PROCEDURE CHECKLIST - PATIENT PROBLEMS/NEEDS
Patient expressed no known problems or needs

## 2022-05-13 NOTE — ECT PRE-PROCEDURE CHECKLIST - PATIENT REPRESENTATIVE: ( YOU CAN CHOOSE ANY PERSON THAT CAN ASSIST YOU WITH YOUR HEALTH CARE PREFERENCES, DOES NOT HAVE TO BE A SPOUSE, IMMEDIATE FAMILY OR SIGNIFICANT OTHER/PARTNER)
same name as above

## 2022-05-13 NOTE — ECT TREATMENT NOTE - NSECTPOSTTXMEDSCOMMENTS_PSY_ALL_CORE
Brevital 50mg and Succinylcholine 50mg iv given for treatment with good response.
Brevital 50mg and Succinylcholine 50mg iv given for treatment with good response.  Received Lopressor in RR as per anesthesia.
Brevital 50mg and Succinylcholine 50mg iv given for treatment with good response.
Brevital 50mg and Succinylcholine 50mg given IV for induction/muscle relaxation with good effect.

## 2022-05-13 NOTE — ECT TREATMENT NOTE - NSECTTXELECPLACE_PSY_ALL_CORE
Right Unilateral
stated
Right Unilateral

## 2022-05-13 NOTE — PACU DISCHARGE NOTE - COMMENTS
Report given to Gerardo MCNULTY patient AAOx3, calm and appropriate wheeled patient to 8 [facility]SE stable
Pt awake, alert. IV d/c. Taken off the monitor. OOB to wheelchair. Back to psych unit with RN and pCA.

## 2022-05-13 NOTE — ECT PRE-PROCEDURE CHECKLIST - NSPROEDALEARNPREF_GEN_A_NUR
verbal instruction

## 2022-05-13 NOTE — ECT PRE-PROCEDURE CHECKLIST - TEMPERATURE IN FAHRENHEIT (DEGREES F)
Peripheral IV  Date/Time: 2/11/2020 11:53 AM  Performed by: Dontae Goldberg M.D.  Authorized by: Dontae Goldberg M.D.     Size:  18 G  Laterality:  Right  Technique:  Direct puncture  Attempts:  1        
98.2
97.9
98.5
97.9
98.4
97.6

## 2022-05-13 NOTE — ECT TREATMENT NOTE - NSECTCARDIOCOMMENT_PSY_ALL_CORE
Afib on Xarelto, HTN, HLD

## 2022-05-13 NOTE — ECT TREATMENT NOTE - NSICDXBHPRIMARYDX_PSY_ALL_CORE
Major depressive disorder, recurrent episode   F33.9  

## 2022-05-13 NOTE — BH INPATIENT PSYCHIATRY PROGRESS NOTE - NSBHMETABOLIC_PSY_ALL_CORE_FT
BMI: BMI (kg/m2): 22.7 (05-13-22 @ 06:43)  HbA1c: A1C with Estimated Average Glucose Result: 5.7 % (04-13-22 @ 07:45)    Glucose: POCT Blood Glucose.: 190 mg/dL (04-19-22 @ 06:43)    BP: 131/60 (05-13-22 @ 10:23) (115/53 - 189/99)  Lipid Panel: Date/Time: 04-13-22 @ 07:45  Cholesterol, Serum: 120  Direct LDL: --  HDL Cholesterol, Serum: 60  Total Cholesterol/HDL Ration Measurement: --  Triglycerides, Serum: 50

## 2022-05-13 NOTE — ECT TREATMENT NOTE - NSECTCPTCODE_PSY_ALL_CORE
48826 (ECT-Electroconvulsive Therapy)
11717 (ECT-Electroconvulsive Therapy)
74268 (ECT-Electroconvulsive Therapy)
97720 (ECT-Electroconvulsive Therapy)
17728 (ECT-Electroconvulsive Therapy)
00921 (ECT-Electroconvulsive Therapy)

## 2022-05-13 NOTE — ECT TREATMENT NOTE - NSECTTXPERFDATETIME_PSY_ALL_CORE
02-May-2022 08:05
29-Apr-2022 08:19
02-May-2022 08:05
13-May-2022 08:11
04-May-2022 08:07
06-May-2022 08:14

## 2022-05-13 NOTE — ECT TREATMENT NOTE - NSECTOTHERCOMMENT_PSY_ALL_CORE
macrocytic anemia, hard of hearing

## 2022-05-13 NOTE — BH INPATIENT PSYCHIATRY PROGRESS NOTE - CURRENT MEDICATION
MEDICATIONS  (STANDING):  atorvastatin 20 milliGRAM(s) Oral daily  cholecalciferol 2000 Unit(s) Oral daily  magnesium oxide 400 milliGRAM(s) Oral daily  metoprolol succinate ER 25 milliGRAM(s) Oral daily  mirabegron ER 50 milliGRAM(s) Oral daily  rivaroxaban 15 milliGRAM(s) Oral daily  senna 2 Tablet(s) Oral at bedtime  sertraline 100 milliGRAM(s) Oral daily    MEDICATIONS  (PRN):  magnesium hydroxide Suspension 30 milliLiter(s) Oral daily PRN Constipation  polyethylene glycol 3350 17 Gram(s) Oral daily PRN Constipation

## 2022-05-13 NOTE — ECT PRE-PROCEDURE CHECKLIST - PATIENT'S SEXUAL ORIENTATION
Withheld/decline to answer

## 2022-05-13 NOTE — BH INPATIENT PSYCHIATRY PROGRESS NOTE - NSBHCHARTREVIEWVS_PSY_A_CORE FT
Vital Signs Last 24 Hrs  T(C): 36.8 (05-13-22 @ 09:37), Max: 36.8 (05-13-22 @ 06:00)  T(F): 98.3 (05-13-22 @ 09:37), Max: 98.3 (05-13-22 @ 09:37)  HR: 78 (05-13-22 @ 10:23) (69 - 86)  BP: 131/60 (05-13-22 @ 10:23) (131/60 - 189/99)  BP(mean): 98 (05-13-22 @ 09:09) (95 - 131)  RR: 16 (05-13-22 @ 10:23) (16 - 26)  SpO2: 98% (05-13-22 @ 10:23) (96% - 99%)

## 2022-05-13 NOTE — ECT PRE-PROCEDURE CHECKLIST - NSPROEDAREADYLEARN_GEN_A_NUR
acuteness of illness/anxiety/depression

## 2022-05-13 NOTE — ECT TREATMENT NOTE - NSECTREVSYS_PSY_ALL_CORE
Cardiovascular/Renal/Urologic/Abdominal/Metabolic/Other

## 2022-05-13 NOTE — BH INPATIENT PSYCHIATRY PROGRESS NOTE - NSBHFUPINTERVALHXFT_PSY_A_CORE
Patient underwent ECT treatment this morning without complications. He was seen around lunch time, requiring encouragement to get out of bed. Patient is a bit concerned about deconditioning during his admission, as he did not have such a hard time ambulating, or arising from bed prior to the admission. He was excited to hear he may be discharged as soon as mid next week. He denies depressed mood, continues to sleep well at night, and eat meals.

## 2022-05-13 NOTE — BH INPATIENT PSYCHIATRY PROGRESS NOTE - NSBHASSESSSUMMFT_PSY_ALL_CORE
89-year-old, left-handed retired man, domiciled with daughter Brianda (), w/ PMH of hearing loss using hearing aid, colitis, HLD, HTN, on Xarelto 15mg daily (PCP Dr Barrett  #1 #5), PPH of depression, multiple psych hospitalization requiring ECT, last 4 years ago at [facility], currently on sertraline 50mg bid, olanzapine 5mg qd, sees Dr Josh Simmons () for outpatient, presenting due to worsening depression and obsessive thought.   MoCA score 20/30. Due to current active depressive symptoms, nonresponsive to outpatient treatment, and collateral from psychiatrist and family, patient meets involuntary admission criteria. He has been admitted to 8U for stabilization and possible ECT as inpatient.     Pt cleared for ECT by medicine on 4/15/22 and cleared by Cardiology on 4/27/2022.      MDD w/psychotic features  Plan:  - Received first unilateral ECT session on (4/29); Patient s/p ECT Session 4 on 05/06/22.    - Discontinued Seroquel 2/2 to suspicion for drug-induced parkinsonian features and/or dizziness  - Continue sertraline 100 mg PO daily  - Seen by medicine for ECT clearance 4/15: They determine pt is Low-intermediate risk for low risk procedure  - Continue medication as follows:     Xarelto 15mg daily with meals     Losartan to 25 mg QD; 4/18/22, decrease Losartan to 25 mg (previously 50mg) daily as BPs have been low 4/18/22 and pt was orthostatics positive     Discontinued Furosemide 4/18/ 22; previously 20mg twice daily     Atorvastatin 20mg daily     Vit D3 2000 units     Magnesium 500mg     Mesalamine 2.4 mg     Mirabegron 50mg po QD (pt occasionally refusing)    Medicine consult, 4/15:  #Pre Op Risk   - EKG with rate controlled Afib, LAFB  - Re HTN: maintain /90   - Re: Afib: maintain anticoagulation with home xarelto   - Low-intermediate risk for low risk procedure    4/12: Pt reluctant to engage today; does not want to be on [facility]. Equivocating regarding ECT. Clearly depressed. MoCA 20/30. Will continue to monitor and to discuss ECT.    4/13: Pt remains ambivalent about treatment. Not asking for discharge today. Refused some labs, will try to get these again. Alert and oriented. No SI. Will follow up anemia.    4/14: at this point patient is irritable and negativistic refusing ECT as well as some medications and lab work.  Will move Zyprexa to nighttime (sleep an issue?); and Zoloft 100mg to am; continue to attempt to discuss need for labs and possible ECT if medical evaluation can be completed. Need to assess/address anemia.  Refusal may require consideration of TOO.    4/15: Pt continues to refuse ECT and routine blood work. Pt was seen by medicine team for clearance for ECT, after which he was determined to be "Low-intermediate risk for low risk procedure. Pt's daughter, Valery, states she is his medical proxy and will bring in form next week.    4/18: Lasix was held as BPs were low. BPs continued to be low and pt was newly orthostatic positive, tf decrease Losartan to 25 mg (previously 50mg) daily CTM. Pt still severely depressed; outpatient psychiatrist and daughters endorse ECT as having been the only effective treatment in the past. Pt continues to be ambivalent about it. He will not answer regarding SI today but endorsed passive SI on previous interview. Still with significant PMR and latency.    4/19: mute to writer but stares; observed interacting with staff; irritable speech clear and coherent; by observation appears generally Alert; oriented; cognition intact; speech clear; no tremor or evidence of movement impairment. No behavioral issues.      4/20: Continues to be despondent and ambivalent about treatments. Endorses hearing music which may be consistent with auditory hallucinations. Stated he was "hearing music coming from the room and following him around with words that he couldn't quite make out." Similar episode "last time I was suffering from this condition." Pt states the music "does not bother him." This resolved after 10 minutes. Otw no SI/HI/VH//delusions. Walking halls as if in fugue.  ADLs:  Barthel Index for Activities of Daily Living (ADL) from [employer]  on 4/20/2022  RESULT SUMMARY:  65 points  Minimally dependent  INPUTS:  Feeding —> 5 = Needs help  Bathing —> 0 = Unable  Grooming —> 5 = Independent  Dressing —> 10 = Independent  Bowel control —> 5 = Occasional accident  Bladder control —> 5 = Occasional accident  Toilet use —> 5 = Needs help  Transfers (bed to chair and back) —> 15 = Independent  Mobility on level surfaces —> 15 = Independent (but may use any aid, e.g. stick) >50 yards  Stairs —> 0 = Unable    4/21/22: Yesterday (4/20) , pt "consented  verbally " to ECT in presence of RN and daughter. Today, again seems ambivalent. Pt appears more irritable today, shouting at interviewer. Did not ask interviewer to stay in room. Pt had "panic" episode yesterday and was given Zyprexa, which seemed to improve panic. Pt is compliant with all meds today.  Patient did not wish to sign consent and had no response when asked about this issue; however less clingy allowing staff to leave the room without panic.   Reported that patient had panic after "consent" was allegedly given on 4/20 verbally.       ;;04/22: patient states he is anxious rather than sad; Not endorsing  suicidal or homicidal ideation intent or plans; no mention of auditory or visual hallucinations ; does not consent to ECT (at this time).  Appeared less distraught today than yesterday.  However still feels "terrible";  Sleep  appetite ok; no pain issues. Alert; oriented; cognition intact; speech clear; but has tremor at rest thought to be EPS possibly secondary to Zyprexa.    ;;04/25: patient focused on leaving; continues somewhat irritable and feels "terrible" but less so when approached this morning.  Sleep an issue.    tremor not prominent.  makes better eye contact; less thought blocking;  a little more spontaneous.    ;;04/26: "The same"; patient a little more spontaneous; wants to go home; no indication of interest in ECT; appears a little less anxious; a little mobile; suggests improvement; sleep is still poor but appears to be improving.  bp continues with swings in diastolic bp.      Vital Signs Last 24 Hrs ;T(C): 36.4 (26 Apr 2022 08:45), Max: 37 (25 Apr 2022 17:07) ;T(F): 97.6 (26 Apr 2022 08:45), Max: 98.6 (25 Apr 2022 17:07) ;HR: 84 (26 Apr 2022 08:45) (82 - 84) ;BP: 140/62 (26 Apr 2022 08:45) (140/62 - 145/98) ;BP(mean): -- ;ABP: -- ;ABP(mean): -- ;RR: 18 (26 Apr 2022 08:45) (17 - 18) ;SpO2: 96% (26 Apr 2022 08:45) (96% - 97%) ; ;    ;;04/27: seen by cardiology scores 3 on stroke risk with 4 as cut off for too risky ; suggestion that patient be placed on warfarin; Dr. Simmons called and vm left for discussion; patient spoke to writer about breakfast and continues to be willing to consent to ECT ; a change from past discussions; however appears to have improved on Seroquel alone.    ;;04/28: above risk score related to Warfarin use and may not apply to this patient; however cardiology did score patient as low risk; Dr. Simmons contacted and treatment discussed with plan to monitor progress on Seroquel and Zoloft as patient had been stable on Zyprexa and Zoloft for many years; however this morning patient returns to being sad; irritable; "terrible"; and silent when some questions asked despite responding to others; consented for ECT yesterday; first treatment RUL in am tomorrow.      4/28: First ECT session scheduled for tomorrow, 4/29/2022. Seen in room today. NAD. A&Ox3. Still tremulous. Exhibiting new difficulty with getting up from a seated position and with ambulation. Very irritable. Pt ended interview abruptly when lunch was announced. Continues on Seroquel and Zoloft for anxiety and mood.      4/29: Seen s/p his first ECT session, which occurred earlier this morning. NAD. A&Ox3. Somnolent, but arousable, but almost immediately went back to sleep during conversation. Slurring his speech. Last night, pt had a witnessed near-fall, after which his mental status exam was unchanged from baseline. Decision was made to allow pt to rest and return later.  Seen 3.5 hours later. Somnolence resolved. Mildly tremulous initially, but it improved with time.  Mood appeared somewhat improved though still irritable.  Pt is endorsing feeling dizzy and stiffer than when he got to [facility]. He also stated that it is not normal for him to be shaking as he is. Mild cogwheel rigidity was appreciated in the right upper extremity. Onset of symptoms correlates with initiation of anti-psychotic augmentation of anti-depressant regimen. Pt is currently on Seroquel 50 as adjuvant treatment. Decision made to d/c Seroquel [out of abundance of caution re EPS and falls issues-RR] .  Of note: Per UpToDate, dizziness can occur in 1-3% of patients taking mirabegron (and in up to 9% with mesalamine but he has not taken this since April 23rd).    ;;05/01: may be demonstrating slight improvement; aware that Seroquel was stopped but this needs to be re-evaluated in view of early gains on meds.  On CO for falls; coarse tremor still present.  RUL ECT#2 on 5/2.   Discussion with Dr. Arthur (not [place];  error) was that patient was stable on Zyprexa for years after ECT.   ;;05/02: no headache or physical distress ;no clear evidence of improvement; RUL may not be sufficient to be effective; however will encourage RUL ECT #3 as appears tolerated.  awaiting PT for falls assessment; on CO as falls risk.    ;;05/03: a little less constricted; while denies feeling better appears slightly brighter; on CO for falls risk; ECT #3 RUL in am.  No headaches reported.    ;;05/04: suggestion of improved mood (also observed by others) after RUL ECT #3; patient ambivalent about ECT after discharge; will continue inpatient course if patient continues to consent.   ;;05/05: reports of talking in his sleep but patient is brighter and less blocked this morning making good eye contact; almost smiling and agreeing to RUL ECT #4 tomorrow; Sleep  appetite ok; no pain issues.  eating breakfast.  On CO for falls.     05/06: ECT #4 completed with good response, lethargic today  05/07: Patient again lethargic today.  Patient reports "feeling better" and denies SI/HI, intent, plan and AVH.  The patient's mood is improved from yesterday.   ;;05/09: refused ECT #5 this am; discharged focused; monitor progression encourage completion of course of treatment.    ;;05/10: irritable but Not endorsing  suicidal or homicidal ideation intent or plans; no mention of auditory or visual hallucinations . Does not want any further ECT and Focused on discharge; wants to leave.  On CO for falls risk.    While the patient appears to agreed to ECT in am under intense discussion with daughter will need to confirm in am and make proper arrangements for timely preparation for a subsequent ECT on 5/13.   ;;05/11: as per staff meeting with daughter was intense; patient now asked "When are you doing it? (ECT)"  forgot that he had refused consistently before; when asked how he was doing "All right";  prior focused on going home.  Not endorsing  suicidal or homicidal ideation intent or plans; no mention of auditory or visual hallucinations .  If patient consents again tomorrow will schedule for ECT on 5/13 RUL #5.    ;;05/12: consistently requests further ECT; team conference including Dr. Franklin as consultant in agreement to proceed but at this point risk/benefit ratio of inpatient treatment is less favorable and will be evaluated on 5/15 for response before continuing inpatient ECT or referring for outpatient care.  Seroquel will NOT be started at this time ; wound care consulted and wraps provided; Dr. Doyle of ECT updated with discussion.  WIll attempt to update outpatient provider.    ;;05/13: Pt received ECT #5 today, appears brighter in mood with more reactive affect. Plan likely at this time to administer one more treatment on Monday and discharge patient on tuesday. Will be discussing this plan with family and continue to monitor patient response. Given his age and vulnerability, risk benefit analysis is necessary to justify benefit of prolonging hospitalization any further

## 2022-05-14 RX ADMIN — MAGNESIUM OXIDE 400 MG ORAL TABLET 400 MILLIGRAM(S): 241.3 TABLET ORAL at 18:01

## 2022-05-14 RX ADMIN — Medication 2000 UNIT(S): at 10:54

## 2022-05-14 RX ADMIN — MIRABEGRON 50 MILLIGRAM(S): 50 TABLET, EXTENDED RELEASE ORAL at 13:01

## 2022-05-14 RX ADMIN — Medication 25 MILLIGRAM(S): at 10:54

## 2022-05-14 RX ADMIN — SERTRALINE 100 MILLIGRAM(S): 25 TABLET, FILM COATED ORAL at 10:54

## 2022-05-14 RX ADMIN — ATORVASTATIN CALCIUM 20 MILLIGRAM(S): 80 TABLET, FILM COATED ORAL at 10:55

## 2022-05-14 RX ADMIN — RIVAROXABAN 15 MILLIGRAM(S): KIT at 10:54

## 2022-05-14 RX ADMIN — SENNA PLUS 2 TABLET(S): 8.6 TABLET ORAL at 21:30

## 2022-05-14 NOTE — BH INPATIENT PSYCHIATRY PROGRESS NOTE - NSBHMETABOLIC_PSY_ALL_CORE_FT
BMI: BMI (kg/m2): 22.7 (05-13-22 @ 06:43)  HbA1c: A1C with Estimated Average Glucose Result: 5.7 % (04-13-22 @ 07:45)    Glucose: POCT Blood Glucose.: 190 mg/dL (04-19-22 @ 06:43)    BP: 118/50 (05-13-22 @ 16:02) (115/53 - 189/99)  Lipid Panel: Date/Time: 04-13-22 @ 07:45  Cholesterol, Serum: 120  Direct LDL: --  HDL Cholesterol, Serum: 60  Total Cholesterol/HDL Ration Measurement: --  Triglycerides, Serum: 50   BMI: BMI (kg/m2): 22.7 (05-13-22 @ 06:43)  HbA1c: A1C with Estimated Average Glucose Result: 5.7 % (04-13-22 @ 07:45)    Glucose: POCT Blood Glucose.: 190 mg/dL (04-19-22 @ 06:43)    BP: 103/78 (05-14-22 @ 09:00) (103/78 - 189/99)  Lipid Panel: Date/Time: 04-13-22 @ 07:45  Cholesterol, Serum: 120  Direct LDL: --  HDL Cholesterol, Serum: 60  Total Cholesterol/HDL Ration Measurement: --  Triglycerides, Serum: 50

## 2022-05-14 NOTE — BH INPATIENT PSYCHIATRY PROGRESS NOTE - NSBHFUPINTERVALHXFT_PSY_A_CORE
No acute interval events. Pt denying any acute complaints. Dressings on right arm intact, dry, and clean. Pt eating well, sleeping well, voiding appropriately. He reports "good" mood and is looking forward to discharge next week. Pt evaluated at bedside; CO 1:1 present.    No acute interval events. Pt denying any acute complaints. Dressings on right arm intact, dry, and clean. Pt eating well, sleeping well, voiding appropriately. He reports "good" mood and is looking forward to discharge next week. Per CO, pt in good behavioral control, cooperative, unsteady on his feet at times requiring assistance; no falls in interval.

## 2022-05-14 NOTE — BH INPATIENT PSYCHIATRY PROGRESS NOTE - NSBHASSESSSUMMFT_PSY_ALL_CORE
89-year-old, left-handed retired man, domiciled with daughter Brianda (), w/ PMH of hearing loss using hearing aid, colitis, HLD, HTN, on Xarelto 15mg daily (PCP Dr Barrett  #1 #5), PPH of depression, multiple psych hospitalization requiring ECT, last 4 years ago at [facility], currently on sertraline 50mg bid, olanzapine 5mg qd, sees Dr Josh Simmons () for outpatient, presenting due to worsening depression and obsessive thought.   MoCA score 20/30. Due to current active depressive symptoms, nonresponsive to outpatient treatment, and collateral from psychiatrist and family, patient meets involuntary admission criteria. He has been admitted to 8U for stabilization and is now undergoing ECT.    MDD w/psychotic features  Plan:  - Received first unilateral ECT session on (4/29); Patient s/p ECT Session 5 on 05/13/22.    - Discontinued Seroquel 2/2 to suspicion for drug-induced parkinsonian features and/or dizziness  - Continue sertraline 100 mg PO daily  - Seen by medicine for ECT clearance 4/15: They determine pt is Low-intermediate risk for low risk procedure  - Continue medication as follows:     Xarelto 15mg daily with meals     Losartan to 25 mg QD; 4/18/22, decrease Losartan to 25 mg (previously 50mg) daily as BPs have been low 4/18/22 and pt was orthostatics positive     Discontinued Furosemide 4/18/ 22; previously 20mg twice daily     Atorvastatin 20mg daily     Vit D3 2000 units     Magnesium 500mg     Mesalamine 2.4 mg     Mirabegron 50mg po QD (pt occasionally refusing)    Medicine consult, 4/15:  #Pre Op Risk   - EKG with rate controlled Afib, LAFB  - Re HTN: maintain /90   - Re: Afib: maintain anticoagulation with home xarelto   - Low-intermediate risk for low risk procedure    4/12: Pt reluctant to engage today; does not want to be on [facility]. Equivocating regarding ECT. Clearly depressed. MoCA 20/30. Will continue to monitor and to discuss ECT.    4/13: Pt remains ambivalent about treatment. Not asking for discharge today. Refused some labs, will try to get these again. Alert and oriented. No SI. Will follow up anemia.    4/14: at this point patient is irritable and negativistic refusing ECT as well as some medications and lab work.  Will move Zyprexa to nighttime (sleep an issue?); and Zoloft 100mg to am; continue to attempt to discuss need for labs and possible ECT if medical evaluation can be completed. Need to assess/address anemia.  Refusal may require consideration of TOO.    4/15: Pt continues to refuse ECT and routine blood work. Pt was seen by medicine team for clearance for ECT, after which he was determined to be "Low-intermediate risk for low risk procedure. Pt's daughter, Valery, states she is his medical proxy and will bring in form next week.    4/18: Lasix was held as BPs were low. BPs continued to be low and pt was newly orthostatic positive, tf decrease Losartan to 25 mg (previously 50mg) daily CTM. Pt still severely depressed; outpatient psychiatrist and daughters endorse ECT as having been the only effective treatment in the past. Pt continues to be ambivalent about it. He will not answer regarding SI today but endorsed passive SI on previous interview. Still with significant PMR and latency.    4/19: mute to writer but stares; observed interacting with staff; irritable speech clear and coherent; by observation appears generally Alert; oriented; cognition intact; speech clear; no tremor or evidence of movement impairment. No behavioral issues.      4/20: Continues to be despondent and ambivalent about treatments. Endorses hearing music which may be consistent with auditory hallucinations. Stated he was "hearing music coming from the room and following him around with words that he couldn't quite make out." Similar episode "last time I was suffering from this condition." Pt states the music "does not bother him." This resolved after 10 minutes. Otw no SI/HI/VH//delusions. Walking halls as if in fugue.  ADLs:  Barthel Index for Activities of Daily Living (ADL) from [employer]  on 4/20/2022  RESULT SUMMARY:  65 points  Minimally dependent  INPUTS:  Feeding —> 5 = Needs help  Bathing —> 0 = Unable  Grooming —> 5 = Independent  Dressing —> 10 = Independent  Bowel control —> 5 = Occasional accident  Bladder control —> 5 = Occasional accident  Toilet use —> 5 = Needs help  Transfers (bed to chair and back) —> 15 = Independent  Mobility on level surfaces —> 15 = Independent (but may use any aid, e.g. stick) >50 yards  Stairs —> 0 = Unable    4/21/22: Yesterday (4/20) , pt "consented  verbally " to ECT in presence of RN and daughter. Today, again seems ambivalent. Pt appears more irritable today, shouting at interviewer. Did not ask interviewer to stay in room. Pt had "panic" episode yesterday and was given Zyprexa, which seemed to improve panic. Pt is compliant with all meds today.  Patient did not wish to sign consent and had no response when asked about this issue; however less clingy allowing staff to leave the room without panic.   Reported that patient had panic after "consent" was allegedly given on 4/20 verbally.       ;;04/22: patient states he is anxious rather than sad; Not endorsing  suicidal or homicidal ideation intent or plans; no mention of auditory or visual hallucinations ; does not consent to ECT (at this time).  Appeared less distraught today than yesterday.  However still feels "terrible";  Sleep  appetite ok; no pain issues. Alert; oriented; cognition intact; speech clear; but has tremor at rest thought to be EPS possibly secondary to Zyprexa.    ;;04/25: patient focused on leaving; continues somewhat irritable and feels "terrible" but less so when approached this morning.  Sleep an issue.    tremor not prominent.  makes better eye contact; less thought blocking;  a little more spontaneous.    ;;04/26: "The same"; patient a little more spontaneous; wants to go home; no indication of interest in ECT; appears a little less anxious; a little mobile; suggests improvement; sleep is still poor but appears to be improving.  bp continues with swings in diastolic bp.      Vital Signs Last 24 Hrs ;T(C): 36.4 (26 Apr 2022 08:45), Max: 37 (25 Apr 2022 17:07) ;T(F): 97.6 (26 Apr 2022 08:45), Max: 98.6 (25 Apr 2022 17:07) ;HR: 84 (26 Apr 2022 08:45) (82 - 84) ;BP: 140/62 (26 Apr 2022 08:45) (140/62 - 145/98) ;BP(mean): -- ;ABP: -- ;ABP(mean): -- ;RR: 18 (26 Apr 2022 08:45) (17 - 18) ;SpO2: 96% (26 Apr 2022 08:45) (96% - 97%) ; ;    ;;04/27: seen by cardiology scores 3 on stroke risk with 4 as cut off for too risky ; suggestion that patient be placed on warfarin; Dr. Simmons called and vm left for discussion; patient spoke to writer about breakfast and continues to be willing to consent to ECT ; a change from past discussions; however appears to have improved on Seroquel alone.    ;;04/28: above risk score related to Warfarin use and may not apply to this patient; however cardiology did score patient as low risk; Dr. Simmons contacted and treatment discussed with plan to monitor progress on Seroquel and Zoloft as patient had been stable on Zyprexa and Zoloft for many years; however this morning patient returns to being sad; irritable; "terrible"; and silent when some questions asked despite responding to others; consented for ECT yesterday; first treatment RUL in am tomorrow.      4/28: First ECT session scheduled for tomorrow, 4/29/2022. Seen in room today. NAD. A&Ox3. Still tremulous. Exhibiting new difficulty with getting up from a seated position and with ambulation. Very irritable. Pt ended interview abruptly when lunch was announced. Continues on Seroquel and Zoloft for anxiety and mood.      4/29: Seen s/p his first ECT session, which occurred earlier this morning. NAD. A&Ox3. Somnolent, but arousable, but almost immediately went back to sleep during conversation. Slurring his speech. Last night, pt had a witnessed near-fall, after which his mental status exam was unchanged from baseline. Decision was made to allow pt to rest and return later.  Seen 3.5 hours later. Somnolence resolved. Mildly tremulous initially, but it improved with time.  Mood appeared somewhat improved though still irritable.  Pt is endorsing feeling dizzy and stiffer than when he got to [facility]. He also stated that it is not normal for him to be shaking as he is. Mild cogwheel rigidity was appreciated in the right upper extremity. Onset of symptoms correlates with initiation of anti-psychotic augmentation of anti-depressant regimen. Pt is currently on Seroquel 50 as adjuvant treatment. Decision made to d/c Seroquel [out of abundance of caution re EPS and falls issues-RR] .  Of note: Per UpToDate, dizziness can occur in 1-3% of patients taking mirabegron (and in up to 9% with mesalamine but he has not taken this since April 23rd).    ;;05/01: may be demonstrating slight improvement; aware that Seroquel was stopped but this needs to be re-evaluated in view of early gains on meds.  On CO for falls; coarse tremor still present.  RUL ECT#2 on 5/2.   Discussion with Dr. Arthur (not [place];  error) was that patient was stable on Zyprexa for years after ECT.   ;;05/02: no headache or physical distress ;no clear evidence of improvement; RUL may not be sufficient to be effective; however will encourage RUL ECT #3 as appears tolerated.  awaiting PT for falls assessment; on CO as falls risk.    ;;05/03: a little less constricted; while denies feeling better appears slightly brighter; on CO for falls risk; ECT #3 RUL in am.  No headaches reported.    ;;05/04: suggestion of improved mood (also observed by others) after RUL ECT #3; patient ambivalent about ECT after discharge; will continue inpatient course if patient continues to consent.   ;;05/05: reports of talking in his sleep but patient is brighter and less blocked this morning making good eye contact; almost smiling and agreeing to RUL ECT #4 tomorrow; Sleep  appetite ok; no pain issues.  eating breakfast.  On CO for falls.     05/06: ECT #4 completed with good response, lethargic today  05/07: Patient again lethargic today.  Patient reports "feeling better" and denies SI/HI, intent, plan and AVH.  The patient's mood is improved from yesterday.   ;;05/09: refused ECT #5 this am; discharged focused; monitor progression encourage completion of course of treatment.    ;;05/10: irritable but Not endorsing  suicidal or homicidal ideation intent or plans; no mention of auditory or visual hallucinations . Does not want any further ECT and Focused on discharge; wants to leave.  On CO for falls risk.    While the patient appears to agreed to ECT in am under intense discussion with daughter will need to confirm in am and make proper arrangements for timely preparation for a subsequent ECT on 5/13.   ;;05/11: as per staff meeting with daughter was intense; patient now asked "When are you doing it? (ECT)"  forgot that he had refused consistently before; when asked how he was doing "All right";  prior focused on going home.  Not endorsing  suicidal or homicidal ideation intent or plans; no mention of auditory or visual hallucinations .  If patient consents again tomorrow will schedule for ECT on 5/13 RUL #5.    ;;05/12: consistently requests further ECT; team conference including Dr. Franklin as consultant in agreement to proceed but at this point risk/benefit ratio of inpatient treatment is less favorable and will be evaluated on 5/15 for response before continuing inpatient ECT or referring for outpatient care.  Seroquel will NOT be started at this time ; wound care consulted and wraps provided; Dr. Doyle of ECT updated with discussion.  WIll attempt to update outpatient provider.    ;;05/13: Pt received ECT #5 today, appears brighter in mood with more reactive affect. Plan likely at this time to administer one more treatment on Monday and discharge patient on tuesday. Will be discussing this plan with family and continue to monitor patient response. Given his age and vulnerability, risk benefit analysis is necessary to justify benefit of prolonging hospitalization any further  ;;05/14: Pt is s/p ECT#5, with improved mood and affect. Appetite, sleep, voiding habits intact. Pt with wounds on right arm, managed by wound care, dressings intact, clean, and dry. No changes to plan.   89-year-old, left-handed retired man, domiciled with daughter Brianda (), w/ PMH of hearing loss using hearing aid, colitis, HLD, HTN, on Xarelto 15mg daily (PCP Dr Barrett  #1 #5), PPH of depression, multiple psych hospitalization requiring ECT, last 4 years ago at [facility], currently on sertraline 50mg bid, olanzapine 5mg qd, sees Dr Josh Simmons () for outpatient, presenting due to worsening depression and obsessive thought.   MoCA score 20/30. Due to current active depressive symptoms, nonresponsive to outpatient treatment, and collateral from psychiatrist and family, patient meets involuntary admission criteria. He has been admitted to 8U for stabilization and is now undergoing ECT.    MDD w/psychotic features  Plan:  - Received first unilateral ECT session on (4/29); Patient s/p ECT Session 5 on 05/13/22.    - Discontinued Seroquel 2/2 to suspicion for drug-induced parkinsonian features and/or dizziness  - Continue sertraline 100 mg PO daily  - f/u routine CBC/CMP/Mg   - Seen by medicine for ECT clearance 4/15: They determine pt is Low-intermediate risk for low risk procedure  - Continue medication as follows:     Xarelto 15mg daily with meals     Losartan to 25 mg QD; 4/18/22, decrease Losartan to 25 mg (previously 50mg) daily as BPs have been low 4/18/22 and pt was orthostatics positive     Discontinued Furosemide 4/18/ 22; previously 20mg twice daily     Atorvastatin 20mg daily     Vit D3 2000 units     Magnesium 500mg     Mesalamine 2.4 mg     Mirabegron 50mg po QD (pt occasionally refusing)    Medicine consult, 4/15:  #Pre Op Risk   - EKG with rate controlled Afib, LAFB  - Re HTN: maintain /90   - Re: Afib: maintain anticoagulation with home xarelto   - Low-intermediate risk for low risk procedure    4/12: Pt reluctant to engage today; does not want to be on [facility]. Equivocating regarding ECT. Clearly depressed. MoCA 20/30. Will continue to monitor and to discuss ECT.    4/13: Pt remains ambivalent about treatment. Not asking for discharge today. Refused some labs, will try to get these again. Alert and oriented. No SI. Will follow up anemia.    4/14: at this point patient is irritable and negativistic refusing ECT as well as some medications and lab work.  Will move Zyprexa to nighttime (sleep an issue?); and Zoloft 100mg to am; continue to attempt to discuss need for labs and possible ECT if medical evaluation can be completed. Need to assess/address anemia.  Refusal may require consideration of TOO.    4/15: Pt continues to refuse ECT and routine blood work. Pt was seen by medicine team for clearance for ECT, after which he was determined to be "Low-intermediate risk for low risk procedure. Pt's daughter, Valery, states she is his medical proxy and will bring in form next week.    4/18: Lasix was held as BPs were low. BPs continued to be low and pt was newly orthostatic positive, tf decrease Losartan to 25 mg (previously 50mg) daily CTM. Pt still severely depressed; outpatient psychiatrist and daughters endorse ECT as having been the only effective treatment in the past. Pt continues to be ambivalent about it. He will not answer regarding SI today but endorsed passive SI on previous interview. Still with significant PMR and latency.    4/19: mute to writer but stares; observed interacting with staff; irritable speech clear and coherent; by observation appears generally Alert; oriented; cognition intact; speech clear; no tremor or evidence of movement impairment. No behavioral issues.      4/20: Continues to be despondent and ambivalent about treatments. Endorses hearing music which may be consistent with auditory hallucinations. Stated he was "hearing music coming from the room and following him around with words that he couldn't quite make out." Similar episode "last time I was suffering from this condition." Pt states the music "does not bother him." This resolved after 10 minutes. Otw no SI/HI/VH//delusions. Walking halls as if in fugue.  ADLs:  Barthel Index for Activities of Daily Living (ADL) from [employer]  on 4/20/2022  RESULT SUMMARY:  65 points  Minimally dependent  INPUTS:  Feeding —> 5 = Needs help  Bathing —> 0 = Unable  Grooming —> 5 = Independent  Dressing —> 10 = Independent  Bowel control —> 5 = Occasional accident  Bladder control —> 5 = Occasional accident  Toilet use —> 5 = Needs help  Transfers (bed to chair and back) —> 15 = Independent  Mobility on level surfaces —> 15 = Independent (but may use any aid, e.g. stick) >50 yards  Stairs —> 0 = Unable    4/21/22: Yesterday (4/20) , pt "consented  verbally " to ECT in presence of RN and daughter. Today, again seems ambivalent. Pt appears more irritable today, shouting at interviewer. Did not ask interviewer to stay in room. Pt had "panic" episode yesterday and was given Zyprexa, which seemed to improve panic. Pt is compliant with all meds today.  Patient did not wish to sign consent and had no response when asked about this issue; however less clingy allowing staff to leave the room without panic.   Reported that patient had panic after "consent" was allegedly given on 4/20 verbally.       ;;04/22: patient states he is anxious rather than sad; Not endorsing  suicidal or homicidal ideation intent or plans; no mention of auditory or visual hallucinations ; does not consent to ECT (at this time).  Appeared less distraught today than yesterday.  However still feels "terrible";  Sleep  appetite ok; no pain issues. Alert; oriented; cognition intact; speech clear; but has tremor at rest thought to be EPS possibly secondary to Zyprexa.    ;;04/25: patient focused on leaving; continues somewhat irritable and feels "terrible" but less so when approached this morning.  Sleep an issue.    tremor not prominent.  makes better eye contact; less thought blocking;  a little more spontaneous.    ;;04/26: "The same"; patient a little more spontaneous; wants to go home; no indication of interest in ECT; appears a little less anxious; a little mobile; suggests improvement; sleep is still poor but appears to be improving.  bp continues with swings in diastolic bp.      Vital Signs Last 24 Hrs ;T(C): 36.4 (26 Apr 2022 08:45), Max: 37 (25 Apr 2022 17:07) ;T(F): 97.6 (26 Apr 2022 08:45), Max: 98.6 (25 Apr 2022 17:07) ;HR: 84 (26 Apr 2022 08:45) (82 - 84) ;BP: 140/62 (26 Apr 2022 08:45) (140/62 - 145/98) ;BP(mean): -- ;ABP: -- ;ABP(mean): -- ;RR: 18 (26 Apr 2022 08:45) (17 - 18) ;SpO2: 96% (26 Apr 2022 08:45) (96% - 97%) ; ;    ;;04/27: seen by cardiology scores 3 on stroke risk with 4 as cut off for too risky ; suggestion that patient be placed on warfarin; Dr. Simmons called and vm left for discussion; patient spoke to writer about breakfast and continues to be willing to consent to ECT ; a change from past discussions; however appears to have improved on Seroquel alone.    ;;04/28: above risk score related to Warfarin use and may not apply to this patient; however cardiology did score patient as low risk; Dr. Simmons contacted and treatment discussed with plan to monitor progress on Seroquel and Zoloft as patient had been stable on Zyprexa and Zoloft for many years; however this morning patient returns to being sad; irritable; "terrible"; and silent when some questions asked despite responding to others; consented for ECT yesterday; first treatment RUL in am tomorrow.      4/28: First ECT session scheduled for tomorrow, 4/29/2022. Seen in room today. NAD. A&Ox3. Still tremulous. Exhibiting new difficulty with getting up from a seated position and with ambulation. Very irritable. Pt ended interview abruptly when lunch was announced. Continues on Seroquel and Zoloft for anxiety and mood.      4/29: Seen s/p his first ECT session, which occurred earlier this morning. NAD. A&Ox3. Somnolent, but arousable, but almost immediately went back to sleep during conversation. Slurring his speech. Last night, pt had a witnessed near-fall, after which his mental status exam was unchanged from baseline. Decision was made to allow pt to rest and return later.  Seen 3.5 hours later. Somnolence resolved. Mildly tremulous initially, but it improved with time.  Mood appeared somewhat improved though still irritable.  Pt is endorsing feeling dizzy and stiffer than when he got to [facility]. He also stated that it is not normal for him to be shaking as he is. Mild cogwheel rigidity was appreciated in the right upper extremity. Onset of symptoms correlates with initiation of anti-psychotic augmentation of anti-depressant regimen. Pt is currently on Seroquel 50 as adjuvant treatment. Decision made to d/c Seroquel [out of abundance of caution re EPS and falls issues-RR] .  Of note: Per UpToDate, dizziness can occur in 1-3% of patients taking mirabegron (and in up to 9% with mesalamine but he has not taken this since April 23rd).    ;;05/01: may be demonstrating slight improvement; aware that Seroquel was stopped but this needs to be re-evaluated in view of early gains on meds.  On CO for falls; coarse tremor still present.  RUL ECT#2 on 5/2.   Discussion with Dr. Arthur (not [place];  error) was that patient was stable on Zyprexa for years after ECT.   ;;05/02: no headache or physical distress ;no clear evidence of improvement; RUL may not be sufficient to be effective; however will encourage RUL ECT #3 as appears tolerated.  awaiting PT for falls assessment; on CO as falls risk.    ;;05/03: a little less constricted; while denies feeling better appears slightly brighter; on CO for falls risk; ECT #3 RUL in am.  No headaches reported.    ;;05/04: suggestion of improved mood (also observed by others) after RUL ECT #3; patient ambivalent about ECT after discharge; will continue inpatient course if patient continues to consent.   ;;05/05: reports of talking in his sleep but patient is brighter and less blocked this morning making good eye contact; almost smiling and agreeing to RUL ECT #4 tomorrow; Sleep  appetite ok; no pain issues.  eating breakfast.  On CO for falls.     05/06: ECT #4 completed with good response, lethargic today  05/07: Patient again lethargic today.  Patient reports "feeling better" and denies SI/HI, intent, plan and AVH.  The patient's mood is improved from yesterday.   ;;05/09: refused ECT #5 this am; discharged focused; monitor progression encourage completion of course of treatment.    ;;05/10: irritable but Not endorsing  suicidal or homicidal ideation intent or plans; no mention of auditory or visual hallucinations . Does not want any further ECT and Focused on discharge; wants to leave.  On CO for falls risk.    While the patient appears to agreed to ECT in am under intense discussion with daughter will need to confirm in am and make proper arrangements for timely preparation for a subsequent ECT on 5/13.   ;;05/11: as per staff meeting with daughter was intense; patient now asked "When are you doing it? (ECT)"  forgot that he had refused consistently before; when asked how he was doing "All right";  prior focused on going home.  Not endorsing  suicidal or homicidal ideation intent or plans; no mention of auditory or visual hallucinations .  If patient consents again tomorrow will schedule for ECT on 5/13 RUL #5.    ;;05/12: consistently requests further ECT; team conference including Dr. Franklin as consultant in agreement to proceed but at this point risk/benefit ratio of inpatient treatment is less favorable and will be evaluated on 5/15 for response before continuing inpatient ECT or referring for outpatient care.  Seroquel will NOT be started at this time ; wound care consulted and wraps provided; Dr. Doyle of ECT updated with discussion.  WIll attempt to update outpatient provider.    ;;05/13: Pt received ECT #5 today, appears brighter in mood with more reactive affect. Plan likely at this time to administer one more treatment on Monday and discharge patient on tuesday. Will be discussing this plan with family and continue to monitor patient response. Given his age and vulnerability, risk benefit analysis is necessary to justify benefit of prolonging hospitalization any further  ;;05/14: Pt is s/p ECT#5, with improved mood and affect. Appetite, sleep, voiding habits intact. Pt with wounds on right arm, managed by wound care, dressings intact, clean, and dry. No changes to plan.

## 2022-05-14 NOTE — BH INPATIENT PSYCHIATRY PROGRESS NOTE - NSBHCHARTREVIEWVS_PSY_A_CORE FT
Vital Signs Last 24 Hrs  T(C): 37.2 (05-13-22 @ 16:02), Max: 37.2 (05-13-22 @ 16:02)  T(F): 99 (05-13-22 @ 16:02), Max: 99 (05-13-22 @ 16:02)  HR: 66 (05-13-22 @ 16:02) (66 - 78)  BP: 118/50 (05-13-22 @ 16:02) (118/50 - 131/60)  BP(mean): --  RR: 16 (05-13-22 @ 16:02) (16 - 16)  SpO2: 94% (05-13-22 @ 16:02) (94% - 98%)     Vital Signs Last 24 Hrs  T(C): 36.5 (05-14-22 @ 09:00), Max: 37.2 (05-13-22 @ 16:02)  T(F): 97.7 (05-14-22 @ 09:00), Max: 99 (05-13-22 @ 16:02)  HR: 80 (05-14-22 @ 09:00) (66 - 80)  BP: 103/78 (05-14-22 @ 09:00) (103/78 - 118/50)  BP(mean): --  RR: 16 (05-14-22 @ 09:00) (16 - 16)  SpO2: 97% (05-14-22 @ 09:00) (94% - 97%)

## 2022-05-15 PROCEDURE — 99232 SBSQ HOSP IP/OBS MODERATE 35: CPT

## 2022-05-15 RX ORDER — SERTRALINE 25 MG/1
100 TABLET, FILM COATED ORAL DAILY
Refills: 0 | Status: DISCONTINUED | OUTPATIENT
Start: 2022-05-15 | End: 2022-05-20

## 2022-05-15 RX ADMIN — MIRABEGRON 50 MILLIGRAM(S): 50 TABLET, EXTENDED RELEASE ORAL at 09:49

## 2022-05-15 RX ADMIN — Medication 2000 UNIT(S): at 10:33

## 2022-05-15 RX ADMIN — SENNA PLUS 2 TABLET(S): 8.6 TABLET ORAL at 21:09

## 2022-05-15 RX ADMIN — ATORVASTATIN CALCIUM 20 MILLIGRAM(S): 80 TABLET, FILM COATED ORAL at 10:34

## 2022-05-15 RX ADMIN — MAGNESIUM OXIDE 400 MG ORAL TABLET 400 MILLIGRAM(S): 241.3 TABLET ORAL at 10:33

## 2022-05-15 RX ADMIN — Medication 25 MILLIGRAM(S): at 10:34

## 2022-05-15 RX ADMIN — RIVAROXABAN 15 MILLIGRAM(S): KIT at 10:34

## 2022-05-15 RX ADMIN — SERTRALINE 100 MILLIGRAM(S): 25 TABLET, FILM COATED ORAL at 10:34

## 2022-05-15 NOTE — BH INPATIENT PSYCHIATRY PROGRESS NOTE - NSBHMSEKNOW_PSY_A_CORE
Juan R Mora.      Signatures   Electronically signed by : JUAN R MORA MD; Oct 24 2018  8:36AM CST     Normal

## 2022-05-15 NOTE — BH INPATIENT PSYCHIATRY PROGRESS NOTE - NSBHFUPINTERVALHXFT_PSY_A_CORE
"I am going home on Tuesday"  thought tomorrow was Tuesday; when discussed ECT #6 RUL he said "no" ; however he does appear more animtated.  continues to need CO for falls;  good eye contact; loud speech but hearing is impaired;   "I am going home on Tuesday"  thought tomorrow was Tuesday; when discussed ECT #6 RUL he said "no" ; however he does appear more animtated.  continues to need CO for falls;  good eye contact; loud speech but hearing is impaired;  wound on R arm healing well.

## 2022-05-15 NOTE — BH INPATIENT PSYCHIATRY PROGRESS NOTE - NSBHASSESSSUMMFT_PSY_ALL_CORE
89-year-old, left-handed retired man, domiciled with daughter Brianda (), w/ PMH of hearing loss using hearing aid, colitis, HLD, HTN, on Xarelto 15mg daily (PCP Dr Barrett  #1 #5), PPH of depression, multiple psych hospitalization requiring ECT, last 4 years ago at [facility], currently on sertraline 50mg bid, olanzapine 5mg qd, sees Dr Josh Simmons () for outpatient, presenting due to worsening depression and obsessive thought.   MoCA score 20/30. Due to current active depressive symptoms, nonresponsive to outpatient treatment, and collateral from psychiatrist and family, patient meets involuntary admission criteria. He has been admitted to 8U for stabilization and possible ECT as inpatient.     Pt cleared for ECT by medicine on 4/15/22 and cleared by Cardiology on 4/27/2022.      MDD w/psychotic features  Plan:  - Received first unilateral ECT session on (4/29); Patient s/p ECT Session 4 on 05/06/22.    - Discontinued Seroquel 2/2 to suspicion for drug-induced parkinsonian features and/or dizziness  - Continue sertraline 100 mg PO daily  - Seen by medicine for ECT clearance 4/15: They determine pt is Low-intermediate risk for low risk procedure  - Continue medication as follows:     Xarelto 15mg daily with meals     Losartan to 25 mg QD; 4/18/22, decrease Losartan to 25 mg (previously 50mg) daily as BPs have been low 4/18/22 and pt was orthostatics positive     Discontinued Furosemide 4/18/ 22; previously 20mg twice daily     Atorvastatin 20mg daily     Vit D3 2000 units     Magnesium 500mg     Mesalamine 2.4 mg     Mirabegron 50mg po QD (pt occasionally refusing)    Medicine consult, 4/15:  #Pre Op Risk   - EKG with rate controlled Afib, LAFB  - Re HTN: maintain /90   - Re: Afib: maintain anticoagulation with home xarelto   - Low-intermediate risk for low risk procedure    4/12: Pt reluctant to engage today; does not want to be on [facility]. Equivocating regarding ECT. Clearly depressed. MoCA 20/30. Will continue to monitor and to discuss ECT.    4/13: Pt remains ambivalent about treatment. Not asking for discharge today. Refused some labs, will try to get these again. Alert and oriented. No SI. Will follow up anemia.    4/14: at this point patient is irritable and negativistic refusing ECT as well as some medications and lab work.  Will move Zyprexa to nighttime (sleep an issue?); and Zoloft 100mg to am; continue to attempt to discuss need for labs and possible ECT if medical evaluation can be completed. Need to assess/address anemia.  Refusal may require consideration of TOO.    4/15: Pt continues to refuse ECT and routine blood work. Pt was seen by medicine team for clearance for ECT, after which he was determined to be "Low-intermediate risk for low risk procedure. Pt's daughter, Valery, states she is his medical proxy and will bring in form next week.    4/18: Lasix was held as BPs were low. BPs continued to be low and pt was newly orthostatic positive, tf decrease Losartan to 25 mg (previously 50mg) daily CTM. Pt still severely depressed; outpatient psychiatrist and daughters endorse ECT as having been the only effective treatment in the past. Pt continues to be ambivalent about it. He will not answer regarding SI today but endorsed passive SI on previous interview. Still with significant PMR and latency.    4/19: mute to writer but stares; observed interacting with staff; irritable speech clear and coherent; by observation appears generally Alert; oriented; cognition intact; speech clear; no tremor or evidence of movement impairment. No behavioral issues.      4/20: Continues to be despondent and ambivalent about treatments. Endorses hearing music which may be consistent with auditory hallucinations. Stated he was "hearing music coming from the room and following him around with words that he couldn't quite make out." Similar episode "last time I was suffering from this condition." Pt states the music "does not bother him." This resolved after 10 minutes. Otw no SI/HI/VH//delusions. Walking halls as if in fugue.  ADLs:  Barthel Index for Activities of Daily Living (ADL) from [employer]  on 4/20/2022  RESULT SUMMARY:  65 points  Minimally dependent  INPUTS:  Feeding —> 5 = Needs help  Bathing —> 0 = Unable  Grooming —> 5 = Independent  Dressing —> 10 = Independent  Bowel control —> 5 = Occasional accident  Bladder control —> 5 = Occasional accident  Toilet use —> 5 = Needs help  Transfers (bed to chair and back) —> 15 = Independent  Mobility on level surfaces —> 15 = Independent (but may use any aid, e.g. stick) >50 yards  Stairs —> 0 = Unable    4/21/22: Yesterday (4/20) , pt "consented  verbally " to ECT in presence of RN and daughter. Today, again seems ambivalent. Pt appears more irritable today, shouting at interviewer. Did not ask interviewer to stay in room. Pt had "panic" episode yesterday and was given Zyprexa, which seemed to improve panic. Pt is compliant with all meds today.  Patient did not wish to sign consent and had no response when asked about this issue; however less clingy allowing staff to leave the room without panic.   Reported that patient had panic after "consent" was allegedly given on 4/20 verbally.       ;;04/22: patient states he is anxious rather than sad; Not endorsing  suicidal or homicidal ideation intent or plans; no mention of auditory or visual hallucinations ; does not consent to ECT (at this time).  Appeared less distraught today than yesterday.  However still feels "terrible";  Sleep  appetite ok; no pain issues. Alert; oriented; cognition intact; speech clear; but has tremor at rest thought to be EPS possibly secondary to Zyprexa.    ;;04/25: patient focused on leaving; continues somewhat irritable and feels "terrible" but less so when approached this morning.  Sleep an issue.    tremor not prominent.  makes better eye contact; less thought blocking;  a little more spontaneous.    ;;04/26: "The same"; patient a little more spontaneous; wants to go home; no indication of interest in ECT; appears a little less anxious; a little mobile; suggests improvement; sleep is still poor but appears to be improving.  bp continues with swings in diastolic bp.      Vital Signs Last 24 Hrs ;T(C): 36.4 (26 Apr 2022 08:45), Max: 37 (25 Apr 2022 17:07) ;T(F): 97.6 (26 Apr 2022 08:45), Max: 98.6 (25 Apr 2022 17:07) ;HR: 84 (26 Apr 2022 08:45) (82 - 84) ;BP: 140/62 (26 Apr 2022 08:45) (140/62 - 145/98) ;BP(mean): -- ;ABP: -- ;ABP(mean): -- ;RR: 18 (26 Apr 2022 08:45) (17 - 18) ;SpO2: 96% (26 Apr 2022 08:45) (96% - 97%) ; ;    ;;04/27: seen by cardiology scores 3 on stroke risk with 4 as cut off for too risky ; suggestion that patient be placed on warfarin; Dr. Simmons called and vm left for discussion; patient spoke to writer about breakfast and continues to be willing to consent to ECT ; a change from past discussions; however appears to have improved on Seroquel alone.    ;;04/28: above risk score related to Warfarin use and may not apply to this patient; however cardiology did score patient as low risk; Dr. Simmons contacted and treatment discussed with plan to monitor progress on Seroquel and Zoloft as patient had been stable on Zyprexa and Zoloft for many years; however this morning patient returns to being sad; irritable; "terrible"; and silent when some questions asked despite responding to others; consented for ECT yesterday; first treatment RUL in am tomorrow.      4/28: First ECT session scheduled for tomorrow, 4/29/2022. Seen in room today. NAD. A&Ox3. Still tremulous. Exhibiting new difficulty with getting up from a seated position and with ambulation. Very irritable. Pt ended interview abruptly when lunch was announced. Continues on Seroquel and Zoloft for anxiety and mood.      4/29: Seen s/p his first ECT session, which occurred earlier this morning. NAD. A&Ox3. Somnolent, but arousable, but almost immediately went back to sleep during conversation. Slurring his speech. Last night, pt had a witnessed near-fall, after which his mental status exam was unchanged from baseline. Decision was made to allow pt to rest and return later.  Seen 3.5 hours later. Somnolence resolved. Mildly tremulous initially, but it improved with time.  Mood appeared somewhat improved though still irritable.  Pt is endorsing feeling dizzy and stiffer than when he got to [facility]. He also stated that it is not normal for him to be shaking as he is. Mild cogwheel rigidity was appreciated in the right upper extremity. Onset of symptoms correlates with initiation of anti-psychotic augmentation of anti-depressant regimen. Pt is currently on Seroquel 50 as adjuvant treatment. Decision made to d/c Seroquel [out of abundance of caution re EPS and falls issues-RR] .  Of note: Per [employer], dizziness can occur in 1-3% of patients taking mirabegron (and in up to 9% with mesalamine but he has not taken this since April 23rd).    ;;05/01: may be demonstrating slight improvement; aware that Seroquel was stopped but this needs to be re-evaluated in view of early gains on meds.  On CO for falls; coarse tremor still present.  RUL ECT#2 on 5/2.   Discussion with Dr. Arthur (not [place];  error) was that patient was stable on Zyprexa for years after ECT.   ;;05/02: no headache or physical distress ;no clear evidence of improvement; RUL may not be sufficient to be effective; however will encourage RUL ECT #3 as appears tolerated.  awaiting PT for falls assessment; on CO as falls risk.    ;;05/03: a little less constricted; while denies feeling better appears slightly brighter; on CO for falls risk; ECT #3 RUL in am.  No headaches reported.    ;;05/04: suggestion of improved mood (also observed by others) after RUL ECT #3; patient ambivalent about ECT after discharge; will continue inpatient course if patient continues to consent.   ;;05/05: reports of talking in his sleep but patient is brighter and less blocked this morning making good eye contact; almost smiling and agreeing to RUL ECT #4 tomorrow; Sleep  appetite ok; no pain issues.  eating breakfast.  On CO for falls.     05/06: ECT #4 completed with good response, lethargic today  05/07: Patient again lethargic today.  Patient reports "feeling better" and denies SI/HI, intent, plan and AVH.  The patient's mood is improved from yesterday.   ;;05/09: refused ECT #5 this am; discharged focused; monitor progression encourage completion of course of treatment.    ;;05/10: irritable but Not endorsing  suicidal or homicidal ideation intent or plans; no mention of auditory or visual hallucinations . Does not want any further ECT and Focused on discharge; wants to leave.  On CO for falls risk.    While the patient appears to agreed to ECT in am under intense discussion with daughter will need to confirm in am and make proper arrangements for timely preparation for a subsequent ECT on 5/13.   ;;05/11: as per staff meeting with daughter was intense; patient now asked "When are you doing it? (ECT)"  forgot that he had refused consistently before; when asked how he was doing "All right";  prior focused on going home.  Not endorsing  suicidal or homicidal ideation intent or plans; no mention of auditory or visual hallucinations .  If patient consents again tomorrow will schedule for ECT on 5/13 RUL #5.    ;;05/12: consistently requests further ECT; team conference including Dr. Franklin as consultant in agreement to proceed but at this point risk/benefit ratio of inpatient treatment is less favorable and will be evaluated on 5/15 for response before continuing inpatient ECT or referring for outpatient care.  Seroquel will NOT be started at this time ; wound care consulted and wraps provided; Dr. Doyle of ECT updated with discussion.  WIll attempt to update outpatient provider.    ;;05/15: had ECT #5 RUL on 5/13 and believes he is leaving tomorrow; informed that one more ECT to be scheduled; at this time does not wish treatment but should the patient change his mind time would be needed to make arrangements so will place order with possibility that ECT may not occur; continues on monotherapy of Zoloft 100mg for depression leaving open the question of Seroquel augmentation.  Sleep  appetite ok; no pain issues.    ;;05/13: Pt received ECT #5 today, appears brighter in mood with more reactive affect. Plan likely at this time to administer one more treatment on Monday and discharge patient on tuesday. Will be discussing this plan with family and continue to monitor patient response. Given his age and vulnerability, risk benefit analysis is necessary to justify benefit of prolonging hospitalization any further

## 2022-05-15 NOTE — BH INPATIENT PSYCHIATRY PROGRESS NOTE - NSBHCHARTREVIEWVS_PSY_A_CORE FT
Vital Signs Last 24 Hrs  T(C): 37.1 (05-14-22 @ 17:00), Max: 37.1 (05-14-22 @ 17:00)  T(F): 98.7 (05-14-22 @ 17:00), Max: 98.7 (05-14-22 @ 17:00)  HR: 70 (05-14-22 @ 17:00) (70 - 80)  BP: 136/61 (05-14-22 @ 17:00) (103/78 - 136/61)  BP(mean): --  RR: 18 (05-14-22 @ 17:00) (16 - 18)  SpO2: 95% (05-14-22 @ 17:00) (95% - 97%)     Vital Signs Last 24 Hrs  T(C): 36.5 (05-15-22 @ 10:33), Max: 37.1 (05-14-22 @ 17:00)  T(F): 97.7 (05-15-22 @ 10:33), Max: 98.7 (05-14-22 @ 17:00)  HR: 80 (05-15-22 @ 10:33) (70 - 80)  BP: 130/42 (05-15-22 @ 10:33) (130/42 - 136/61)  BP(mean): --  RR: 18 (05-14-22 @ 17:00) (18 - 18)  SpO2: 95% (05-15-22 @ 10:33) (95% - 95%)

## 2022-05-15 NOTE — BH INPATIENT PSYCHIATRY PROGRESS NOTE - NSBHMETABOLIC_PSY_ALL_CORE_FT
BMI: BMI (kg/m2): 22.7 (05-13-22 @ 06:43)  HbA1c: A1C with Estimated Average Glucose Result: 5.7 % (04-13-22 @ 07:45)    Glucose: POCT Blood Glucose.: 190 mg/dL (04-19-22 @ 06:43)    BP: 136/61 (05-14-22 @ 17:00) (103/78 - 189/99)  Lipid Panel: Date/Time: 04-13-22 @ 07:45  Cholesterol, Serum: 120  Direct LDL: --  HDL Cholesterol, Serum: 60  Total Cholesterol/HDL Ration Measurement: --  Triglycerides, Serum: 50   BMI: BMI (kg/m2): 22.7 (05-13-22 @ 06:43)  HbA1c: A1C with Estimated Average Glucose Result: 5.7 % (04-13-22 @ 07:45)    Glucose: POCT Blood Glucose.: 190 mg/dL (04-19-22 @ 06:43)    BP: 130/42 (05-15-22 @ 10:33) (103/78 - 189/99)  Lipid Panel: Date/Time: 04-13-22 @ 07:45  Cholesterol, Serum: 120  Direct LDL: --  HDL Cholesterol, Serum: 60  Total Cholesterol/HDL Ration Measurement: --  Triglycerides, Serum: 50

## 2022-05-16 RX ADMIN — Medication 2000 UNIT(S): at 11:08

## 2022-05-16 RX ADMIN — MIRABEGRON 50 MILLIGRAM(S): 50 TABLET, EXTENDED RELEASE ORAL at 11:11

## 2022-05-16 RX ADMIN — RIVAROXABAN 15 MILLIGRAM(S): KIT at 11:08

## 2022-05-16 RX ADMIN — SERTRALINE 100 MILLIGRAM(S): 25 TABLET, FILM COATED ORAL at 11:08

## 2022-05-16 RX ADMIN — Medication 25 MILLIGRAM(S): at 11:08

## 2022-05-16 RX ADMIN — ATORVASTATIN CALCIUM 20 MILLIGRAM(S): 80 TABLET, FILM COATED ORAL at 11:08

## 2022-05-16 NOTE — BH INPATIENT PSYCHIATRY PROGRESS NOTE - NSBHFUPINTERVALHXFT_PSY_A_CORE
Patient refused ECT #6 this morning, he is adamant that he wants to return home already. He does appear more animated and vocal since his last treatment. His daughter Brianda came to visit and was explained that he has benefited from ECT and his mood has improved. He is not suicidal and he demonstrates a full reactive affect. He could continue ECT in the community if he wishes to do so, however if he is refusing further ECT in the inpatient setting, their is no indication to treat him against his will and he is stable enough psychiatrically to return home. Brianda is requesting more resources to care for him in the community prior to him returning home.

## 2022-05-16 NOTE — BH INPATIENT PSYCHIATRY PROGRESS NOTE - CASE SUMMARY
89-year-old, left-handed retired man, domiciled with daughter Brianda (), w/ PMH of hearing loss using hearing aid, colitis, HLD, HTN, on Xarelto 15mg daily (PCP Dr Barrett  #1 #5), PPH of depression, multiple psych hospitalization requiring ECT, last 4 years ago at [facility], currently on sertraline 50mg bid, olanzapine 5mg qd, sees Dr Josh Simmons () for outpatient, presenting due to worsening depression and obsessive thought.   MoCA score 20/30. Due to current active depressive symptoms, nonresponsive to outpatient treatment, and collateral from psychiatrist and family, patient meets involuntary admission criteria. He has been admitted to 8U for stabilization and possible ECT as inpatient.     Pt cleared for ECT by medicine on 4/15/22 and cleared by Cardiology on 4/27/2022.      MDD w/psychotic features  Plan:  - Received first unilateral ECT session on (4/29); Patient s/p ECT Session 4 on 05/06/22.    - Discontinued Seroquel 2/2 to suspicion for drug-induced parkinsonian features and/or dizziness  - Continue sertraline 100 mg PO daily  - Seen by medicine for ECT clearance 4/15: They determine pt is Low-intermediate risk for low risk procedure  - Continue medication as follows:     Xarelto 15mg daily with meals     Losartan to 25 mg QD; 4/18/22, decrease Losartan to 25 mg (previously 50mg) daily as BPs have been low 4/18/22 and pt was orthostatics positive     Discontinued Furosemide 4/18/ 22; previously 20mg twice daily     Atorvastatin 20mg daily     Vit D3 2000 units     Magnesium 500mg     Mesalamine 2.4 mg     Mirabegron 50mg po QD (pt occasionally refusing)    Medicine consult, 4/15:  #Pre Op Risk   - EKG with rate controlled Afib, LAFB  - Re HTN: maintain /90   - Re: Afib: maintain anticoagulation with home xarelto   - Low-intermediate risk for low risk procedure    4/12: Pt reluctant to engage today; does not want to be on [facility]. Equivocating regarding ECT. Clearly depressed. MoCA 20/30. Will continue to monitor and to discuss ECT.    4/13: Pt remains ambivalent about treatment. Not asking for discharge today. Refused some labs, will try to get these again. Alert and oriented. No SI. Will follow up anemia.    4/14: at this point patient is irritable and negativistic refusing ECT as well as some medications and lab work.  Will move Zyprexa to nighttime (sleep an issue?); and Zoloft 100mg to am; continue to attempt to discuss need for labs and possible ECT if medical evaluation can be completed. Need to assess/address anemia.  Refusal may require consideration of TOO.    4/15: Pt continues to refuse ECT and routine blood work. Pt was seen by medicine team for clearance for ECT, after which he was determined to be "Low-intermediate risk for low risk procedure. Pt's daughter, Valery, states she is his medical proxy and will bring in form next week.    4/18: Lasix was held as BPs were low. BPs continued to be low and pt was newly orthostatic positive, tf decrease Losartan to 25 mg (previously 50mg) daily CTM. Pt still severely depressed; outpatient psychiatrist and daughters endorse ECT as having been the only effective treatment in the past. Pt continues to be ambivalent about it. He will not answer regarding SI today but endorsed passive SI on previous interview. Still with significant PMR and latency.    4/19: mute to writer but stares; observed interacting with staff; irritable speech clear and coherent; by observation appears generally Alert; oriented; cognition intact; speech clear; no tremor or evidence of movement impairment. No behavioral issues.      4/20: Continues to be despondent and ambivalent about treatments. Endorses hearing music which may be consistent with auditory hallucinations. Stated he was "hearing music coming from the room and following him around with words that he couldn't quite make out." Similar episode "last time I was suffering from this condition." Pt states the music "does not bother him." This resolved after 10 minutes. Otw no SI/HI/VH//delusions. Walking halls as if in fugue.  ADLs:  Barthel Index for Activities of Daily Living (ADL) from [email or web address]  on 4/20/2022  RESULT SUMMARY:  65 points  Minimally dependent  INPUTS:  Feeding —> 5 = Needs help  Bathing —> 0 = Unable  Grooming —> 5 = Independent  Dressing —> 10 = Independent  Bowel control —> 5 = Occasional accident  Bladder control —> 5 = Occasional accident  Toilet use —> 5 = Needs help  Transfers (bed to chair and back) —> 15 = Independent  Mobility on level surfaces —> 15 = Independent (but may use any aid, e.g. stick) >50 yards  Stairs —> 0 = Unable    4/21/22: Yesterday (4/20) , pt "consented  verbally " to ECT in presence of RN and daughter. Today, again seems ambivalent. Pt appears more irritable today, shouting at interviewer. Did not ask interviewer to stay in room. Pt had "panic" episode yesterday and was given Zyprexa, which seemed to improve panic. Pt is compliant with all meds today.  Patient did not wish to sign consent and had no response when asked about this issue; however less clingy allowing staff to leave the room without panic.   Reported that patient had panic after "consent" was allegedly given on 4/20 verbally.       ;;04/22: patient states he is anxious rather than sad; Not endorsing  suicidal or homicidal ideation intent or plans; no mention of auditory or visual hallucinations ; does not consent to ECT (at this time).  Appeared less distraught today than yesterday.  However still feels "terrible";  Sleep  appetite ok; no pain issues. Alert; oriented; cognition intact; speech clear; but has tremor at rest thought to be EPS possibly secondary to Zyprexa.    ;;04/25: patient focused on leaving; continues somewhat irritable and feels "terrible" but less so when approached this morning.  Sleep an issue.    tremor not prominent.  makes better eye contact; less thought blocking;  a little more spontaneous.    ;;04/26: "The same"; patient a little more spontaneous; wants to go home; no indication of interest in ECT; appears a little less anxious; a little mobile; suggests improvement; sleep is still poor but appears to be improving.  bp continues with swings in diastolic bp.      Vital Signs Last 24 Hrs ;T(C): 36.4 (26 Apr 2022 08:45), Max: 37 (25 Apr 2022 17:07) ;T(F): 97.6 (26 Apr 2022 08:45), Max: 98.6 (25 Apr 2022 17:07) ;HR: 84 (26 Apr 2022 08:45) (82 - 84) ;BP: 140/62 (26 Apr 2022 08:45) (140/62 - 145/98) ;BP(mean): -- ;ABP: -- ;ABP(mean): -- ;RR: 18 (26 Apr 2022 08:45) (17 - 18) ;SpO2: 96% (26 Apr 2022 08:45) (96% - 97%) ; ;    ;;04/27: seen by cardiology scores 3 on stroke risk with 4 as cut off for too risky ; suggestion that patient be placed on warfarin; Dr. Simmons called and vm left for discussion; patient spoke to writer about breakfast and continues to be willing to consent to ECT ; a change from past discussions; however appears to have improved on Seroquel alone.    ;;04/28: above risk score related to Warfarin use and may not apply to this patient; however cardiology did score patient as low risk; Dr. Simmons contacted and treatment discussed with plan to monitor progress on Seroquel and Zoloft as patient had been stable on Zyprexa and Zoloft for many years; however this morning patient returns to being sad; irritable; "terrible"; and silent when some questions asked despite responding to others; consented for ECT yesterday; first treatment RUL in am tomorrow.      4/28: First ECT session scheduled for tomorrow, 4/29/2022. Seen in room today. NAD. A&Ox3. Still tremulous. Exhibiting new difficulty with getting up from a seated position and with ambulation. Very irritable. Pt ended interview abruptly when lunch was announced. Continues on Seroquel and Zoloft for anxiety and mood.      4/29: Seen s/p his first ECT session, which occurred earlier this morning. NAD. A&Ox3. Somnolent, but arousable, but almost immediately went back to sleep during conversation. Slurring his speech. Last night, pt had a witnessed near-fall, after which his mental status exam was unchanged from baseline. Decision was made to allow pt to rest and return later.  Seen 3.5 hours later. Somnolence resolved. Mildly tremulous initially, but it improved with time.  Mood appeared somewhat improved though still irritable.  Pt is endorsing feeling dizzy and stiffer than when he got to [facility]. He also stated that it is not normal for him to be shaking as he is. Mild cogwheel rigidity was appreciated in the right upper extremity. Onset of symptoms correlates with initiation of anti-psychotic augmentation of anti-depressant regimen. Pt is currently on Seroquel 50 as adjuvant treatment. Decision made to d/c Seroquel [out of abundance of caution re EPS and falls issues-RR] .  Of note: Per [employer], dizziness can occur in 1-3% of patients taking mirabegron (and in up to 9% with mesalamine but he has not taken this since April 23rd).    ;;05/01: may be demonstrating slight improvement; aware that Seroquel was stopped but this needs to be re-evaluated in view of early gains on meds.  On CO for falls; coarse tremor still present.  RUL ECT#2 on 5/2.   Discussion with Dr. Arthur (not [place];  error) was that patient was stable on Zyprexa for years after ECT.   ;;05/02: no headache or physical distress ;no clear evidence of improvement; RUL may not be sufficient to be effective; however will encourage RUL ECT #3 as appears tolerated.  awaiting PT for falls assessment; on CO as falls risk.    ;;05/03: a little less constricted; while denies feeling better appears slightly brighter; on CO for falls risk; ECT #3 RUL in am.  No headaches reported.    ;;05/04: suggestion of improved mood (also observed by others) after RUL ECT #3; patient ambivalent about ECT after discharge; will continue inpatient course if patient continues to consent.   ;;05/05: reports of talking in his sleep but patient is brighter and less blocked this morning making good eye contact; almost smiling and agreeing to RUL ECT #4 tomorrow; Sleep  appetite ok; no pain issues.  eating breakfast.  On CO for falls.     05/06: ECT #4 completed with good response, lethargic today  05/07: Patient again lethargic today.  Patient reports "feeling better" and denies SI/HI, intent, plan and AVH.  The patient's mood is improved from yesterday.   ;;05/09: refused ECT #5 this am; discharged focused; monitor progression encourage completion of course of treatment.    ;;05/10: irritable but Not endorsing  suicidal or homicidal ideation intent or plans; no mention of auditory or visual hallucinations . Does not want any further ECT and Focused on discharge; wants to leave.  On CO for falls risk.    While the patient appears to agreed to ECT in am under intense discussion with daughter will need to confirm in am and make proper arrangements for timely preparation for a subsequent ECT on 5/13.   ;;05/11: as per staff meeting with daughter was intense; patient now asked "When are you doing it? (ECT)"  forgot that he had refused consistently before; when asked how he was doing "All right";  prior focused on going home.  Not endorsing  suicidal or homicidal ideation intent or plans; no mention of auditory or visual hallucinations .  If patient consents again tomorrow will schedule for ECT on 5/13 RUL #5.    ;;05/12: consistently requests further ECT; team conference including Dr. Franklin as consultant in agreement to proceed but at this point risk/benefit ratio of inpatient treatment is less favorable and will be evaluated on 5/15 for response before continuing inpatient ECT or referring for outpatient care.  Seroquel will NOT be started at this time ; wound care consulted and wraps provided; Dr. Doyle of ECT updated with discussion.  WIll attempt to update outpatient provider.    ;;05/15: had ECT #5 RUL on 5/13 and believes he is leaving tomorrow; informed that one more ECT to be scheduled; at this time does not wish treatment but should the patient change his mind time would be needed to make arrangements so will place order with possibility that ECT may not occur; continues on monotherapy of Zoloft 100mg for depression leaving open the question of Seroquel augmentation.  Sleep  appetite ok; no pain issues.    ;;05/16: focused on going home; thought today was Tuesday;  angry that not going home today; daughter visited and argues for another ECT patient clearly shouted no.  to continue to make arrangements for return to home unless patient changes his mind.

## 2022-05-16 NOTE — BH INPATIENT PSYCHIATRY PROGRESS NOTE - NSBHASSESSSUMMFT_PSY_ALL_CORE
89-year-old, left-handed retired man, domiciled with daughter Brianda (), w/ PMH of hearing loss using hearing aid, colitis, HLD, HTN, on Xarelto 15mg daily (PCP Dr Barrett  #1 #5), PPH of depression, multiple psych hospitalization requiring ECT, last 4 years ago at [facility], currently on sertraline 50mg bid, olanzapine 5mg qd, sees Dr Josh Simmons () for outpatient, presenting due to worsening depression and obsessive thought.   MoCA score 20/30. Due to current active depressive symptoms, nonresponsive to outpatient treatment, and collateral from psychiatrist and family, patient meets involuntary admission criteria. He has been admitted to 8U for stabilization and possible ECT as inpatient.     Pt cleared for ECT by medicine on 4/15/22 and cleared by Cardiology on 4/27/2022.      MDD w/psychotic features  Plan:  - Received first unilateral ECT session on (4/29); Patient s/p ECT Session 4 on 05/06/22.    - Discontinued Seroquel 2/2 to suspicion for drug-induced parkinsonian features and/or dizziness  - Continue sertraline 100 mg PO daily  - Seen by medicine for ECT clearance 4/15: They determine pt is Low-intermediate risk for low risk procedure  - Continue medication as follows:     Xarelto 15mg daily with meals     Losartan to 25 mg QD; 4/18/22, decrease Losartan to 25 mg (previously 50mg) daily as BPs have been low 4/18/22 and pt was orthostatics positive     Discontinued Furosemide 4/18/ 22; previously 20mg twice daily     Atorvastatin 20mg daily     Vit D3 2000 units     Magnesium 500mg     Mesalamine 2.4 mg     Mirabegron 50mg po QD (pt occasionally refusing)    Medicine consult, 4/15:  #Pre Op Risk   - EKG with rate controlled Afib, LAFB  - Re HTN: maintain /90   - Re: Afib: maintain anticoagulation with home xarelto   - Low-intermediate risk for low risk procedure    4/12: Pt reluctant to engage today; does not want to be on [facility]. Equivocating regarding ECT. Clearly depressed. MoCA 20/30. Will continue to monitor and to discuss ECT.    4/13: Pt remains ambivalent about treatment. Not asking for discharge today. Refused some labs, will try to get these again. Alert and oriented. No SI. Will follow up anemia.    4/14: at this point patient is irritable and negativistic refusing ECT as well as some medications and lab work.  Will move Zyprexa to nighttime (sleep an issue?); and Zoloft 100mg to am; continue to attempt to discuss need for labs and possible ECT if medical evaluation can be completed. Need to assess/address anemia.  Refusal may require consideration of TOO.    4/15: Pt continues to refuse ECT and routine blood work. Pt was seen by medicine team for clearance for ECT, after which he was determined to be "Low-intermediate risk for low risk procedure. Pt's daughter, Valery, states she is his medical proxy and will bring in form next week.    4/18: Lasix was held as BPs were low. BPs continued to be low and pt was newly orthostatic positive, tf decrease Losartan to 25 mg (previously 50mg) daily CTM. Pt still severely depressed; outpatient psychiatrist and daughters endorse ECT as having been the only effective treatment in the past. Pt continues to be ambivalent about it. He will not answer regarding SI today but endorsed passive SI on previous interview. Still with significant PMR and latency.    4/19: mute to writer but stares; observed interacting with staff; irritable speech clear and coherent; by observation appears generally Alert; oriented; cognition intact; speech clear; no tremor or evidence of movement impairment. No behavioral issues.      4/20: Continues to be despondent and ambivalent about treatments. Endorses hearing music which may be consistent with auditory hallucinations. Stated he was "hearing music coming from the room and following him around with words that he couldn't quite make out." Similar episode "last time I was suffering from this condition." Pt states the music "does not bother him." This resolved after 10 minutes. Otw no SI/HI/VH//delusions. Walking halls as if in fugue.  ADLs:  Barthel Index for Activities of Daily Living (ADL) from [employer]  on 4/20/2022  RESULT SUMMARY:  65 points  Minimally dependent  INPUTS:  Feeding —> 5 = Needs help  Bathing —> 0 = Unable  Grooming —> 5 = Independent  Dressing —> 10 = Independent  Bowel control —> 5 = Occasional accident  Bladder control —> 5 = Occasional accident  Toilet use —> 5 = Needs help  Transfers (bed to chair and back) —> 15 = Independent  Mobility on level surfaces —> 15 = Independent (but may use any aid, e.g. stick) >50 yards  Stairs —> 0 = Unable    4/21/22: Yesterday (4/20) , pt "consented  verbally " to ECT in presence of RN and daughter. Today, again seems ambivalent. Pt appears more irritable today, shouting at interviewer. Did not ask interviewer to stay in room. Pt had "panic" episode yesterday and was given Zyprexa, which seemed to improve panic. Pt is compliant with all meds today.  Patient did not wish to sign consent and had no response when asked about this issue; however less clingy allowing staff to leave the room without panic.   Reported that patient had panic after "consent" was allegedly given on 4/20 verbally.       ;;04/22: patient states he is anxious rather than sad; Not endorsing  suicidal or homicidal ideation intent or plans; no mention of auditory or visual hallucinations ; does not consent to ECT (at this time).  Appeared less distraught today than yesterday.  However still feels "terrible";  Sleep  appetite ok; no pain issues. Alert; oriented; cognition intact; speech clear; but has tremor at rest thought to be EPS possibly secondary to Zyprexa.    ;;04/25: patient focused on leaving; continues somewhat irritable and feels "terrible" but less so when approached this morning.  Sleep an issue.    tremor not prominent.  makes better eye contact; less thought blocking;  a little more spontaneous.    ;;04/26: "The same"; patient a little more spontaneous; wants to go home; no indication of interest in ECT; appears a little less anxious; a little mobile; suggests improvement; sleep is still poor but appears to be improving.  bp continues with swings in diastolic bp.      Vital Signs Last 24 Hrs ;T(C): 36.4 (26 Apr 2022 08:45), Max: 37 (25 Apr 2022 17:07) ;T(F): 97.6 (26 Apr 2022 08:45), Max: 98.6 (25 Apr 2022 17:07) ;HR: 84 (26 Apr 2022 08:45) (82 - 84) ;BP: 140/62 (26 Apr 2022 08:45) (140/62 - 145/98) ;BP(mean): -- ;ABP: -- ;ABP(mean): -- ;RR: 18 (26 Apr 2022 08:45) (17 - 18) ;SpO2: 96% (26 Apr 2022 08:45) (96% - 97%) ; ;    ;;04/27: seen by cardiology scores 3 on stroke risk with 4 as cut off for too risky ; suggestion that patient be placed on warfarin; Dr. Simmons called and vm left for discussion; patient spoke to writer about breakfast and continues to be willing to consent to ECT ; a change from past discussions; however appears to have improved on Seroquel alone.    ;;04/28: above risk score related to Warfarin use and may not apply to this patient; however cardiology did score patient as low risk; Dr. Simmons contacted and treatment discussed with plan to monitor progress on Seroquel and Zoloft as patient had been stable on Zyprexa and Zoloft for many years; however this morning patient returns to being sad; irritable; "terrible"; and silent when some questions asked despite responding to others; consented for ECT yesterday; first treatment RUL in am tomorrow.      4/28: First ECT session scheduled for tomorrow, 4/29/2022. Seen in room today. NAD. A&Ox3. Still tremulous. Exhibiting new difficulty with getting up from a seated position and with ambulation. Very irritable. Pt ended interview abruptly when lunch was announced. Continues on Seroquel and Zoloft for anxiety and mood.      4/29: Seen s/p his first ECT session, which occurred earlier this morning. NAD. A&Ox3. Somnolent, but arousable, but almost immediately went back to sleep during conversation. Slurring his speech. Last night, pt had a witnessed near-fall, after which his mental status exam was unchanged from baseline. Decision was made to allow pt to rest and return later.  Seen 3.5 hours later. Somnolence resolved. Mildly tremulous initially, but it improved with time.  Mood appeared somewhat improved though still irritable.  Pt is endorsing feeling dizzy and stiffer than when he got to [facility]. He also stated that it is not normal for him to be shaking as he is. Mild cogwheel rigidity was appreciated in the right upper extremity. Onset of symptoms correlates with initiation of anti-psychotic augmentation of anti-depressant regimen. Pt is currently on Seroquel 50 as adjuvant treatment. Decision made to d/c Seroquel [out of abundance of caution re EPS and falls issues-RR] .  Of note: Per UpToDate, dizziness can occur in 1-3% of patients taking mirabegron (and in up to 9% with mesalamine but he has not taken this since April 23rd).    ;;05/01: may be demonstrating slight improvement; aware that Seroquel was stopped but this needs to be re-evaluated in view of early gains on meds.  On CO for falls; coarse tremor still present.  RUL ECT#2 on 5/2.   Discussion with Dr. Arthur (not Sandborn;  error) was that patient was stable on Zyprexa for years after ECT.   ;;05/02: no headache or physical distress ;no clear evidence of improvement; RUL may not be sufficient to be effective; however will encourage RUL ECT #3 as appears tolerated.  awaiting PT for falls assessment; on CO as falls risk.    ;;05/03: a little less constricted; while denies feeling better appears slightly brighter; on CO for falls risk; ECT #3 RUL in am.  No headaches reported.    ;;05/04: suggestion of improved mood (also observed by others) after RUL ECT #3; patient ambivalent about ECT after discharge; will continue inpatient course if patient continues to consent.   ;;05/05: reports of talking in his sleep but patient is brighter and less blocked this morning making good eye contact; almost smiling and agreeing to RUL ECT #4 tomorrow; Sleep  appetite ok; no pain issues.  eating breakfast.  On CO for falls.     05/06: ECT #4 completed with good response, lethargic today  05/07: Patient again lethargic today.  Patient reports "feeling better" and denies SI/HI, intent, plan and AVH.  The patient's mood is improved from yesterday.   ;;05/09: refused ECT #5 this am; discharged focused; monitor progression encourage completion of course of treatment.    ;;05/10: irritable but Not endorsing  suicidal or homicidal ideation intent or plans; no mention of auditory or visual hallucinations . Does not want any further ECT and Focused on discharge; wants to leave.  On CO for falls risk.    While the patient appears to agreed to ECT in am under intense discussion with daughter will need to confirm in am and make proper arrangements for timely preparation for a subsequent ECT on 5/13.   ;;05/11: as per staff meeting with daughter was intense; patient now asked "When are you doing it? (ECT)"  forgot that he had refused consistently before; when asked how he was doing "All right";  prior focused on going home.  Not endorsing  suicidal or homicidal ideation intent or plans; no mention of auditory or visual hallucinations .  If patient consents again tomorrow will schedule for ECT on 5/13 RUL #5.    ;;05/12: consistently requests further ECT; team conference including Dr. Franklin as consultant in agreement to proceed but at this point risk/benefit ratio of inpatient treatment is less favorable and will be evaluated on 5/15 for response before continuing inpatient ECT or referring for outpatient care.  Seroquel will NOT be started at this time ; wound care consulted and wraps provided; Dr. Doyle of ECT updated with discussion.  WIll attempt to update outpatient provider.    ;;05/13: Pt received ECT #5 today, appears brighter in mood with more reactive affect. Plan likely at this time to administer one more treatment on Monday and discharge patient on tuesday. Will be discussing this plan with family and continue to monitor patient response. Given his age and vulnerability, risk benefit analysis is necessary to justify benefit of prolonging hospitalization any further  ;;05/15: had ECT #5 RUL on 5/13 and believes he is leaving tomorrow; informed that one more ECT to be scheduled; at this time does not wish treatment but should the patient change his mind time would be needed to make arrangements so will place order with possibility that ECT may not occur; continues on monotherapy of Zoloft 100mg for depression leaving open the question of Seroquel augmentation.  Sleep  appetite ok; no pain issues.    ;;05/16: Patient refused ECT #5 and is refusing any further inpatient ECT treatments. He is significantly improved since admission and their is no indication for treating him against his will at this time. Safe disposition plan needs to be put in place as patient has difficulty ambulating and attending to his ADLs. will likely require HHA assistance upon returning home.

## 2022-05-16 NOTE — BH INPATIENT PSYCHIATRY PROGRESS NOTE - NSBHCHARTREVIEWVS_PSY_A_CORE FT
Vital Signs Last 24 Hrs  T(C): 36.6 (05-15-22 @ 17:00), Max: 36.6 (05-15-22 @ 17:00)  T(F): 97.8 (05-15-22 @ 17:00), Max: 97.8 (05-15-22 @ 17:00)  HR: 77 (05-15-22 @ 17:00) (77 - 80)  BP: 142/51 (05-15-22 @ 17:00) (130/42 - 142/51)  BP(mean): --  RR: 18 (05-15-22 @ 17:00) (18 - 18)  SpO2: 98% (05-15-22 @ 17:00) (95% - 98%)     Vital Signs Last 24 Hrs  T(C): 36.6 (05-15-22 @ 17:00), Max: 36.6 (05-15-22 @ 17:00)  T(F): 97.8 (05-15-22 @ 17:00), Max: 97.8 (05-15-22 @ 17:00)  HR: 87 (05-16-22 @ 09:00) (77 - 87)  BP: 136/73 (05-16-22 @ 09:00) (136/73 - 142/51)  BP(mean): --  RR: 18 (05-16-22 @ 09:00) (18 - 18)  SpO2: 97% (05-16-22 @ 09:00) (97% - 98%)     Vital Signs Last 24 Hrs  T(C): 36.7 (05-16-22 @ 16:53), Max: 36.7 (05-16-22 @ 16:53)  T(F): 98.1 (05-16-22 @ 16:53), Max: 98.1 (05-16-22 @ 16:53)  HR: 71 (05-16-22 @ 16:53) (71 - 87)  BP: 171/67 (05-16-22 @ 16:53) (136/73 - 171/67)  BP(mean): --  RR: 18 (05-16-22 @ 16:53) (18 - 18)  SpO2: 95% (05-16-22 @ 16:53) (95% - 97%)

## 2022-05-16 NOTE — BH INPATIENT PSYCHIATRY PROGRESS NOTE - NSBHMETABOLIC_PSY_ALL_CORE_FT
BMI: BMI (kg/m2): 22.7 (05-13-22 @ 06:43)  HbA1c: A1C with Estimated Average Glucose Result: 5.7 % (04-13-22 @ 07:45)    Glucose: POCT Blood Glucose.: 190 mg/dL (04-19-22 @ 06:43)    BP: 142/51 (05-15-22 @ 17:00) (103/78 - 142/51)  Lipid Panel: Date/Time: 04-13-22 @ 07:45  Cholesterol, Serum: 120  Direct LDL: --  HDL Cholesterol, Serum: 60  Total Cholesterol/HDL Ration Measurement: --  Triglycerides, Serum: 50   BMI: BMI (kg/m2): 22.7 (05-13-22 @ 06:43)  HbA1c: A1C with Estimated Average Glucose Result: 5.7 % (04-13-22 @ 07:45)    Glucose: POCT Blood Glucose.: 190 mg/dL (04-19-22 @ 06:43)    BP: 136/73 (05-16-22 @ 09:00) (103/78 - 142/51)  Lipid Panel: Date/Time: 04-13-22 @ 07:45  Cholesterol, Serum: 120  Direct LDL: --  HDL Cholesterol, Serum: 60  Total Cholesterol/HDL Ration Measurement: --  Triglycerides, Serum: 50   BMI: BMI (kg/m2): 22.7 (05-13-22 @ 06:43)  HbA1c: A1C with Estimated Average Glucose Result: 5.7 % (04-13-22 @ 07:45)    Glucose: POCT Blood Glucose.: 190 mg/dL (04-19-22 @ 06:43)    BP: 171/67 (05-16-22 @ 16:53) (103/78 - 171/67)  Lipid Panel: Date/Time: 04-13-22 @ 07:45  Cholesterol, Serum: 120  Direct LDL: --  HDL Cholesterol, Serum: 60  Total Cholesterol/HDL Ration Measurement: --  Triglycerides, Serum: 50

## 2022-05-17 RX ADMIN — MAGNESIUM OXIDE 400 MG ORAL TABLET 400 MILLIGRAM(S): 241.3 TABLET ORAL at 10:01

## 2022-05-17 RX ADMIN — ATORVASTATIN CALCIUM 20 MILLIGRAM(S): 80 TABLET, FILM COATED ORAL at 21:40

## 2022-05-17 RX ADMIN — Medication 2000 UNIT(S): at 10:01

## 2022-05-17 RX ADMIN — Medication 25 MILLIGRAM(S): at 10:01

## 2022-05-17 RX ADMIN — RIVAROXABAN 15 MILLIGRAM(S): KIT at 10:02

## 2022-05-17 RX ADMIN — MIRABEGRON 50 MILLIGRAM(S): 50 TABLET, EXTENDED RELEASE ORAL at 10:01

## 2022-05-17 RX ADMIN — SENNA PLUS 2 TABLET(S): 8.6 TABLET ORAL at 21:40

## 2022-05-17 RX ADMIN — SERTRALINE 100 MILLIGRAM(S): 25 TABLET, FILM COATED ORAL at 10:02

## 2022-05-17 NOTE — BH TREATMENT PLAN - NSTXMOBILINTERMD_PSY_ALL_CORE
15 min/day med management. Hearing aid retrieved. PT consulted. Planning for safe dispo back to home with mobility concerns in context.

## 2022-05-17 NOTE — BH INPATIENT PSYCHIATRY PROGRESS NOTE - NSBHCHARTREVIEWVS_PSY_A_CORE FT
Vital Signs Last 24 Hrs  T(C): 36.7 (05-16-22 @ 16:53), Max: 36.7 (05-16-22 @ 16:53)  T(F): 98.1 (05-16-22 @ 16:53), Max: 98.1 (05-16-22 @ 16:53)  HR: 71 (05-16-22 @ 16:53) (71 - 87)  BP: 171/67 (05-16-22 @ 16:53) (136/73 - 171/67)  BP(mean): --  RR: 18 (05-16-22 @ 16:53) (18 - 18)  SpO2: 95% (05-16-22 @ 16:53) (95% - 97%)     Vital Signs Last 24 Hrs  T(C): 36.4 (05-17-22 @ 10:05), Max: 36.7 (05-16-22 @ 16:53)  T(F): 97.6 (05-17-22 @ 10:05), Max: 98.1 (05-16-22 @ 16:53)  HR: 83 (05-17-22 @ 10:05) (71 - 83)  BP: 136/60 (05-17-22 @ 10:05) (136/60 - 171/67)  BP(mean): --  RR: 18 (05-17-22 @ 10:05) (18 - 18)  SpO2: 96% (05-17-22 @ 10:05) (95% - 96%)     Vital Signs Last 24 Hrs  T(C): 37 (05-17-22 @ 16:40), Max: 37 (05-17-22 @ 16:40)  T(F): 98.6 (05-17-22 @ 16:40), Max: 98.6 (05-17-22 @ 16:40)  HR: 71 (05-17-22 @ 16:40) (71 - 83)  BP: 141/48 (05-17-22 @ 16:40) (136/60 - 141/48)  BP(mean): --  RR: 18 (05-17-22 @ 16:40) (18 - 18)  SpO2: 97% (05-17-22 @ 16:40) (96% - 97%)

## 2022-05-17 NOTE — BH TREATMENT PLAN - NSBHPRIMARYDX_PSY_ALL_CORE
Major depressive disorder, recurrent episode    

## 2022-05-17 NOTE — BH TREATMENT PLAN - NSPTSTATEDGOAL_PSY_ALL_CORE
Unable to obtain due to patient's hearing disability during interview.
Unable to obtain due to patient's hearing disability during interview.
to go home
to go home
no clear statement of goal

## 2022-05-17 NOTE — BH TREATMENT PLAN - NSTXDEPRESINTERPR_PSY_ALL_CORE
Pt will continue to be invited and encouraged to attend all offered groups
Pt. will be invited and encouraged to attend all offered groups
Pt will continue to be invited and encouraged to attend all offered groups

## 2022-05-17 NOTE — BH TREATMENT PLAN - NSTXECTINTERMD_PSY_ALL_CORE
medication  management x 15 min daily, 1 hearing aid obtained & daughter working on getting another medication  management x 15 min daily, 1 hearing aid obtained & daughter working on getting another. ECT was ongoing. Possible discontinuation at this time. Planning discharge.

## 2022-05-17 NOTE — BH TREATMENT PLAN - NSDCCRITERIA_PSY_ALL_CORE
Patient is euthymic and able to work with others to maintain ADLs. 
Patient is euthymic and able to work with others to maintain ADLs. 
Pt no longer severely depressed
Patient is euthymic and able to work with others to maintain ADLs. 

## 2022-05-17 NOTE — BH TREATMENT PLAN - NSTXDEPRESINTERMD_PSY_ALL_CORE
ECT for depression ; holding Seroquel for now.  Might reinstate in future for depression.   Med management 15min/day. ECT for depression ; holding Seroquel for now.  Might reinstate in future for depression.

## 2022-05-17 NOTE — BH TREATMENT PLAN - NSCMSPTSTRENGTHS_PSY_ALL_CORE
Financial stability/Intelligence/Supportive family
Supportive family
Financial stability/Intelligence/Supportive family
Supportive family
Assertive/Intact family/Intelligence

## 2022-05-17 NOTE — BH INPATIENT PSYCHIATRY PROGRESS NOTE - NSBHFUPINTERVALHXFT_PSY_A_CORE
Patient continues to refuse ECT and requesting to be discharged. Multidisciplinary team met with family over the phone to discuss possible disposition options. Patient and family agreeable with plan for discharge on Friday with return to outpatient treatment, and possible outpatient ECT if Mr. Arriaga is agreeable. At this time he has shown fair response with improved mood and hayden affect. He denies SI/HI. Eats well and sleeps well.

## 2022-05-17 NOTE — BH TREATMENT PLAN - NSTXDEPRESGOAL_PSY_ALL_CORE
Other...
Attend and participate in at least 2 groups daily despite low mood/energy
Other...
Other...

## 2022-05-17 NOTE — BH INPATIENT PSYCHIATRY PROGRESS NOTE - CASE SUMMARY
89-year-old, left-handed retired man, domiciled with daughter Brianda (), w/ PMH of hearing loss using hearing aid, colitis, HLD, HTN, on Xarelto 15mg daily (PCP Dr Barrett  #1 #5), PPH of depression, multiple psych hospitalization requiring ECT, last 4 years ago at [facility], currently on sertraline 50mg bid, olanzapine 5mg qd, sees Dr Josh Simmons () for outpatient, presenting due to worsening depression and obsessive thought.   MoCA score 20/30. Due to current active depressive symptoms, nonresponsive to outpatient treatment, and collateral from psychiatrist and family, patient meets involuntary admission criteria. He has been admitted to 8U for stabilization and possible ECT as inpatient.     Pt cleared for ECT by medicine on 4/15/22 and cleared by Cardiology on 4/27/2022.      MDD w/psychotic features  Plan:  - Received first unilateral ECT session on (4/29); Patient s/p ECT Session 4 on 05/06/22.    - Discontinued Seroquel 2/2 to suspicion for drug-induced parkinsonian features and/or dizziness  - Continue sertraline 100 mg PO daily  - Seen by medicine for ECT clearance 4/15: They determine pt is Low-intermediate risk for low risk procedure  - Continue medication as follows:     Xarelto 15mg daily with meals     Losartan to 25 mg QD; 4/18/22, decrease Losartan to 25 mg (previously 50mg) daily as BPs have been low 4/18/22 and pt was orthostatics positive     Discontinued Furosemide 4/18/ 22; previously 20mg twice daily     Atorvastatin 20mg daily     Vit D3 2000 units     Magnesium 500mg     Mesalamine 2.4 mg     Mirabegron 50mg po QD (pt occasionally refusing)    Medicine consult, 4/15:  #Pre Op Risk   - EKG with rate controlled Afib, LAFB  - Re HTN: maintain /90   - Re: Afib: maintain anticoagulation with home xarelto   - Low-intermediate risk for low risk procedure    4/12: Pt reluctant to engage today; does not want to be on [facility]. Equivocating regarding ECT. Clearly depressed. MoCA 20/30. Will continue to monitor and to discuss ECT.    4/13: Pt remains ambivalent about treatment. Not asking for discharge today. Refused some labs, will try to get these again. Alert and oriented. No SI. Will follow up anemia.    4/14: at this point patient is irritable and negativistic refusing ECT as well as some medications and lab work.  Will move Zyprexa to nighttime (sleep an issue?); and Zoloft 100mg to am; continue to attempt to discuss need for labs and possible ECT if medical evaluation can be completed. Need to assess/address anemia.  Refusal may require consideration of TOO.    4/15: Pt continues to refuse ECT and routine blood work. Pt was seen by medicine team for clearance for ECT, after which he was determined to be "Low-intermediate risk for low risk procedure. Pt's daughter, Valery, states she is his medical proxy and will bring in form next week.    4/18: Lasix was held as BPs were low. BPs continued to be low and pt was newly orthostatic positive, tf decrease Losartan to 25 mg (previously 50mg) daily CTM. Pt still severely depressed; outpatient psychiatrist and daughters endorse ECT as having been the only effective treatment in the past. Pt continues to be ambivalent about it. He will not answer regarding SI today but endorsed passive SI on previous interview. Still with significant PMR and latency.    4/19: mute to writer but stares; observed interacting with staff; irritable speech clear and coherent; by observation appears generally Alert; oriented; cognition intact; speech clear; no tremor or evidence of movement impairment. No behavioral issues.      4/20: Continues to be despondent and ambivalent about treatments. Endorses hearing music which may be consistent with auditory hallucinations. Stated he was "hearing music coming from the room and following him around with words that he couldn't quite make out." Similar episode "last time I was suffering from this condition." Pt states the music "does not bother him." This resolved after 10 minutes. Otw no SI/HI/VH//delusions. Walking halls as if in fugue.  ADLs:  Barthel Index for Activities of Daily Living (ADL) from [employer].Workday  on 4/20/2022  RESULT SUMMARY:  65 points  Minimally dependent  INPUTS:  Feeding —> 5 = Needs help  Bathing —> 0 = Unable  Grooming —> 5 = Independent  Dressing —> 10 = Independent  Bowel control —> 5 = Occasional accident  Bladder control —> 5 = Occasional accident  Toilet use —> 5 = Needs help  Transfers (bed to chair and back) —> 15 = Independent  Mobility on level surfaces —> 15 = Independent (but may use any aid, e.g. stick) >50 yards  Stairs —> 0 = Unable    4/21/22: Yesterday (4/20) , pt "consented  verbally " to ECT in presence of RN and daughter. Today, again seems ambivalent. Pt appears more irritable today, shouting at interviewer. Did not ask interviewer to stay in room. Pt had "panic" episode yesterday and was given Zyprexa, which seemed to improve panic. Pt is compliant with all meds today.  Patient did not wish to sign consent and had no response when asked about this issue; however less clingy allowing staff to leave the room without panic.   Reported that patient had panic after "consent" was allegedly given on 4/20 verbally.       ;;04/22: patient states he is anxious rather than sad; Not endorsing  suicidal or homicidal ideation intent or plans; no mention of auditory or visual hallucinations ; does not consent to ECT (at this time).  Appeared less distraught today than yesterday.  However still feels "terrible";  Sleep  appetite ok; no pain issues. Alert; oriented; cognition intact; speech clear; but has tremor at rest thought to be EPS possibly secondary to Zyprexa.    ;;04/25: patient focused on leaving; continues somewhat irritable and feels "terrible" but less so when approached this morning.  Sleep an issue.    tremor not prominent.  makes better eye contact; less thought blocking;  a little more spontaneous.    ;;04/26: "The same"; patient a little more spontaneous; wants to go home; no indication of interest in ECT; appears a little less anxious; a little mobile; suggests improvement; sleep is still poor but appears to be improving.  bp continues with swings in diastolic bp.      Vital Signs Last 24 Hrs ;T(C): 36.4 (26 Apr 2022 08:45), Max: 37 (25 Apr 2022 17:07) ;T(F): 97.6 (26 Apr 2022 08:45), Max: 98.6 (25 Apr 2022 17:07) ;HR: 84 (26 Apr 2022 08:45) (82 - 84) ;BP: 140/62 (26 Apr 2022 08:45) (140/62 - 145/98) ;BP(mean): -- ;ABP: -- ;ABP(mean): -- ;RR: 18 (26 Apr 2022 08:45) (17 - 18) ;SpO2: 96% (26 Apr 2022 08:45) (96% - 97%) ; ;    ;;04/27: seen by cardiology scores 3 on stroke risk with 4 as cut off for too risky ; suggestion that patient be placed on warfarin; Dr. Simmons called and vm left for discussion; patient spoke to writer about breakfast and continues to be willing to consent to ECT ; a change from past discussions; however appears to have improved on Seroquel alone.    ;;04/28: above risk score related to Warfarin use and may not apply to this patient; however cardiology did score patient as low risk; Dr. Simmons contacted and treatment discussed with plan to monitor progress on Seroquel and Zoloft as patient had been stable on Zyprexa and Zoloft for many years; however this morning patient returns to being sad; irritable; "terrible"; and silent when some questions asked despite responding to others; consented for ECT yesterday; first treatment RUL in am tomorrow.      4/28: First ECT session scheduled for tomorrow, 4/29/2022. Seen in room today. NAD. A&Ox3. Still tremulous. Exhibiting new difficulty with getting up from a seated position and with ambulation. Very irritable. Pt ended interview abruptly when lunch was announced. Continues on Seroquel and Zoloft for anxiety and mood.      4/29: Seen s/p his first ECT session, which occurred earlier this morning. NAD. A&Ox3. Somnolent, but arousable, but almost immediately went back to sleep during conversation. Slurring his speech. Last night, pt had a witnessed near-fall, after which his mental status exam was unchanged from baseline. Decision was made to allow pt to rest and return later.  Seen 3.5 hours later. Somnolence resolved. Mildly tremulous initially, but it improved with time.  Mood appeared somewhat improved though still irritable.  Pt is endorsing feeling dizzy and stiffer than when he got to [facility]. He also stated that it is not normal for him to be shaking as he is. Mild cogwheel rigidity was appreciated in the right upper extremity. Onset of symptoms correlates with initiation of anti-psychotic augmentation of anti-depressant regimen. Pt is currently on Seroquel 50 as adjuvant treatment. Decision made to d/c Seroquel [out of abundance of caution re EPS and falls issues-RR] .  Of note: Per [employer], dizziness can occur in 1-3% of patients taking mirabegron (and in up to 9% with mesalamine but he has not taken this since April 23rd).    ;;05/01: may be demonstrating slight improvement; aware that Seroquel was stopped but this needs to be re-evaluated in view of early gains on meds.  On CO for falls; coarse tremor still present.  RUL ECT#2 on 5/2.   Discussion with Dr. Arthur (not Russell;  error) was that patient was stable on Zyprexa for years after ECT.   ;;05/02: no headache or physical distress ;no clear evidence of improvement; RUL may not be sufficient to be effective; however will encourage RUL ECT #3 as appears tolerated.  awaiting PT for falls assessment; on CO as falls risk.    ;;05/03: a little less constricted; while denies feeling better appears slightly brighter; on CO for falls risk; ECT #3 RUL in am.  No headaches reported.    ;;05/04: suggestion of improved mood (also observed by others) after RUL ECT #3; patient ambivalent about ECT after discharge; will continue inpatient course if patient continues to consent.   ;;05/05: reports of talking in his sleep but patient is brighter and less blocked this morning making good eye contact; almost smiling and agreeing to RUL ECT #4 tomorrow; Sleep  appetite ok; no pain issues.  eating breakfast.  On CO for falls.     05/06: ECT #4 completed with good response, lethargic today  05/07: Patient again lethargic today.  Patient reports "feeling better" and denies SI/HI, intent, plan and AVH.  The patient's mood is improved from yesterday.   ;;05/09: refused ECT #5 this am; discharged focused; monitor progression encourage completion of course of treatment.    ;;05/10: irritable but Not endorsing  suicidal or homicidal ideation intent or plans; no mention of auditory or visual hallucinations . Does not want any further ECT and Focused on discharge; wants to leave.  On CO for falls risk.    While the patient appears to agreed to ECT in am under intense discussion with daughter will need to confirm in am and make proper arrangements for timely preparation for a subsequent ECT on 5/13.   ;;05/11: as per staff meeting with daughter was intense; patient now asked "When are you doing it? (ECT)"  forgot that he had refused consistently before; when asked how he was doing "All right";  prior focused on going home.  Not endorsing  suicidal or homicidal ideation intent or plans; no mention of auditory or visual hallucinations .  If patient consents again tomorrow will schedule for ECT on 5/13 RUL #5.    ;;05/12: consistently requests further ECT; team conference including Dr. Franklin as consultant in agreement to proceed but at this point risk/benefit ratio of inpatient treatment is less favorable and will be evaluated on 5/15 for response before continuing inpatient ECT or referring for outpatient care.  Seroquel will NOT be started at this time ; wound care consulted and wraps provided; Dr. Doyle of ECT updated with discussion.  WIll attempt to update outpatient provider.    ;;05/15: had ECT #5 RUL on 5/13 and believes he is leaving tomorrow; informed that one more ECT to be scheduled; at this time does not wish treatment but should the patient change his mind time would be needed to make arrangements so will place order with possibility that ECT may not occur; continues on monotherapy of Zoloft 100mg for depression leaving open the question of Seroquel augmentation.  Sleep  appetite ok; no pain issues.    ;;05/16: focused on going home; thought today was Tuesday;  angry that not going home today; daughter visited and argues for another ECT patient clearly shouted no.  to continue to make arrangements for return to home unless patient changes his mind.    ;;05/17: family meeting with daughters via telephone; agree on discharge 5/20; Dr. Arthur contacted prior to meeting and open to outpatient appointment if daughters accept patient back; patient is angry and discharged focused but otherwise cooperative; remains on CO for falls.

## 2022-05-17 NOTE — BH TREATMENT PLAN - NSTXDEPRESGOALOTHER_PSY_ALL_CORE
Pt. will participate in groups and milieu treatment structure of the unit
Pt. will participate in groups and milieu treatment structure of the unit
Pt will participate in groups and milieu tx structure of unit
Pt. will participate in groups and milieu treatment structure of the unit
Patient will stabilize mood and alleviate symptoms of depression to engage in discharge planning.

## 2022-05-17 NOTE — BH INPATIENT PSYCHIATRY PROGRESS NOTE - NSBHASSESSSUMMFT_PSY_ALL_CORE
89-year-old, left-handed retired man, domiciled with daughter Brianda (), w/ PMH of hearing loss using hearing aid, colitis, HLD, HTN, on Xarelto 15mg daily (PCP Dr Barrett  #1 #5), PPH of depression, multiple psych hospitalization requiring ECT, last 4 years ago at [facility], currently on sertraline 50mg bid, olanzapine 5mg qd, sees Dr Josh Simmons () for outpatient, presenting due to worsening depression and obsessive thought.   MoCA score 20/30. Due to current active depressive symptoms, nonresponsive to outpatient treatment, and collateral from psychiatrist and family, patient meets involuntary admission criteria. He has been admitted to 8U for stabilization and possible ECT as inpatient.     Pt cleared for ECT by medicine on 4/15/22 and cleared by Cardiology on 4/27/2022.      MDD w/psychotic features  Plan:  - Received first unilateral ECT session on (4/29); Patient s/p ECT Session 4 on 05/06/22.    - Discontinued Seroquel 2/2 to suspicion for drug-induced parkinsonian features and/or dizziness  - Continue sertraline 100 mg PO daily  - Seen by medicine for ECT clearance 4/15: They determine pt is Low-intermediate risk for low risk procedure  - Continue medication as follows:     Xarelto 15mg daily with meals     Losartan to 25 mg QD; 4/18/22, decrease Losartan to 25 mg (previously 50mg) daily as BPs have been low 4/18/22 and pt was orthostatics positive     Discontinued Furosemide 4/18/ 22; previously 20mg twice daily     Atorvastatin 20mg daily     Vit D3 2000 units     Magnesium 500mg     Mesalamine 2.4 mg     Mirabegron 50mg po QD (pt occasionally refusing)    Medicine consult, 4/15:  #Pre Op Risk   - EKG with rate controlled Afib, LAFB  - Re HTN: maintain /90   - Re: Afib: maintain anticoagulation with home xarelto   - Low-intermediate risk for low risk procedure    4/12: Pt reluctant to engage today; does not want to be on [facility]. Equivocating regarding ECT. Clearly depressed. MoCA 20/30. Will continue to monitor and to discuss ECT.    4/13: Pt remains ambivalent about treatment. Not asking for discharge today. Refused some labs, will try to get these again. Alert and oriented. No SI. Will follow up anemia.    4/14: at this point patient is irritable and negativistic refusing ECT as well as some medications and lab work.  Will move Zyprexa to nighttime (sleep an issue?); and Zoloft 100mg to am; continue to attempt to discuss need for labs and possible ECT if medical evaluation can be completed. Need to assess/address anemia.  Refusal may require consideration of TOO.    4/15: Pt continues to refuse ECT and routine blood work. Pt was seen by medicine team for clearance for ECT, after which he was determined to be "Low-intermediate risk for low risk procedure. Pt's daughter, Valery, states she is his medical proxy and will bring in form next week.    4/18: Lasix was held as BPs were low. BPs continued to be low and pt was newly orthostatic positive, tf decrease Losartan to 25 mg (previously 50mg) daily CTM. Pt still severely depressed; outpatient psychiatrist and daughters endorse ECT as having been the only effective treatment in the past. Pt continues to be ambivalent about it. He will not answer regarding SI today but endorsed passive SI on previous interview. Still with significant PMR and latency.    4/19: mute to writer but stares; observed interacting with staff; irritable speech clear and coherent; by observation appears generally Alert; oriented; cognition intact; speech clear; no tremor or evidence of movement impairment. No behavioral issues.      4/20: Continues to be despondent and ambivalent about treatments. Endorses hearing music which may be consistent with auditory hallucinations. Stated he was "hearing music coming from the room and following him around with words that he couldn't quite make out." Similar episode "last time I was suffering from this condition." Pt states the music "does not bother him." This resolved after 10 minutes. Otw no SI/HI/VH//delusions. Walking halls as if in fugue.  ADLs:  Barthel Index for Activities of Daily Living (ADL) from [employer]  on 4/20/2022  RESULT SUMMARY:  65 points  Minimally dependent  INPUTS:  Feeding —> 5 = Needs help  Bathing —> 0 = Unable  Grooming —> 5 = Independent  Dressing —> 10 = Independent  Bowel control —> 5 = Occasional accident  Bladder control —> 5 = Occasional accident  Toilet use —> 5 = Needs help  Transfers (bed to chair and back) —> 15 = Independent  Mobility on level surfaces —> 15 = Independent (but may use any aid, e.g. stick) >50 yards  Stairs —> 0 = Unable    4/21/22: Yesterday (4/20) , pt "consented  verbally " to ECT in presence of RN and daughter. Today, again seems ambivalent. Pt appears more irritable today, shouting at interviewer. Did not ask interviewer to stay in room. Pt had "panic" episode yesterday and was given Zyprexa, which seemed to improve panic. Pt is compliant with all meds today.  Patient did not wish to sign consent and had no response when asked about this issue; however less clingy allowing staff to leave the room without panic.   Reported that patient had panic after "consent" was allegedly given on 4/20 verbally.       ;;04/22: patient states he is anxious rather than sad; Not endorsing  suicidal or homicidal ideation intent or plans; no mention of auditory or visual hallucinations ; does not consent to ECT (at this time).  Appeared less distraught today than yesterday.  However still feels "terrible";  Sleep  appetite ok; no pain issues. Alert; oriented; cognition intact; speech clear; but has tremor at rest thought to be EPS possibly secondary to Zyprexa.    ;;04/25: patient focused on leaving; continues somewhat irritable and feels "terrible" but less so when approached this morning.  Sleep an issue.    tremor not prominent.  makes better eye contact; less thought blocking;  a little more spontaneous.    ;;04/26: "The same"; patient a little more spontaneous; wants to go home; no indication of interest in ECT; appears a little less anxious; a little mobile; suggests improvement; sleep is still poor but appears to be improving.  bp continues with swings in diastolic bp.      Vital Signs Last 24 Hrs ;T(C): 36.4 (26 Apr 2022 08:45), Max: 37 (25 Apr 2022 17:07) ;T(F): 97.6 (26 Apr 2022 08:45), Max: 98.6 (25 Apr 2022 17:07) ;HR: 84 (26 Apr 2022 08:45) (82 - 84) ;BP: 140/62 (26 Apr 2022 08:45) (140/62 - 145/98) ;BP(mean): -- ;ABP: -- ;ABP(mean): -- ;RR: 18 (26 Apr 2022 08:45) (17 - 18) ;SpO2: 96% (26 Apr 2022 08:45) (96% - 97%) ; ;    ;;04/27: seen by cardiology scores 3 on stroke risk with 4 as cut off for too risky ; suggestion that patient be placed on warfarin; Dr. Simmons called and vm left for discussion; patient spoke to writer about breakfast and continues to be willing to consent to ECT ; a change from past discussions; however appears to have improved on Seroquel alone.    ;;04/28: above risk score related to Warfarin use and may not apply to this patient; however cardiology did score patient as low risk; Dr. Simmons contacted and treatment discussed with plan to monitor progress on Seroquel and Zoloft as patient had been stable on Zyprexa and Zoloft for many years; however this morning patient returns to being sad; irritable; "terrible"; and silent when some questions asked despite responding to others; consented for ECT yesterday; first treatment RUL in am tomorrow.      4/28: First ECT session scheduled for tomorrow, 4/29/2022. Seen in room today. NAD. A&Ox3. Still tremulous. Exhibiting new difficulty with getting up from a seated position and with ambulation. Very irritable. Pt ended interview abruptly when lunch was announced. Continues on Seroquel and Zoloft for anxiety and mood.      4/29: Seen s/p his first ECT session, which occurred earlier this morning. NAD. A&Ox3. Somnolent, but arousable, but almost immediately went back to sleep during conversation. Slurring his speech. Last night, pt had a witnessed near-fall, after which his mental status exam was unchanged from baseline. Decision was made to allow pt to rest and return later.  Seen 3.5 hours later. Somnolence resolved. Mildly tremulous initially, but it improved with time.  Mood appeared somewhat improved though still irritable.  Pt is endorsing feeling dizzy and stiffer than when he got to [facility]. He also stated that it is not normal for him to be shaking as he is. Mild cogwheel rigidity was appreciated in the right upper extremity. Onset of symptoms correlates with initiation of anti-psychotic augmentation of anti-depressant regimen. Pt is currently on Seroquel 50 as adjuvant treatment. Decision made to d/c Seroquel [out of abundance of caution re EPS and falls issues-RR] .  Of note: Per [employer], dizziness can occur in 1-3% of patients taking mirabegron (and in up to 9% with mesalamine but he has not taken this since April 23rd).    ;;05/01: may be demonstrating slight improvement; aware that Seroquel was stopped but this needs to be re-evaluated in view of early gains on meds.  On CO for falls; coarse tremor still present.  RUL ECT#2 on 5/2.   Discussion with Dr. Arthur (not [place];  error) was that patient was stable on Zyprexa for years after ECT.   ;;05/02: no headache or physical distress ;no clear evidence of improvement; RUL may not be sufficient to be effective; however will encourage RUL ECT #3 as appears tolerated.  awaiting PT for falls assessment; on CO as falls risk.    ;;05/03: a little less constricted; while denies feeling better appears slightly brighter; on CO for falls risk; ECT #3 RUL in am.  No headaches reported.    ;;05/04: suggestion of improved mood (also observed by others) after RUL ECT #3; patient ambivalent about ECT after discharge; will continue inpatient course if patient continues to consent.   ;;05/05: reports of talking in his sleep but patient is brighter and less blocked this morning making good eye contact; almost smiling and agreeing to RUL ECT #4 tomorrow; Sleep  appetite ok; no pain issues.  eating breakfast.  On CO for falls.     05/06: ECT #4 completed with good response, lethargic today  05/07: Patient again lethargic today.  Patient reports "feeling better" and denies SI/HI, intent, plan and AVH.  The patient's mood is improved from yesterday.   ;;05/09: refused ECT #5 this am; discharged focused; monitor progression encourage completion of course of treatment.    ;;05/10: irritable but Not endorsing  suicidal or homicidal ideation intent or plans; no mention of auditory or visual hallucinations . Does not want any further ECT and Focused on discharge; wants to leave.  On CO for falls risk.    While the patient appears to agreed to ECT in am under intense discussion with daughter will need to confirm in am and make proper arrangements for timely preparation for a subsequent ECT on 5/13.   ;;05/11: as per staff meeting with daughter was intense; patient now asked "When are you doing it? (ECT)"  forgot that he had refused consistently before; when asked how he was doing "All right";  prior focused on going home.  Not endorsing  suicidal or homicidal ideation intent or plans; no mention of auditory or visual hallucinations .  If patient consents again tomorrow will schedule for ECT on 5/13 RUL #5.    ;;05/12: consistently requests further ECT; team conference including Dr. Franklin as consultant in agreement to proceed but at this point risk/benefit ratio of inpatient treatment is less favorable and will be evaluated on 5/15 for response before continuing inpatient ECT or referring for outpatient care.  Seroquel will NOT be started at this time ; wound care consulted and wraps provided; Dr. Doyle of ECT updated with discussion.  WIll attempt to update outpatient provider.    ;;05/13: Pt received ECT #5 today, appears brighter in mood with more reactive affect. Plan likely at this time to administer one more treatment on Monday and discharge patient on tuesday. Will be discussing this plan with family and continue to monitor patient response. Given his age and vulnerability, risk benefit analysis is necessary to justify benefit of prolonging hospitalization any further  ;;05/15: had ECT #5 RUL on 5/13 and believes he is leaving tomorrow; informed that one more ECT to be scheduled; at this time does not wish treatment but should the patient change his mind time would be needed to make arrangements so will place order with possibility that ECT may not occur; continues on monotherapy of Zoloft 100mg for depression leaving open the question of Seroquel augmentation.  Sleep  appetite ok; no pain issues.    ;;05/16: Patient refused ECT #5 and is refusing any further inpatient ECT treatments. He is significantly improved since admission and their is no indication for treating him against his will at this time. Safe disposition plan needs to be put in place as patient has difficulty ambulating and attending to his ADLs. will likely require HHA assistance upon returning home.   ;;05/17: Multidisciplinary team met with patient family and agreed on safe outpatient plan. Patient is projected for dicharge this friday. Doing well, will continue on Zoloft, no further inpatient ECT treatments will be provided as this is against patients wishes.

## 2022-05-17 NOTE — BH INPATIENT PSYCHIATRY PROGRESS NOTE - MODIFICATIONS
family agrees to discharge 5/20; to contact Dr. Arthur for appointment and query Dr. Doyle regarding continuing ECT as outpatient; prepare medications and documents for discharge.

## 2022-05-17 NOTE — BH INPATIENT PSYCHIATRY PROGRESS NOTE - NSTXDEPRESGOALOTHER_PSY_ALL_CORE
Patient will stabilize mood & alleviate psychotic symptoms to engage with discharge planning
Pt. will participate in groups and milieu treatment structure of the unit
Patient will stabilize mood and alleviate symptoms of depression to engage in discharge planning.
Patient will stabilize mood and alleviate symptoms of depression to engage in discharge planning.
Pt will participate in groups and milieu tx structure of unit
Pt. will participate in groups and milieu treatment structure of the unit
Pt will participate in groups and milieu tx structure of unit
Pt. will participate in groups and milieu treatment structure of the unit
Pt. will participate in groups and milieu treatment structure of the unit
Patient will stabilize mood and alleviate symptoms of depression to engage in discharge planning.
Pt. will participate in groups and milieu treatment structure of the unit
Patient will stabilize mood and alleviate symptoms of depression to engage in discharge planning.
Pt. will participate in groups and milieu treatment structure of the unit
Patient will stabilize mood and alleviate symptoms of depression to engage in discharge planning.
Patient will stabilize mood and alleviate symptoms of depression to engage in discharge planning.
Pt. will participate in groups and milieu treatment structure of the unit
Patient will stabilize mood and alleviate symptoms of depression to engage in discharge planning.
Pt will participate in groups and milieu tx structure of unit
Pt will participate in groups and milieu tx structure of unit
Pt. will participate in groups and milieu treatment structure of the unit
Patient will stabilize mood & alleviate psychotic symptoms to engage with discharge planning
Pt. will participate in groups and milieu treatment structure of the unit
Patient will stabilize mood & alleviate psychotic symptoms to engage with discharge planning
Patient will stabilize mood and alleviate symptoms of depression to engage in discharge planning.
Pt. will participate in groups and milieu treatment structure of the unit
Pt will participate in groups and milieu tx structure of unit
Patient will stabilize mood and alleviate symptoms of depression to engage in discharge planning.

## 2022-05-17 NOTE — BH INPATIENT PSYCHIATRY PROGRESS NOTE - NSBHMETABOLIC_PSY_ALL_CORE_FT
BMI: BMI (kg/m2): 22.7 (05-13-22 @ 06:43)  HbA1c: A1C with Estimated Average Glucose Result: 5.7 % (04-13-22 @ 07:45)    Glucose: POCT Blood Glucose.: 190 mg/dL (04-19-22 @ 06:43)    BP: 171/67 (05-16-22 @ 16:53) (103/78 - 171/67)  Lipid Panel: Date/Time: 04-13-22 @ 07:45  Cholesterol, Serum: 120  Direct LDL: --  HDL Cholesterol, Serum: 60  Total Cholesterol/HDL Ration Measurement: --  Triglycerides, Serum: 50   BMI: BMI (kg/m2): 22.7 (05-13-22 @ 06:43)  HbA1c: A1C with Estimated Average Glucose Result: 5.7 % (04-13-22 @ 07:45)    Glucose: POCT Blood Glucose.: 190 mg/dL (04-19-22 @ 06:43)    BP: 136/60 (05-17-22 @ 10:05) (130/42 - 171/67)  Lipid Panel: Date/Time: 04-13-22 @ 07:45  Cholesterol, Serum: 120  Direct LDL: --  HDL Cholesterol, Serum: 60  Total Cholesterol/HDL Ration Measurement: --  Triglycerides, Serum: 50   BMI: BMI (kg/m2): 22.7 (05-13-22 @ 06:43)  HbA1c: A1C with Estimated Average Glucose Result: 5.7 % (04-13-22 @ 07:45)    Glucose: POCT Blood Glucose.: 190 mg/dL (04-19-22 @ 06:43)    BP: 141/48 (05-17-22 @ 16:40) (130/42 - 171/67)  Lipid Panel: Date/Time: 04-13-22 @ 07:45  Cholesterol, Serum: 120  Direct LDL: --  HDL Cholesterol, Serum: 60  Total Cholesterol/HDL Ration Measurement: --  Triglycerides, Serum: 50

## 2022-05-17 NOTE — BH TREATMENT PLAN - NSTXDEPRESINTERRN_PSY_ALL_CORE
Provide opportunity for patient and family/caregiver to express feelings, stressors and self-perception (e.g., verbalization, journaling).    Convey caring approach, empathy and potential for change; hope is key for recovery. Utilize existing coping strategies and assist in developing new strategies, such as relaxation, gratitude, music and problem-solving; assess for ineffective strategies (e.g., substance use, isolation). Recognize past and present achievements, successes and personal strengths. Empower participation in planning care and activities, as well as development of attainable goals, to increase self-esteem and feelings of control. Promote self-care; support balanced sleep, physical activity and nutrition.
Encourage patient to express his feelings and concerns, to adhere with medications and treatment. Encourage patient to attend groups. Help patient identify coping strategies that have worked in the past and support the use of these strategies.
Encourage patient to adhere with medications and treatment. Encourage patient to attend groups and verbalize her feelings and concerns. Help patient identify coping strategies that have worked in the past and support the use of these strategies.
Encourage patient to express his feelings and concerns, to adhere with medications and treatment. Encourage patient to attend groups. Help patient identify coping strategies that have worked in the past and support the use of these strategies.
Encourage patient to adhere with medications and treatment. Encourage patient to attend groups and verbalize her feelings and concerns. Help patient identify coping strategies that have worked in the past and support the use of these strategies.
Encourage patient to express his feelings and concerns, to adhere with medications and treatment. Encourage patient to attend groups. Help patient identify coping strategies that have worked in the past and support the use of these strategies.

## 2022-05-17 NOTE — BH TREATMENT PLAN - NSTXDEPRESINTERSW_PSY_ALL_CORE
Liaison with family and collateral providers as needed for discharge planning purposes.

## 2022-05-18 RX ORDER — RIVAROXABAN 15 MG-20MG
15 KIT ORAL DAILY
Refills: 0 | Status: DISCONTINUED | OUTPATIENT
Start: 2022-05-18 | End: 2022-05-20

## 2022-05-18 RX ADMIN — MAGNESIUM OXIDE 400 MG ORAL TABLET 400 MILLIGRAM(S): 241.3 TABLET ORAL at 11:00

## 2022-05-18 RX ADMIN — SENNA PLUS 2 TABLET(S): 8.6 TABLET ORAL at 21:10

## 2022-05-18 RX ADMIN — RIVAROXABAN 15 MILLIGRAM(S): KIT at 11:03

## 2022-05-18 RX ADMIN — Medication 2000 UNIT(S): at 10:59

## 2022-05-18 RX ADMIN — Medication 25 MILLIGRAM(S): at 11:01

## 2022-05-18 RX ADMIN — SERTRALINE 100 MILLIGRAM(S): 25 TABLET, FILM COATED ORAL at 11:01

## 2022-05-18 RX ADMIN — ATORVASTATIN CALCIUM 20 MILLIGRAM(S): 80 TABLET, FILM COATED ORAL at 21:10

## 2022-05-18 RX ADMIN — MIRABEGRON 50 MILLIGRAM(S): 50 TABLET, EXTENDED RELEASE ORAL at 11:03

## 2022-05-18 NOTE — BH INPATIENT PSYCHIATRY PROGRESS NOTE - NSTXDEPRESINTERMD_PSY_ALL_CORE
Med management 15min/day. ECT for depression ; holding Seroquel for now.  Might reinstate in future for depression.

## 2022-05-18 NOTE — BH INPATIENT PSYCHIATRY PROGRESS NOTE - CASE SUMMARY
89-year-old, left-handed retired man, domiciled with daughter Brianda (), w/ PMH of hearing loss using hearing aid, colitis, HLD, HTN, on Xarelto 15mg daily (PCP Dr Barrett  #1 #5), PPH of depression, multiple psych hospitalization requiring ECT, last 4 years ago at [facility], currently on sertraline 50mg bid, olanzapine 5mg qd, sees Dr Josh Simmons () for outpatient, presenting due to worsening depression and obsessive thought.   MoCA score 20/30. Due to current active depressive symptoms, nonresponsive to outpatient treatment, and collateral from psychiatrist and family, patient meets involuntary admission criteria. He has been admitted to 8U for stabilization and possible ECT as inpatient.     Pt cleared for ECT by medicine on 4/15/22 and cleared by Cardiology on 4/27/2022.      MDD w/psychotic features  Plan:  - Received first unilateral ECT session on (4/29); Patient s/p ECT Session 4 on 05/06/22.    - Discontinued Seroquel 2/2 to suspicion for drug-induced parkinsonian features and/or dizziness  - Continue sertraline 100 mg PO daily  - Seen by medicine for ECT clearance 4/15: They determine pt is Low-intermediate risk for low risk procedure  - Continue medication as follows:     Xarelto 15mg daily with meals     Losartan to 25 mg QD; 4/18/22, decrease Losartan to 25 mg (previously 50mg) daily as BPs have been low 4/18/22 and pt was orthostatics positive     Discontinued Furosemide 4/18/ 22; previously 20mg twice daily     Atorvastatin 20mg daily     Vit D3 2000 units     Magnesium 500mg     Mesalamine 2.4 mg     Mirabegron 50mg po QD (pt occasionally refusing)    Medicine consult, 4/15:  #Pre Op Risk   - EKG with rate controlled Afib, LAFB  - Re HTN: maintain /90   - Re: Afib: maintain anticoagulation with home xarelto   - Low-intermediate risk for low risk procedure    4/12: Pt reluctant to engage today; does not want to be on [facility]. Equivocating regarding ECT. Clearly depressed. MoCA 20/30. Will continue to monitor and to discuss ECT.    4/13: Pt remains ambivalent about treatment. Not asking for discharge today. Refused some labs, will try to get these again. Alert and oriented. No SI. Will follow up anemia.    4/14: at this point patient is irritable and negativistic refusing ECT as well as some medications and lab work.  Will move Zyprexa to nighttime (sleep an issue?); and Zoloft 100mg to am; continue to attempt to discuss need for labs and possible ECT if medical evaluation can be completed. Need to assess/address anemia.  Refusal may require consideration of TOO.    4/15: Pt continues to refuse ECT and routine blood work. Pt was seen by medicine team for clearance for ECT, after which he was determined to be "Low-intermediate risk for low risk procedure. Pt's daughter, Valery, states she is his medical proxy and will bring in form next week.    4/18: Lasix was held as BPs were low. BPs continued to be low and pt was newly orthostatic positive, tf decrease Losartan to 25 mg (previously 50mg) daily CTM. Pt still severely depressed; outpatient psychiatrist and daughters endorse ECT as having been the only effective treatment in the past. Pt continues to be ambivalent about it. He will not answer regarding SI today but endorsed passive SI on previous interview. Still with significant PMR and latency.    4/19: mute to writer but stares; observed interacting with staff; irritable speech clear and coherent; by observation appears generally Alert; oriented; cognition intact; speech clear; no tremor or evidence of movement impairment. No behavioral issues.      4/20: Continues to be despondent and ambivalent about treatments. Endorses hearing music which may be consistent with auditory hallucinations. Stated he was "hearing music coming from the room and following him around with words that he couldn't quite make out." Similar episode "last time I was suffering from this condition." Pt states the music "does not bother him." This resolved after 10 minutes. Otw no SI/HI/VH//delusions. Walking halls as if in fugue.  ADLs:  Barthel Index for Activities of Daily Living (ADL) from [email or web address]  on 4/20/2022  RESULT SUMMARY:  65 points  Minimally dependent  INPUTS:  Feeding —> 5 = Needs help  Bathing —> 0 = Unable  Grooming —> 5 = Independent  Dressing —> 10 = Independent  Bowel control —> 5 = Occasional accident  Bladder control —> 5 = Occasional accident  Toilet use —> 5 = Needs help  Transfers (bed to chair and back) —> 15 = Independent  Mobility on level surfaces —> 15 = Independent (but may use any aid, e.g. stick) >50 yards  Stairs —> 0 = Unable    4/21/22: Yesterday (4/20) , pt "consented  verbally " to ECT in presence of RN and daughter. Today, again seems ambivalent. Pt appears more irritable today, shouting at interviewer. Did not ask interviewer to stay in room. Pt had "panic" episode yesterday and was given Zyprexa, which seemed to improve panic. Pt is compliant with all meds today.  Patient did not wish to sign consent and had no response when asked about this issue; however less clingy allowing staff to leave the room without panic.   Reported that patient had panic after "consent" was allegedly given on 4/20 verbally.       ;;04/22: patient states he is anxious rather than sad; Not endorsing  suicidal or homicidal ideation intent or plans; no mention of auditory or visual hallucinations ; does not consent to ECT (at this time).  Appeared less distraught today than yesterday.  However still feels "terrible";  Sleep  appetite ok; no pain issues. Alert; oriented; cognition intact; speech clear; but has tremor at rest thought to be EPS possibly secondary to Zyprexa.    ;;04/25: patient focused on leaving; continues somewhat irritable and feels "terrible" but less so when approached this morning.  Sleep an issue.    tremor not prominent.  makes better eye contact; less thought blocking;  a little more spontaneous.    ;;04/26: "The same"; patient a little more spontaneous; wants to go home; no indication of interest in ECT; appears a little less anxious; a little mobile; suggests improvement; sleep is still poor but appears to be improving.  bp continues with swings in diastolic bp.      Vital Signs Last 24 Hrs ;T(C): 36.4 (26 Apr 2022 08:45), Max: 37 (25 Apr 2022 17:07) ;T(F): 97.6 (26 Apr 2022 08:45), Max: 98.6 (25 Apr 2022 17:07) ;HR: 84 (26 Apr 2022 08:45) (82 - 84) ;BP: 140/62 (26 Apr 2022 08:45) (140/62 - 145/98) ;BP(mean): -- ;ABP: -- ;ABP(mean): -- ;RR: 18 (26 Apr 2022 08:45) (17 - 18) ;SpO2: 96% (26 Apr 2022 08:45) (96% - 97%) ; ;    ;;04/27: seen by cardiology scores 3 on stroke risk with 4 as cut off for too risky ; suggestion that patient be placed on warfarin; Dr. Simmons called and vm left for discussion; patient spoke to writer about breakfast and continues to be willing to consent to ECT ; a change from past discussions; however appears to have improved on Seroquel alone.    ;;04/28: above risk score related to Warfarin use and may not apply to this patient; however cardiology did score patient as low risk; Dr. Simmons contacted and treatment discussed with plan to monitor progress on Seroquel and Zoloft as patient had been stable on Zyprexa and Zoloft for many years; however this morning patient returns to being sad; irritable; "terrible"; and silent when some questions asked despite responding to others; consented for ECT yesterday; first treatment RUL in am tomorrow.      4/28: First ECT session scheduled for tomorrow, 4/29/2022. Seen in room today. NAD. A&Ox3. Still tremulous. Exhibiting new difficulty with getting up from a seated position and with ambulation. Very irritable. Pt ended interview abruptly when lunch was announced. Continues on Seroquel and Zoloft for anxiety and mood.      4/29: Seen s/p his first ECT session, which occurred earlier this morning. NAD. A&Ox3. Somnolent, but arousable, but almost immediately went back to sleep during conversation. Slurring his speech. Last night, pt had a witnessed near-fall, after which his mental status exam was unchanged from baseline. Decision was made to allow pt to rest and return later.  Seen 3.5 hours later. Somnolence resolved. Mildly tremulous initially, but it improved with time.  Mood appeared somewhat improved though still irritable.  Pt is endorsing feeling dizzy and stiffer than when he got to [facility]. He also stated that it is not normal for him to be shaking as he is. Mild cogwheel rigidity was appreciated in the right upper extremity. Onset of symptoms correlates with initiation of anti-psychotic augmentation of anti-depressant regimen. Pt is currently on Seroquel 50 as adjuvant treatment. Decision made to d/c Seroquel [out of abundance of caution re EPS and falls issues-RR] .  Of note: Per UpToDate, dizziness can occur in 1-3% of patients taking mirabegron (and in up to 9% with mesalamine but he has not taken this since April 23rd).    ;;05/01: may be demonstrating slight improvement; aware that Seroquel was stopped but this needs to be re-evaluated in view of early gains on meds.  On CO for falls; coarse tremor still present.  RUL ECT#2 on 5/2.   Discussion with Dr. Arthur (not Sedgwick;  error) was that patient was stable on Zyprexa for years after ECT.   ;;05/02: no headache or physical distress ;no clear evidence of improvement; RUL may not be sufficient to be effective; however will encourage RUL ECT #3 as appears tolerated.  awaiting PT for falls assessment; on CO as falls risk.    ;;05/03: a little less constricted; while denies feeling better appears slightly brighter; on CO for falls risk; ECT #3 RUL in am.  No headaches reported.    ;;05/04: suggestion of improved mood (also observed by others) after RUL ECT #3; patient ambivalent about ECT after discharge; will continue inpatient course if patient continues to consent.   ;;05/05: reports of talking in his sleep but patient is brighter and less blocked this morning making good eye contact; almost smiling and agreeing to RUL ECT #4 tomorrow; Sleep  appetite ok; no pain issues.  eating breakfast.  On CO for falls.     05/06: ECT #4 completed with good response, lethargic today  05/07: Patient again lethargic today.  Patient reports "feeling better" and denies SI/HI, intent, plan and AVH.  The patient's mood is improved from yesterday.   ;;05/09: refused ECT #5 this am; discharged focused; monitor progression encourage completion of course of treatment.    ;;05/10: irritable but Not endorsing  suicidal or homicidal ideation intent or plans; no mention of auditory or visual hallucinations . Does not want any further ECT and Focused on discharge; wants to leave.  On CO for falls risk.    While the patient appears to agreed to ECT in am under intense discussion with daughter will need to confirm in am and make proper arrangements for timely preparation for a subsequent ECT on 5/13.   ;;05/11: as per staff meeting with daughter was intense; patient now asked "When are you doing it? (ECT)"  forgot that he had refused consistently before; when asked how he was doing "All right";  prior focused on going home.  Not endorsing  suicidal or homicidal ideation intent or plans; no mention of auditory or visual hallucinations .  If patient consents again tomorrow will schedule for ECT on 5/13 RUL #5.    ;;05/12: consistently requests further ECT; team conference including Dr. Franklin as consultant in agreement to proceed but at this point risk/benefit ratio of inpatient treatment is less favorable and will be evaluated on 5/15 for response before continuing inpatient ECT or referring for outpatient care.  Seroquel will NOT be started at this time ; wound care consulted and wraps provided; Dr. Doyle of ECT updated with discussion.  WIll attempt to update outpatient provider.    ;;05/15: had ECT #5 RUL on 5/13 and believes he is leaving tomorrow; informed that one more ECT to be scheduled; at this time does not wish treatment but should the patient change his mind time would be needed to make arrangements so will place order with possibility that ECT may not occur; continues on monotherapy of Zoloft 100mg for depression leaving open the question of Seroquel augmentation.  Sleep  appetite ok; no pain issues.    ;;05/16: focused on going home; thought today was Tuesday;  angry that not going home today; daughter visited and argues for another ECT patient clearly shouted no.  to continue to make arrangements for return to home unless patient changes his mind.    ;;05/17: family meeting with daughters via telephone; agree on discharge 5/20; Dr. Arthur contacted prior to meeting and open to outpatient appointment if daughters accept patient back; patient is angry and discharged focused but otherwise cooperative; remains on CO for falls.    ;;05/18: patient wanted to know what time he was leaving and was less irritable when approached this am.  Not endorsing  suicidal or homicidal ideation intent or plans; no mention of auditory or visual hallucinations .  on CO for falls. Dr. Arthur contacted.  To monitor possible leg swelling.

## 2022-05-18 NOTE — BH INPATIENT PSYCHIATRY PROGRESS NOTE - NSBHASSESSSUMMFT_PSY_ALL_CORE
89-year-old, left-handed retired man, domiciled with daughter Brianda (), w/ PMH of hearing loss using hearing aid, colitis, HLD, HTN, on Xarelto 15mg daily (PCP Dr Barrett  #1 #5), PPH of depression, multiple psych hospitalization requiring ECT, last 4 years ago at [facility], currently on sertraline 50mg bid, olanzapine 5mg qd, sees Dr Josh Simmons () for outpatient, presenting due to worsening depression and obsessive thought.   MoCA score 20/30. Due to current active depressive symptoms, nonresponsive to outpatient treatment, and collateral from psychiatrist and family, patient meets involuntary admission criteria. He has been admitted to 8U for stabilization and possible ECT as inpatient.     Pt cleared for ECT by medicine on 4/15/22 and cleared by Cardiology on 4/27/2022.      MDD w/psychotic features  Plan:  - Received first unilateral ECT session on (4/29); Patient s/p ECT Session 4 on 05/06/22.    - Discontinued Seroquel 2/2 to suspicion for drug-induced parkinsonian features and/or dizziness  - Continue sertraline 100 mg PO daily  - Seen by medicine for ECT clearance 4/15: They determine pt is Low-intermediate risk for low risk procedure  - Continue medication as follows:     Xarelto 15mg daily with meals     Losartan to 25 mg QD; 4/18/22, decrease Losartan to 25 mg (previously 50mg) daily as BPs have been low 4/18/22 and pt was orthostatics positive     Discontinued Furosemide 4/18/ 22; previously 20mg twice daily     Atorvastatin 20mg daily     Vit D3 2000 units     Magnesium 500mg     Mesalamine 2.4 mg     Mirabegron 50mg po QD (pt occasionally refusing)    Medicine consult, 4/15:  #Pre Op Risk   - EKG with rate controlled Afib, LAFB  - Re HTN: maintain /90   - Re: Afib: maintain anticoagulation with home xarelto   - Low-intermediate risk for low risk procedure    4/12: Pt reluctant to engage today; does not want to be on [facility]. Equivocating regarding ECT. Clearly depressed. MoCA 20/30. Will continue to monitor and to discuss ECT.    4/13: Pt remains ambivalent about treatment. Not asking for discharge today. Refused some labs, will try to get these again. Alert and oriented. No SI. Will follow up anemia.    4/14: at this point patient is irritable and negativistic refusing ECT as well as some medications and lab work.  Will move Zyprexa to nighttime (sleep an issue?); and Zoloft 100mg to am; continue to attempt to discuss need for labs and possible ECT if medical evaluation can be completed. Need to assess/address anemia.  Refusal may require consideration of TOO.    4/15: Pt continues to refuse ECT and routine blood work. Pt was seen by medicine team for clearance for ECT, after which he was determined to be "Low-intermediate risk for low risk procedure. Pt's daughter, Valery, states she is his medical proxy and will bring in form next week.    4/18: Lasix was held as BPs were low. BPs continued to be low and pt was newly orthostatic positive, tf decrease Losartan to 25 mg (previously 50mg) daily CTM. Pt still severely depressed; outpatient psychiatrist and daughters endorse ECT as having been the only effective treatment in the past. Pt continues to be ambivalent about it. He will not answer regarding SI today but endorsed passive SI on previous interview. Still with significant PMR and latency.    4/19: mute to writer but stares; observed interacting with staff; irritable speech clear and coherent; by observation appears generally Alert; oriented; cognition intact; speech clear; no tremor or evidence of movement impairment. No behavioral issues.      4/20: Continues to be despondent and ambivalent about treatments. Endorses hearing music which may be consistent with auditory hallucinations. Stated he was "hearing music coming from the room and following him around with words that he couldn't quite make out." Similar episode "last time I was suffering from this condition." Pt states the music "does not bother him." This resolved after 10 minutes. Otw no SI/HI/VH//delusions. Walking halls as if in fugue.  ADLs:  Barthel Index for Activities of Daily Living (ADL) from [email or web address]  on 4/20/2022  RESULT SUMMARY:  65 points  Minimally dependent  INPUTS:  Feeding —> 5 = Needs help  Bathing —> 0 = Unable  Grooming —> 5 = Independent  Dressing —> 10 = Independent  Bowel control —> 5 = Occasional accident  Bladder control —> 5 = Occasional accident  Toilet use —> 5 = Needs help  Transfers (bed to chair and back) —> 15 = Independent  Mobility on level surfaces —> 15 = Independent (but may use any aid, e.g. stick) >50 yards  Stairs —> 0 = Unable    4/21/22: Yesterday (4/20) , pt "consented  verbally " to ECT in presence of RN and daughter. Today, again seems ambivalent. Pt appears more irritable today, shouting at interviewer. Did not ask interviewer to stay in room. Pt had "panic" episode yesterday and was given Zyprexa, which seemed to improve panic. Pt is compliant with all meds today.  Patient did not wish to sign consent and had no response when asked about this issue; however less clingy allowing staff to leave the room without panic.   Reported that patient had panic after "consent" was allegedly given on 4/20 verbally.       ;;04/22: patient states he is anxious rather than sad; Not endorsing  suicidal or homicidal ideation intent or plans; no mention of auditory or visual hallucinations ; does not consent to ECT (at this time).  Appeared less distraught today than yesterday.  However still feels "terrible";  Sleep  appetite ok; no pain issues. Alert; oriented; cognition intact; speech clear; but has tremor at rest thought to be EPS possibly secondary to Zyprexa.    ;;04/25: patient focused on leaving; continues somewhat irritable and feels "terrible" but less so when approached this morning.  Sleep an issue.    tremor not prominent.  makes better eye contact; less thought blocking;  a little more spontaneous.    ;;04/26: "The same"; patient a little more spontaneous; wants to go home; no indication of interest in ECT; appears a little less anxious; a little mobile; suggests improvement; sleep is still poor but appears to be improving.  bp continues with swings in diastolic bp.      Vital Signs Last 24 Hrs ;T(C): 36.4 (26 Apr 2022 08:45), Max: 37 (25 Apr 2022 17:07) ;T(F): 97.6 (26 Apr 2022 08:45), Max: 98.6 (25 Apr 2022 17:07) ;HR: 84 (26 Apr 2022 08:45) (82 - 84) ;BP: 140/62 (26 Apr 2022 08:45) (140/62 - 145/98) ;BP(mean): -- ;ABP: -- ;ABP(mean): -- ;RR: 18 (26 Apr 2022 08:45) (17 - 18) ;SpO2: 96% (26 Apr 2022 08:45) (96% - 97%) ; ;    ;;04/27: seen by cardiology scores 3 on stroke risk with 4 as cut off for too risky ; suggestion that patient be placed on warfarin; Dr. Simmons called and vm left for discussion; patient spoke to writer about breakfast and continues to be willing to consent to ECT ; a change from past discussions; however appears to have improved on Seroquel alone.    ;;04/28: above risk score related to Warfarin use and may not apply to this patient; however cardiology did score patient as low risk; Dr. Simmons contacted and treatment discussed with plan to monitor progress on Seroquel and Zoloft as patient had been stable on Zyprexa and Zoloft for many years; however this morning patient returns to being sad; irritable; "terrible"; and silent when some questions asked despite responding to others; consented for ECT yesterday; first treatment RUL in am tomorrow.      4/28: First ECT session scheduled for tomorrow, 4/29/2022. Seen in room today. NAD. A&Ox3. Still tremulous. Exhibiting new difficulty with getting up from a seated position and with ambulation. Very irritable. Pt ended interview abruptly when lunch was announced. Continues on Seroquel and Zoloft for anxiety and mood.      4/29: Seen s/p his first ECT session, which occurred earlier this morning. NAD. A&Ox3. Somnolent, but arousable, but almost immediately went back to sleep during conversation. Slurring his speech. Last night, pt had a witnessed near-fall, after which his mental status exam was unchanged from baseline. Decision was made to allow pt to rest and return later.  Seen 3.5 hours later. Somnolence resolved. Mildly tremulous initially, but it improved with time.  Mood appeared somewhat improved though still irritable.  Pt is endorsing feeling dizzy and stiffer than when he got to [facility]. He also stated that it is not normal for him to be shaking as he is. Mild cogwheel rigidity was appreciated in the right upper extremity. Onset of symptoms correlates with initiation of anti-psychotic augmentation of anti-depressant regimen. Pt is currently on Seroquel 50 as adjuvant treatment. Decision made to d/c Seroquel [out of abundance of caution re EPS and falls issues-RR] .  Of note: Per UpToDate, dizziness can occur in 1-3% of patients taking mirabegron (and in up to 9% with mesalamine but he has not taken this since April 23rd).    ;;05/01: may be demonstrating slight improvement; aware that Seroquel was stopped but this needs to be re-evaluated in view of early gains on meds.  On CO for falls; coarse tremor still present.  RUL ECT#2 on 5/2.   Discussion with Dr. Arthur (not [place];  error) was that patient was stable on Zyprexa for years after ECT.   ;;05/02: no headache or physical distress ;no clear evidence of improvement; RUL may not be sufficient to be effective; however will encourage RUL ECT #3 as appears tolerated.  awaiting PT for falls assessment; on CO as falls risk.    ;;05/03: a little less constricted; while denies feeling better appears slightly brighter; on CO for falls risk; ECT #3 RUL in am.  No headaches reported.    ;;05/04: suggestion of improved mood (also observed by others) after RUL ECT #3; patient ambivalent about ECT after discharge; will continue inpatient course if patient continues to consent.   ;;05/05: reports of talking in his sleep but patient is brighter and less blocked this morning making good eye contact; almost smiling and agreeing to RUL ECT #4 tomorrow; Sleep  appetite ok; no pain issues.  eating breakfast.  On CO for falls.     05/06: ECT #4 completed with good response, lethargic today  05/07: Patient again lethargic today.  Patient reports "feeling better" and denies SI/HI, intent, plan and AVH.  The patient's mood is improved from yesterday.   ;;05/09: refused ECT #5 this am; discharged focused; monitor progression encourage completion of course of treatment.    ;;05/10: irritable but Not endorsing  suicidal or homicidal ideation intent or plans; no mention of auditory or visual hallucinations . Does not want any further ECT and Focused on discharge; wants to leave.  On CO for falls risk.    While the patient appears to agreed to ECT in am under intense discussion with daughter will need to confirm in am and make proper arrangements for timely preparation for a subsequent ECT on 5/13.   ;;05/11: as per staff meeting with daughter was intense; patient now asked "When are you doing it? (ECT)"  forgot that he had refused consistently before; when asked how he was doing "All right";  prior focused on going home.  Not endorsing  suicidal or homicidal ideation intent or plans; no mention of auditory or visual hallucinations .  If patient consents again tomorrow will schedule for ECT on 5/13 RUL #5.    ;;05/12: consistently requests further ECT; team conference including Dr. Franklin as consultant in agreement to proceed but at this point risk/benefit ratio of inpatient treatment is less favorable and will be evaluated on 5/15 for response before continuing inpatient ECT or referring for outpatient care.  Seroquel will NOT be started at this time ; wound care consulted and wraps provided; Dr. Doyle of ECT updated with discussion.  WIll attempt to update outpatient provider.    ;;05/13: Pt received ECT #5 today, appears brighter in mood with more reactive affect. Plan likely at this time to administer one more treatment on Monday and discharge patient on tuesday. Will be discussing this plan with family and continue to monitor patient response. Given his age and vulnerability, risk benefit analysis is necessary to justify benefit of prolonging hospitalization any further  ;;05/15: had ECT #5 RUL on 5/13 and believes he is leaving tomorrow; informed that one more ECT to be scheduled; at this time does not wish treatment but should the patient change his mind time would be needed to make arrangements so will place order with possibility that ECT may not occur; continues on monotherapy of Zoloft 100mg for depression leaving open the question of Seroquel augmentation.  Sleep  appetite ok; no pain issues.    ;;05/16: Patient refused ECT #5 and is refusing any further inpatient ECT treatments. He is significantly improved since admission and their is no indication for treating him against his will at this time. Safe disposition plan needs to be put in place as patient has difficulty ambulating and attending to his ADLs. will likely require HHA assistance upon returning home.   ;;05/17: Multidisciplinary team met with patient family and agreed on safe outpatient plan. Patient is projected for dicharge this friday. Doing well, will continue on Zoloft, no further inpatient ECT treatments will be provided as this is against patients wishes.   5/18: No updates, patient doing well, ready for discharge friday.

## 2022-05-18 NOTE — BH INPATIENT PSYCHIATRY PROGRESS NOTE - NSBHFUPINTERVALHXFT_PSY_A_CORE
He is eager to return home on Friday. Referral was made by [facility] for home PT. Patient is eating meals and sleeping at night. Affect is full range.

## 2022-05-18 NOTE — BH INPATIENT PSYCHIATRY PROGRESS NOTE - MODIFICATIONS
as per discussion with Dr. Quigley  outpatient treating psychiatrist will decrease Zoloft to 50mg and augment with Seroquel at 25mg;  Dr. Slade will see patient via remote on 5/23 at 4pm.  Will also add MVI to regime.

## 2022-05-18 NOTE — BH INPATIENT PSYCHIATRY PROGRESS NOTE - NSBHCHARTREVIEWVS_PSY_A_CORE FT
Vital Signs Last 24 Hrs  T(C): 37 (05-17-22 @ 16:40), Max: 37 (05-17-22 @ 16:40)  T(F): 98.6 (05-17-22 @ 16:40), Max: 98.6 (05-17-22 @ 16:40)  HR: 71 (05-17-22 @ 16:40) (71 - 83)  BP: 141/48 (05-17-22 @ 16:40) (136/60 - 141/48)  BP(mean): --  RR: 18 (05-17-22 @ 16:40) (18 - 18)  SpO2: 97% (05-17-22 @ 16:40) (96% - 97%)     Vital Signs Last 24 Hrs  T(C): 37 (05-18-22 @ 09:25), Max: 37 (05-17-22 @ 16:40)  T(F): 98.6 (05-18-22 @ 09:25), Max: 98.6 (05-17-22 @ 16:40)  HR: 86 (05-18-22 @ 09:25) (71 - 86)  BP: 115/49 (05-18-22 @ 09:25) (115/49 - 141/48)  BP(mean): --  RR: 18 (05-18-22 @ 09:25) (18 - 18)  SpO2: 97% (05-18-22 @ 09:25) (97% - 97%)     Vital Signs Last 24 Hrs  T(C): 36.6 (05-18-22 @ 14:29), Max: 37 (05-17-22 @ 16:40)  T(F): 97.8 (05-18-22 @ 14:29), Max: 98.6 (05-17-22 @ 16:40)  HR: 75 (05-18-22 @ 14:29) (71 - 86)  BP: 179/70 (05-18-22 @ 14:29) (115/49 - 179/70)  BP(mean): --  RR: 18 (05-18-22 @ 09:25) (18 - 18)  SpO2: 97% (05-18-22 @ 14:29) (97% - 97%)

## 2022-05-18 NOTE — BH INPATIENT PSYCHIATRY PROGRESS NOTE - NSBHMETABOLIC_PSY_ALL_CORE_FT
BMI: BMI (kg/m2): 22.7 (05-13-22 @ 06:43)  HbA1c: A1C with Estimated Average Glucose Result: 5.7 % (04-13-22 @ 07:45)    Glucose: POCT Blood Glucose.: 190 mg/dL (04-19-22 @ 06:43)    BP: 141/48 (05-17-22 @ 16:40) (130/42 - 171/67)  Lipid Panel: Date/Time: 04-13-22 @ 07:45  Cholesterol, Serum: 120  Direct LDL: --  HDL Cholesterol, Serum: 60  Total Cholesterol/HDL Ration Measurement: --  Triglycerides, Serum: 50   BMI: BMI (kg/m2): 22.7 (05-13-22 @ 06:43)  HbA1c: A1C with Estimated Average Glucose Result: 5.7 % (04-13-22 @ 07:45)    Glucose: POCT Blood Glucose.: 190 mg/dL (04-19-22 @ 06:43)    BP: 115/49 (05-18-22 @ 09:25) (115/49 - 171/67)  Lipid Panel: Date/Time: 04-13-22 @ 07:45  Cholesterol, Serum: 120  Direct LDL: --  HDL Cholesterol, Serum: 60  Total Cholesterol/HDL Ration Measurement: --  Triglycerides, Serum: 50   BMI: BMI (kg/m2): 22.7 (05-13-22 @ 06:43)  HbA1c: A1C with Estimated Average Glucose Result: 5.7 % (04-13-22 @ 07:45)    Glucose: POCT Blood Glucose.: 190 mg/dL (04-19-22 @ 06:43)    BP: 179/70 (05-18-22 @ 14:29) (115/49 - 179/70)  Lipid Panel: Date/Time: 04-13-22 @ 07:45  Cholesterol, Serum: 120  Direct LDL: --  HDL Cholesterol, Serum: 60  Total Cholesterol/HDL Ration Measurement: --  Triglycerides, Serum: 50

## 2022-05-19 PROCEDURE — 99232 SBSQ HOSP IP/OBS MODERATE 35: CPT

## 2022-05-19 RX ADMIN — ATORVASTATIN CALCIUM 20 MILLIGRAM(S): 80 TABLET, FILM COATED ORAL at 20:44

## 2022-05-19 RX ADMIN — MIRABEGRON 50 MILLIGRAM(S): 50 TABLET, EXTENDED RELEASE ORAL at 09:43

## 2022-05-19 RX ADMIN — RIVAROXABAN 15 MILLIGRAM(S): KIT at 09:44

## 2022-05-19 RX ADMIN — Medication 25 MILLIGRAM(S): at 09:44

## 2022-05-19 RX ADMIN — MAGNESIUM OXIDE 400 MG ORAL TABLET 400 MILLIGRAM(S): 241.3 TABLET ORAL at 09:43

## 2022-05-19 RX ADMIN — Medication 2000 UNIT(S): at 09:43

## 2022-05-19 RX ADMIN — SENNA PLUS 2 TABLET(S): 8.6 TABLET ORAL at 20:43

## 2022-05-19 RX ADMIN — SERTRALINE 100 MILLIGRAM(S): 25 TABLET, FILM COATED ORAL at 09:44

## 2022-05-19 NOTE — BH INPATIENT PSYCHIATRY PROGRESS NOTE - NSBHMSEGAIT_PSY_A_CORE
Normal gait / station
Other
Normal gait / station
Other
Normal gait / station
Normal gait / station
Other
Normal gait / station
Normal gait / station
Abnormal gait / station
Other
Unable to assess
Other
Unable to assess
Normal gait / station
Other
Abnormal gait / station
Abnormal gait / station
Normal gait / station
Other
Abnormal gait / station
Normal gait / station
Other
Unable to assess
Other
Abnormal gait / station
Other

## 2022-05-19 NOTE — BH INPATIENT PSYCHIATRY PROGRESS NOTE - NSBHADMITMEDEDUDETAILS_A_CORE FT
Attempted. 
Limited ; patient aware of headaches and memory issues as possible SEs 
Attempted. 
Limited ; patient aware of headaches and memory issues as possible SEs 
Limited ; patient aware of headaches and memory issues as possible SEs 
Attempted. 
Attempted. 
Limited ; patient aware of headaches and memory issues as possible SEs 
Attempted. 
Limited ; patient aware of headaches and memory issues as possible SEs 
Limited ; patient aware of headaches and memory issues as possible SEs 
Attempted. 
Limited ; patient aware of headaches and memory issues as possible SEs 
Limited ; patient aware of headaches and memory issues as possible SEs 
Attempted. 
Attempted. 
Limited ; patient aware of headaches and memory issues as possible SEs 
Attempted. 
Attempted. 
Limited ; patient aware of headaches and memory issues as possible SEs 
Attempted. 
Limited ; patient aware of headaches and memory issues as possible SEs 
Attempted. 
Limited ; patient aware of headaches and memory issues as possible SEs 
Attempted. 
Limited ; patient aware of headaches and memory issues as possible SEs 
Attempted. 
Limited ; patient aware of headaches and memory issues as possible SEs 
Limited ; patient aware of headaches and memory issues as possible SEs 
Attempted.

## 2022-05-19 NOTE — BH INPATIENT PSYCHIATRY PROGRESS NOTE - NS ED BHA REVIEW OF ED CHART VITAL SIGNS REVIEWED
Yes

## 2022-05-19 NOTE — BH INPATIENT PSYCHIATRY PROGRESS NOTE - NSTXPROBECT_PSY_ALL_CORE
ECT, PATIENT RECEIVING

## 2022-05-19 NOTE — BH INPATIENT PSYCHIATRY PROGRESS NOTE - NSBHROSSTATEMENT_PSY_A_CORE
.
eMERGENCY dEPARTMENT eNCOUnter      PCP: Andrés Martinez MD    [address] [facility]    [facility] [address]     \"[facility] [address]"   [facility]  Severo Johnson 411, PA  04/27/19 153
.

## 2022-05-19 NOTE — BH INPATIENT PSYCHIATRY PROGRESS NOTE - NSBHMSEMUSCLE_PSY_A_CORE
Normal muscle tone/strength
Unable to assess
Normal muscle tone/strength

## 2022-05-19 NOTE — BH INPATIENT PSYCHIATRY PROGRESS NOTE - GENERAL APPEARANCE
No deformities present

## 2022-05-19 NOTE — BH INPATIENT PSYCHIATRY PROGRESS NOTE - NSBHFUPINTERVALHXFT_PSY_A_CORE
"I am leaving today" informed that tomorrow is Friday.  "You have other staff" when informed that the writer will be out tomorrow .  Both said more calmly than usual and demonstrating relative orientation and comprehension of events.  Not endorsing  suicidal or homicidal ideation intent or plans; no mention of auditory or visual hallucinations  . on CO for falls.

## 2022-05-19 NOTE — BH INPATIENT PSYCHIATRY PROGRESS NOTE - NSTXECTDATETRGT_PSY_ALL_CORE
09-May-2022
16-May-2022
09-May-2022
16-May-2022
16-May-2022
09-May-2022
16-May-2022
16-May-2022
09-May-2022
16-May-2022

## 2022-05-19 NOTE — BH INPATIENT PSYCHIATRY PROGRESS NOTE - NS ED BHA REVIEW OF ED CHART AVAILABLE INVESTIGATIONS REVIEWED
None available
Yes
None available

## 2022-05-19 NOTE — BH INPATIENT PSYCHIATRY PROGRESS NOTE - NSCGISEVERILLNESS_PSY_ALL_CORE
5 = Markedly ill - intrusive symptoms that distinctly impair social/occupational function or cause intrusive levels of distress
4 = Moderately ill – overt symptoms causing noticeable, but modest, functional impairment or distress; symptom level may warrant medication
5 = Markedly ill - intrusive symptoms that distinctly impair social/occupational function or cause intrusive levels of distress
4 = Moderately ill – overt symptoms causing noticeable, but modest, functional impairment or distress; symptom level may warrant medication
4 = Moderately ill – overt symptoms causing noticeable, but modest, functional impairment or distress; symptom level may warrant medication
5 = Markedly ill - intrusive symptoms that distinctly impair social/occupational function or cause intrusive levels of distress
4 = Moderately ill – overt symptoms causing noticeable, but modest, functional impairment or distress; symptom level may warrant medication
5 = Markedly ill - intrusive symptoms that distinctly impair social/occupational function or cause intrusive levels of distress
4 = Moderately ill – overt symptoms causing noticeable, but modest, functional impairment or distress; symptom level may warrant medication
5 = Markedly ill - intrusive symptoms that distinctly impair social/occupational function or cause intrusive levels of distress
5 = Markedly ill - intrusive symptoms that distinctly impair social/occupational function or cause intrusive levels of distress
4 = Moderately ill – overt symptoms causing noticeable, but modest, functional impairment or distress; symptom level may warrant medication
5 = Markedly ill - intrusive symptoms that distinctly impair social/occupational function or cause intrusive levels of distress

## 2022-05-19 NOTE — BH INPATIENT PSYCHIATRY PROGRESS NOTE - NSBHCONSBHPROVDETAILS_PSY_A_CORE  FT
Team has spoken several times with Dr. Simmons

## 2022-05-19 NOTE — BH INPATIENT PSYCHIATRY PROGRESS NOTE - NS ED BHA MED ROS PSYCHIATRIC
See HPI

## 2022-05-19 NOTE — BH INPATIENT PSYCHIATRY PROGRESS NOTE - NSTXMOBILGOAL_PSY_ALL_CORE
Will practice basic/instrumental activities of living with appropriate assistance daily

## 2022-05-19 NOTE — BH INPATIENT PSYCHIATRY PROGRESS NOTE - NSTXPROBDEPRES_PSY_ALL_CORE
DEPRESSIVE SYMPTOMS

## 2022-05-19 NOTE — BH INPATIENT PSYCHIATRY PROGRESS NOTE - NSICDXBHSECONDARYDX_PSY_ALL_CORE
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  

## 2022-05-19 NOTE — BH INPATIENT PSYCHIATRY PROGRESS NOTE - NSBHPROGSUIC_PSY_A_CORE
no

## 2022-05-19 NOTE — BH INPATIENT PSYCHIATRY PROGRESS NOTE - NSBHATTENDATTEST_PSY_ALL_CORE
I have personally seen, examined and participated in the care of this patient. I have reviewed all pertinent clinical information, including history, physical exam, plan and the Medical/PA/NP Student’s note and agree except as noted.

## 2022-05-19 NOTE — BH INPATIENT PSYCHIATRY PROGRESS NOTE - NSTXECTDATEEST_PSY_ALL_CORE
03-Apr-2022

## 2022-05-19 NOTE — BH INPATIENT PSYCHIATRY PROGRESS NOTE - NSBHMSEAFFCONG_PSY_A_CORE
Congruent
Unable to assess
Congruent

## 2022-05-19 NOTE — BH INPATIENT PSYCHIATRY PROGRESS NOTE - NS ED BHA REVIEW OF ED CHART AVAILABLE LABS REVIEWED
None available
Yes
None available

## 2022-05-19 NOTE — BH INPATIENT PSYCHIATRY PROGRESS NOTE - NSICDXBHPRIMARYDX_PSY_ALL_CORE
Major depressive disorder, recurrent episode   F33.9  

## 2022-05-19 NOTE — BH INPATIENT PSYCHIATRY PROGRESS NOTE - NSTXECTGOAL_PSY_ALL_CORE
Maintain NPO status as indicated prior to procedure

## 2022-05-19 NOTE — BH INPATIENT PSYCHIATRY PROGRESS NOTE - NSBHASSESSSUMMFT_PSY_ALL_CORE
89-year-old, left-handed retired man, domiciled with daughter Brianda (), w/ PMH of hearing loss using hearing aid, colitis, HLD, HTN, on Xarelto 15mg daily (PCP Dr Barrett  #1 #5), PPH of depression, multiple psych hospitalization requiring ECT, last 4 years ago at [facility], currently on sertraline 50mg bid, olanzapine 5mg qd, sees Dr Josh Simmons () for outpatient, presenting due to worsening depression and obsessive thought.   MoCA score 20/30. Due to current active depressive symptoms, nonresponsive to outpatient treatment, and collateral from psychiatrist and family, patient meets involuntary admission criteria. He has been admitted to 8U for stabilization and possible ECT as inpatient.     Pt cleared for ECT by medicine on 4/15/22 and cleared by Cardiology on 4/27/2022.      MDD w/psychotic features  Plan:  - Received first unilateral ECT session on (4/29); Patient s/p ECT Session 4 on 05/06/22.    - Discontinued Seroquel 2/2 to suspicion for drug-induced parkinsonian features and/or dizziness  - Continue sertraline 100 mg PO daily  - Seen by medicine for ECT clearance 4/15: They determine pt is Low-intermediate risk for low risk procedure  - Continue medication as follows:     Xarelto 15mg daily with meals     Losartan to 25 mg QD; 4/18/22, decrease Losartan to 25 mg (previously 50mg) daily as BPs have been low 4/18/22 and pt was orthostatics positive     Discontinued Furosemide 4/18/ 22; previously 20mg twice daily     Atorvastatin 20mg daily     Vit D3 2000 units     Magnesium 500mg     Mesalamine 2.4 mg     Mirabegron 50mg po QD (pt occasionally refusing)    Medicine consult, 4/15:  #Pre Op Risk   - EKG with rate controlled Afib, LAFB  - Re HTN: maintain /90   - Re: Afib: maintain anticoagulation with home xarelto   - Low-intermediate risk for low risk procedure    4/12: Pt reluctant to engage today; does not want to be on [facility]. Equivocating regarding ECT. Clearly depressed. MoCA 20/30. Will continue to monitor and to discuss ECT.    4/13: Pt remains ambivalent about treatment. Not asking for discharge today. Refused some labs, will try to get these again. Alert and oriented. No SI. Will follow up anemia.    4/14: at this point patient is irritable and negativistic refusing ECT as well as some medications and lab work.  Will move Zyprexa to nighttime (sleep an issue?); and Zoloft 100mg to am; continue to attempt to discuss need for labs and possible ECT if medical evaluation can be completed. Need to assess/address anemia.  Refusal may require consideration of TOO.    4/15: Pt continues to refuse ECT and routine blood work. Pt was seen by medicine team for clearance for ECT, after which he was determined to be "Low-intermediate risk for low risk procedure. Pt's daughter, Valery, states she is his medical proxy and will bring in form next week.    4/18: Lasix was held as BPs were low. BPs continued to be low and pt was newly orthostatic positive, tf decrease Losartan to 25 mg (previously 50mg) daily CTM. Pt still severely depressed; outpatient psychiatrist and daughters endorse ECT as having been the only effective treatment in the past. Pt continues to be ambivalent about it. He will not answer regarding SI today but endorsed passive SI on previous interview. Still with significant PMR and latency.    4/19: mute to writer but stares; observed interacting with staff; irritable speech clear and coherent; by observation appears generally Alert; oriented; cognition intact; speech clear; no tremor or evidence of movement impairment. No behavioral issues.      4/20: Continues to be despondent and ambivalent about treatments. Endorses hearing music which may be consistent with auditory hallucinations. Stated he was "hearing music coming from the room and following him around with words that he couldn't quite make out." Similar episode "last time I was suffering from this condition." Pt states the music "does not bother him." This resolved after 10 minutes. Otw no SI/HI/VH//delusions. Walking halls as if in fugue.  ADLs:  Barthel Index for Activities of Daily Living (ADL) from [employer] Assist  on 4/20/2022  RESULT SUMMARY:  65 points  Minimally dependent  INPUTS:  Feeding —> 5 = Needs help  Bathing —> 0 = Unable  Grooming —> 5 = Independent  Dressing —> 10 = Independent  Bowel control —> 5 = Occasional accident  Bladder control —> 5 = Occasional accident  Toilet use —> 5 = Needs help  Transfers (bed to chair and back) —> 15 = Independent  Mobility on level surfaces —> 15 = Independent (but may use any aid, e.g. stick) >50 yards  Stairs —> 0 = Unable    4/21/22: Yesterday (4/20) , pt "consented  verbally " to ECT in presence of RN and daughter. Today, again seems ambivalent. Pt appears more irritable today, shouting at interviewer. Did not ask interviewer to stay in room. Pt had "panic" episode yesterday and was given Zyprexa, which seemed to improve panic. Pt is compliant with all meds today.  Patient did not wish to sign consent and had no response when asked about this issue; however less clingy allowing staff to leave the room without panic.   Reported that patient had panic after "consent" was allegedly given on 4/20 verbally.       ;;04/22: patient states he is anxious rather than sad; Not endorsing  suicidal or homicidal ideation intent or plans; no mention of auditory or visual hallucinations ; does not consent to ECT (at this time).  Appeared less distraught today than yesterday.  However still feels "terrible";  Sleep  appetite ok; no pain issues. Alert; oriented; cognition intact; speech clear; but has tremor at rest thought to be EPS possibly secondary to Zyprexa.    ;;04/25: patient focused on leaving; continues somewhat irritable and feels "terrible" but less so when approached this morning.  Sleep an issue.    tremor not prominent.  makes better eye contact; less thought blocking;  a little more spontaneous.    ;;04/26: "The same"; patient a little more spontaneous; wants to go home; no indication of interest in ECT; appears a little less anxious; a little mobile; suggests improvement; sleep is still poor but appears to be improving.  bp continues with swings in diastolic bp.      Vital Signs Last 24 Hrs ;T(C): 36.4 (26 Apr 2022 08:45), Max: 37 (25 Apr 2022 17:07) ;T(F): 97.6 (26 Apr 2022 08:45), Max: 98.6 (25 Apr 2022 17:07) ;HR: 84 (26 Apr 2022 08:45) (82 - 84) ;BP: 140/62 (26 Apr 2022 08:45) (140/62 - 145/98) ;BP(mean): -- ;ABP: -- ;ABP(mean): -- ;RR: 18 (26 Apr 2022 08:45) (17 - 18) ;SpO2: 96% (26 Apr 2022 08:45) (96% - 97%) ; ;    ;;04/27: seen by cardiology scores 3 on stroke risk with 4 as cut off for too risky ; suggestion that patient be placed on warfarin; Dr. Simmons called and vm left for discussion; patient spoke to writer about breakfast and continues to be willing to consent to ECT ; a change from past discussions; however appears to have improved on Seroquel alone.    ;;04/28: above risk score related to Warfarin use and may not apply to this patient; however cardiology did score patient as low risk; Dr. Simmons contacted and treatment discussed with plan to monitor progress on Seroquel and Zoloft as patient had been stable on Zyprexa and Zoloft for many years; however this morning patient returns to being sad; irritable; "terrible"; and silent when some questions asked despite responding to others; consented for ECT yesterday; first treatment RUL in am tomorrow.      4/28: First ECT session scheduled for tomorrow, 4/29/2022. Seen in room today. NAD. A&Ox3. Still tremulous. Exhibiting new difficulty with getting up from a seated position and with ambulation. Very irritable. Pt ended interview abruptly when lunch was announced. Continues on Seroquel and Zoloft for anxiety and mood.      4/29: Seen s/p his first ECT session, which occurred earlier this morning. NAD. A&Ox3. Somnolent, but arousable, but almost immediately went back to sleep during conversation. Slurring his speech. Last night, pt had a witnessed near-fall, after which his mental status exam was unchanged from baseline. Decision was made to allow pt to rest and return later.  Seen 3.5 hours later. Somnolence resolved. Mildly tremulous initially, but it improved with time.  Mood appeared somewhat improved though still irritable.  Pt is endorsing feeling dizzy and stiffer than when he got to [facility]. He also stated that it is not normal for him to be shaking as he is. Mild cogwheel rigidity was appreciated in the right upper extremity. Onset of symptoms correlates with initiation of anti-psychotic augmentation of anti-depressant regimen. Pt is currently on Seroquel 50 as adjuvant treatment. Decision made to d/c Seroquel [out of abundance of caution re EPS and falls issues-RR] .  Of note: Per UpToDate, dizziness can occur in 1-3% of patients taking mirabegron (and in up to 9% with mesalamine but he has not taken this since April 23rd).    ;;05/01: may be demonstrating slight improvement; aware that Seroquel was stopped but this needs to be re-evaluated in view of early gains on meds.  On CO for falls; coarse tremor still present.  RUL ECT#2 on 5/2.   Discussion with Dr. Arthur (not [place];  error) was that patient was stable on Zyprexa for years after ECT.   ;;05/02: no headache or physical distress ;no clear evidence of improvement; RUL may not be sufficient to be effective; however will encourage RUL ECT #3 as appears tolerated.  awaiting PT for falls assessment; on CO as falls risk.    ;;05/03: a little less constricted; while denies feeling better appears slightly brighter; on CO for falls risk; ECT #3 RUL in am.  No headaches reported.    ;;05/04: suggestion of improved mood (also observed by others) after RUL ECT #3; patient ambivalent about ECT after discharge; will continue inpatient course if patient continues to consent.   ;;05/05: reports of talking in his sleep but patient is brighter and less blocked this morning making good eye contact; almost smiling and agreeing to RUL ECT #4 tomorrow; Sleep  appetite ok; no pain issues.  eating breakfast.  On CO for falls.     05/06: ECT #4 completed with good response, lethargic today  05/07: Patient again lethargic today.  Patient reports "feeling better" and denies SI/HI, intent, plan and AVH.  The patient's mood is improved from yesterday.   ;;05/09: refused ECT #5 this am; discharged focused; monitor progression encourage completion of course of treatment.    ;;05/10: irritable but Not endorsing  suicidal or homicidal ideation intent or plans; no mention of auditory or visual hallucinations . Does not want any further ECT and Focused on discharge; wants to leave.  On CO for falls risk.    While the patient appears to agreed to ECT in am under intense discussion with daughter will need to confirm in am and make proper arrangements for timely preparation for a subsequent ECT on 5/13.   ;;05/11: as per staff meeting with daughter was intense; patient now asked "When are you doing it? (ECT)"  forgot that he had refused consistently before; when asked how he was doing "All right";  prior focused on going home.  Not endorsing  suicidal or homicidal ideation intent or plans; no mention of auditory or visual hallucinations .  If patient consents again tomorrow will schedule for ECT on 5/13 RUL #5.    ;;05/12: consistently requests further ECT; team conference including Dr. Franklin as consultant in agreement to proceed but at this point risk/benefit ratio of inpatient treatment is less favorable and will be evaluated on 5/15 for response before continuing inpatient ECT or referring for outpatient care.  Seroquel will NOT be started at this time ; wound care consulted and wraps provided; Dr. Doyle of ECT updated with discussion.  WIll attempt to update outpatient provider.    ;;05/15: had ECT #5 RUL on 5/13 and believes he is leaving tomorrow; informed that one more ECT to be scheduled; at this time does not wish treatment but should the patient change his mind time would be needed to make arrangements so will place order with possibility that ECT may not occur; continues on monotherapy of Zoloft 100mg for depression leaving open the question of Seroquel augmentation.  Sleep  appetite ok; no pain issues.    ;;05/16: focused on going home; thought today was Tuesday;  angry that not going home today; daughter visited and argues for another ECT patient clearly shouted no.  to continue to make arrangements for return to home unless patient changes his mind.    ;;05/17: family meeting with daughters via telephone; agree on discharge 5/20; Dr. Arthur contacted prior to meeting and open to outpatient appointment if daughters accept patient back; patient is angry and discharged focused but otherwise cooperative; remains on CO for falls.    ;;05/18: patient wanted to know what time he was leaving and was less irritable when approached this am.  Not endorsing  suicidal or homicidal ideation intent or plans; no mention of auditory or visual hallucinations .  on CO for falls. Dr. Arthur contacted.  To monitor possible leg swelling.   ;;05/19: patient aware of discharge tomorrow; patient responded in a coherent and relatively calm manner; on CO for falls; while lowering Zoloft and starting Seroquel are recommended cannot change medications on day prior to discharge.

## 2022-05-19 NOTE — BH INPATIENT PSYCHIATRY PROGRESS NOTE - NSICDXBHTERTIARYDX_PSY_ALL_CORE
R/O Essential hypernatremia   E87.0  R/O Hyperlipidemia   E78.5  
R/O Essential hypernatremia   E87.0  R/O Hyperlipidemia   E78.5  R/O Chronic atrial fibrillation   I48.20  
R/O Essential hypernatremia   E87.0  R/O Hyperlipidemia   E78.5  
R/O Essential hypernatremia   E87.0  R/O Hyperlipidemia   E78.5  R/O Chronic atrial fibrillation   I48.20  
R/O Essential hypernatremia   E87.0  R/O Hyperlipidemia   E78.5  
R/O Essential hypernatremia   E87.0  R/O Hyperlipidemia   E78.5  R/O Chronic atrial fibrillation   I48.20  
R/O Essential hypernatremia   E87.0  R/O Hyperlipidemia   E78.5  
R/O Essential hypernatremia   E87.0  R/O Hyperlipidemia   E78.5  R/O Chronic atrial fibrillation   I48.20  

## 2022-05-19 NOTE — BH INPATIENT PSYCHIATRY PROGRESS NOTE - NSBHADMITMEDEDUDETAILS_PSY_A_CORE FT
Patient consented to and acknowledges ECT treatment but does not perceive improvement at this time. 
Patient mostly shouts at writer negatvistic responses but did attempt to discuss
Patient consented to and acknowledges ECT treatment appears to appreciate some improvement at this time. 
Patient consented to and acknowledges ECT treatment appears to appreciate some improvement at this time. 
Patient mostly shouts at writer negatvistic responses but did attempt to discuss in past 
Patient consented to and acknowledges ECT treatment appears to appreciate some improvement at this time. 
Patient mostly shouts at writer negatvistic responses but did attempt to discuss
Patient consented to and acknowledges ECT treatment appears to appreciate some improvement at this time. 
Patient consented to and acknowledges ECT treatment appears to appreciate some improvement at this time. 
Patient mostly shouts at writer negatvistic responses but did attempt to discuss in past 
Patient consented to and acknowledges ECT treatment but does not perceive improvement at this time. 
Patient mostly shouts at writer negatvistic responses but did attempt to discuss in past 
Patient consented to and acknowledges ECT treatment appears to appreciate some improvement at this time. 
Patient mostly shouts at writer negatvistic responses but did attempt to discuss in past 
Provided today; pt expressed ambivalence about ECT; discussed with patient the indication for ECT for treatment-resistant depression and patient's longstanding psychiatric illness; pt expressed understanding and said he would think about it further and discuss with weekday team. 
Patient mostly shouts at writer negatvistic responses but did attempt to discuss in past 
Patient mostly shouts at writer negatvistic responses but did attempt to discuss in past 
Patient mostly shouts at writer negatvistic responses but did attempt to discuss
Patient mostly shouts at writer negatvistic responses but did attempt to discuss in past 
Patient mostly shouts at writer negatvistic responses but did attempt to discuss in past 
Patient consented to and acknowledges ECT treatment appears to appreciate some improvement at this time. 
Patient mostly shouts at writer negatvistic responses but did attempt to discuss in past 
Patient consented to and acknowledges ECT treatment appears to appreciate some improvement at this time. 
Patient mostly shouts at writer negatvistic responses but did attempt to discuss in past 
Patient mostly shouts at writer negatvistic responses but did attempt to discuss in past 
Patient consented to and acknowledges ECT treatment appears to appreciate some improvement at this time. 
Patient mostly shouts at writer negatvistic responses but did attempt to discuss in past 
Patient consented to and acknowledges ECT treatment appears to appreciate some improvement at this time. 
Patient mostly shouts at writer negatvistic responses but did attempt to discuss in past

## 2022-05-19 NOTE — BH INPATIENT PSYCHIATRY PROGRESS NOTE - NSDCCRITERIA_PSY_ALL_CORE
Patient is euthymic and able to work with others to maintain ADLs. 
Pt no longer severely depressed
Patient is euthymic and able to work with others to maintain ADLs. 
Pt no longer severely depressed
Patient is euthymic and able to work with others to maintain ADLs. 
Pt no longer severely depressed
Patient is euthymic and able to work with others to maintain ADLs. 

## 2022-05-19 NOTE — BH INPATIENT PSYCHIATRY PROGRESS NOTE - NSBHCONSULTIPREASON_PSY_A_CORE
danger to self; mental illness expected to respond to inpatient care

## 2022-05-19 NOTE — BH INPATIENT PSYCHIATRY PROGRESS NOTE - NSBHCHARTREVIEWVS_PSY_A_CORE FT
Vital Signs Last 24 Hrs  T(C): 37 (05-18-22 @ 19:30), Max: 37 (05-18-22 @ 09:25)  T(F): 98.6 (05-18-22 @ 19:30), Max: 98.6 (05-18-22 @ 09:25)  HR: 66 (05-18-22 @ 19:30) (66 - 86)  BP: 146/47 (05-18-22 @ 19:30) (115/49 - 179/70)  BP(mean): --  RR: 18 (05-18-22 @ 09:25) (18 - 18)  SpO2: 97% (05-18-22 @ 14:29) (97% - 97%)     Vital Signs Last 24 Hrs  T(C): 37 (05-18-22 @ 19:30), Max: 37 (05-18-22 @ 19:30)  T(F): 98.6 (05-18-22 @ 19:30), Max: 98.6 (05-18-22 @ 19:30)  HR: 70 (05-19-22 @ 09:00) (66 - 75)  BP: 168/97 (05-19-22 @ 09:00) (146/47 - 179/70)  BP(mean): --  RR: 16 (05-19-22 @ 09:00) (16 - 16)  SpO2: 96% (05-19-22 @ 09:00) (96% - 97%)

## 2022-05-19 NOTE — BH INPATIENT PSYCHIATRY PROGRESS NOTE - NSTXDEPRESDATETRGT_PSY_ALL_CORE
26-Apr-2022
19-May-2022
03-May-2022
10-May-2022
17-May-2022
03-May-2022
13-May-2022
03-May-2022
13-May-2022
30-Apr-2022
19-Apr-2022
13-May-2022
19-Apr-2022
17-May-2022
19-Apr-2022
24-May-2022
17-May-2022
17-May-2022
19-Apr-2022
19-Apr-2022
30-Apr-2022
30-Apr-2022
10-May-2022
17-May-2022
17-May-2022
19-Apr-2022
13-May-2022
17-May-2022
26-Apr-2022
13-May-2022
19-Apr-2022
26-Apr-2022
03-May-2022
24-May-2022
26-Apr-2022

## 2022-05-19 NOTE — BH INPATIENT PSYCHIATRY PROGRESS NOTE - NSTREATMENTCERTY_PSY_ALL_CORE

## 2022-05-19 NOTE — BH INPATIENT PSYCHIATRY PROGRESS NOTE - NSBHROSSYSTEMS_PSY_ALL_CORE
Psychiatric

## 2022-05-19 NOTE — BH INPATIENT PSYCHIATRY PROGRESS NOTE - NSTXECTINTERMD_PSY_ALL_CORE
medication  management x 15 min daily, 1 hearing aid obtained & daughter working on getting another
medication  management x 15 min daily, 1 hearing aid obtained & daughter working on getting another. ECT was ongoing. Possible discontinuation at this time. Planning discharge.
medication  management x 15 min daily, 1 hearing aid obtained & daughter working on getting another
medication  management x 15 min daily, 1 hearing aid obtained & daughter working on getting another
medication  management x 15 min daily, 1 hearing aid obtained & daughter working on getting another. ECT was ongoing. Possible discontinuation at this time. Planning discharge.
medication  management x 15 min daily, 1 hearing aid obtained & daughter working on getting another

## 2022-05-19 NOTE — BH INPATIENT PSYCHIATRY PROGRESS NOTE - NSBHMSEHYG_PSY_A_CORE
Good
Good
Fair
Good
Good
Fair
Good
Good
Fair
Fair
Good
Fair
Fair
Good
Fair
Good
Fair
Good
Fair
Good
Fair
Good
Fair

## 2022-05-19 NOTE — BH INPATIENT PSYCHIATRY PROGRESS NOTE - NSTXDEPRESDATENEW_PSY_ALL_CORE
19-May-2022
19-May-2022
24-May-2022
24-May-2022
19-May-2022

## 2022-05-19 NOTE — BH INPATIENT PSYCHIATRY PROGRESS NOTE - NSTXECTPROGRES_PSY_ALL_CORE
Met - goal discontinued
Improving
Improving
No Change
Improving
No Change
Met - goal discontinued
Improving
Met - goal discontinued

## 2022-05-19 NOTE — BH INPATIENT PSYCHIATRY PROGRESS NOTE - NSBHMETABOLIC_PSY_ALL_CORE_FT
BMI: BMI (kg/m2): 22.7 (05-13-22 @ 06:43)  HbA1c: A1C with Estimated Average Glucose Result: 5.7 % (04-13-22 @ 07:45)    Glucose: POCT Blood Glucose.: 190 mg/dL (04-19-22 @ 06:43)    BP: 146/47 (05-18-22 @ 19:30) (115/49 - 179/70)  Lipid Panel: Date/Time: 04-13-22 @ 07:45  Cholesterol, Serum: 120  Direct LDL: --  HDL Cholesterol, Serum: 60  Total Cholesterol/HDL Ration Measurement: --  Triglycerides, Serum: 50   BMI: BMI (kg/m2): 22.7 (05-13-22 @ 06:43)  HbA1c: A1C with Estimated Average Glucose Result: 5.7 % (04-13-22 @ 07:45)    Glucose: POCT Blood Glucose.: 190 mg/dL (04-19-22 @ 06:43)    BP: 168/97 (05-19-22 @ 09:00) (115/49 - 179/70)  Lipid Panel: Date/Time: 04-13-22 @ 07:45  Cholesterol, Serum: 120  Direct LDL: --  HDL Cholesterol, Serum: 60  Total Cholesterol/HDL Ration Measurement: --  Triglycerides, Serum: 50

## 2022-05-20 VITALS
RESPIRATION RATE: 18 BRPM | TEMPERATURE: 99 F | OXYGEN SATURATION: 96 % | DIASTOLIC BLOOD PRESSURE: 67 MMHG | SYSTOLIC BLOOD PRESSURE: 116 MMHG | HEART RATE: 78 BPM

## 2022-05-20 PROCEDURE — 97116 GAIT TRAINING THERAPY: CPT

## 2022-05-20 PROCEDURE — 80061 LIPID PANEL: CPT

## 2022-05-20 PROCEDURE — 83036 HEMOGLOBIN GLYCOSYLATED A1C: CPT

## 2022-05-20 PROCEDURE — 90870 ELECTROCONVULSIVE THERAPY: CPT

## 2022-05-20 PROCEDURE — 85025 COMPLETE CBC W/AUTO DIFF WBC: CPT

## 2022-05-20 PROCEDURE — 85027 COMPLETE CBC AUTOMATED: CPT

## 2022-05-20 PROCEDURE — 99285 EMERGENCY DEPT VISIT HI MDM: CPT

## 2022-05-20 PROCEDURE — 93005 ELECTROCARDIOGRAM TRACING: CPT

## 2022-05-20 PROCEDURE — 97110 THERAPEUTIC EXERCISES: CPT

## 2022-05-20 PROCEDURE — 97161 PT EVAL LOW COMPLEX 20 MIN: CPT

## 2022-05-20 PROCEDURE — 36415 COLL VENOUS BLD VENIPUNCTURE: CPT

## 2022-05-20 PROCEDURE — 97530 THERAPEUTIC ACTIVITIES: CPT

## 2022-05-20 PROCEDURE — 81003 URINALYSIS AUTO W/O SCOPE: CPT

## 2022-05-20 PROCEDURE — 82962 GLUCOSE BLOOD TEST: CPT

## 2022-05-20 PROCEDURE — 99239 HOSP IP/OBS DSCHRG MGMT >30: CPT

## 2022-05-20 PROCEDURE — 82550 ASSAY OF CK (CPK): CPT

## 2022-05-20 PROCEDURE — 87635 SARS-COV-2 COVID-19 AMP PRB: CPT

## 2022-05-20 PROCEDURE — 80053 COMPREHEN METABOLIC PANEL: CPT

## 2022-05-20 PROCEDURE — 80307 DRUG TEST PRSMV CHEM ANLYZR: CPT

## 2022-05-20 RX ORDER — SERTRALINE 25 MG/1
1 TABLET, FILM COATED ORAL
Qty: 30 | Refills: 0
Start: 2022-05-20 | End: 2022-06-18

## 2022-05-20 RX ORDER — MIRABEGRON 50 MG/1
1 TABLET, EXTENDED RELEASE ORAL
Qty: 30 | Refills: 0
Start: 2022-05-20 | End: 2022-06-18

## 2022-05-20 RX ORDER — ATORVASTATIN CALCIUM 80 MG/1
1 TABLET, FILM COATED ORAL
Qty: 30 | Refills: 0
Start: 2022-05-20 | End: 2022-06-18

## 2022-05-20 RX ORDER — MAGNESIUM OXIDE 400 MG ORAL TABLET 241.3 MG
1 TABLET ORAL
Qty: 30 | Refills: 0
Start: 2022-05-20 | End: 2022-06-18

## 2022-05-20 RX ORDER — CHOLECALCIFEROL (VITAMIN D3) 125 MCG
2000 CAPSULE ORAL
Qty: 1 | Refills: 0
Start: 2022-05-20 | End: 2022-06-18

## 2022-05-20 RX ORDER — METOPROLOL TARTRATE 50 MG
1 TABLET ORAL
Qty: 30 | Refills: 0
Start: 2022-05-20 | End: 2022-06-18

## 2022-05-20 RX ORDER — RIVAROXABAN 15 MG-20MG
1 KIT ORAL
Qty: 30 | Refills: 0
Start: 2022-05-20 | End: 2022-06-18

## 2022-05-20 RX ORDER — CHOLECALCIFEROL (VITAMIN D3) 125 MCG
1 CAPSULE ORAL
Qty: 30 | Refills: 0
Start: 2022-05-20 | End: 2022-06-18

## 2022-05-20 RX ORDER — SENNA PLUS 8.6 MG/1
2 TABLET ORAL
Qty: 60 | Refills: 0
Start: 2022-05-20 | End: 2022-06-18

## 2022-05-20 RX ADMIN — MAGNESIUM OXIDE 400 MG ORAL TABLET 400 MILLIGRAM(S): 241.3 TABLET ORAL at 11:20

## 2022-05-20 RX ADMIN — RIVAROXABAN 15 MILLIGRAM(S): KIT at 11:19

## 2022-05-20 RX ADMIN — Medication 25 MILLIGRAM(S): at 11:21

## 2022-05-20 RX ADMIN — SERTRALINE 100 MILLIGRAM(S): 25 TABLET, FILM COATED ORAL at 11:19

## 2022-05-20 RX ADMIN — MIRABEGRON 50 MILLIGRAM(S): 50 TABLET, EXTENDED RELEASE ORAL at 11:20

## 2022-05-20 RX ADMIN — Medication 2000 UNIT(S): at 11:20

## 2022-05-20 NOTE — BH DISCHARGE NOTE NURSING/SOCIAL WORK/PSYCH REHAB - PATIENT PORTAL LINK FT
You can access the FollowMyHealth Patient Portal offered by [facility] by registering at the following website: [email or web address]. By joining [facility]’s FollowMyHealth portal, you will also be able to view your health information using other applications (apps) compatible with our system.

## 2022-05-20 NOTE — BH DISCHARGE NOTE NURSING/SOCIAL WORK/PSYCH REHAB - NSCDUDCCRISIS_PSY_A_CORE
[facility]  1 [phone]) [facility]-WELL ([phone])  Text "WELL" to [phone]  Website: www.[employer] 1 (847) 541-RRRF (9862) Website: www.safehorizon.org/.National Suicide Prevention Lifeline 5 (841) 871-3540/.  [facility]  1 (728) LIFENET (548-7754)/.  [facility]  [address] [place] [address] [phone]   Hours:  Monday through Friday from 9 AM to 3 PM/.  [employer] - [facility] Crisis Line  3 [phone], Option 1

## 2022-05-20 NOTE — BH INPATIENT PSYCHIATRY DISCHARGE NOTE - NSDCMRMEDTOKEN_GEN_ALL_CORE_FT
atorvastatin 20 mg oral tablet: 1 tab(s) orally once a day  cholecalciferol oral tablet: 1 tab(s) orally once a day   magnesium oxide 400 mg oral tablet: 1 tab(s) orally once a day  metoprolol succinate 25 mg oral tablet, extended release: 1 tab(s) orally once a day  mirabegron 50 mg oral tablet, extended release: 1 tab(s) orally once a day  rivaroxaban 15 mg oral tablet: 1 tab(s) orally once a day  senna oral tablet: 2 tab(s) orally once a day (at bedtime)  sertraline 100 mg oral tablet: 1 tab(s) orally once a day

## 2022-05-20 NOTE — BH INPATIENT PSYCHIATRY DISCHARGE NOTE - NSBHMETABOLIC_PSY_ALL_CORE_FT
BMI: BMI (kg/m2): 22.7 (05-13-22 @ 06:43)  HbA1c: A1C with Estimated Average Glucose Result: 5.7 % (04-13-22 @ 07:45)    Glucose: POCT Blood Glucose.: 190 mg/dL (04-19-22 @ 06:43)    BP: 176/75 (05-19-22 @ 16:50) (115/49 - 179/70)  Lipid Panel: Date/Time: 04-13-22 @ 07:45  Cholesterol, Serum: 120  Direct LDL: --  HDL Cholesterol, Serum: 60  Total Cholesterol/HDL Ration Measurement: --  Triglycerides, Serum: 50

## 2022-05-20 NOTE — BH INPATIENT PSYCHIATRY DISCHARGE NOTE - HOSPITAL COURSE
Patient has been admitted to [facility] from April 11th to May 20th 2020. The patient was admitted for the purpose of receiving ECT treatment for his treatment refractory depression, as he has had a similar episode 4 years ago which responded to ECT. Upon admission assessment patient was very despondent and irritable. He exressed that he did not want to be in inpatient psychiatry and was discharged focused. For the first few days of his admission he was refusing basic labs and was minimally engaging with his treatment team. He provided verbal consent for ECT on 4/20, however the following day when he was asked to sign a consent, he had a panic attack and refused to sign, required PRN oral zyprexa to calm down. In the initial days in the hospital her reported predominate anxiety related complaints, was very scared to be left alone. He was accepting his home psychotropic medications of Zoloft and Zyprexa, however a resting tremor was observed and Zyprexa was switched to Seroquel 50mg. On 4/26 patient agreed to proceed with ECT treatment and on 4/27 he was seen by cardiology, who cleared him for ECT treatment. On 4/29 patient underwent first right temporal ECT session. Later that day patient was noted to have cogwheel rigidity, and reported dizzyness when trying to get from seated to standing position, so seroquel was discontinued. Patient was put on CO for fall risk. He proceeded with RUL ECT treatments according to schedule with notable improvements in his mood. On 5/9 he was scheduled for ECT session #5, however he refused that morning. He did not provide any reason for his refusal other than that he did not want to do it anymore and wanted to go home. His family came to visit him and encouraged him to continue with the treatment, as it was clearly helping him. He agreed to participate in one more session, which he received on 5/13. Patient is being discharged to the care of his previous psychiatrist Dr. Arthur. He is in agreement with this plan. He denies SI/HI/AVH. His mood is significantly improved from time of admission. He did not require NRT during this admission. Patient was referred for home PT to address the deconditioning during this hospitalization.     Discharged with 30 tablets of zoloft 100mg to be taken daily.

## 2022-05-20 NOTE — BH INPATIENT PSYCHIATRY DISCHARGE NOTE - OTHER PAST PSYCHIATRIC HISTORY (INCLUDE DETAILS REGARDING ONSET, COURSE OF ILLNESS, INPATIENT/OUTPATIENT TREATMENT)
Patient has history of depression, multiple hospitalizations, last ECT in 4 years ago at [facility]. Did not provide further information regarding his psychiatric illness.

## 2022-05-20 NOTE — BH INPATIENT PSYCHIATRY DISCHARGE NOTE - NSBHDCRISKMITIGATE_PSY_ALL_CORE
Safety planning/Maintenance ECT/Medications targeting suicidality/non-suicidal self injurious behavior Maintenance ECT/Medications targeting suicidality/non-suicidal self injurious behavior

## 2022-05-20 NOTE — BH INPATIENT PSYCHIATRY DISCHARGE NOTE - CASE SUMMARY
Pt eager for discharge, Team has coordinated with Dr. Simmons and Mr. Arriaga's daughters.     MSE- Fairly groomed & related, good EC. Much less PMR. Speech: wnl Mood: "Fine" Affect: less constricted TP: linear TC: -SI/HI/AH/VH/PI. I&J: fair

## 2022-05-20 NOTE — BH INPATIENT PSYCHIATRY DISCHARGE NOTE - NSDCPROCEDURESFT_PSY_ALL_CORE
Patient underwent 5 treatments of right unilateral ECT, with a positive response in mood and no reported adverse events.

## 2022-05-20 NOTE — BH DISCHARGE NOTE NURSING/SOCIAL WORK/PSYCH REHAB - BRAND OF FIRST COVID-19 BOOSTER
Alonso Sy)  Internal Medicine  [address] [facility], [address] [place] [address]  Phone: [phone]  Fax: [phone]  Follow Up Time:   
[employer]

## 2022-05-20 NOTE — BH INPATIENT PSYCHIATRY DISCHARGE NOTE - HPI (INCLUDE ILLNESS QUALITY, SEVERITY, DURATION, TIMING, CONTEXT, MODIFYING FACTORS, ASSOCIATED SIGNS AND SYMPTOMS)
Mr. Arriaga is a 89-year-old retired man, domiciled with daughter Brianda (), w/ PMH of hearing loss using hearing aid, colitis, HLD, HTN, on Xarelto 15mg daily (PCP Dr Barrett  #1 #5), PPH of depression, multiple psych hospitalization requiring ECT, last 4 years ago at [facility], currently on sertraline 50mg bid, olanzapine 5mg qd, sees Dr Josh Simmons () for outpatient, presenting due to worsening depression and obsessive thought.    Per [facility] service (Dr. Knox):  "Patient is sent on recommendation by psychiatrist as patient's depression is worsening, not responding to medications.  Patient is somewhat partially uncooperative... appears miserable, defeated, and annoyed. He kept repeating that people are not going to care for him as he could not hear them. Patient says that he has been feeling more depressed, has no olivia in doing his activities, sleeping more than usual (16hr/day), no energy, poor concentration, and 15 lbs weight loss. He has no active suicidal ideation but feels miserable that he is in the hospital and said to his daughter 'you might as well shoot me', denies having access to firearm or have weapons at home. He denies manic symptoms, denies nightmares. Thinks his memory is good. He could not identify triggers to his depressive symptom although noted improvements with ECT. He does not want to be admitted, and feel very concerned about his financial situation.    Daughters Valery and Brianda provided collateral. Brianda noted that after patient had colitis around a few weeks ago, patient's depression has worsened. When patient becomes more depressed, he became fixated with the family's finances and had concerns that there would be no food or that they would be kicked out from the apartment. Daughter had to put lock in her bedroom as patient would come to her bed to say that they are running out of money. Patient has become less active, refusing to exercise like he used to regularly. Daughter had tried to work with outpatient psychiatrist Dr Simmons to manage patient's symptoms as outpatient but he has not responded to medications, only to ECT like he was 3-4 years ago (according to daughter, 4-5 sessions of ECT would show improvements). Daughter had tried to seek outpatient ECT however the waitlist is 4-5 weeks. Daughters and psychiatrist are concerned of patient's current state of depression. Patient is completely independent with ADLs and daughter assist him with some iADLs."    Pt was admitted to [facility] on 2[facility]; transferred safely and without incident.    On initial interview on the unit, Mr. Arriaga presented much as described by Dr. Knox: slightly irritated, annoyed at being here, low affect, very hard of hearing. Writer had to repeat questions several times at a very loud volume, but Mr. Arriaga corroborated the story above; he admitted to having felt depressed for "a while" and that his outpatient medications were not particularly helpful. He was not able to fully elaborate on the details of his illness but did admit to "lots" of sleep, poor energy, low mood, poor concentration and lack of interest in activities. He asked repeatedly for discharge and equivocated regarding ECT, neither granting nor rescinding permission. He acknowledged that it had been helpful 4-5 years ago (calling it "ECP") but could not describe other successful (or even partially so) past treatments.  Mr. 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 He lives with his daughter (Brianda) but hates feeling like he relies on her (even though he is independent with ADLs per reports). He denied drug or alcohol use, denied SI, and denied symptoms of psychosis. He was unable to explain worries about running out of money (see above).      Note: Mr. Arriaga scored a 20/30 on today's [facility] exam. However, he is extremely hard of hearing. Also worth noting that he used "larger" F-words during exam, such as "forethought" or "flourish."    Call placed to kb Lamar ([phone]) at approximately 3 pm; left message.

## 2022-05-20 NOTE — BH INPATIENT PSYCHIATRY DISCHARGE NOTE - NSBHDCHANDOFFFT_PSY_ALL_CORE
Dr Arthur many discussions of treatment ; next appointment 5/23 at 4pm Dr Simmons many discussions of treatment ; next appointment 5/23 at 4pm

## 2022-05-23 DIAGNOSIS — I12.9 HYPERTENSIVE CHRONIC KIDNEY DISEASE WITH STAGE 1 THROUGH STAGE 4 CHRONIC KIDNEY DISEASE, OR UNSPECIFIED CHRONIC KIDNEY DISEASE: ICD-10-CM

## 2022-05-23 DIAGNOSIS — K59.00 CONSTIPATION, UNSPECIFIED: ICD-10-CM

## 2022-05-23 DIAGNOSIS — N18.31 CHRONIC KIDNEY DISEASE, STAGE 3A: ICD-10-CM

## 2022-05-23 DIAGNOSIS — Z87.19 PERSONAL HISTORY OF OTHER DISEASES OF THE DIGESTIVE SYSTEM: ICD-10-CM

## 2022-05-23 DIAGNOSIS — F33.2 MAJOR DEPRESSIVE DISORDER, RECURRENT SEVERE WITHOUT PSYCHOTIC FEATURES: ICD-10-CM

## 2022-05-23 DIAGNOSIS — R45.851 SUICIDAL IDEATIONS: ICD-10-CM

## 2022-05-23 DIAGNOSIS — E78.5 HYPERLIPIDEMIA, UNSPECIFIED: ICD-10-CM

## 2022-05-23 DIAGNOSIS — I48.91 UNSPECIFIED ATRIAL FIBRILLATION: ICD-10-CM

## 2022-05-23 DIAGNOSIS — H91.93 UNSPECIFIED HEARING LOSS, BILATERAL: ICD-10-CM

## 2022-06-02 NOTE — BH INPATIENT PSYCHIATRY PROGRESS NOTE - NSBHMSETHTASSOC_PSY_A_CORE
Quality 431: Preventive Care And Screening: Unhealthy Alcohol Use - Screening: Patient identified as an unhealthy alcohol user when screened for unhealthy alcohol use using a systematic screening method and received brief counseling Quality 130: Documentation Of Current Medications In The Medical Record: Current Medications Documented Quality 226: Preventive Care And Screening: Tobacco Use: Screening And Cessation Intervention: Patient screened for tobacco use and is an ex/non-smoker Quality 111:Pneumonia Vaccination Status For Older Adults: Pneumococcal vaccine administered on or after patient’s 60th birthday and before the end of the measurement period Detail Level: Detailed Normal

## 2023-08-09 NOTE — CONSULT NOTE ADULT - ASSESSMENT
89 yo M, hx of HTN, HLD, AF on xarelto, depression, multiple psych hospitalization requiring ECT, who presents for ECT.  Per primary team, ECT will be under general anesthesia, with brief induced seizures. Cardiology consulted for pre-op in the setting of AF.      #Pre-op  Patient at low risk for an intermediate risk procedure. Not in ACS or decompensated HF. Unable to assess mets.     #AF  Rate controlled in the 80s, though with induction of ECT, may cause worsening of tachycardia.   - c/w rivaroxaban 15mg daily   - recommend switching losartan 25 to toprol 25mg po daily     Will see once after ECT.  89 yo M, hx of HTN, HLD, AF on xarelto, depression, multiple psych hospitalization requiring ECT, who presents for ECT.  Per primary team, ECT will be under general anesthesia, with brief induced seizures. Cardiology consulted for pre-op in the setting of AF.      #Pre-op  Patient at low risk for an intermediate risk procedure. Not in ACS or decompensated HF. Unable to assess mets.   - no further cardiac testing needed prior to ECT     #AF  Rate controlled in the 80s, though with induction of ECT, may cause worsening of tachycardia.   - c/w rivaroxaban 15mg daily   - recommend switching losartan 25 to toprol 25mg po daily     Will see once after ECT.  9

## 2024-04-30 NOTE — BH INPATIENT PSYCHIATRY PROGRESS NOTE - NSTXDEPRESPROGRES_PSY_ALL_CORE
No Change
Improving
No Change
Improving
No Change
Improving
Worsening
Improving
No Change
11-May-2024
Improving
Improving
No Change
Improving
No Change
Improving
Met - goal discontinued
Improving
Improving
No Change
Improving
Worsening
Improving

## 2025-03-04 NOTE — BH INPATIENT PSYCHIATRY PROGRESS NOTE - NSBHATTESTSEENBY_PSY_A_CORE
Physical Exam    Vital Signs: I have reviewed the initial vital signs.  Head: NC/AT  Eyes: Conjunctiva pink, Sclera clear  Neck: Supple  Cardiovascular: S1 and S2, regular rate, regular rhythm, well-perfused extremities, radial pulses equal and 2+  Respiratory: tachypneic, b/l breath sounds, no wheezing, rales and rhonchi  Gastrointestinal: soft, non-tender, non-distended abdomen  Musculoskeletal: b/l pitting lower extremity edema  Skin: Warm, dry  Neurologic: awake, alert Attending Psychiatrist supervising NP/Trainee, meeting pt...

## 2025-03-06 NOTE — ED PROVIDER NOTE - PHYSICAL EXAMINATION
no
CONSTITUTIONAL: Well-appearing; in no apparent distress.   HEAD: Normocephalic; atraumatic.   EYES:  conjunctiva and sclera clear  ENT: normal nose; no rhinorrhea; normal pharynx with no erythema or lesions.   NECK: Supple; non-tender;   CARDIOVASCULAR: Normal S1, S2; no murmurs, rubs, or gallops. Regular rate and rhythm.   RESPIRATORY: Breathing easily; breath sounds clear and equal bilaterally; no wheezes, rhonchi, or rales.  GI: Soft; non-distended; non-tender; no palpable organomegaly.   EXT: No cyanosis or edema; N/V intact  SKIN: Normal for age and race; warm; dry; good turgor; no apparent lesions or rash.   NEURO: A & O x 3; face symmetric; grossly unremarkable.   PSYCHOLOGICAL: The patient’s mood and manner are appropriate. denies SI, HI, does not appear to be responding to internal stimuli